# Patient Record
Sex: FEMALE | Race: WHITE | Employment: OTHER | URBAN - METROPOLITAN AREA
[De-identification: names, ages, dates, MRNs, and addresses within clinical notes are randomized per-mention and may not be internally consistent; named-entity substitution may affect disease eponyms.]

---

## 2017-08-28 ENCOUNTER — OFFICE VISIT (OUTPATIENT)
Dept: INTERNAL MEDICINE CLINIC | Age: 82
End: 2017-08-28

## 2017-08-28 VITALS
RESPIRATION RATE: 20 BRPM | DIASTOLIC BLOOD PRESSURE: 65 MMHG | BODY MASS INDEX: 21.71 KG/M2 | TEMPERATURE: 98.4 F | WEIGHT: 115 LBS | HEART RATE: 68 BPM | SYSTOLIC BLOOD PRESSURE: 124 MMHG | HEIGHT: 61 IN | OXYGEN SATURATION: 96 %

## 2017-08-28 DIAGNOSIS — M19.90 OSTEOARTHRITIS, UNSPECIFIED OSTEOARTHRITIS TYPE, UNSPECIFIED SITE: ICD-10-CM

## 2017-08-28 DIAGNOSIS — I48.91 ATRIAL FIBRILLATION, UNSPECIFIED TYPE (HCC): Primary | ICD-10-CM

## 2017-08-28 DIAGNOSIS — R26.9 GAIT DISORDER: ICD-10-CM

## 2017-08-28 DIAGNOSIS — N32.81 OAB (OVERACTIVE BLADDER): ICD-10-CM

## 2017-08-28 DIAGNOSIS — M75.02 ADHESIVE CAPSULITIS OF BOTH SHOULDERS: ICD-10-CM

## 2017-08-28 DIAGNOSIS — M75.01 ADHESIVE CAPSULITIS OF BOTH SHOULDERS: ICD-10-CM

## 2017-08-28 RX ORDER — SENNOSIDES 8.6 MG/1
2 TABLET ORAL EVERY EVENING
COMMUNITY

## 2017-08-28 RX ORDER — LOSARTAN POTASSIUM 50 MG/1
25 TABLET ORAL DAILY
COMMUNITY
End: 2018-05-30

## 2017-08-28 RX ORDER — CHOLECALCIFEROL (VITAMIN D3) 125 MCG
2000 CAPSULE ORAL EVERY EVENING
COMMUNITY

## 2017-08-28 RX ORDER — METOPROLOL SUCCINATE 50 MG/1
TABLET, EXTENDED RELEASE ORAL DAILY
COMMUNITY
End: 2018-05-30

## 2017-08-28 RX ORDER — DILTIAZEM HYDROCHLORIDE EXTENDED-RELEASE TABLETS 240 MG/1
240 TABLET, EXTENDED RELEASE ORAL DAILY
Status: ON HOLD | COMMUNITY
End: 2018-05-30

## 2017-08-28 RX ORDER — OXYBUTYNIN CHLORIDE 5 MG/1
5 TABLET, EXTENDED RELEASE ORAL DAILY
COMMUNITY
End: 2018-02-09 | Stop reason: SDUPTHER

## 2017-08-28 NOTE — PROGRESS NOTES
Chief Complaint   Patient presents with    [facility]    New Order     PT   patient is here today with her niece, Vielka Zaldivar [phone].

## 2017-08-28 NOTE — PROGRESS NOTES
HISTORY OF PRESENT ILLNESS  Ananya Urbina is a 80 y.o. female. This patient presents today with her niece to establish care. The patient has recently moved to [facility] from [facility]. The patient states she is generally feeling well. She denies pain and shortness of breath at time of visit. She does have some gait weakness and uses a rollator for ambulation. She denies recent falls. 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 Patient's history includes Afib. She has appointment scheduled to establish with cardiology, Dr. Russel Davis next month. Visit Vitals    /65 (BP 1 Location: Right arm, BP Patient Position: Sitting)    Pulse 68    Temp 98.4 °F (36.9 °C) (Oral)    Resp 20    Ht 5' 1\" (1.549 m)    Wt 115 lb (52.2 kg)    SpO2 96%    BMI 21.73 kg/m2     HPI    Review of Systems   Constitutional: Negative for chills, fever, malaise/fatigue and weight loss. HENT: Negative for congestion. Eyes: Negative for blurred vision. Respiratory: Negative for cough, shortness of breath and wheezing. Cardiovascular: Negative for chest pain, palpitations and leg swelling. Gastrointestinal: Negative for abdominal pain. Genitourinary: Negative. Musculoskeletal: Negative. Skin: Negative. Neurological: Negative for dizziness and headaches. Endo/Heme/Allergies: Negative. Psychiatric/Behavioral: Negative. Physical Exam   Constitutional: She is oriented to person, place, and time. She appears well-developed and well-nourished. No distress. Ambulating with rollator   HENT:   Head: Normocephalic and atraumatic. Mouth/Throat: Oropharynx is clear and moist. No oropharyngeal exudate. [place]   Eyes: Conjunctivae are normal.   Neck: Neck supple. Cardiovascular: Normal rate, regular rhythm, normal heart sounds and intact distal pulses. No murmur heard. Pulmonary/Chest: Effort normal and breath sounds normal. No respiratory distress. She has no wheezes.  She has no rales. Abdominal: Soft. Bowel sounds are normal. She exhibits no distension. There is no tenderness. Musculoskeletal: She exhibits edema. Trace bilateral lower extremity edema  ROM bilateral shoulders limited with history of frozen shoulders   Lower extremity weakness   Neurological: She is alert and oriented to person, place, and time. Skin: Skin is warm and dry. Psychiatric: She has a normal mood and affect. Her behavior is normal.   Nursing note and vitals reviewed. ASSESSMENT and PLAN    ICD-10-CM ICD-9-CM    1. Atrial fibrillation, unspecified type [facility]) I48.91 427.31 Establish with cardiology, as scheduled. Taking Toprol XL 50 mg daily, Cardizem  mg daily, losartan 50 mg daily, and Xarelto 15 mg daily. 2. OAB (overactive bladder) N32.81 596.51 Taking Ditropan XL 10 mg daily   3. Osteoarthritis, unspecified osteoarthritis type, unspecified site M19.90 715.90 Will refer,   4. Gait disorder R26.9 781. 2 Will order  [address] [place]   5. Adhesive capsulitis of both shoulders M75.01 726.0 As above. M75.02       Lab results and schedule of future lab studies reviewed with patient  Reviewed diet, exercise and weight control  Reviewed medications and side effects in detail  Patient encouraged to call or return to office if symptoms do not improve or worsen. Reviewed plan of care with patient who acknowledges understanding and agrees. Follow-up in 3 months, or sooner as needed.

## 2017-08-28 NOTE — MR AVS SNAPSHOT
Visit Information Date & Time Provider Department Dept. Phone Encounter #  
 8/28/2017  3:30 PM Albina Bateman, [address] [facility] Internal Medicine [facility] [phone] [id number] Upcoming Health Maintenance Date Due DTaP/Tdap/Td series (1 - Tdap) 8/19/1950 ZOSTER VACCINE AGE 60> 6/19/1989 GLAUCOMA SCREENING Q2Y 8/19/1994 OSTEOPOROSIS SCREENING (DEXA) 8/19/1994 Pneumococcal 65+ Low/Medium Risk (1 of 2 - PCV13) 8/19/1994 MEDICARE YEARLY EXAM 8/19/1994 INFLUENZA AGE 9 TO ADULT 8/1/2017 Allergies as of 8/28/2017  Review Complete On: 8/28/2017 By: Albina Bateman NP No Known Allergies Current Immunizations  Never Reviewed No immunizations on file. Not reviewed this visit You Were Diagnosed With   
  
 Codes Comments Gait disorder    -  Primary ICD-10-CM: R26.9 ICD-9-CM: 138. 2 Vitals BP Pulse Temp Resp Height(growth percentile) Weight(growth percentile) 124/65 (BP 1 Location: Right arm, BP Patient Position: Sitting) 68 98.4 °F (36.9 °C) (Oral) 20 5' 1\" (1.549 m) 115 lb (52.2 kg) SpO2 BMI OB Status Smoking Status 96% 21.73 kg/m2 Postmenopausal Never Smoker BMI and BSA Data Body Mass Index Body Surface Area 21.73 kg/m 2 1.5 m 2 Preferred Pharmacy Pharmacy Name Phone [address], [facility] [phone] Your Updated Medication List  
  
   
This list is accurate as of: 8/28/17  4:26 PM.  Always use your most recent med list.  
  
  
  
  
 dilTIAZem  mg Tb24 tablet Commonly known as:  CARDIZEM LA Take 240 mg by mouth daily. DITROPAN XL 5 mg CR tablet Generic drug:  oxybutynin chloride XL Take 5 mg by mouth daily. 2 tablets PO daily. losartan 50 mg tablet Commonly known as:  COZAAR Take  by mouth daily. metoprolol succinate 50 mg XL tablet Commonly known as:  TOPROL-XL Take  by mouth daily. SENOKOT 8.6 mg tablet Generic drug:  senna Take 1 Tab by mouth daily. Take 2 tablets daily. VITAMIN D3 2,000 unit Tab Generic drug:  cholecalciferol (vitamin D3) Take  by mouth. XARELTO 15 mg Tab tablet Generic drug:  rivaroxaban Take  by mouth daily. We Performed the Following [address] [place] [address] Comments:  
 Please evaluate patient for PT:  Gait weakness, bilateral frozen shoulder Referral Information Referral ID Referred By Referred To  
  
 [id number] Deanna Espino Not Available Visits Status Start Date End Date 1 New Request 8/28/17 8/28/18 If your referral has a status of pending review or denied, additional information will be sent to support the outcome of this decision. Please provide this summary of care documentation to your next provider. If you have any questions after today's visit, please call [phone].

## 2017-08-31 ENCOUNTER — TELEPHONE (OUTPATIENT)
Dept: INTERNAL MEDICINE CLINIC | Age: 82
End: 2017-08-31

## 2017-09-05 ENCOUNTER — TELEPHONE (OUTPATIENT)
Dept: INTERNAL MEDICINE CLINIC | Age: 82
End: 2017-09-05

## 2017-09-05 NOTE — TELEPHONE ENCOUNTER
Received message from Naina Escalante with [facility]. Patient started PT today. Jorge Luis Newberry recommends OT referral as well for assistance with bathing and dressing. Verbal order provided. Jorge Luis Newberry requests medication list for home health services.     Requests list be faxed to #[phone]

## 2017-10-10 ENCOUNTER — TELEPHONE (OUTPATIENT)
Dept: INTERNAL MEDICINE CLINIC | Age: 82
End: 2017-10-10

## 2017-11-21 ENCOUNTER — OFFICE VISIT (OUTPATIENT)
Dept: INTERNAL MEDICINE CLINIC | Age: 82
End: 2017-11-21

## 2017-11-21 VITALS
TEMPERATURE: 98.3 F | DIASTOLIC BLOOD PRESSURE: 58 MMHG | HEART RATE: 75 BPM | OXYGEN SATURATION: 98 % | HEIGHT: 61 IN | SYSTOLIC BLOOD PRESSURE: 135 MMHG | RESPIRATION RATE: 20 BRPM

## 2017-11-21 DIAGNOSIS — I48.91 ATRIAL FIBRILLATION, UNSPECIFIED TYPE (HCC): ICD-10-CM

## 2017-11-21 DIAGNOSIS — M54.50 ACUTE LEFT-SIDED LOW BACK PAIN WITHOUT SCIATICA: Primary | ICD-10-CM

## 2017-11-21 DIAGNOSIS — M62.838 MUSCLE SPASM: ICD-10-CM

## 2017-11-21 DIAGNOSIS — M41.9 SCOLIOSIS, UNSPECIFIED SCOLIOSIS TYPE, UNSPECIFIED SPINAL REGION: ICD-10-CM

## 2017-11-21 RX ORDER — BACLOFEN 10 MG/1
10 TABLET ORAL
Qty: 20 TAB | Refills: 0 | Status: SHIPPED | OUTPATIENT
Start: 2017-11-21 | End: 2017-12-05 | Stop reason: SDUPTHER

## 2017-11-21 RX ORDER — BACLOFEN 10 MG/1
10 TABLET ORAL
Qty: 20 TAB | Refills: 0 | Status: SHIPPED | OUTPATIENT
Start: 2017-11-21 | End: 2017-11-21 | Stop reason: SDUPTHER

## 2017-11-21 NOTE — PROGRESS NOTES
HISTORY OF PRESENT ILLNESS  Amanda Hill is a 80 y.o. female here accompanied by her niece . She has been suffering from acute lower back pain for last 5 days. Pain is moderate to severe in intensity. She did not fall no injury. Pain get better if she lie down get worse if she sits up. She has scoliosis. Using specialized bed with running. No leg weakness or bowel bladder problem. Has A. fib, seeing cardiologist.  On Xarelto 20 mg once a day for now labs done in cardiologist office. CBC CMP are stable. No other discomfort. HPI    Review of Systems   Constitutional: Negative. HENT: Negative. Eyes: Negative. Respiratory: Negative. Cardiovascular: Negative. Genitourinary: Negative. Musculoskeletal: Positive for back pain and joint pain. Negative for falls. Skin: Negative. Neurological: Negative. Negative for sensory change and speech change. Physical Exam   Constitutional: She appears well-developed and well-nourished. She appears distressed. Neck: Normal range of motion. Neck supple. No JVD present. No thyromegaly present. Cardiovascular: Normal rate, regular rhythm, normal heart sounds and intact distal pulses. Pulmonary/Chest: Effort normal and breath sounds normal. No respiratory distress. She has no wheezes. Musculoskeletal: She exhibits no edema. Lower back: Spine nontender, has scoliosis present. Left-sided paravertebral muscle spasm present tender on left side muscle. ROM restricted   Lymphadenopathy:     She has no cervical adenopathy. Neurological: She displays normal reflexes. No cranial nerve deficit. She exhibits normal muscle tone. Coordination normal.   Psychiatric: She has a normal mood and affect. Her behavior is normal.       ASSESSMENT and PLAN  Diagnoses and all orders for this visit:    1.  Acute left-sided low back pain without sciatica    Musculoskeletal.  Will give,  -     KETOROLAC TROMETHAMINE INJ 15 MG IM.  -     AR THER/PROPH/DIAG INJECTION, SUBCUT/IM    Will call in,  -     baclofen (LIORESAL) 10 mg tablet; Take 1 Tab by mouth two (2) times daily as needed. Advised to use Tylenol 2-3 times a day. 2. Muscle spasm    Heating pad and massage. Will call in,  -     baclofen (LIORESAL) 10 mg tablet; Take 1 Tab by mouth two (2) times daily as needed. 3. Scoliosis, unspecified scoliosis type, unspecified spinal region    On special mattress with railing. Advised to take her time to get up and come to age of BED TO stand up. 4. Atrial fibrillation, unspecified type (HCC)    Stable on Toprol and Xarelto 20 mg once a day. Seeing cardiologist Dr. Rosaura Ramos. Labs reviewed from cardiologist office. CBC CMP are stable. Discussed expected course/resolution/complications of diagnosis in detail with patient. Medication risks/benefits/costs/interactions/alternatives discussed with patient. Pt was given an after visit summary which includes diagnoses, current medications & vitals. Pt expressed understanding with the diagnosis and plan.

## 2017-11-21 NOTE — MR AVS SNAPSHOT
Visit Information Date & Time Provider Department Dept. Phone Encounter #  
 11/21/2017 11:00 AM Miguel Angel Kramer MD [facility] Internal Medicine [facility] [phone] [id number] Upcoming Health Maintenance Date Due DTaP/Tdap/Td series (1 - Tdap) 8/19/1950 ZOSTER VACCINE AGE 60> 6/19/1989 GLAUCOMA SCREENING Q2Y 8/19/1994 OSTEOPOROSIS SCREENING (DEXA) 8/19/1994 Pneumococcal 65+ Low/Medium Risk (1 of 2 - PCV13) 8/19/1994 MEDICARE YEARLY EXAM 8/19/1994 Influenza Age 5 to Adult 8/1/2017 Allergies as of 11/21/2017  Review Complete On: 11/21/2017 By: Miguel Angel Kramer MD  
 No Known Allergies Current Immunizations  Never Reviewed No immunizations on file. Not reviewed this visit You Were Diagnosed With   
  
 Codes Comments Acute left-sided low back pain without sciatica    -  Primary ICD-10-CM: M54.5 ICD-9-CM: 724.2 Muscle spasm     ICD-10-CM: P66.854 ICD-9-CM: 728.85 Scoliosis, unspecified scoliosis type, unspecified spinal region     ICD-10-CM: M41.9 ICD-9-CM: 737.30 Atrial fibrillation, unspecified type ([facility]     ICD-10-CM: I48.91 
ICD-9-CM: 427.31 Vitals BP Pulse Temp Resp Height(growth percentile) SpO2  
 135/58 (BP 1 Location: Left arm, BP Patient Position: Sitting) 75 98.3 °F (36.8 °C) (Oral) 20 5' 1\" (1.549 m) 98% OB Status Smoking Status Postmenopausal Never Smoker Vitals History Preferred Pharmacy Pharmacy Name Phone [address] [facility] [phone] Your Updated Medication List  
  
   
This list is accurate as of: 11/21/17 11:27 AM.  Always use your most recent med list.  
  
  
  
  
 baclofen 10 mg tablet Commonly known as:  LIORESAL Take 1 Tab by mouth two (2) times daily as needed. dilTIAZem  mg Tb24 tablet Commonly known as:  CARDIZEM LA Take 240 mg by mouth daily. DITROPAN XL 5 mg CR tablet Generic drug:  oxybutynin chloride XL Take 5 mg by mouth daily. 2 tablets PO daily. losartan 50 mg tablet Commonly known as:  COZAAR Take  by mouth daily. metoprolol succinate 50 mg XL tablet Commonly known as:  TOPROL-XL Take  by mouth daily. SENOKOT 8.6 mg tablet Generic drug:  senna Take 1 Tab by mouth daily. Take 2 tablets daily. VITAMIN D3 2,000 unit Tab Generic drug:  cholecalciferol (vitamin D3) Take  by mouth. XARELTO 15 mg Tab tablet Generic drug:  rivaroxaban Take  by mouth daily. Prescriptions Sent to Pharmacy Refills  
 baclofen (LIORESAL) 10 mg tablet 0 Sig: Take 1 Tab by mouth two (2) times daily as needed. Class: Normal  
 Pharmacy: [address], [address] [place] [address] #: [phone] Route: Oral  
  
We Performed the Following KETOROLAC TROMETHAMINE INJ [ [facility]] [place] THER/PROPH/DIAG INJECTION, SUBCUT/IM [id number] CPT(R)] Patient Instructions Acute Low Back Pain: Exercises Your Care Instructions Here are some examples of typical rehabilitation exercises for your condition. Start each exercise slowly. Ease off the exercise if you start to have pain. Your doctor or physical therapist will tell you when you can start these exercises and which ones will work best for you. When you are not being active, find a comfortable position for rest. Some people are comfortable on the floor or a medium-firm bed with a small pillow under their head and another under their knees. Some people prefer to lie on their side with a pillow between their knees. Don't stay in one position for too long. Take short walks (10 to 20 minutes) every 2 to 3 hours. Avoid slopes, hills, and stairs until you feel better. Walk only distances you can manage without pain, especially leg pain. How to do the exercises Back stretches 1. Get down on your hands and knees on the floor. 2. Relax your head and allow it to droop. Round your back up toward the ceiling until you feel a nice stretch in your upper, middle, and lower back. Hold this stretch for as long as it feels comfortable, or about 15 to 30 seconds. 3. Return to the starting position with a flat back while you are on your hands and knees. 4. Let your back sway by pressing your stomach toward the floor. Lift your buttocks toward the ceiling. 5. Hold this position for 15 to 30 seconds. 6. Repeat 2 to 4 times. Follow-up care is a key part of your treatment and safety. Be sure to make and go to all appointments, and call your doctor if you are having problems. It's also a good idea to know your test results and keep a list of the medicines you take. Where can you learn more? Go to http://rosario-gino.info/. Enter N498 in the search box to learn more about \"Acute Low Back Pain: Exercises. \" Current as of: March 21, 2017 Content Version: 11.4 © 8571-0458 Healthwise, [employer]. Care instructions adapted under license by [employer] (which disclaims liability or warranty for this information). If you have questions about a medical condition or this instruction, always ask your healthcare professional. [address] any warranty or liability for your use of this information. Please provide this summary of care documentation to your next provider. Your primary care clinician is listed as Oneta Faes. If you have any questions after today's visit, please call [phone].

## 2017-11-21 NOTE — PATIENT INSTRUCTIONS
Acute Low Back Pain: Exercises  Your Care Instructions  Here are some examples of typical rehabilitation exercises for your condition. Start each exercise slowly. Ease off the exercise if you start to have pain. Your doctor or physical therapist will tell you when you can start these exercises and which ones will work best for you. When you are not being active, find a comfortable position for rest. Some people are comfortable on the floor or a medium-firm bed with a small pillow under their head and another under their knees. Some people prefer to lie on their side with a pillow between their knees. Don't stay in one position for too long. Take short walks (10 to 20 minutes) every 2 to 3 hours. Avoid slopes, hills, and stairs until you feel better. Walk only distances you can manage without pain, especially leg pain. How to do the exercises  Back stretches    1. Get down on your hands and knees on the floor. 2. Relax your head and allow it to droop. Round your back up toward the ceiling until you feel a nice stretch in your upper, middle, and lower back. Hold this stretch for as long as it feels comfortable, or about 15 to 30 seconds. 3. Return to the starting position with a flat back while you are on your hands and knees. 4. Let your back sway by pressing your stomach toward the floor. Lift your buttocks toward the ceiling. 5. Hold this position for 15 to 30 seconds. 6. Repeat 2 to 4 times. Follow-up care is a key part of your treatment and safety. Be sure to make and go to all appointments, and call your doctor if you are having problems. It's also a good idea to know your test results and keep a list of the medicines you take. Where can you learn more? Go to http://rosario-gino.info/. Enter X002 in the search box to learn more about \"Acute Low Back Pain: Exercises. \"  Current as of: March 21, 2017  Content Version: 11.4  © 4739-5155 Healthwise, [facility].  Care instructions adapted under license by [employer] (which disclaims liability or warranty for this information). If you have questions about a medical condition or this instruction, always ask your healthcare professional. [address] any warranty or liability for your use of this information.

## 2017-11-21 NOTE — PROGRESS NOTES
Health Maintenance Due   Topic Date Due    DTaP/Tdap/Td series (1 - Tdap) 08/19/1950    ZOSTER VACCINE AGE 60>  06/19/1989    GLAUCOMA SCREENING Q2Y  08/19/1994    OSTEOPOROSIS SCREENING (DEXA)  08/19/1994    Pneumococcal 65+ Low/Medium Risk (1 of 2 - PCV13) 08/19/1994    MEDICARE YEARLY EXAM  08/19/1994    Influenza Age 9 to Adult  08/01/2017       Chief Complaint   Patient presents with    Back Pain     left low back, no radiating, when sitting, better when she lays down x 4-5 days       1. Have you been to the ER, urgent care clinic since your last visit? Hospitalized since your last visit? No    2. Have you seen or consulted any other health care providers outside of the [address] [place] [address] since your last visit? Include any pap smears or colon screening. No    3) Do you have an Advance Directive on file? no    4) Are you interested in receiving information on Advance Directives? NO      Patient is accompanied by self I have received verbal consent from Darlin Davis to discuss any/all medical information while they are present in the room. After obtaining consent, and per orders of Dr. Geri Lucero, injection of Toradol 15mg IM given by Nolvia Richards LPN. Patient instructed to remain in clinic for 20 minutes afterwards, and to report any adverse reaction to me immediately.

## 2017-12-05 ENCOUNTER — OFFICE VISIT (OUTPATIENT)
Dept: INTERNAL MEDICINE CLINIC | Age: 82
End: 2017-12-05

## 2017-12-05 VITALS
DIASTOLIC BLOOD PRESSURE: 50 MMHG | TEMPERATURE: 98.3 F | OXYGEN SATURATION: 95 % | HEIGHT: 61 IN | BODY MASS INDEX: 21.71 KG/M2 | RESPIRATION RATE: 20 BRPM | SYSTOLIC BLOOD PRESSURE: 120 MMHG | WEIGHT: 115 LBS | HEART RATE: 82 BPM

## 2017-12-05 DIAGNOSIS — Z78.0 POST-MENOPAUSE: ICD-10-CM

## 2017-12-05 DIAGNOSIS — L84 CORN OF FOOT: ICD-10-CM

## 2017-12-05 DIAGNOSIS — Z71.89 ADVANCE CARE PLANNING: ICD-10-CM

## 2017-12-05 DIAGNOSIS — Z23 ENCOUNTER FOR IMMUNIZATION: ICD-10-CM

## 2017-12-05 DIAGNOSIS — M41.9 SCOLIOSIS, UNSPECIFIED SCOLIOSIS TYPE, UNSPECIFIED SPINAL REGION: ICD-10-CM

## 2017-12-05 DIAGNOSIS — Z00.00 MEDICARE ANNUAL WELLNESS VISIT, INITIAL: ICD-10-CM

## 2017-12-05 DIAGNOSIS — I48.91 ATRIAL FIBRILLATION, UNSPECIFIED TYPE (HCC): Primary | ICD-10-CM

## 2017-12-05 DIAGNOSIS — M54.50 ACUTE LEFT-SIDED LOW BACK PAIN WITHOUT SCIATICA: ICD-10-CM

## 2017-12-05 DIAGNOSIS — G89.29 CHRONIC MIDLINE LOW BACK PAIN WITHOUT SCIATICA: ICD-10-CM

## 2017-12-05 DIAGNOSIS — Z13.5 GLAUCOMA SCREENING: ICD-10-CM

## 2017-12-05 DIAGNOSIS — M54.50 CHRONIC MIDLINE LOW BACK PAIN WITHOUT SCIATICA: ICD-10-CM

## 2017-12-05 DIAGNOSIS — R41.3 MEMORY PROBLEM: ICD-10-CM

## 2017-12-05 DIAGNOSIS — M21.961 FOOT DEFORMITY, RIGHT: ICD-10-CM

## 2017-12-05 DIAGNOSIS — M62.838 MUSCLE SPASM: ICD-10-CM

## 2017-12-05 RX ORDER — ACETAMINOPHEN 500 MG
500 TABLET ORAL 2 TIMES DAILY
Qty: 60 TAB | Refills: 5 | Status: SHIPPED | OUTPATIENT
Start: 2017-12-05 | End: 2018-07-28

## 2017-12-05 RX ORDER — BACLOFEN 10 MG/1
10 TABLET ORAL
Qty: 30 TAB | Refills: 1 | Status: SHIPPED | OUTPATIENT
Start: 2017-12-05 | End: 2018-05-30

## 2017-12-05 NOTE — PATIENT INSTRUCTIONS
Schedule of Personalized Health Plan  (Provide Copy to Patient)  The best way to stay healthy is to live a healthy lifestyle. A healthy lifestyle includes regular exercise, eating a well-balanced diet, keeping a healthy weight and not smoking. Regular physical exams and screening tests are another important way to take care of yourself. Preventive exams provided by health care providers can find health problems early when treatment works best and can keep you from getting certain diseases or illnesses. Preventive services include exams, lab tests, screenings, shots, monitoring and information to help you take care of your own health. All people over 65 should have a pneumonia shot. Pneumonia shots are usually only needed once in a lifetime unless your doctor decides differently. Will order    All people over 65 should have a yearly flu shot. upto date    People over 72 are at medium to high risk for Hepatitis B. Three shots are needed for complete protection. In addition to your physical exam, some screening tests are recommended:    Bone mass measurement (dexa scan) is recommended every two years. ordered. Diabetes Mellitus screening is recommended every year. up to date. Glaucoma is an eye disease caused by high pressure in the eye. An eye exam is recommended every year. Will order. Cardiovascular screening tests that check your cholesterol and other blood fat (lipid) levels are recommended every five years. Up to date. Colorectal Cancer screening tests help to find pre-cancerous polyps (growths in the colon) so they can be removed before they turn into cancer. Tests ordered for screening depend on your personal and family history risk factors.     Screening for Breast Cancer is recommended yearly with a mammogram.    Screening for Cervical Cancer is recommended every two years (annually for certain risk factors, such as previous history of STD or abnormal PAP in past 7 years), with a Pelvic Exam with PAP. N/A    Here is a list of your current Health Maintenance items with a due date:  Health Maintenance   Topic Date Due    DTaP/Tdap/Td series (1 - Tdap) 08/19/1950    ZOSTER VACCINE AGE 60>  06/19/1989    GLAUCOMA SCREENING Q2Y  08/19/1994    OSTEOPOROSIS SCREENING (DEXA)  08/19/1994    Pneumococcal 65+ Low/Medium Risk (1 of 2 - PCV13) 08/19/1994    Influenza Age 9 to Adult  08/01/2017    MEDICARE YEARLY EXAM  12/06/2018

## 2017-12-05 NOTE — PROGRESS NOTES
HISTORY OF PRESENT ILLNESS  Vielka Lindsey is a 80 y.o. female here accompanied by her niece . Her back pain has improved. Almost finishing her baclofen. She is taking Tylenol twice a day. Has scoliosis, using specialized bed. No fall or injury. She has foot deformity, has corn on her right foot. He is to see a podiatrist in [facility]. Needs a new one. need bone density and I checkup. Has A. fib, seeing cardiologist.  On Xarelto 20 mg once a day for now labs done in cardiologist office. CBC CMP are stable. Report memory problem. Need formal evaluation. Here for Medicare wellness visit. Need living will. Back Pain      Irregular Heart Beat    Associated symptoms include back pain. Foot Problem    Associated symptoms include back pain. Review of Systems   Constitutional: Negative. HENT: Negative. Eyes: Negative. Respiratory: Negative. Cardiovascular: Negative. Genitourinary: Negative. Musculoskeletal: Positive for back pain and joint pain. Negative for falls. Skin: Negative. Neurological: Negative. Negative for sensory change and speech change. Psychiatric/Behavioral: Positive for memory loss. Physical Exam   Constitutional: She appears well-developed and well-nourished. No distress. Neck: Normal range of motion. Neck supple. No JVD present. No thyromegaly present. Cardiovascular: Normal rate, regular rhythm, normal heart sounds and intact distal pulses. Pulmonary/Chest: Effort normal and breath sounds normal. No respiratory distress. She has no wheezes. Musculoskeletal: She exhibits no edema. Lower back: Spine nontender, has scoliosis present. Left-sided paravertebral muscle spasm present tender on left side muscle. ROM restricted   Skin:   Right foot: Approximately 2 x 5 cm size: On medial part of foot. Foot is deformed. Psychiatric: She has a normal mood and affect.  Her behavior is normal.       ASSESSMENT and PLAN  Diagnoses and all orders for this visit:    1. Atrial fibrillation, unspecified type ([address]    And rhythm controlled. On Xarelto Cardizem LA and metoprolol XL. Has seen Dr. Zakiya Luevano a cardiologist.  Matteo Olvera reviewed, stable. 2. Chronic midline low back pain without sciatica    Mostly positional.  Will continue baclofen twice a day as needed along with Tylenol twice a day scheduled. If pain get worse advised to call me will do x-ray of the lower back. 3. Scoliosis, unspecified scoliosis type, unspecified spinal region    4. Acute left-sided low back pain without sciatica  -     baclofen (LIORESAL) 10 mg tablet; Take 1 Tab by mouth two (2) times daily as needed. -     acetaminophen (TYLENOL) 500 mg tablet; Take 1 Tab by mouth two (2) times a day. 5. Muscle spasm  -     baclofen (LIORESAL) 10 mg tablet; Take 1 Tab by mouth two (2) times daily as needed. -     acetaminophen (TYLENOL) 500 mg tablet; Take 1 Tab by mouth two (2) times a day. 6. Corn of foot    Has foot deformity causing chronic corn. Will refer,  -     REFERRAL TO PODIATRY    7. Foot deformity, right  -     REFERRAL TO PODIATRY    8. Medicare annual wellness visit, initial    9. Post-menopause  -     DEXA BONE DENSITY STUDY AXIAL; Future    10. Memory problem  Will order,    -     REFERRAL TO NEUROPSYCHOLOGY    11. Encounter for immunization  -     varicella zoster vaccine live (ZOSTAVAX) 19,400 unit/0.65 mL susr injection; 1 Vial by SubCUTAneous route once for 1 dose. -     pneumococcal 13 jagjit conj dip (PREVNAR-13) 0.5 mL syrg injection; 0.5 mL by IntraMUSCular route once for 1 dose. 12. Glaucoma screening  -     REFERRAL TO OPHTHALMOLOGY    13. Advance care planning  ACP discussed with pt in detail , including choosing a healthcare agent to communicate patient's healthcare decisions if patient lost the ability to make decisions, such as after a sudden illness or accident. Understanding of the healthcare agent role was assessed and information provided.  Discussed values, goals, and preferences if recovery is not expected, even with continued medical treatment in the event of: Imminent death/serious illness. Recommended completion of Advance Directive form after review of ACP materials and conversation with prospective healthcare agent. Recommended communicating the plan and making copies for the healthcare agent, personal physician, and others as appropriate (e.g., health system). Recommended review of completed ACP document annually or upon change in health status. Information book and form given. Face to face time spent was16 minutes      Discussed expected course/resolution/complications of diagnosis in detail with patient. Medication risks/benefits/costs/interactions/alternatives discussed with patient. Pt was given an after visit summary which includes diagnoses, current medications & vitals. Pt expressed understanding with the diagnosis and plan.

## 2017-12-05 NOTE — PROGRESS NOTES
Lidia Phillips is a 80 y.o. female and presents for annual Medicare Wellness Visit. Problem List: Reviewed with patient and discussed risk factors. 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    Osteoarthritis M19.90    OAB (overactive bladder) N32.81    Scoliosis M41.9       Current medical providers:  Patient Care Team:  Faiza Bernal NP as PCP - General (Nurse Practitioner)  Radha Perez RN    PSH: Reviewed with patient  Past Surgical History:   Procedure Laterality Date    HX BREAST AUGMENTATION      HX ORTHOPAEDIC      TKR--bilateral         SH: Reviewed with patient  Social History   Substance Use Topics    Smoking status: Never Smoker    Smokeless tobacco: Never Used    Alcohol use No       FH: Reviewed with patient  Family History   Problem Relation Age of Onset    Heart Attack Mother     Cancer Sister     Cancer Brother        Medications/Allergies: Reviewed with patient  Current Outpatient Prescriptions on File Prior to Visit   Medication Sig Dispense Refill    losartan (COZAAR) 50 mg tablet Take  by mouth daily.  metoprolol succinate (TOPROL-XL) 50 mg XL tablet Take  by mouth daily.  rivaroxaban (XARELTO) 15 mg tab tablet Take  by mouth daily.  dilTIAZem ER (CARDIZEM LA) 240 mg Tb24 tablet Take 240 mg by mouth daily.  oxybutynin chloride XL (DITROPAN XL) 5 mg CR tablet Take 5 mg by mouth daily. 2 tablets PO daily.  cholecalciferol, vitamin D3, (VITAMIN D3) 2,000 unit tab Take  by mouth.  senna (SENOKOT) 8.6 mg tablet Take 1 Tab by mouth daily. Take 2 tablets daily. No current facility-administered medications on file prior to visit.        No Known Allergies    Objective:  Visit Vitals    /50 (BP 1 Location: Right arm, BP Patient Position: Sitting)    Pulse 82    Temp 98.3 °F (36.8 °C) (Oral)    Resp 20    Ht 5' 1\" (1.549 m)    Wt 115 lb (52.2 kg)    SpO2 95%    BMI 21.73 kg/m2    Body mass index is 21.73 kg/(m^2). Assessment of cognitive impairment: Alert and oriented x 2    Depression Screen:   PHQ over the last two weeks 12/5/2017   Little interest or pleasure in doing things Not at all   Feeling down, depressed or hopeless Not at all   Total Score PHQ 2 0       Fall Risk Assessment:    Fall Risk Assessment, last 12 mths 12/5/2017   Able to walk? No   Fall in past 12 months? -       Functional Ability:   Does the patient exhibit a steady gait?  no   How long did it take the patient to get up and walk from a sitting position? 1 min   Is the patient self reliant?  (ie can do own laundry, meals, household chores)  no     Does the patient handle his/her own medications? yes     Does the patient handle his/her own money? no     Is the patients home safe (ie good lighting, handrails on stairs and bath, etc.)? yes     Did you notice or did patient express any hearing difficulties? no     Did you notice or did patient express any vision difficulties?   no     Were distance and reading eye charts used? no       Advance Care Planning:   Patient was offered the opportunity to discuss advance care planning:  yes     Does patient have an Advance Directive:  yes   If no, did you provide information on Caring Connections?   yes       Plan:      Orders Placed This Encounter    DEXA BONE DENSITY STUDY AXIAL    REFERRAL TO PODIATRY    REFERRAL TO NEUROPSYCHOLOGY    REFERRAL TO OPHTHALMOLOGY    baclofen (LIORESAL) 10 mg tablet    acetaminophen (TYLENOL) 500 mg tablet    varicella zoster vaccine live (ZOSTAVAX) 19,400 unit/0.65 mL susr injection    pneumococcal 13 jagjit conj dip (PREVNAR-13) 0.5 mL syrg injection       Health Maintenance   Topic Date Due    DTaP/Tdap/Td series (1 - Tdap) [birth year]/1950    ZOSTER VACCINE AGE 60>  06/19/1989    GLAUCOMA SCREENING Q2Y  08/19/1994    OSTEOPOROSIS SCREENING (DEXA)  08/19/1994    Pneumococcal 65+ Low/Medium Risk (1 of 2 - PCV13) 08/19/1994    Influenza Age 9 to Adult  08/01/2017    [address] [place] [address]  12/06/2018       *Patient verbalized understanding and agreement with the plan. A copy of the After Visit Summary with personalized health plan was given to the patient today.

## 2017-12-05 NOTE — MR AVS SNAPSHOT
Visit Information Date & Time Provider Department Dept. Phone Encounter #  
 12/5/2017 10:45 AM Miguelangel Johnson MD [facility] [phone] [id number] Upcoming Health Maintenance Date Due DTaP/Tdap/Td series (1 - Tdap) 8/19/1950 ZOSTER VACCINE AGE 60> 6/19/1989 GLAUCOMA SCREENING Q2Y 8/19/1994 OSTEOPOROSIS SCREENING (DEXA) 8/19/1994 Pneumococcal 65+ Low/Medium Risk (1 of 2 - PCV13) 8/19/1994 Influenza Age 5 to Adult 8/1/2017 MEDICARE YEARLY EXAM 12/6/2018 Allergies as of 12/5/2017  Review Complete On: 12/5/2017 By: Miguelangel Johnson MD  
 No Known Allergies Current Immunizations  Reviewed on 12/5/2017 No immunizations on file. Reviewed by Miguelangel Johnson MD on 12/5/2017 at 11:19 AM  
You Were Diagnosed With   
  
 Codes Comments Atrial fibrillation, unspecified type ([facility]    -  Primary ICD-10-CM: I48.91 
ICD-9-CM: 427.31 Chronic midline low back pain without sciatica     ICD-10-CM: M54.5, G89.29 ICD-9-CM: 724.2, 338.29 Scoliosis, unspecified scoliosis type, unspecified spinal region     ICD-10-CM: M41.9 ICD-9-CM: 737.30 Acute left-sided low back pain without sciatica     ICD-10-CM: M54.5 ICD-9-CM: 724.2 Muscle spasm     ICD-10-CM: A43.683 ICD-9-CM: 728.85 Corn of foot     ICD-10-CM: L84 
ICD-9-CM: 539 Foot deformity, right     ICD-10-CM: M21.961 ICD-9-CM: 736.70 Medicare annual wellness visit, initial     ICD-10-CM: Z00.00 ICD-9-CM: V70.0 Post-menopause     ICD-10-CM: Z78.0 ICD-9-CM: V49.81 Memory problem     ICD-10-CM: R41.3 ICD-9-CM: 780.93 Encounter for immunization     ICD-10-CM: L41 ICD-9-CM: V03.89 Glaucoma screening     ICD-10-CM: Z13.5 ICD-9-CM: V80.1 Advance care planning     ICD-10-CM: Z71.89 ICD-9-CM: V65.49 Vitals BP Pulse Temp Resp Height(growth percentile) Weight(growth percentile)  120/50 (BP 1 Location: Right arm, BP Patient Position: Sitting) 82 98.3 °F (36.8 °C) (Oral) 20 5' 1\" (1.549 m) 115 lb (52.2 kg) SpO2 BMI OB Status Smoking Status 95% 21.73 kg/m2 Postmenopausal Never Smoker Vitals History BMI and BSA Data Body Mass Index Body Surface Area 21.73 kg/m 2 1.5 m 2 Preferred Pharmacy Pharmacy Name Phone [facility] [phone] Your Updated Medication List  
  
   
This list is accurate as of: 12/5/17 11:26 AM.  Always use your most recent med list.  
  
  
  
  
 acetaminophen 500 mg tablet Commonly known as:  TYLENOL Take 1 Tab by mouth two (2) times a day. baclofen 10 mg tablet Commonly known as:  LIORESAL Take 1 Tab by mouth two (2) times daily as needed. dilTIAZem  mg Tb24 tablet Commonly known as:  CARDIZEM LA Take 240 mg by mouth daily. DITROPAN XL 5 mg CR tablet Generic drug:  oxybutynin chloride XL Take 5 mg by mouth daily. 2 tablets PO daily. losartan 50 mg tablet Commonly known as:  COZAAR Take  by mouth daily. metoprolol succinate 50 mg XL tablet Commonly known as:  TOPROL-XL Take  by mouth daily. pneumococcal 13 jagjit conj dip 0.5 mL Syrg injection Commonly known as:  PREVNAR-13  
0.5 mL by IntraMUSCular route once for 1 dose. SENOKOT 8.6 mg tablet Generic drug:  senna Take 1 Tab by mouth daily. Take 2 tablets daily. varicella zoster vaccine live 19,400 unit/0.65 mL Susr injection Commonly known as:  ZOSTAVAX  
1 Vial by SubCUTAneous route once for 1 dose. VITAMIN D3 2,000 unit Tab Generic drug:  cholecalciferol (vitamin D3) Take  by mouth. XARELTO 15 mg Tab tablet Generic drug:  rivaroxaban Take  by mouth daily. Prescriptions Sent to Pharmacy Refills  
 baclofen (LIORESAL) 10 mg tablet 1 Sig: Take 1 Tab by mouth two (2) times daily as needed.   
 Class: Normal  
 Pharmacy: [address] Ph #: [phone] Route: Oral  
 acetaminophen (TYLENOL) 500 mg tablet 5 Sig: Take 1 Tab by mouth two (2) times a day. Class: Normal  
 Pharmacy: [facility] Ph #: [phone] Route: Oral  
 varicella zoster vaccine live (ZOSTAVAX) 19,400 unit/0.65 mL susr injection 0 Si Vial by SubCUTAneous route once for 1 dose. Class: Normal  
 Pharmacy: [facility] Ph #: [phone] Route: SubCUTAneous  
 pneumococcal 13 jagjit conj dip (PREVNAR-13) 0.5 mL syrg injection 0 Si.5 mL by IntraMUSCular route once for 1 dose. Class: Normal  
 Pharmacy: [facility] Ph #: [phone] Route: IntraMUSCular We Performed the Following REFERRAL TO NEUROPSYCHOLOGY [YKY58 Custom] Comments:  
 Memory problem REFERRAL TO OPHTHALMOLOGY [REF57 Custom] Comments:  
 Glaucoma screening REFERRAL TO PODIATRY [REF90 Custom] Comments:  
 Foot deformity and corn To-Do List   
 2018 Imaging:  DEXA BONE DENSITY STUDY AXIAL Referral Information Referral ID Referred By Referred To  
  
 [id number] MARY, [facility] Visits Status Start Date End Date 1 New Request 17 If your referral has a status of pending review or denied, additional information will be sent to support the outcome of this decision. Referral ID Referred By Referred To  
 [id number] MARY, [address] Jakob Rodriguez [address] [facility] [address] Phone: [phone] Fax: [phone] Visits Status Start Date End Date 1 New Request 17 If your referral has a status of pending review or denied, additional information will be sent to support the outcome of this decision. Referral ID Referred By [facility]  
 [id number] [address] [place], [address] Visits Status Start Date End Date 1 New Request 12/5/17 12/5/18 If your referral has a status of pending review or denied, additional information will be sent to support the outcome of this decision. Patient Instructions Schedule of Personalized Health Plan (Provide Copy to Patient) The best way to stay healthy is to live a healthy lifestyle. A healthy lifestyle includes regular exercise, eating a well-balanced diet, keeping a healthy weight and not smoking. Regular physical exams and screening tests are another important way to take care of yourself. Preventive exams provided by health care providers can find health problems early when treatment works best and can keep you from getting certain diseases or illnesses. Preventive services include exams, lab tests, screenings, shots, monitoring and information to help you take care of your own health. All people over 65 should have a pneumonia shot. Pneumonia shots are usually only needed once in a lifetime unless your doctor decides differently. Will order All people over 65 should have a yearly flu shot. upto date People over 65 are at medium to high risk for Hepatitis B. Three shots are needed for complete protection. In addition to your physical exam, some screening tests are recommended: 
 
Bone mass measurement (dexa scan) is recommended every two years. ordered. Diabetes Mellitus screening is recommended every year. up to date. Glaucoma is an eye disease caused by high pressure in the eye. An eye exam is recommended every year. Will order.  
 
Cardiovascular screening tests that check your cholesterol and other blood fat (lipid) levels are recommended every five years. Up to date. Colorectal Cancer screening tests help to find pre-cancerous polyps (growths in the colon) so they can be removed before they turn into cancer. Tests ordered for screening depend on your personal and family history risk factors. Screening for Breast Cancer is recommended yearly with a mammogram. 
 
Screening for Cervical Cancer is recommended every two years (annually for certain risk factors, such as previous history of STD or abnormal PAP in past 7 years), with a Pelvic Exam with PAP. N/A Here is a list of your current Health Maintenance items with a due date: 
Health Maintenance Topic Date Due  
 DTaP/Tdap/Td series (1 - Tdap) 08/19/1950  ZOSTER VACCINE AGE 60>  06/19/1989  GLAUCOMA SCREENING Q2Y  08/19/1994  
 OSTEOPOROSIS SCREENING (DEXA)  08/19/1994  Pneumococcal 65+ Low/Medium Risk (1 of 2 - PCV13) 08/19/1994  Influenza Age 5 to Adult  08/01/2017  MEDICARE YEARLY EXAM  12/06/2018 Please provide this summary of care documentation to your next provider. Your primary care clinician is listed as Len Brown. If you have any questions after today's visit, please call [phone].

## 2018-02-09 RX ORDER — OXYBUTYNIN CHLORIDE 5 MG/1
10 TABLET, EXTENDED RELEASE ORAL DAILY
Qty: 180 TAB | Refills: 0 | Status: SHIPPED | OUTPATIENT
Start: 2018-02-09 | End: 2018-02-16 | Stop reason: CLARIF

## 2018-02-16 RX ORDER — OXYBUTYNIN CHLORIDE 10 MG/1
10 TABLET, EXTENDED RELEASE ORAL DAILY
Qty: 90 TAB | Refills: 1 | Status: SHIPPED | OUTPATIENT
Start: 2018-02-16 | End: 2018-02-20 | Stop reason: DRUGHIGH

## 2018-02-20 ENCOUNTER — HOSPITAL ENCOUNTER (OUTPATIENT)
Dept: LAB | Age: 83
Discharge: HOME | End: 2018-02-20
Payer: MEDICARE

## 2018-02-20 ENCOUNTER — OFFICE VISIT (OUTPATIENT)
Dept: INTERNAL MEDICINE CLINIC | Age: 83
End: 2018-02-20

## 2018-02-20 ENCOUNTER — TELEPHONE (OUTPATIENT)
Dept: INTERNAL MEDICINE CLINIC | Age: 83
End: 2018-02-20

## 2018-02-20 VITALS
HEART RATE: 74 BPM | DIASTOLIC BLOOD PRESSURE: 54 MMHG | OXYGEN SATURATION: 98 % | SYSTOLIC BLOOD PRESSURE: 102 MMHG | WEIGHT: 120 LBS | TEMPERATURE: 96.8 F | BODY MASS INDEX: 22.66 KG/M2 | HEIGHT: 61 IN | RESPIRATION RATE: 20 BRPM

## 2018-02-20 DIAGNOSIS — E55.9 VITAMIN D DEFICIENCY: ICD-10-CM

## 2018-02-20 DIAGNOSIS — I48.91 ATRIAL FIBRILLATION, UNSPECIFIED TYPE (HCC): ICD-10-CM

## 2018-02-20 DIAGNOSIS — N32.81 OAB (OVERACTIVE BLADDER): ICD-10-CM

## 2018-02-20 DIAGNOSIS — R63.0 LOSS OF APPETITE: ICD-10-CM

## 2018-02-20 DIAGNOSIS — F32.A DEPRESSION, UNSPECIFIED DEPRESSION TYPE: ICD-10-CM

## 2018-02-20 DIAGNOSIS — F02.80 LATE ONSET ALZHEIMER'S DISEASE WITHOUT BEHAVIORAL DISTURBANCE (HCC): Primary | ICD-10-CM

## 2018-02-20 DIAGNOSIS — G30.1 LATE ONSET ALZHEIMER'S DISEASE WITHOUT BEHAVIORAL DISTURBANCE (HCC): Primary | ICD-10-CM

## 2018-02-20 DIAGNOSIS — E78.5 HYPERLIPIDEMIA, UNSPECIFIED HYPERLIPIDEMIA TYPE: ICD-10-CM

## 2018-02-20 DIAGNOSIS — D64.9 ANEMIA, UNSPECIFIED TYPE: ICD-10-CM

## 2018-02-20 PROCEDURE — 82607 VITAMIN B-12: CPT

## 2018-02-20 PROCEDURE — 80061 LIPID PANEL: CPT

## 2018-02-20 PROCEDURE — 85018 HEMOGLOBIN: CPT

## 2018-02-20 PROCEDURE — 83550 IRON BINDING TEST: CPT

## 2018-02-20 PROCEDURE — 82306 VITAMIN D 25 HYDROXY: CPT

## 2018-02-20 PROCEDURE — 82728 ASSAY OF FERRITIN: CPT

## 2018-02-20 PROCEDURE — 80053 COMPREHEN METABOLIC PANEL: CPT

## 2018-02-20 PROCEDURE — 84443 ASSAY THYROID STIM HORMONE: CPT

## 2018-02-20 RX ORDER — MIRTAZAPINE 7.5 MG/1
7.5 TABLET, FILM COATED ORAL
Qty: 30 TAB | Refills: 3 | Status: SHIPPED | OUTPATIENT
Start: 2018-02-20 | End: 2018-05-22

## 2018-02-20 RX ORDER — OXYBUTYNIN CHLORIDE 5 MG/1
5 TABLET ORAL 2 TIMES DAILY
Qty: 60 TAB | Refills: 4 | Status: SHIPPED | OUTPATIENT
Start: 2018-02-20 | End: 2018-02-20 | Stop reason: SDUPTHER

## 2018-02-20 RX ORDER — OXYBUTYNIN CHLORIDE 5 MG/1
5 TABLET ORAL 2 TIMES DAILY
Qty: 180 TAB | Refills: 3 | Status: SHIPPED | OUTPATIENT
Start: 2018-02-20 | End: 2018-02-27 | Stop reason: SDUPTHER

## 2018-02-20 RX ORDER — MEMANTINE HYDROCHLORIDE 10 MG/1
10 TABLET ORAL DAILY
Qty: 30 TAB | Refills: 5 | Status: SHIPPED | OUTPATIENT
Start: 2018-02-20 | End: 2018-05-22

## 2018-02-20 NOTE — PROGRESS NOTES
Health Maintenance Due   Topic Date Due    DTaP/Tdap/Td series (1 - Tdap) 08/19/1950    ZOSTER VACCINE AGE 60>  06/19/1989    GLAUCOMA SCREENING Q2Y  08/19/1994    OSTEOPOROSIS SCREENING (DEXA)  08/19/1994    Influenza Age 9 to Adult  08/01/2017       Chief Complaint   Patient presents with    Irregular Heart Beat     2 month follow up    Osteoarthritis    Scoliosis       1. Have you been to the ER, urgent care clinic since your last visit? Hospitalized since your last visit? No    2. Have you seen or consulted any other health care providers outside of the [address] [place] [address] since your last visit? Include any pap smears or colon screening. No    3) Do you have an Advance Directive on file? no    4) Are you interested in receiving information on Advance Directives? NO      Patient is accompanied by self I have received verbal consent from Zeina Oconnor to discuss any/all medical information while they are present in the room.     Per verbal order from MD changes oxybutynin to 5mg po bid due to insurance not covering other

## 2018-02-20 NOTE — TELEPHONE ENCOUNTER
They need the oxybutynin re done for the regular at the extended release. Ins urance will not pay for the xl please put in corrected for the 5mg tab twice a day.

## 2018-02-20 NOTE — LETTER
2/23/2018 9:47 AM 
 
Ms. Марина Rooney [address] [facility] [id number] [facility] [id number] Dear Марина Rooney: 
 
Please find your most recent results below. Resulted Orders METABOLIC PANEL, COMPREHENSIVE Result Value Ref Range Glucose 77 65 - 99 mg/dL BUN 20 8 - 27 mg/dL Creatinine 0.79 0.57 - 1.00 mg/dL GFR est non-AA 67 >59 GFR est AA 77 >59 BUN/Creatinine ratio 25 12 - 28 Sodium 140 134 - 144 mmol/L Potassium 5.0 3.5 - 5.2 mmol/L Chloride 99 96 - 106 mmol/L  
 CO2 22 18 - 29 mmol/L Calcium 9.0 8.7 - 10.3 mg/dL Protein, total 6.6 6.0 - 8.5 g/dL Albumin 4.3 3.5 - 4.7 g/dL GLOBULIN, TOTAL 2.3 1.5 - 4.5 A-G Ratio 1.9 1.2 - 2.2 Bilirubin, total 0.3 0.0 - 1.2 mg/dL Alk. phosphatase 63 39 - 117 IU/L  
 AST (SGOT) 22 0 - 40 IU/L  
 ALT (SGPT) 16 0 - 32 IU/L Narrative Performed at:  [address]  [id number] : Erasto Bunch MD, Phone:  [phone] HGB & HCT Result Value Ref Range HGB 10.3 (L) 11.1 - 15.9 g/dL HCT 32.6 (L) 34.0 - 46.6 % Narrative Performed at:  [address]  [id number] : Erasto Bunch MD, Phone:  [phone] IRON PROFILE Result Value Ref Range TIBC 372 250 - 450 UIBC 353 118 - 369 ug/dL Iron 19 (L) 27 - 139 ug/dL Iron % saturation 5 (LL) 15 - 55 Narrative Performed at:  [address]  [id number] : Erasto Bunch MD, Phone:  [phone] FERRITIN Result Value Ref Range Ferritin 25 15 - 150 ng/mL Narrative Performed at:  [address] [place]  [id number] : Erasto Bunch MD, Phone:  [phone] LIPID PANEL Result Value Ref Range Cholesterol, total 153 100 - 199 mg/dL Triglyceride 70 0 - 149 mg/dL HDL Cholesterol 72 >39 mg/dL  VLDL, calculated 14 5 - 40  
 LDL, calculated 67 0 - 99 Narrative Performed at:  [address]  [id number] : Bella Koch MD, Phone:  [phone] VITAMIN D, 25 HYDROXY Result Value Ref Range VITAMIN D, 25-HYDROXY 31.8 30.0 - 100.0 ng/mL Comment:  
   Vitamin D deficiency has been defined by the [address] [place] [address] practice guideline as a 
level of serum 25-OH vitamin D less than 20 ng/mL (1,2). The Endocrine Society went on to further define vitamin D 
insufficiency as a level between 21 and 29 ng/mL (2). 1. IOM ([employer] of [employer]). 2010. Dietary reference 
   intakes for calcium and D. [address] [place] [address]: The 
   [employer]. 2. Carter MF, Chari JOHNSON, David JAVIER, et al. 
   Evaluation, treatment, and prevention of vitamin D 
   deficiency: an Endocrine Society clinical practice 
   guideline. JCEM. 2011 Jul; 96(7):1911-30. Narrative Performed at:  [address]  [id number] : Bella Koch MD, Phone:  [phone] TSH 3RD GENERATION Result Value Ref Range TSH 1.710 0.450 - 4.500 uIU/mL Narrative Performed at:  [address]  [id number] : Bella Koch MD, Phone:  [phone] VITAMIN B12 Result Value Ref Range Vitamin B12 474 232 - 1245 pg/mL Narrative Performed at:  [address]  [id number] : Bella Koch MD, Phone:  [phone] CVD REPORT Result Value Ref Range INTERPRETATION Note Comment:  
   Supplemental report is available. Narrative Performed at:  [address] [place] [address]  [id number] : Shaun Rowell PhD, Phone:  [phone] RECOMMENDATIONS: 
 
Please call me if you have any questions: [phone] Has iron deficiency anemia. Advised to start taking over-the-counter iron sulfate 325 mg 1 tablet once a day for 4 months. Please also add vitamin B12 500 mcg once a day for 4 months.  Repeat H&H in 3 months.  All other labs are stable Sincerely, Donaldo Morley MD

## 2018-02-20 NOTE — MR AVS SNAPSHOT
[address] [place] [id number] [phone] Patient: Alvin Conroy MRN: [id number] Sherryle Gola Visit Information Date & Time Provider Department Dept. Phone Encounter #  
 2/20/2018 11:00 AM Rubén Rhoades MD [facility] [phone] [id number] Upcoming Health Maintenance Date Due DTaP/Tdap/Td series (1 - Tdap) 8/19/1950 ZOSTER VACCINE AGE 60> 6/19/1989 GLAUCOMA SCREENING Q2Y 8/19/1994 OSTEOPOROSIS SCREENING (DEXA) 8/19/1994 Influenza Age 5 to Adult 8/1/2017 Pneumococcal 65+ Low/Medium Risk (2 of 2 - PPSV23) 12/5/2018 MEDICARE YEARLY EXAM 12/6/2018 Allergies as of 2/20/2018  Review Complete On: 2/20/2018 By: Rubén Rhoades MD  
 No Known Allergies Current Immunizations  Reviewed on 12/6/2017 Name Date Pneumococcal Conjugate (PCV-13) 12/5/2017 Not reviewed this visit You Were Diagnosed With   
  
 Codes Comments Late onset Alzheimer's disease without behavioral disturbance    -  Primary ICD-10-CM: G30.1, F02.80 ICD-9-CM: 331.0, 294.10 Atrial fibrillation, unspecified type ([facility] [address]     ICD-10-CM: I48.91 
ICD-9-CM: 427.31 Loss of appetite     ICD-10-CM: R63.0 ICD-9-CM: 783.0 Depression, unspecified depression type     ICD-10-CM: F32.9 ICD-9-CM: 777 OAB (overactive bladder)     ICD-10-CM: N32.81 ICD-9-CM: 596.51 Anemia, unspecified type     ICD-10-CM: D64.9 ICD-9-CM: 239. 9 Vitamin D deficiency     ICD-10-CM: E55.9 ICD-9-CM: 268.9 Hyperlipidemia, unspecified hyperlipidemia type     ICD-10-CM: E78.5 ICD-9-CM: 272.4 Vitals BP Pulse Temp Resp Height(growth percentile) Weight(growth percentile) 102/54 (BP 1 Location: Right arm, BP Patient Position: Sitting) 74 96.8 °F (36 °C) (Oral) 20 5' 1\" (1.549 m) 120 lb (54.4 kg) SpO2 BMI OB Status Smoking Status 98% 22.67 kg/m2 Postmenopausal Never Smoker Vitals History BMI and BSA Data Body Mass Index Body Surface Area  
 22.67 kg/m 2 1.53 m 2 Preferred Pharmacy Pharmacy Name Phone [facility] [phone] Your Updated Medication List  
  
   
This list is accurate as of: 2/20/18 11:45 AM.  Always use your most recent med list.  
  
  
  
  
 acetaminophen 500 mg tablet Commonly known as:  TYLENOL Take 1 Tab by mouth two (2) times a day. baclofen 10 mg tablet Commonly known as:  LIORESAL Take 1 Tab by mouth two (2) times daily as needed. dilTIAZem  mg Tb24 tablet Commonly known as:  CARDIZEM LA Take 240 mg by mouth daily. losartan 50 mg tablet Commonly known as:  COZAAR Take  by mouth daily. memantine 10 mg tablet Commonly known as:  Chyrl Poplin Take 1 Tab by mouth daily. metoprolol succinate 50 mg XL tablet Commonly known as:  TOPROL-XL Take  by mouth daily. mirtazapine 7.5 mg tablet Commonly known as:  Hazeline [place] Take 1 Tab by mouth nightly. oxybutynin chloride XL 10 mg CR tablet Commonly known as:  DITROPAN XL Take 1 Tab by mouth daily. SENOKOT 8.6 mg tablet Generic drug:  senna Take 1 Tab by mouth daily. Take 2 tablets daily. VITAMIN D3 2,000 unit Tab Generic drug:  cholecalciferol (vitamin D3) Take  by mouth. XARELTO 15 mg Tab tablet Generic drug:  rivaroxaban Take  by mouth daily. Prescriptions Sent to Pharmacy Refills  
 memantine (NAMENDA) 10 mg tablet 5 Sig: Take 1 Tab by mouth daily. Class: Normal  
 Pharmacy: [facility] Hector Babb Ph #: [phone] Route: Oral  
 mirtazapine (REMERON) 7.5 mg tablet 3 Sig: Take 1 Tab by mouth nightly. Class: Normal  
 Pharmacy: [address] [place], [place] [address]and Dr. Ph #: [phone] Route: Oral  
  
We Performed the Following FERRITIN [13192 CPT(R)] HGB & HCT [88011 CPT(R)] IRON PROFILE [id number] CPT(R)] LIPID PANEL [73574 CPT(R)] METABOLIC PANEL, COMPREHENSIVE [96514 CPT(R)] TSH 3RD GENERATION [51822 CPT(R)] VITAMIN B12 [id number] CPT(R)] VITAMIN D, 25 HYDROXY Q8046030 CPT(R)] Please provide this summary of care documentation to your next provider. Your primary care clinician is listed as Amiclar Arenas. If you have any questions after today's visit, please call [phone].

## 2018-02-20 NOTE — PROGRESS NOTES
HISTORY OF PRESENT ILLNESS  King Wicho is a 80 y.o. female here accompanied by her niece . She is not having good appetite. Not eating well. She is taking boost every morning. Weight seems okay. Caregiver is asking if she can have medicine to boost her appetite. Has A. fib, seeing cardiologist.  On Xarelto 20 mg once a day for now labs done in cardiologist office. CBC CMP are stable. She is more forgetful   Nowadays. Never tried any medications for dementia. She seems depressed. No suicidal thought. Irregular Heart Beat      Scoliosis         Review of Systems   Constitutional: Negative. HENT: Negative. Eyes: Negative. Respiratory: Negative. Cardiovascular: Negative. Genitourinary: Negative. Musculoskeletal: Positive for joint pain. Negative for falls. Skin: Negative. Neurological: Negative. Negative for sensory change and speech change. Psychiatric/Behavioral: Positive for memory loss. Physical Exam   Constitutional: She appears well-developed and well-nourished. No distress. Neck: Normal range of motion. Neck supple. No JVD present. No thyromegaly present. Cardiovascular: Normal rate, regular rhythm, normal heart sounds and intact distal pulses. Pulmonary/Chest: Effort normal and breath sounds normal. No respiratory distress. She has no wheezes. Musculoskeletal: She exhibits no edema or tenderness. Psychiatric: She has a normal mood and affect. Her behavior is normal.       ASSESSMENT and PLAN  Diagnoses and all orders for this visit:    1. Late onset Alzheimer's disease without behavioral disturbance  She is more forgetful nowadays. Her dementia is getting worse. Will start her,  -     memantine (NAMENDA) 10 mg tablet; Take 1 Tab by mouth daily.  -     TSH 3RD GENERATION  -     VITAMIN B12    2. Atrial fibrillation, unspecified type ([address]    On Cardizem and metoprolol. Rate controlled.   Will check,  -     METABOLIC PANEL, COMPREHENSIVE  - LIPID PANEL    3. Loss of appetite    Advised her to use boost every day. Will add,  -     mirtazapine (REMERON) 7.5 mg tablet; Take 1 Tab by mouth nightly. 4. Depression, unspecified depression type    She is probably having some component of depressions. Will try,  -     mirtazapine (REMERON) 7.5 mg tablet; Take 1 Tab by mouth nightly. Follow-up in 6 weeks. 5. OAB (overactive bladder)    We will change,  -     oxybutynin (DITROPAN) 5 mg tablet; Take 1 Tab by mouth two (2) times a day. 6. Anemia, unspecified type    Hemoglobin was 9.6. Will check,  -     HGB & HCT  -     IRON PROFILE  -     FERRITIN    7. Vitamin D deficiency  -     VITAMIN D, 25 HYDROXY    8. Hyperlipidemia, unspecified hyperlipidemia type  -     LIPID PANEL    9. OAB (overactive bladder)  -     oxybutynin (DITROPAN) 5 mg tablet; Take 1 Tab by mouth two (2) times a day. Discussed expected course/resolution/complications of diagnosis in detail with patient. Medication risks/benefits/costs/interactions/alternatives discussed with patient. Pt was given an after visit summary which includes diagnoses, current medications & vitals. Pt expressed understanding with the diagnosis and plan.

## 2018-02-21 LAB
25(OH)D3+25(OH)D2 SERPL-MCNC: 31.8 NG/ML (ref 30–100)
ALBUMIN SERPL-MCNC: 4.3 G/DL (ref 3.5–4.7)
ALBUMIN/GLOB SERPL: 1.9 {RATIO} (ref 1.2–2.2)
ALP SERPL-CCNC: 63 IU/L (ref 39–117)
ALT SERPL-CCNC: 16 IU/L (ref 0–32)
AST SERPL-CCNC: 22 IU/L (ref 0–40)
BILIRUB SERPL-MCNC: 0.3 MG/DL (ref 0–1.2)
BUN SERPL-MCNC: 20 MG/DL (ref 8–27)
BUN/CREAT SERPL: 25 (ref 12–28)
CALCIUM SERPL-MCNC: 9 MG/DL (ref 8.7–10.3)
CHLORIDE SERPL-SCNC: 99 MMOL/L (ref 96–106)
CHOLEST SERPL-MCNC: 153 MG/DL (ref 100–199)
CO2 SERPL-SCNC: 22 MMOL/L (ref 18–29)
CREAT SERPL-MCNC: 0.79 MG/DL (ref 0.57–1)
FERRITIN SERPL-MCNC: 25 NG/ML (ref 15–150)
GFR SERPLBLD CREATININE-BSD FMLA CKD-EPI: 67 ML/MIN/{1.73_M2}
GFR SERPLBLD CREATININE-BSD FMLA CKD-EPI: 77 ML/MIN/{1.73_M2}
GLOBULIN SER CALC-MCNC: 2.3 G/L (ref 1.5–4.5)
GLUCOSE SERPL-MCNC: 77 MG/DL (ref 65–99)
HCT VFR BLD AUTO: 32.6 % (ref 34–46.6)
HDLC SERPL-MCNC: 72 MG/DL
HGB BLD-MCNC: 10.3 G/DL (ref 11.1–15.9)
INTERPRETATION, 910389: NORMAL
IRON SATN MFR SERPL: 5 % (ref 15–55)
IRON SERPL-MCNC: 19 UG/DL (ref 27–139)
LDLC SERPL CALC-MCNC: 67 MG/DL (ref 0–99)
POTASSIUM SERPL-SCNC: 5 MMOL/L (ref 3.5–5.2)
PROT SERPL-MCNC: 6.6 G/DL (ref 6–8.5)
SODIUM SERPL-SCNC: 140 MMOL/L (ref 134–144)
TIBC SERPL-MCNC: 372 UG/DL (ref 250–450)
TRIGL SERPL-MCNC: 70 MG/DL (ref 0–149)
TSH SERPL DL<=0.005 MIU/L-ACNC: 1.71 UIU/ML (ref 0.45–4.5)
UIBC SERPL-MCNC: 353 UG/DL (ref 118–369)
VIT B12 SERPL-MCNC: 474 PG/ML (ref 232–1245)
VLDLC SERPL CALC-MCNC: 14 MG/DL (ref 5–40)

## 2018-02-21 NOTE — PROGRESS NOTES
Has iron deficiency anemia. Advised to start taking over-the-counter iron sulfate 325 mg 1 tablet once a day for 4 months. Please also add vitamin B12 500 mcg once a day for 4 months. Repeat H&H in 3 months. All other labs are stable.

## 2018-02-27 DIAGNOSIS — N32.81 OAB (OVERACTIVE BLADDER): ICD-10-CM

## 2018-02-27 RX ORDER — OXYBUTYNIN CHLORIDE 5 MG/1
5 TABLET ORAL 2 TIMES DAILY
Qty: 180 TAB | Refills: 3 | Status: SHIPPED | OUTPATIENT
Start: 2018-02-27 | End: 2018-03-12 | Stop reason: SDUPTHER

## 2018-03-09 ENCOUNTER — OFFICE VISIT (OUTPATIENT)
Dept: NEUROLOGY | Age: 83
End: 2018-03-09

## 2018-03-09 VITALS
OXYGEN SATURATION: 97 % | HEART RATE: 79 BPM | SYSTOLIC BLOOD PRESSURE: 110 MMHG | BODY MASS INDEX: 22.66 KG/M2 | HEIGHT: 61 IN | WEIGHT: 120 LBS | DIASTOLIC BLOOD PRESSURE: 80 MMHG

## 2018-03-09 DIAGNOSIS — I10 ESSENTIAL HYPERTENSION: ICD-10-CM

## 2018-03-09 DIAGNOSIS — F02.80 LATE ONSET ALZHEIMER'S DISEASE WITHOUT BEHAVIORAL DISTURBANCE (HCC): Primary | ICD-10-CM

## 2018-03-09 DIAGNOSIS — G30.1 LATE ONSET ALZHEIMER'S DISEASE WITHOUT BEHAVIORAL DISTURBANCE (HCC): Primary | ICD-10-CM

## 2018-03-09 DIAGNOSIS — I48.20 CHRONIC ATRIAL FIBRILLATION (HCC): ICD-10-CM

## 2018-03-09 RX ORDER — LUTEIN 6 MG
1 TABLET ORAL DAILY
COMMUNITY

## 2018-03-09 NOTE — PATIENT INSTRUCTIONS
[facility]   Statement to Patients  April 1, 2014      In an effort to ensure the large volume of patient prescription refills is processed in the most efficient and expeditious manner, we are asking our patients to assist us by calling your Pharmacy for all prescription refills, this will include also your  Mail Order Pharmacy. The pharmacy will contact our office electronically to continue the refill process. Please do not wait until the last minute to call your pharmacy. We need at least 48 hours (2days) to fill prescriptions. We also encourage you to call your pharmacy before going to  your prescription to make sure it is ready. With regard to controlled substance prescription refill requests (narcotic refills) that need to be picked up at our office, we ask your cooperation by providing us with at least 72 hours (3days) notice that you will need a refill. We will not refill narcotic prescription refill requests after 4:00pm on any weekday, Monday through Thursday, or after 2:00pm on Fridays, or on the weekends. We encourage everyone to explore another way of getting your prescription refill request processed using [employer], our patient web portal through our electronic medical record system. [employer] is an efficient and effective way to communicate your medication request directly to the office and  downloadable as an darien on your smart phone . [employer] also features a review functionality that allows you to view your medication list as well as leave messages for your physician. Are you ready to get connected? If so please review the attatched instructions or speak to any of our staff to get you set up right away! Thank you so much for your cooperation. Should you have any questions please contact our Practice Administrator.     The Physicians and Staff,  [facility]

## 2018-03-09 NOTE — LETTER
3/9/2018 11:35 AM 
 
Patient:  Tim Vidal YOB: 1929 Date of Visit: 3/9/2018 Dear David Dinero MD 
[address] [place] [address] [address] [place] [address] [phone] VIA In Basket 
 : Thank you for referring Ms. Tim Vidal to me for evaluation/treatment. Below are the relevant portions of my assessment and plan of care. HISTORY OF PRESENT ILLNESS Tim Vidal is a 80 y.o. female. HPI Comments: This patient is an 49-year-old right-handed  female who comes in today for memory loss. She apparently has been living at home with a lot of help and they are looking to place her in an extended care facility. She is referred by her primary care physician for evaluation and staging of dementia. She has a history of hypertension and atrial fibrillation. She is on Xarelto. She was started on memantine 10 mg a day by her primary care physician. He also has her own mirtazapine 7.5 mg a day for possible depression. The family states that this woman has been having memory problems for many years. It is just gotten slowly worse. There is no history of syncope or seizure. There is no history of stroke. She does have a sister that is demented as well. Memory Loss The history is provided by the relative. This is a chronic problem. Review of Systems Constitutional:  
     Review of systems is not particularly reliable however the patient complains of depression fatigue hearing loss memory loss poor appetite. Complete review of systems done all others negative Psychiatric/Behavioral: Positive for memory loss. Current Outpatient Prescriptions on File Prior to Visit Medication Sig Dispense Refill  oxybutynin (DITROPAN) 5 mg tablet Take 1 Tab by mouth two (2) times a day. 180 Tab 3  
 memantine (NAMENDA) 10 mg tablet Take 1 Tab by mouth daily. 30 Tab 5  
 mirtazapine (REMERON) 7.5 mg tablet Take 1 Tab by mouth nightly.  30 Tab 3  
  losartan (COZAAR) 50 mg tablet Take  by mouth daily.  metoprolol succinate (TOPROL-XL) 50 mg XL tablet Take  by mouth daily.  rivaroxaban (XARELTO) 15 mg tab tablet Take  by mouth daily.  dilTIAZem ER (CARDIZEM LA) 240 mg Tb24 tablet Take 240 mg by mouth daily.  cholecalciferol, vitamin D3, (VITAMIN D3) 2,000 unit tab Take  by mouth.  senna (SENOKOT) 8.6 mg tablet Take 1 Tab by mouth daily. Take 2 tablets daily.  baclofen (LIORESAL) 10 mg tablet Take 1 Tab by mouth two (2) times daily as needed. 30 Tab 1  
 acetaminophen (TYLENOL) 500 mg tablet Take 1 Tab by mouth two (2) times a day. 60 Tab 5 No current facility-administered medications on file prior to visit. Past Medical History:  
Diagnosis Date  [facility] ([facility]  Arthritis Family History Problem Relation Age of Onset  Heart Attack Mother  Cancer Sister  Cancer Brother Social History Substance Use Topics  Smoking status: Never Smoker  Smokeless tobacco: Never Used  Alcohol use No  
 
/80  Pulse 79  Ht 5' 1\" (1.549 m)  Wt 120 lb (54.4 kg)  SpO2 97%  BMI 22.67 kg/m2 Physical Exam  
Constitutional: She appears well-developed. No distress. HENT:  
Head: Normocephalic and atraumatic. Mouth/Throat: Oropharynx is clear and moist. No oropharyngeal exudate. Eyes: Conjunctivae and EOM are normal. Pupils are equal, round, and reactive to light. No scleral icterus. Neck: No thyromegaly present. Musculoskeletal: Normal range of motion. She exhibits no edema, tenderness or deformity. Neurological: She is alert. No cranial nerve deficit. Coordination normal.  
Toes appear to be downgoing bilaterally The patient is minimally conversant. She will answer of asked questions. She scored 15 out of 30 on the MMSE. Skin: Skin is warm and dry. Psychiatric: She is appropriate considering her degree of dementia. Vitals reviewed. ASSESSMENT and PLAN 
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 The time course and her exam are consistent with that. I think she is a little late to consider Namenda or Aricept. I recommend they stop that at this time. I am going to set her up for a CT scan of her head without contrast just to eliminate the possibility of multiple strokes presenting as dementia or other structural abnormalities. Given her exam I think that is unlikely. She has a moderate to severe dementia at this time. I explained to the family as they already knew that this is a progressive disease and we have no medication to slow the progression of the illness. I will plan to see her back on an as-needed basis. HYPERTENSION Stroke risk, stable, managed by primary care ATRIAL FIB Stroke risk, on Xarelto This note will not be viewable in [address] This note was created using voice recognition software. Despite editing, there may be syntax errors. If you have questions, please do not hesitate to call me. I look forward to following Ms. Nelsy No along with you. Sincerely, Antoinette Wolfe MD

## 2018-03-09 NOTE — PROGRESS NOTES
HISTORY OF PRESENT ILLNESS  Deni Mccoy is a 80 y.o. female. HPI Comments: This patient is an 80-year-old right-handed  female who comes in today for memory loss. She apparently has been living at home with a lot of help and they are looking to place her in an extended care facility. She is referred by her primary care physician for evaluation and staging of dementia. She has a history of hypertension and atrial fibrillation. She is on Xarelto. She was started on memantine 10 mg a day by her primary care physician. He also has her own mirtazapine 7.5 mg a day for possible depression. The family states that this woman has been having memory problems for many years. It is just gotten slowly worse. There is no history of syncope or seizure. There is no history of stroke. She does have a sister that is demented as well. Memory Loss    The history is provided by the relative. This is a chronic problem. Review of Systems   Constitutional:        Review of systems is not particularly reliable however the patient complains of depression fatigue hearing loss memory loss poor appetite. Complete review of systems done all others negative   Psychiatric/Behavioral: Positive for memory loss. Current Outpatient Prescriptions on File Prior to Visit   Medication Sig Dispense Refill    oxybutynin (DITROPAN) 5 mg tablet Take 1 Tab by mouth two (2) times a day. 180 Tab 3    memantine (NAMENDA) 10 mg tablet Take 1 Tab by mouth daily. 30 Tab 5    mirtazapine (REMERON) 7.5 mg tablet Take 1 Tab by mouth nightly. 30 Tab 3    losartan (COZAAR) 50 mg tablet Take  by mouth daily.  metoprolol succinate (TOPROL-XL) 50 mg XL tablet Take  by mouth daily.  rivaroxaban (XARELTO) 15 mg tab tablet Take  by mouth daily.  dilTIAZem ER (CARDIZEM LA) 240 mg Tb24 tablet Take 240 mg by mouth daily.  cholecalciferol, vitamin D3, (VITAMIN D3) 2,000 unit tab Take  by mouth.       senna (SENOKOT) 8.6 mg tablet Take 1 Tab by mouth daily. Take 2 tablets daily.  baclofen (LIORESAL) 10 mg tablet Take 1 Tab by mouth two (2) times daily as needed. 30 Tab 1    acetaminophen (TYLENOL) 500 mg tablet Take 1 Tab by mouth two (2) times a day. 60 Tab 5     No current facility-administered medications on file prior to visit. Past Medical History:   Diagnosis Date    A-fib [facility])     Arthritis      Family History   Problem Relation Age of Onset    Heart Attack Mother     Cancer Sister     Cancer Brother      Social History   Substance Use Topics    Smoking status: Never Smoker    Smokeless tobacco: Never Used    Alcohol use No     /80  Pulse 79  Ht 5' 1\" (1.549 m)  Wt 120 lb (54.4 kg)  SpO2 97%  BMI 22.67 kg/m2  Physical Exam   Constitutional: She appears well-developed. No distress. HENT:   Head: Normocephalic and atraumatic. Mouth/Throat: Oropharynx is clear and moist. No oropharyngeal exudate. Eyes: Conjunctivae and EOM are normal. Pupils are equal, round, and reactive to light. No scleral icterus. Neck: No thyromegaly present. Musculoskeletal: Normal range of motion. She exhibits no edema, tenderness or deformity. Neurological: She is alert. No cranial nerve deficit. Coordination normal.   Toes appear to be downgoing bilaterally  The patient is minimally conversant. She will answer of asked questions. She scored 15 out of 30 on the MMSE. Skin: Skin is warm and dry. Psychiatric:   She is appropriate considering her degree of dementia. Vitals reviewed. 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 The time course and her exam are consistent with that. I think she is a little late to consider Namenda or Aricept. I recommend they stop that at this time.   I am going to set her up for a CT scan of her head without contrast just to eliminate the possibility of multiple strokes presenting as dementia or other structural abnormalities. Given her exam I think that is unlikely. She has a moderate to severe dementia at this time. I explained to the family as they already knew that this is a progressive disease and we have no medication to slow the progression of the illness. I will plan to see her back on an as-needed basis. HYPERTENSION  Stroke risk, stable, managed by primary care  ATRIAL FIB  Stroke risk, on Xarelto  This note will not be viewable in [place]. This note was created using voice recognition software. Despite editing, there may be syntax errors.

## 2018-03-12 DIAGNOSIS — N32.81 OAB (OVERACTIVE BLADDER): ICD-10-CM

## 2018-03-12 NOTE — TELEPHONE ENCOUNTER
Prescription was written for 10 mg 1x a day  Insurance charges copay if written this way  Patient requests a new prescription written for the original 5mg taken 2X a day

## 2018-03-13 RX ORDER — OXYBUTYNIN CHLORIDE 5 MG/1
5 TABLET ORAL 2 TIMES DAILY
Qty: 180 TAB | Refills: 2 | Status: SHIPPED | OUTPATIENT
Start: 2018-03-13 | End: 2018-05-30

## 2018-03-19 ENCOUNTER — HOSPITAL ENCOUNTER (OUTPATIENT)
Dept: CT IMAGING | Age: 83
Discharge: HOME | End: 2018-03-19
Attending: PSYCHIATRY & NEUROLOGY
Payer: MEDICARE

## 2018-03-19 DIAGNOSIS — F02.80 LATE ONSET ALZHEIMER'S DISEASE WITHOUT BEHAVIORAL DISTURBANCE (HCC): ICD-10-CM

## 2018-03-19 DIAGNOSIS — G30.1 LATE ONSET ALZHEIMER'S DISEASE WITHOUT BEHAVIORAL DISTURBANCE (HCC): ICD-10-CM

## 2018-03-19 PROCEDURE — 70450 CT HEAD/BRAIN W/O DYE: CPT

## 2018-05-22 ENCOUNTER — HOSPITAL ENCOUNTER (OUTPATIENT)
Dept: LAB | Age: 83
Discharge: HOME | End: 2018-05-22
Payer: MEDICARE

## 2018-05-22 ENCOUNTER — OFFICE VISIT (OUTPATIENT)
Dept: INTERNAL MEDICINE CLINIC | Age: 83
End: 2018-05-22

## 2018-05-22 VITALS
BODY MASS INDEX: 21.75 KG/M2 | HEIGHT: 61 IN | RESPIRATION RATE: 20 BRPM | OXYGEN SATURATION: 94 % | SYSTOLIC BLOOD PRESSURE: 142 MMHG | HEART RATE: 77 BPM | WEIGHT: 115.2 LBS | TEMPERATURE: 97.8 F | DIASTOLIC BLOOD PRESSURE: 61 MMHG

## 2018-05-22 DIAGNOSIS — F02.80 LATE ONSET ALZHEIMER'S DISEASE WITHOUT BEHAVIORAL DISTURBANCE (HCC): ICD-10-CM

## 2018-05-22 DIAGNOSIS — I48.91 ATRIAL FIBRILLATION, UNSPECIFIED TYPE (HCC): Primary | ICD-10-CM

## 2018-05-22 DIAGNOSIS — I10 ESSENTIAL HYPERTENSION: ICD-10-CM

## 2018-05-22 DIAGNOSIS — G30.1 LATE ONSET ALZHEIMER'S DISEASE WITHOUT BEHAVIORAL DISTURBANCE (HCC): ICD-10-CM

## 2018-05-22 DIAGNOSIS — D50.9 IRON DEFICIENCY ANEMIA, UNSPECIFIED IRON DEFICIENCY ANEMIA TYPE: ICD-10-CM

## 2018-05-22 PROCEDURE — 85027 COMPLETE CBC AUTOMATED: CPT

## 2018-05-22 PROCEDURE — 80053 COMPREHEN METABOLIC PANEL: CPT

## 2018-05-22 RX ORDER — FERROUS SULFATE 325(65) MG
325 TABLET, DELAYED RELEASE (ENTERIC COATED) ORAL
Qty: 30 TAB | Refills: 2 | Status: SHIPPED | OUTPATIENT
Start: 2018-05-22 | End: 2018-07-28 | Stop reason: DRUGHIGH

## 2018-05-22 NOTE — PROGRESS NOTES
Health Maintenance Due   Topic Date Due    DTaP/Tdap/Td series (1 - Tdap) 08/19/1950    ZOSTER VACCINE AGE 60>  06/19/1989    GLAUCOMA SCREENING Q2Y  08/19/1994    Bone Densitometry (Dexa) Screening  08/19/1994       Chief Complaint   Patient presents with    Irregular Heart Beat    Osteoarthritis    Overactive Bladder    Decreased Appetite     not eating much at all, taking boost       1. Have you been to the ER, urgent care clinic since your last visit? Hospitalized since your last visit? No    2. Have you seen or consulted any other health care providers outside of the [address] [place] [address] since your last visit? Include any pap smears or colon screening. No    3) Do you have an Advance Directive on file? no    4) Are you interested in receiving information on Advance Directives? NO      Patient is accompanied by self I have received verbal consent from Ro Foots to discuss any/all medical information while they are present in the room.

## 2018-05-22 NOTE — MR AVS SNAPSHOT
Karyle Olp 
 
 
 [address] [place] [id number] [phone] Patient: Geri Nugent MRN: [id number] Dalia Patient Visit Information Date & Time Provider Department Dept. Phone Encounter #  
 5/22/2018 10:45 AM Leyda Gray MD [address] [phone] [id number] Upcoming Health Maintenance Date Due DTaP/Tdap/Td series (1 - Tdap) 8/19/1950 ZOSTER VACCINE AGE 60> 6/19/1989 GLAUCOMA SCREENING Q2Y 8/19/1994 Bone Densitometry (Dexa) Screening 8/19/1994 Influenza Age 5 to Adult 8/1/2018 Pneumococcal 65+ Low/Medium Risk (2 of 2 - PPSV23) 12/5/2018 MEDICARE YEARLY EXAM 12/6/2018 Allergies as of 5/22/2018  Review Complete On: 5/22/2018 By: Leyda Gray MD  
 No Known Allergies Current Immunizations  Reviewed on 12/6/2017 Name Date Pneumococcal Conjugate (PCV-13) 12/5/2017 Not reviewed this visit You Were Diagnosed With   
  
 Codes Comments Atrial fibrillation, unspecified type ([facility]    -  Primary ICD-10-CM: I48.91 
ICD-9-CM: 427.31 Essential hypertension     ICD-10-CM: I10 
ICD-9-CM: 401.9 Late onset Alzheimer's disease without behavioral disturbance     ICD-10-CM: G30.1, F02.80 ICD-9-CM: 331.0, 294.10 Iron deficiency anemia, unspecified iron deficiency anemia type     ICD-10-CM: D50.9 ICD-9-CM: 280.9 Vitals BP Pulse Temp Resp Height(growth percentile) Weight(growth percentile) 142/61 (BP 1 Location: Left arm, BP Patient Position: Sitting) 77 97.8 °F (36.6 °C) (Oral) 20 5' 1\" (1.549 m) 115 lb 3.2 oz (52.3 kg) SpO2 BMI OB Status Smoking Status 94% 21.77 kg/m2 Postmenopausal Never Smoker Vitals History BMI and BSA Data Body Mass Index Body Surface Area 21.77 kg/m 2 1.5 m 2 Preferred Pharmacy Pharmacy Name Phone [facility] [address] [address] [place] [address] [address] [facility] Your Updated Medication List  
  
   
This list is accurate as of 5/22/18 11:17 AM.  Always use your most recent med list.  
  
  
  
  
 acetaminophen 500 mg tablet Commonly known as:  TYLENOL Take 1 Tab by mouth two (2) times a day. baclofen 10 mg tablet Commonly known as:  LIORESAL Take 1 Tab by mouth two (2) times daily as needed. dilTIAZem  mg Tb24 tablet Commonly known as:  CARDIZEM LA Take 240 mg by mouth daily. ferrous sulfate 325 mg (65 mg iron) EC tablet Commonly known as:  IRON Take 1 Tab by mouth Daily (before breakfast). losartan 50 mg tablet Commonly known as:  COZAAR Take 25 mg by mouth daily. lutein 6 mg Tab Take  by mouth.  
  
 metoprolol succinate 50 mg XL tablet Commonly known as:  TOPROL-XL Take  by mouth daily. oxybutynin 5 mg tablet Commonly known as:  CJRHXNKF Take 1 Tab by mouth two (2) times a day. SENOKOT 8.6 mg tablet Generic drug:  senna Take 1 Tab by mouth daily. Take 2 tablets daily. VITAMIN D3 2,000 unit Tab Generic drug:  cholecalciferol (vitamin D3) Take  by mouth. XARELTO 15 mg Tab tablet Generic drug:  rivaroxaban Take  by mouth daily. Prescriptions Sent to Pharmacy Refills  
 ferrous sulfate (IRON) 325 mg (65 mg iron) EC tablet 2 Sig: Take 1 Tab by mouth Daily (before breakfast). Class: Normal  
 Pharmacy: [address] [place] [address] Stephanie [address]. [address] Ph #: [phone] Route: Oral  
  
We Performed the Following CBC W/O DIFF [32964 CPT(R)] METABOLIC PANEL, COMPREHENSIVE [67964 CPT(R)] Please provide this summary of care documentation to your next provider. Your primary care clinician is listed as Britt Guerrero. If you have any questions after today's visit, please call [phone].

## 2018-05-22 NOTE — PROGRESS NOTES
HISTORY OF PRESENT ILLNESS  Manjinder Bravo is a 80 y.o. female here accompanied by her niece . She has lost another 5 pound weight. Has moderate to severe dementia, was confirmed from neurologist Dr. Redd Conteh. Her medications for dementia were discontinued. Now she is forgetful, not wants to take any food or even fluid. Her family is coming almost every day and reinforcing different types of food, patient is not taking much. She is living on boost.   Has A. fib, seeing cardiologist.  On Xarelto 20 mg once a day for now labs done in cardiologist office. CBC CMP are stable. Has low iron, taking iron sulfate every day. Need lab work. Irregular Heart Beat    Associated symptoms include malaise/fatigue. Scoliosis         Review of Systems   Constitutional: Positive for malaise/fatigue. HENT: Negative. Eyes: Negative. Respiratory: Negative. Cardiovascular: Negative. Genitourinary: Negative. Musculoskeletal: Negative. Negative for falls. Skin: Negative. Neurological: Negative. Negative for sensory change and speech change. Psychiatric/Behavioral: Positive for memory loss. Physical Exam   Constitutional: She appears well-developed and well-nourished. No distress. Neck: Normal range of motion. Neck supple. No JVD present. No thyromegaly present. Cardiovascular: Normal rate, regular rhythm, normal heart sounds and intact distal pulses. Pulmonary/Chest: Effort normal and breath sounds normal. No respiratory distress. She has no wheezes. Musculoskeletal: She exhibits no edema or tenderness. Psychiatric: She has a normal mood and affect. Her behavior is normal.       ASSESSMENT and PLAN  Diagnoses and all orders for this visit:    1. Atrial fibrillation, unspecified type ([address]    On Cardizem and metoprolol. Also taking Xarelto. Seeing cardiologist Dr. Jeanie Partida. Rate and rhythm controlled. Will check,  -     METABOLIC PANEL, COMPREHENSIVE    2.  Essential hypertension    on losartan 25 mg right now blood pressure is okay. Will check,  -     METABOLIC PANEL, COMPREHENSIVE    3. Late onset Alzheimer's disease without behavioral disturbance   Moderate to severe dementia. Was seen by neurologist Dr. Geri Schmitt, was advised not to continue with Namenda or Aricept. She was not able to tolerate Remeron, but was discontinued also. She is forgetful, forgets to eat, and she has lost 5 pounds in the last 2 months. She is not drinking enough fluid either. Her family continuously reinforcing her teaching her to eat but is not working with her. She is only living on post if she wants to. No not much solid food at all. 4. Iron deficiency anemia, unspecified iron deficiency anemia type    Has low iron. probable dietary deficiency. Will repeat,  -     CBC W/O DIFF  -     ferrous sulfate (IRON) 325 mg (65 mg iron) EC tablet; Take 1 Tab by mouth Daily (before breakfast). Discussed expected course/resolution/complications of diagnosis in detail with patient. Medication risks/benefits/costs/interactions/alternatives discussed with patient. Pt was given an after visit summary which includes diagnoses, current medications & vitals. Pt expressed understanding with the diagnosis and plan.

## 2018-05-22 NOTE — LETTER
5/23/2018 11:06 AM 
 
Ms. Chino Galaviz [address] [address] Dear Chino Galaviz: 
 
Please find your most recent results below. Resulted Orders CBC W/O DIFF Result Value Ref Range WBC 6.8 3.4 - 10.8 x10E3/uL  
 RBC 4.62 3.77 - 5.28 x10E6/uL HGB 13.9 11.1 - 15.9 g/dL HCT 41.7 34.0 - 46.6 % MCV 90 79 - 97 fL  
 MCH 30.1 26.6 - 33.0 pg  
 MCHC 33.3 31.5 - 35.7 g/dL  
 RDW 18.0 (H) 12.3 - 15.4 % PLATELET 839 417 - 373 x10E3/uL Narrative Performed at:  [address]  [id number] : Claudine Angeles MD, Phone:  [phone] METABOLIC PANEL, COMPREHENSIVE Result Value Ref Range Glucose 82 65 - 99 mg/dL BUN 22 8 - 27 mg/dL Creatinine 0.98 0.57 - 1.00 mg/dL GFR est non-AA 52 (L) >59 mL/min/1.73 GFR est AA 60 >59 mL/min/1.73  
 BUN/Creatinine ratio 22 12 - 28 Sodium 146 (H) 134 - 144 mmol/L Potassium 4.8 3.5 - 5.2 mmol/L Chloride 104 96 - 106 mmol/L  
 CO2 24 18 - 29 mmol/L Calcium 10.1 8.7 - 10.3 mg/dL Protein, total 6.9 6.0 - 8.5 g/dL Albumin 4.5 3.5 - 4.7 g/dL GLOBULIN, TOTAL 2.4 1.5 - 4.5 g/dL A-G Ratio 1.9 1.2 - 2.2 Bilirubin, total 0.5 0.0 - 1.2 mg/dL Alk. phosphatase 57 39 - 117 IU/L  
 AST (SGOT) 20 0 - 40 IU/L  
 ALT (SGPT) 18 0 - 32 IU/L Narrative Performed at:  [address]  [id number] : Claudine Angeles MD, Phone:  [phone] CKD REPORT Result Value Ref Range Interpretation Note Comment:  
   Supplemental report is available. Narrative Performed at:  [address] [place] [address]  [id number] : Socorro Ashford MD, Phone:  [phone] RECOMMENDATIONS: 
Labs are stable except sodium level slightly high.   advised to drink more fluid. Please call me if you have any questions: [phone] Sincerely, 
 
 
 Hieu Rosario MD

## 2018-05-23 LAB
ALBUMIN SERPL-MCNC: 4.5 G/DL (ref 3.5–4.7)
ALBUMIN/GLOB SERPL: 1.9 {RATIO} (ref 1.2–2.2)
ALP SERPL-CCNC: 57 IU/L (ref 39–117)
ALT SERPL-CCNC: 18 IU/L (ref 0–32)
AST SERPL-CCNC: 20 IU/L (ref 0–40)
BILIRUB SERPL-MCNC: 0.5 MG/DL (ref 0–1.2)
BUN SERPL-MCNC: 22 MG/DL (ref 8–27)
BUN/CREAT SERPL: 22 (ref 12–28)
CALCIUM SERPL-MCNC: 10.1 MG/DL (ref 8.7–10.3)
CHLORIDE SERPL-SCNC: 104 MMOL/L (ref 96–106)
CO2 SERPL-SCNC: 24 MMOL/L (ref 18–29)
CREAT SERPL-MCNC: 0.98 MG/DL (ref 0.57–1)
ERYTHROCYTE [DISTWIDTH] IN BLOOD BY AUTOMATED COUNT: 18 % (ref 12.3–15.4)
GFR SERPLBLD CREATININE-BSD FMLA CKD-EPI: 52 ML/MIN/1.73
GFR SERPLBLD CREATININE-BSD FMLA CKD-EPI: 60 ML/MIN/1.73
GLOBULIN SER CALC-MCNC: 2.4 G/DL (ref 1.5–4.5)
GLUCOSE SERPL-MCNC: 82 MG/DL (ref 65–99)
HCT VFR BLD AUTO: 41.7 % (ref 34–46.6)
HGB BLD-MCNC: 13.9 G/DL (ref 11.1–15.9)
INTERPRETATION: NORMAL
MCH RBC QN AUTO: 30.1 PG (ref 26.6–33)
MCHC RBC AUTO-ENTMCNC: 33.3 G/DL (ref 31.5–35.7)
MCV RBC AUTO: 90 FL (ref 79–97)
PLATELET # BLD AUTO: 229 X10E3/UL (ref 150–379)
POTASSIUM SERPL-SCNC: 4.8 MMOL/L (ref 3.5–5.2)
PROT SERPL-MCNC: 6.9 G/DL (ref 6–8.5)
RBC # BLD AUTO: 4.62 X10E6/UL (ref 3.77–5.28)
SODIUM SERPL-SCNC: 146 MMOL/L (ref 134–144)
WBC # BLD AUTO: 6.8 X10E3/UL (ref 3.4–10.8)

## 2018-05-24 ENCOUNTER — HOSPITAL ENCOUNTER (INPATIENT)
Age: 83
LOS: 6 days | Discharge: NURSING, REHAB OR LONG-TERM CARE | DRG: 309 | End: 2018-05-30
Attending: EMERGENCY MEDICINE | Admitting: INTERNAL MEDICINE
Payer: MEDICARE

## 2018-05-24 ENCOUNTER — APPOINTMENT (OUTPATIENT)
Dept: GENERAL RADIOLOGY | Age: 83
DRG: 309 | End: 2018-05-24
Attending: EMERGENCY MEDICINE
Payer: MEDICARE

## 2018-05-24 DIAGNOSIS — R63.4 WEIGHT LOSS: ICD-10-CM

## 2018-05-24 DIAGNOSIS — I48.92 ATRIAL FLUTTER WITH RAPID VENTRICULAR RESPONSE (HCC): Primary | ICD-10-CM

## 2018-05-24 DIAGNOSIS — G30.1 LATE ONSET ALZHEIMER'S DISEASE WITHOUT BEHAVIORAL DISTURBANCE (HCC): ICD-10-CM

## 2018-05-24 DIAGNOSIS — F02.80 LATE ONSET ALZHEIMER'S DISEASE WITHOUT BEHAVIORAL DISTURBANCE (HCC): ICD-10-CM

## 2018-05-24 DIAGNOSIS — E86.0 DEHYDRATION: ICD-10-CM

## 2018-05-24 DIAGNOSIS — Z71.89 ADVANCED CARE PLANNING/COUNSELING DISCUSSION: ICD-10-CM

## 2018-05-24 DIAGNOSIS — F02.80 ALZHEIMER'S DEMENTIA WITHOUT BEHAVIORAL DISTURBANCE, UNSPECIFIED TIMING OF DEMENTIA ONSET: ICD-10-CM

## 2018-05-24 DIAGNOSIS — G30.9 ALZHEIMER'S DEMENTIA WITHOUT BEHAVIORAL DISTURBANCE, UNSPECIFIED TIMING OF DEMENTIA ONSET: ICD-10-CM

## 2018-05-24 LAB
ALBUMIN SERPL-MCNC: 4.2 G/DL (ref 3.5–5)
ALBUMIN/GLOB SERPL: 1.4 {RATIO} (ref 1.1–2.2)
ALP SERPL-CCNC: 60 U/L (ref 45–117)
ALT SERPL-CCNC: 22 U/L (ref 12–78)
ANION GAP SERPL CALC-SCNC: 8 MMOL/L (ref 5–15)
AST SERPL-CCNC: 14 U/L (ref 15–37)
BASOPHILS # BLD: 0 K/UL (ref 0–0.1)
BASOPHILS NFR BLD: 1 % (ref 0–1)
BILIRUB SERPL-MCNC: 0.5 MG/DL (ref 0.2–1)
BUN SERPL-MCNC: 22 MG/DL (ref 6–20)
BUN/CREAT SERPL: 23 (ref 12–20)
CALCIUM SERPL-MCNC: 9.5 MG/DL (ref 8.5–10.1)
CHLORIDE SERPL-SCNC: 105 MMOL/L (ref 97–108)
CK SERPL-CCNC: 40 U/L (ref 26–192)
CO2 SERPL-SCNC: 29 MMOL/L (ref 21–32)
CREAT SERPL-MCNC: 0.95 MG/DL (ref 0.55–1.02)
DIFFERENTIAL METHOD BLD: ABNORMAL
EOSINOPHIL # BLD: 0.1 K/UL (ref 0–0.4)
EOSINOPHIL NFR BLD: 1 % (ref 0–7)
ERYTHROCYTE [DISTWIDTH] IN BLOOD BY AUTOMATED COUNT: 17.2 % (ref 11.5–14.5)
GLOBULIN SER CALC-MCNC: 3.1 G/DL (ref 2–4)
GLUCOSE SERPL-MCNC: 103 MG/DL (ref 65–100)
HCT VFR BLD AUTO: 43.8 % (ref 35–47)
HGB BLD-MCNC: 14.6 G/DL (ref 11.5–16)
IMM GRANULOCYTES # BLD: 0 K/UL (ref 0–0.04)
IMM GRANULOCYTES NFR BLD AUTO: 0 % (ref 0–0.5)
INR PPP: 1.3 (ref 0.9–1.1)
LYMPHOCYTES # BLD: 2.3 K/UL (ref 0.8–3.5)
LYMPHOCYTES NFR BLD: 27 % (ref 12–49)
MAGNESIUM SERPL-MCNC: 2.4 MG/DL (ref 1.6–2.4)
MCH RBC QN AUTO: 30.4 PG (ref 26–34)
MCHC RBC AUTO-ENTMCNC: 33.3 G/DL (ref 30–36.5)
MCV RBC AUTO: 91.1 FL (ref 80–99)
MONOCYTES # BLD: 0.9 K/UL (ref 0–1)
MONOCYTES NFR BLD: 10 % (ref 5–13)
NEUTS SEG # BLD: 5.1 K/UL (ref 1.8–8)
NEUTS SEG NFR BLD: 61 % (ref 32–75)
NRBC # BLD: 0 K/UL (ref 0–0.01)
NRBC BLD-RTO: 0 PER 100 WBC
PLATELET # BLD AUTO: 236 K/UL (ref 150–400)
PMV BLD AUTO: 11 FL (ref 8.9–12.9)
POTASSIUM SERPL-SCNC: 4.4 MMOL/L (ref 3.5–5.1)
PROT SERPL-MCNC: 7.3 G/DL (ref 6.4–8.2)
PROTHROMBIN TIME: 13.5 SEC (ref 9–11.1)
RBC # BLD AUTO: 4.81 M/UL (ref 3.8–5.2)
SODIUM SERPL-SCNC: 142 MMOL/L (ref 136–145)
TROPONIN I SERPL-MCNC: <0.04 NG/ML
WBC # BLD AUTO: 8.3 K/UL (ref 3.6–11)

## 2018-05-24 PROCEDURE — 36415 COLL VENOUS BLD VENIPUNCTURE: CPT | Performed by: EMERGENCY MEDICINE

## 2018-05-24 PROCEDURE — 85610 PROTHROMBIN TIME: CPT | Performed by: EMERGENCY MEDICINE

## 2018-05-24 PROCEDURE — 96376 TX/PRO/DX INJ SAME DRUG ADON: CPT

## 2018-05-24 PROCEDURE — 96365 THER/PROPH/DIAG IV INF INIT: CPT

## 2018-05-24 PROCEDURE — 65660000000 HC RM CCU STEPDOWN

## 2018-05-24 PROCEDURE — 74011250636 HC RX REV CODE- 250/636: Performed by: EMERGENCY MEDICINE

## 2018-05-24 PROCEDURE — 96361 HYDRATE IV INFUSION ADD-ON: CPT

## 2018-05-24 PROCEDURE — 74011250636 HC RX REV CODE- 250/636: Performed by: INTERNAL MEDICINE

## 2018-05-24 PROCEDURE — 71045 X-RAY EXAM CHEST 1 VIEW: CPT

## 2018-05-24 PROCEDURE — 93005 ELECTROCARDIOGRAM TRACING: CPT

## 2018-05-24 PROCEDURE — 82550 ASSAY OF CK (CPK): CPT | Performed by: EMERGENCY MEDICINE

## 2018-05-24 PROCEDURE — 80053 COMPREHEN METABOLIC PANEL: CPT | Performed by: EMERGENCY MEDICINE

## 2018-05-24 PROCEDURE — 84484 ASSAY OF TROPONIN QUANT: CPT | Performed by: EMERGENCY MEDICINE

## 2018-05-24 PROCEDURE — 83735 ASSAY OF MAGNESIUM: CPT | Performed by: EMERGENCY MEDICINE

## 2018-05-24 PROCEDURE — 74011000250 HC RX REV CODE- 250: Performed by: EMERGENCY MEDICINE

## 2018-05-24 PROCEDURE — 99285 EMERGENCY DEPT VISIT HI MDM: CPT

## 2018-05-24 PROCEDURE — 85025 COMPLETE CBC W/AUTO DIFF WBC: CPT | Performed by: EMERGENCY MEDICINE

## 2018-05-24 RX ORDER — OXYBUTYNIN CHLORIDE 5 MG/1
10 TABLET, EXTENDED RELEASE ORAL DAILY
Status: DISCONTINUED | OUTPATIENT
Start: 2018-05-25 | End: 2018-05-29 | Stop reason: ALTCHOICE

## 2018-05-24 RX ORDER — METOPROLOL SUCCINATE 50 MG/1
50 TABLET, EXTENDED RELEASE ORAL DAILY
Status: DISCONTINUED | OUTPATIENT
Start: 2018-05-25 | End: 2018-05-26

## 2018-05-24 RX ORDER — ACETAMINOPHEN 325 MG/1
650 TABLET ORAL
Status: DISCONTINUED | OUTPATIENT
Start: 2018-05-24 | End: 2018-05-30 | Stop reason: HOSPADM

## 2018-05-24 RX ORDER — ONDANSETRON 2 MG/ML
4 INJECTION INTRAMUSCULAR; INTRAVENOUS
Status: DISCONTINUED | OUTPATIENT
Start: 2018-05-24 | End: 2018-05-30 | Stop reason: HOSPADM

## 2018-05-24 RX ORDER — OXYBUTYNIN CHLORIDE 5 MG/1
5 TABLET ORAL 2 TIMES DAILY
Status: DISCONTINUED | OUTPATIENT
Start: 2018-05-24 | End: 2018-05-24

## 2018-05-24 RX ORDER — SODIUM CHLORIDE 9 MG/ML
75 INJECTION, SOLUTION INTRAVENOUS CONTINUOUS
Status: DISCONTINUED | OUTPATIENT
Start: 2018-05-24 | End: 2018-05-28

## 2018-05-24 RX ORDER — SODIUM CHLORIDE 0.9 % (FLUSH) 0.9 %
5-10 SYRINGE (ML) INJECTION EVERY 8 HOURS
Status: DISCONTINUED | OUTPATIENT
Start: 2018-05-24 | End: 2018-05-30 | Stop reason: HOSPADM

## 2018-05-24 RX ORDER — DOCUSATE SODIUM 100 MG/1
100 CAPSULE, LIQUID FILLED ORAL
Status: DISCONTINUED | OUTPATIENT
Start: 2018-05-24 | End: 2018-05-30 | Stop reason: HOSPADM

## 2018-05-24 RX ORDER — DILTIAZEM HYDROCHLORIDE 5 MG/ML
10 INJECTION INTRAVENOUS
Status: COMPLETED | OUTPATIENT
Start: 2018-05-24 | End: 2018-05-24

## 2018-05-24 RX ORDER — METOPROLOL TARTRATE 5 MG/5ML
2.5 INJECTION INTRAVENOUS
Status: DISCONTINUED | OUTPATIENT
Start: 2018-05-24 | End: 2018-05-30 | Stop reason: HOSPADM

## 2018-05-24 RX ORDER — SODIUM CHLORIDE 0.9 % (FLUSH) 0.9 %
5-10 SYRINGE (ML) INJECTION AS NEEDED
Status: DISCONTINUED | OUTPATIENT
Start: 2018-05-24 | End: 2018-05-30 | Stop reason: HOSPADM

## 2018-05-24 RX ORDER — SENNOSIDES 8.6 MG/1
1 TABLET ORAL DAILY
Status: DISCONTINUED | OUTPATIENT
Start: 2018-05-25 | End: 2018-05-30 | Stop reason: HOSPADM

## 2018-05-24 RX ADMIN — SODIUM CHLORIDE 500 ML: 900 INJECTION, SOLUTION INTRAVENOUS at 17:37

## 2018-05-24 RX ADMIN — SODIUM CHLORIDE 75 ML/HR: 900 INJECTION, SOLUTION INTRAVENOUS at 21:57

## 2018-05-24 RX ADMIN — DEXTROSE MONOHYDRATE 5 MG/HR: 50 INJECTION, SOLUTION INTRAVENOUS at 19:09

## 2018-05-24 RX ADMIN — DILTIAZEM HYDROCHLORIDE 10 MG: 5 INJECTION INTRAVENOUS at 17:40

## 2018-05-24 RX ADMIN — Medication 10 ML: at 21:58

## 2018-05-24 NOTE — IP AVS SNAPSHOT
[address] [facility] 
[phone] Patient: Tabitha Garces MRN: [id number] Steven Bradley About your hospitalization You were admitted on:  May 24, 2018 You last received care in the:  [facility] [place] [facility] CARDIOPULMONARY CARE You were discharged on:  May 30, 2018 Why you were hospitalized Your primary diagnosis was:  Atrial Flutter (Hcc) Your diagnoses also included:  Atrial Fibrillation (Hcc), Late Onset Alzheimer's Disease Without Behavioral Disturbance, Malnutrition (Hcc), Hypertension Follow-up Information Follow up With Details Comments Contact Info [id number] ([facility]) On 5/30/2018 This is your post-acute skilled nursing rehabilitation facility provider. [address] [facility] [address] [facility] 
[phone] Jimbo Ndiaye MD Go in 25 days Please schedule hospital follow-up after patient completes rehablitatoin. [address] Jesus [address] 13 
[phone] Una Medina MD Go on 6/15/2018 9:30AM; Cardiology follow-up [address] [facility] 
[phone] Your Scheduled Appointments Tuesday July 10, 2018  1:15 PM EDT Any with Jimbo Ndiaye MD  
[facility] ([address]) [address] [place] [id number] [phone] Discharge Orders None A check alis indicates which time of day the medication should be taken. My Medications START taking these medications Instructions Each Dose to Equal  
 Morning Noon Evening Bedtime  
 mirabegron ER 25 mg ER tablet Commonly known as:  MYRBETRIQ Your last dose was: Your next dose is: Take 1 Tab by mouth daily. 25 mg CHANGE how you take these medications Instructions Each Dose to Equal  
 Morning Noon Evening Bedtime metoprolol succinate 50 mg XL tablet Commonly known as:  TOPROL-XL What changed:  how much to take Your last dose was: Your next dose is: Take 3 Tabs by mouth daily. 150 mg CONTINUE taking these medications Instructions Each Dose to Equal  
 Morning Noon Evening Bedtime  
 acetaminophen 500 mg tablet Commonly known as:  TYLENOL Your last dose was: Your next dose is: Take 1 Tab by mouth two (2) times a day. 500 mg  
    
   
   
   
  
 ferrous sulfate 325 mg (65 mg iron) EC tablet Commonly known as:  IRON Your last dose was: Your next dose is: Take 1 Tab by mouth Daily (before breakfast). 325 mg  
    
   
   
   
  
 lutein 6 mg Tab Your last dose was: Your next dose is: Take  by mouth. SENOKOT 8.6 mg tablet Generic drug:  senna Your last dose was: Your next dose is: Take 1 Tab by mouth daily. Take 2 tablets daily. 1 Tab VITAMIN D3 2,000 unit Tab Generic drug:  cholecalciferol (vitamin D3) Your last dose was: Your next dose is: Take  by mouth. XARELTO 15 mg Tab tablet Generic drug:  rivaroxaban Your last dose was: Your next dose is: Take  by mouth daily. STOP taking these medications   
 baclofen 10 mg tablet Commonly known as:  LIORESAL  
   
  
 dilTIAZem  mg Tb24 tablet Commonly known as:  CARDIZEM LA  
   
  
 losartan 50 mg tablet Commonly known as:  COZAAR  
   
  
 oxybutynin 5 mg tablet Commonly known as:  HCQELKOO Where to Get Your Medications Information on where to get these meds will be given to you by the nurse or doctor. ! Ask your nurse or doctor about these medications  
  metoprolol succinate 50 mg XL tablet mirabegron ER 25 mg ER tablet Discharge Instructions Patient Discharge Instructions Pt Name  Courtney Nelson Date of Birth 8/19/1929 Age  80 y.o. Medical Record Number  [id number] PCP Padmini Vivar MD   
Admit date:  5/24/2018 @    [place] [address] Room Number  2214/01 Date of Discharge 5/30/2018 Admission Diagnoses:     Atrial flutter ([address] No Known Allergies You were admitted to [address] for  Atrial flutter ([address] YOUR OTHER MEDICAL DIAGNOSES INCLUDE (BUT NOT LIMITED TO ): 
Present on Admission:  Atrial flutter ([address]  Atrial fibrillation ([address]  Late onset Alzheimer's disease without behavioral disturbance  Malnutrition ([address]  Hypertension DIET:  Regular Diet Nutritional support: Ensure TID Recommended activity: Activity as tolerated Follow up : Follow-up Information Follow up With Details Comments Contact Info [id number] [facility]) On 5/30/2018 This is your post-acute skilled nursing rehabilitation facility provider. [address] [place] [address]. 
[phone] Padmini Vivar MD Go in 25 days Please schedule hospital follow-up after patient completes rehablitatoin. [address] [place] [address] 
[phone] Kristine Sweeney MD Go on 6/15/2018 9:30AM; Cardiology follow-up [address] [facility] 83. [phone] Skilled nursing facility MD responsible for above upon discharge. · It is important that you take the medication exactly as they are prescribed. · Keep your medication in the bottles provided by the pharmacist and keep a list of the medication names, dosages, and times to be taken in your wallet. · Do not take other medications without consulting your doctor.   
 
 
ADDITIONAL INFORMATION: If you experience any of the following symptoms or have any health problem not listed below, then please call your primary care physician or return to the emergency room if you cannot get hold of your doctor: Fever, chills, nausea, vomiting, diarrhea, change in mentation, falling, bleeding, shortness of breath. I understand that if any problems occur once I am discharged, I am supposed to call my Primary care physician for further care or seek help in the Emergency Department at the nearest Healthcare facility. I have had an opportunity to discuss my clinical issues with my doctor and nursing staff. I understand and acknowledge receipt of the above instructions. Physician's or R.N.'s Signature                                                            Date/Time Patient or Representative Signature                                                 Date/Time 
 
 
 
       
 
 
[facility]O Transitions of Care [facility] offers a voluntary care coordination program to provide high quality service and care to [facility] fee-for-service beneficiaries. Raissa Rojas was designed to help you enhance your health and well-being through the following services: ? Transitions of Care  support for individuals who are transitioning from one care setting to another (example: Hospital to home). ? Chronic and Complex Care Coordination  support for individuals and caregivers of those with serious or chronic illnesses or with more than one chronic (ongoing) condition and those who take a number of different medications.   
 
 
If you meet specific medical criteria, a [facility] Coordinator may call you directly to coordinate your care with your primary care physician and your other care providers. For questions about the [facility] programs, please, contact your physicians office. For general questions or additional information about Accountable Care Organizations: 
Please visit www.medicare.gov/acos. html or call 1-800-MEDICARE (5-699.252.2023) TTY users should call 1-594.883.9952. [employer] Announcement We are excited to announce that we are making your provider's discharge notes available to you in [employer]. You will see these notes when they are completed and signed by the physician that discharged you from your recent hospital stay. If you have any questions or concerns about any information you see in [employer], please call the Health Information Department where you were seen or reach out to your Primary Care Provider for more information about your plan of care. Introducing Catarino Waggoner As a [facility] patient, I wanted to make you aware of our electronic visit tool called Catarion Waggoner. [facility] 24/Practice Management e-Tools allows you to connect within minutes with a medical provider 24 hours a day, seven days a week via a mobile device or tablet or logging into a secure website from your computer. You can access Catarino Waggoner from anywhere in the [place]. A virtual visit might be right for you when you have a simple condition and feel like you just dont want to get out of bed, or cant get away from work for an appointment, when your regular [facility] provider is not available (evenings, weekends or holidays), or when youre out of town and need minor care. Electronic visits cost only $49 and if the [facility] 24/7 provider determines a prescription is needed to treat your condition, one can be electronically transmitted to a nearby pharmacy*. Please take a moment to enroll today if you have not already done so.   The enrollment process is free and takes just a few minutes. To enroll, please download the [employer] 24/7 darien to your tablet or phone, or visit [email or web address] to enroll on your computer. And, as an [address] [place] [address] patient with a [employer] account, the results of your visits will be scanned into your electronic medical record and your primary care provider will be able to view the scanned results. We urge you to continue to see your regular [employer] provider for your ongoing medical care. And while your primary care provider may not be the one available when you seek a [employer] virtual visit, the peace of mind you get from getting a real diagnosis real time can be priceless. For more information on [employer], view our Frequently Asked Questions (FAQs) at [email or web address]. Sincerely, 
 
Shavon Cartagena MD 
Chief Medical Officer Luther Clemente *:  certain medications cannot be prescribed via [employer] Providers Seen During Your Hospitalization Provider Specialty Primary office phone Edwin Watson MD Emergency Medicine [phone] Marilee Noble MD Internal Medicine MD [place] Hospitalist [phone] Your Primary Care Physician (PCP) Primary Care Physician Office Phone Office Fax [address], [address] [place] [address] [phone] You are allergic to the following No active allergies Recent Documentation Height Weight Breastfeeding? BMI OB Status Smoking Status 1.549 m 53 kg No 22.08 kg/m2 Postmenopausal Never Smoker Emergency Contacts Name Discharge Info Relation Home Work Mobile Pia Osuna DISCHARGE CAREGIVER [3] Other Relative [6] [phone] Patient Belongings  The following personal items are in your possession at time of discharge: 
  Dental Appliances: None  Visual Aid: Glasses, With patient      Home Medications: None   Jewelry: None  Clothing: At bedside    Other Valuables: At bedside  Personal Items Sent to Safe: none Please provide this summary of care documentation to your next provider. Signatures-by signing, you are acknowledging that this After Visit Summary has been reviewed with you and you have received a copy. Patient Signature:  ____________________________________________________________ Date:  ____________________________________________________________  
  
[facility] Provider Signature:  ____________________________________________________________ Date:  ____________________________________________________________

## 2018-05-24 NOTE — ED TRIAGE NOTES
Pt here via EMS for rapid HR and aflutter.  initially and now 98 after 300ml of fluid. Pt denies CP or SOB. Pt confused and asking Cathy Bui is wrong with me\" repeatedly.

## 2018-05-24 NOTE — ED NOTES
Pt up to bedside commode to void, pt contaminated urine sample so did not collect  Pt became symptomatic HR increased to 120's and became short winded. Placed pt back in bed. HR remaining in the 120's. Redirection given to pt about why she is here and where she is and where her family is. Hitchcock given to pt, call light within reach, bed in lowest position, side rails up x 2 for pt safety.

## 2018-05-24 NOTE — ED PROVIDER NOTES
EMERGENCY DEPARTMENT HISTORY AND PHYSICAL EXAM      Date: 5/24/2018  Patient Name: Wilian Little    History of Presenting Illness     Chief Complaint   Patient presents with    Irregular Heart Beat     pt here via EMS for rapid HR ,sinus tach with aflutter hx of afib       History Provided By: Patient's Daughter    HPI: Wilian Little, 80 y.o. female with PMHx significant for a-fib on xarelto, presents via EMS to the ED with cc of elevated HR in the 150's x today. Pt's daughter reports that home health was evaluating a wound per normal routine and found her HR to be in 150's. They called EMS, who found her to be in a-flutter with prolonged tachycardia. She does not have any complaints in the ED. Family is aware of hx of a-fib and notes that she is on metoprolol and diltiazem, which she is compliant with. She sees Dr. Marisela House for cardiology. Pt does not eat well at baseline and family notes that she has lost 6lbs in 5 weeks. Family makes her drink 2 Boosts with her medications daily. She lives at [facility]. They report that she complains of a HA everyday. Denies nausea, vomiting, diarrhea. HPI is limited secondary to pt's dementia    PCP: Lis Flores MD    Current Facility-Administered Medications   Medication Dose Route Frequency Provider Last Rate Last Dose    dilTIAZem (CARDIZEM) 100 mg in dextrose 5% (MBP/ADV) 100 mL infusion  0-15 mg/hr IntraVENous TITRATE Ye Zaldivar MD 5 mL/hr at 05/24/18 1909 5 mg/hr at 05/24/18 1909     Current Outpatient Prescriptions   Medication Sig Dispense Refill    ferrous sulfate (IRON) 325 mg (65 mg iron) EC tablet Take 1 Tab by mouth Daily (before breakfast). 30 Tab 2    oxybutynin (DITROPAN) 5 mg tablet Take 1 Tab by mouth two (2) times a day. 180 Tab 2    lutein 6 mg tab Take  by mouth.  baclofen (LIORESAL) 10 mg tablet Take 1 Tab by mouth two (2) times daily as needed.  30 Tab 1    acetaminophen (TYLENOL) 500 mg tablet Take 1 Tab by mouth two (2) times a day. 60 Tab 5    losartan (COZAAR) 50 mg tablet Take 25 mg by mouth daily.  metoprolol succinate (TOPROL-XL) 50 mg XL tablet Take  by mouth daily.  rivaroxaban (XARELTO) 15 mg tab tablet Take  by mouth daily.  dilTIAZem ER (CARDIZEM LA) 240 mg Tb24 tablet Take 240 mg by mouth daily.  cholecalciferol, vitamin D3, (VITAMIN D3) 2,000 unit tab Take  by mouth.  senna (SENOKOT) 8.6 mg tablet Take 1 Tab by mouth daily. Take 2 tablets daily. Past History     Past Medical History:  Past Medical History:   Diagnosis Date    A-fib ([address]     Arthritis        Past Surgical History:  Past Surgical History:   Procedure Laterality Date    HX BREAST AUGMENTATION      HX ORTHOPAEDIC      TKR--bilateral        Family History:  Family History   Problem Relation Age of Onset    Heart Attack Mother     Cancer Sister     Cancer Brother        Social History:  Social History   Substance Use Topics    Smoking status: Never Smoker    Smokeless tobacco: Never Used    Alcohol use No       Allergies:  No Known Allergies      Review of Systems   Review of Systems   Unable to perform ROS: Dementia       Physical Exam   Physical Exam   Constitutional: She appears well-developed and well-nourished. HENT:   Head: Normocephalic and atraumatic. Eyes: EOM are normal. Right eye exhibits no discharge. Left eye exhibits no discharge. No scleral icterus. Neck: Normal range of motion. Neck supple. No tracheal deviation present. Cardiovascular: Normal heart sounds and intact distal pulses. An irregular rhythm present. Tachycardia present. Exam reveals no gallop and no friction rub. No murmur heard. Pulmonary/Chest: Effort normal and breath sounds normal. No respiratory distress. She has no wheezes. She has no rales. Abdominal: Soft. She exhibits no distension. There is no tenderness. Musculoskeletal: Normal range of motion. She exhibits edema (trace BLE edema).    Lymphadenopathy:     She has no cervical adenopathy. Neurological: She is alert. No focal neuro deficits  Oriented to self and person but not place or time, facial symmetry, moving all extremities equally   Skin: Skin is warm and dry. No rash noted. Psychiatric: She has a normal mood and affect. Nursing note and vitals reviewed. Diagnostic Study Results     Labs -     Recent Results (from the past 12 hour(s))   EKG, 12 LEAD, INITIAL    Collection Time: 05/24/18  5:22 PM   Result Value Ref Range    Ventricular Rate 118 BPM    Atrial Rate 308 BPM    QRS Duration 88 ms    Q-T Interval 346 ms    QTC Calculation (Bezet) 484 ms    Calculated P Axis -151 degrees    Calculated R Axis 27 degrees    Calculated T Axis 50 degrees    Diagnosis       Atrial flutter with variable AV block with premature ventricular or   aberrantly conducted complexes  Nonspecific ST and T wave abnormality  Abnormal ECG  No previous ECGs available     CK W/ REFLX CKMB    Collection Time: 05/24/18  5:43 PM   Result Value Ref Range    CK 40 26 - 192 U/L   TROPONIN I    Collection Time: 05/24/18  5:43 PM   Result Value Ref Range    Troponin-I, Qt. <0.04 <0.05 ng/mL   CBC WITH AUTOMATED DIFF    Collection Time: 05/24/18  5:43 PM   Result Value Ref Range    WBC 8.3 3.6 - 11.0 K/uL    RBC 4.81 3.80 - 5.20 M/uL    HGB 14.6 11.5 - 16.0 g/dL    HCT 43.8 35.0 - 47.0 %    MCV 91.1 80.0 - 99.0 FL    MCH 30.4 26.0 - 34.0 PG    MCHC 33.3 30.0 - 36.5 g/dL    RDW 17.2 (H) 11.5 - 14.5 %    PLATELET 354 167 - 530 K/uL    MPV 11.0 8.9 - 12.9 FL    NRBC 0.0 0  WBC    ABSOLUTE NRBC 0.00 0.00 - 0.01 K/uL    NEUTROPHILS 61 32 - 75 %    LYMPHOCYTES 27 12 - 49 %    MONOCYTES 10 5 - 13 %    EOSINOPHILS 1 0 - 7 %    BASOPHILS 1 0 - 1 %    IMMATURE GRANULOCYTES 0 0.0 - 0.5 %    ABS. NEUTROPHILS 5.1 1.8 - 8.0 K/UL    ABS. LYMPHOCYTES 2.3 0.8 - 3.5 K/UL    ABS. MONOCYTES 0.9 0.0 - 1.0 K/UL    ABS. EOSINOPHILS 0.1 0.0 - 0.4 K/UL    ABS. BASOPHILS 0.0 0.0 - 0.1 K/UL    ABS. IMM.  GRANS. 0.0 0.00 - 0.04 K/UL    DF AUTOMATED     METABOLIC PANEL, COMPREHENSIVE    Collection Time: 05/24/18  5:43 PM   Result Value Ref Range    Sodium 142 136 - 145 mmol/L    Potassium 4.4 3.5 - 5.1 mmol/L    Chloride 105 97 - 108 mmol/L    CO2 29 21 - 32 mmol/L    Anion gap 8 5 - 15 mmol/L    Glucose 103 (H) 65 - 100 mg/dL    BUN 22 (H) 6 - 20 MG/DL    Creatinine 0.95 0.55 - 1.02 MG/DL    BUN/Creatinine ratio 23 (H) 12 - 20      GFR est AA >60 >60 ml/min/1.73m2    GFR est non-AA 56 (L) >60 ml/min/1.73m2    Calcium 9.5 8.5 - 10.1 MG/DL    Bilirubin, total 0.5 0.2 - 1.0 MG/DL    ALT (SGPT) 22 12 - 78 U/L    AST (SGOT) 14 (L) 15 - 37 U/L    Alk. phosphatase 60 45 - 117 U/L    Protein, total 7.3 6.4 - 8.2 g/dL    Albumin 4.2 3.5 - 5.0 g/dL    Globulin 3.1 2.0 - 4.0 g/dL    A-G Ratio 1.4 1.1 - 2.2     PROTHROMBIN TIME + INR    Collection Time: 05/24/18  5:43 PM   Result Value Ref Range    INR 1.3 (H) 0.9 - 1.1      Prothrombin time 13.5 (H) 9.0 - 11.1 sec   MAGNESIUM    Collection Time: 05/24/18  5:43 PM   Result Value Ref Range    Magnesium 2.4 1.6 - 2.4 mg/dL   EKG, 12 LEAD, SUBSEQUENT    Collection Time: 05/24/18  6:03 PM   Result Value Ref Range    Ventricular Rate 80 BPM    Atrial Rate 320 BPM    QRS Duration 78 ms    Q-T Interval 386 ms    QTC Calculation (Bezet) 445 ms    Calculated P Axis 43 degrees    Calculated R Axis 28 degrees    Calculated T Axis 59 degrees    Diagnosis       Atrial flutter with 4:1 AV conduction  Abnormal ECG  When compared with ECG of 24-MAY-2018 17:22,  MANUAL COMPARISON REQUIRED, DATA IS UNCONFIRMED         Radiologic Studies -   CXR Results  (Last 48 hours)               05/24/18 1835  XR CHEST PORT Final result    Impression:  IMPRESSION: No Acute Disease. Narrative:  EXAM: Portable CXR. 1818 hours         INDICATION: a flutter       The lungs are clear. Heart is normal in size. There is no overt pulmonary edema. There is no evident pneumothorax, adenopathy or pleural effusion. Medical Decision Making   I am the first provider for this patient. I reviewed the vital signs, available nursing notes, past medical history, past surgical history, family history and social history. Vital Signs-Reviewed the patient's vital signs. Patient Vitals for the past 12 hrs:   Temp Pulse Resp BP SpO2   05/24/18 1912 - (!) 142 23 - 94 %   05/24/18 1902 - (!) 109 20 - 94 %   05/24/18 1900 - (!) 124 23 105/72 95 %   05/24/18 1856 - (!) 143 24 - 94 %   05/24/18 1855 - (!) 114 16 - 96 %   05/24/18 1854 - (!) 117 14 - (!) 84 %   05/24/18 1853 - (!) 140 27 - 95 %   05/24/18 1848 - (!) 126 24 - 94 %   05/24/18 1845 - (!) 120 19 126/84 95 %   05/24/18 1810 - 81 22 - 98 %   05/24/18 1800 - 81 23 95/83 96 %   05/24/18 1757 - 81 17 - 96 %   05/24/18 1747 - 80 17 - 98 %   05/24/18 1745 - 81 17 98/60 96 %   05/24/18 1744 - 81 18 - 97 %   05/24/18 1742 - (!) 128 28 94/59 98 %   05/24/18 1740 - (!) 145 - 100/78 -   05/24/18 1739 - (!) 132 21 - 97 %   05/24/18 1730 - (!) 135 21 100/78 98 %   05/24/18 1725 - - - - 97 %   05/24/18 1721 - - - 126/85 93 %   05/24/18 1717 97.9 °F (36.6 °C) (!) 124 16 126/85 92 %       Pulse Oximetry Analysis - 92% on RA    Cardiac Monitor:   Rate: 124 bpm  Rhythm: Atrial Flutter      EKG interpretation: (Preliminary) 1727  Rhythm: atrial flutter; and irregular. Rate (approx.): 118 bpm; Axis: normal; QRS interval: normal ; ST/T wave: non-specific ST and T wave abnormality. Written by London Callow ED Scribe as dictated by Miki Damon MD      Records Reviewed: Nursing Notes and Old Medical Records    Provider Notes (Medical Decision Making):   DDx: a-flutter, a-fib, other arrhythmia, dehydration, electrolyte abnormality, ACS    Impression Plan: 80year old female who presents to the ED in aflutter RVR. HR initially improved with 10 mg diltiazem, but during ED course HR increased to 130s bpm and pt remained symptomatic. Diltiazem drip initiated.  Labs unremarkable. Pt admitted for further management. ED Course:   Initial assessment performed. The patients presenting problems have been discussed, and they are in agreement with the care plan formulated and outlined with them. I have encouraged them to ask questions as they arise throughout their visit. CONSULT NOTE:   6:50 PM  Suresh Rojas MD spoke with Dr. Hulon Gitelman,   Specialty: Cardiology  Discussed pt's hx, disposition, and available diagnostic and imaging results. Reviewed care plans. Consultant states that if pt is asymptomatic and rate controlled, she can be d/c home as she is already on appropriate therapy. If she becomes symptomatic or her rate is uncontrolled, admit to hospitalist.   Written by Eric Zamudio ED Scribe, as dictated by Suresh Rojas MD.    PROGRESS NOTE:  7:00 PM  Pt is now in a-flutter with RVR. Pt ambulated and her HR went up to the 120's and she became symptomatic. Will start on diltiazem drip and admit to hospitalist.  Written by Eric Zamudio ED Scribe as dictated by Suresh Rojas MD    CONSULT NOTE:   7:08 PM  Suresh Rojas MD spoke with Dr. Paulo Thomas,   Specialty: Hospitalist  Discussed pt's hx, disposition, and available diagnostic and imaging results. Reviewed care plans. Consultant will evaluate pt for admission.   Written by Eric Zamudio ED Scribe, as dictated by Suresh Rojas MD.    Critical Care Time:   CRITICAL CARE NOTE :    7:08 PM      IMPENDING DETERIORATION -Cardiovascular    ASSOCIATED RISK FACTORS - Dysrhythmia    MANAGEMENT- Bedside Assessment and Supervision of Care    INTERPRETATION -  ECG and Blood Pressure    INTERVENTIONS - IV fluids and diltiazem drip    CASE REVIEW - Hospitalist, Medical Sub-Specialist, Nursing and Family    TREATMENT RESPONSE -Improved    PERFORMED BY - Self        NOTES   :      I have spent 45 minutes of critical care time involved in lab review, consultations with specialist, family decision- making, bedside attention and documentation. During this entire length of time I was immediately available to the patient . Marie Estrada MD    Disposition:  PLAN: Admit to Hospitalist    7:08 PM  Patient is being admitted to the hospital by Dr. Jerris Sever. The results of their tests and reasons for their admission have been discussed with them and/or available family. They convey agreement and understanding for the need to be admitted and for their admission diagnosis. Written by Arelis Rivero ED Scribe, as dictated by Marie Estrada MD.    Diagnosis     Clinical Impression:   Atrial flutter with RVR  Alzheimer's dementia    Attestations: This note is prepared by John Padgett acting as scribe for MD Marie Vogel MD : The scribe's documentation has been prepared under my direction and personally reviewed by me in its entirety. I confirm that the note above accurately reflects all work, treatment, procedures, and medical decision making performed by me.

## 2018-05-24 NOTE — ED NOTES
Bedside shift change report given to Tressa Mora (oncoming nurse) by Naif Garcia RN (offgoing nurse). Report included the following information SBAR, Kardex, ED Summary and MAR. Call light within reach, bed in lowest position.

## 2018-05-24 NOTE — ED NOTES
Pt assisted to bedside commode. Pt asking if she can go home and if she can have something to eat. Hospitalist currently at bedside with patient.

## 2018-05-24 NOTE — ED NOTES
Family and MD  at bedside. Call light within reach, side rails up x 2 for safety. Pt has hx of afib and follows with Dr. Tanner Roy.   Family reports pt does not eat well per family, drinks mainly only 2 boosts a day

## 2018-05-25 LAB
ANION GAP SERPL CALC-SCNC: 9 MMOL/L (ref 5–15)
ATRIAL RATE: 308 BPM
ATRIAL RATE: 320 BPM
BUN SERPL-MCNC: 16 MG/DL (ref 6–20)
BUN/CREAT SERPL: 21 (ref 12–20)
CALCIUM SERPL-MCNC: 8.9 MG/DL (ref 8.5–10.1)
CALCULATED P AXIS, ECG09: -151 DEGREES
CALCULATED P AXIS, ECG09: 43 DEGREES
CALCULATED R AXIS, ECG10: 27 DEGREES
CALCULATED R AXIS, ECG10: 28 DEGREES
CALCULATED T AXIS, ECG11: 50 DEGREES
CALCULATED T AXIS, ECG11: 59 DEGREES
CHLORIDE SERPL-SCNC: 108 MMOL/L (ref 97–108)
CO2 SERPL-SCNC: 24 MMOL/L (ref 21–32)
CREAT SERPL-MCNC: 0.75 MG/DL (ref 0.55–1.02)
DIAGNOSIS, 93000: NORMAL
DIAGNOSIS, 93000: NORMAL
ERYTHROCYTE [DISTWIDTH] IN BLOOD BY AUTOMATED COUNT: 17.1 % (ref 11.5–14.5)
GLUCOSE SERPL-MCNC: 93 MG/DL (ref 65–100)
HCT VFR BLD AUTO: 38.4 % (ref 35–47)
HGB BLD-MCNC: 13 G/DL (ref 11.5–16)
MCH RBC QN AUTO: 30.4 PG (ref 26–34)
MCHC RBC AUTO-ENTMCNC: 33.9 G/DL (ref 30–36.5)
MCV RBC AUTO: 89.9 FL (ref 80–99)
NRBC # BLD: 0 K/UL (ref 0–0.01)
NRBC BLD-RTO: 0 PER 100 WBC
PLATELET # BLD AUTO: 202 K/UL (ref 150–400)
PMV BLD AUTO: 10.8 FL (ref 8.9–12.9)
POTASSIUM SERPL-SCNC: 3.9 MMOL/L (ref 3.5–5.1)
Q-T INTERVAL, ECG07: 346 MS
Q-T INTERVAL, ECG07: 386 MS
QRS DURATION, ECG06: 78 MS
QRS DURATION, ECG06: 88 MS
QTC CALCULATION (BEZET), ECG08: 445 MS
QTC CALCULATION (BEZET), ECG08: 484 MS
RBC # BLD AUTO: 4.27 M/UL (ref 3.8–5.2)
SODIUM SERPL-SCNC: 141 MMOL/L (ref 136–145)
VENTRICULAR RATE, ECG03: 118 BPM
VENTRICULAR RATE, ECG03: 80 BPM
WBC # BLD AUTO: 9.3 K/UL (ref 3.6–11)

## 2018-05-25 PROCEDURE — 36415 COLL VENOUS BLD VENIPUNCTURE: CPT | Performed by: INTERNAL MEDICINE

## 2018-05-25 PROCEDURE — 97165 OT EVAL LOW COMPLEX 30 MIN: CPT

## 2018-05-25 PROCEDURE — 65660000000 HC RM CCU STEPDOWN

## 2018-05-25 PROCEDURE — 76450000000

## 2018-05-25 PROCEDURE — 97530 THERAPEUTIC ACTIVITIES: CPT

## 2018-05-25 PROCEDURE — 77030032490 HC SLV COMPR SCD KNE COVD -B

## 2018-05-25 PROCEDURE — G8978 MOBILITY CURRENT STATUS: HCPCS

## 2018-05-25 PROCEDURE — 74011250636 HC RX REV CODE- 250/636: Performed by: INTERNAL MEDICINE

## 2018-05-25 PROCEDURE — G8979 MOBILITY GOAL STATUS: HCPCS

## 2018-05-25 PROCEDURE — 74011000250 HC RX REV CODE- 250: Performed by: EMERGENCY MEDICINE

## 2018-05-25 PROCEDURE — 97116 GAIT TRAINING THERAPY: CPT

## 2018-05-25 PROCEDURE — 74011250637 HC RX REV CODE- 250/637: Performed by: INTERNAL MEDICINE

## 2018-05-25 PROCEDURE — 97162 PT EVAL MOD COMPLEX 30 MIN: CPT

## 2018-05-25 PROCEDURE — 80048 BASIC METABOLIC PNL TOTAL CA: CPT | Performed by: INTERNAL MEDICINE

## 2018-05-25 PROCEDURE — 85027 COMPLETE CBC AUTOMATED: CPT | Performed by: INTERNAL MEDICINE

## 2018-05-25 RX ADMIN — METOPROLOL SUCCINATE 50 MG: 50 TABLET, EXTENDED RELEASE ORAL at 09:30

## 2018-05-25 RX ADMIN — OXYBUTYNIN CHLORIDE 10 MG: 5 TABLET, EXTENDED RELEASE ORAL at 09:30

## 2018-05-25 RX ADMIN — Medication 10 ML: at 22:25

## 2018-05-25 RX ADMIN — DEXTROSE MONOHYDRATE 7.5 MG/HR: 50 INJECTION, SOLUTION INTRAVENOUS at 22:25

## 2018-05-25 RX ADMIN — RIVAROXABAN 15 MG: 15 TABLET, FILM COATED ORAL at 09:30

## 2018-05-25 RX ADMIN — SODIUM CHLORIDE 75 ML/HR: 900 INJECTION, SOLUTION INTRAVENOUS at 23:25

## 2018-05-25 RX ADMIN — Medication 10 ML: at 15:29

## 2018-05-25 RX ADMIN — Medication 10 ML: at 06:00

## 2018-05-25 RX ADMIN — SENNOSIDES 8.6 MG: 8.6 TABLET, FILM COATED ORAL at 09:30

## 2018-05-25 RX ADMIN — DEXTROSE MONOHYDRATE 5 MG/HR: 50 INJECTION, SOLUTION INTRAVENOUS at 04:13

## 2018-05-25 RX ADMIN — SODIUM CHLORIDE 75 ML/HR: 900 INJECTION, SOLUTION INTRAVENOUS at 10:35

## 2018-05-25 NOTE — ED NOTES
TRANSFER - OUT REPORT:    Verbal report given to Keila GARSIA on Edwina High  being transferred to PCU for routine progression of care       Report consisted of patients Situation, Background, Assessment and   Recommendations(SBAR). Information from the following report(s) SBAR, Kardex, ED Summary, Procedure Summary, Recent Results and Cardiac Rhythm a flutter was reviewed with the receiving nurse. Lines:   Peripheral IV 05/24/18 Right Antecubital (Active)   Site Assessment Clean, dry, & intact 5/24/2018  5:39 PM   Phlebitis Assessment 0 5/24/2018  5:39 PM   Infiltration Assessment 0 5/24/2018  5:39 PM   Dressing Status Clean, dry, & intact 5/24/2018  5:39 PM        Opportunity for questions and clarification was provided.       Patient transported with:   Monitor  Registered Nurse

## 2018-05-25 NOTE — PROGRESS NOTES
Hospitalist Progress Note    NAME: mArit Kendrick   :  1929   MRN:  [id number]       Assessment / Plan:  Atrial flutter with RVR POA- now rate controlled  -HR up to 150s, most likely related to dehydration, has been compliant with medication, no signs of ischemia    Continue IV fluids  Continue dilt drip- taper soon  Awaiting Cardiology/EP consult for further recommendations  Cont  metoprolol IV PRN  Cont PO metoprolol 50 mg XL started today AM  Continue xarelto  Follow echo results     Malnutrition, weight loss, decreased appetite POA- likely natural progression dementia  in setting of Alzheimer's dementia POA  -drinks 1-2 boost per day otherwise barely eats or drinks  -family is very frustrated  Palliative care consulted in ER-  to help with care decisions  -nutrition consulted        Code Status: DNR  Surrogate Decision Maker: niece is mPOJAMA     DVT Prophylaxis: xarelto  Baseline: at assisted living, family checks in frequently and helps with meds      Recommended Disposition: SNF/LTC [facility] soon- may need health care section - CM informed     Subjective:     Chief Complaint / Reason for Physician Visit: F/U Aflutter/fib, dehydration, dementia  \"Why am I here? \". Discussed with RN events overnight. Review of Systems:  Symptom Y/N Comments  Symptom Y/N Comments   Fever/Chills    Chest Pain     Poor Appetite    Edema     Cough    Abdominal Pain     Sputum    Joint Pain     SOB/ADAME    Pruritis/Rash     Nausea/vomit    Tolerating PT/OT     Diarrhea    Tolerating Diet     Constipation    Other       Could NOT obtain due to: Dementia     Objective:     VITALS:   Last 24hrs VS reviewed since prior progress note.  Most recent are:  Patient Vitals for the past 24 hrs:   Temp Pulse Resp BP SpO2   18 1117 98.4 °F (36.9 °C) 64 16 110/83 97 %   18 0928 - (!) 104 - 146/79 -   18 0714 98.4 °F (36.9 °C) 80 16 121/75 96 %   18 0324 98.7 °F (37.1 °C) 88 15 112/63 99 %   18 2327 97.4 °F (36.3 °C) 67 16 127/58 100 %   05/24/18 2258 97.8 °F (36.6 °C) 90 16 110/83 93 %   05/24/18 2040 98.3 °F (36.8 °C) (!) 105 19 125/89 98 %   05/24/18 2030 - (!) 106 19 - 96 %   05/24/18 2015 - (!) 134 21 114/81 95 %   05/24/18 2000 - (!) 137 10 - 95 %   05/24/18 1945 - (!) 153 20 (!) 120/105 94 %   05/24/18 1930 - (!) 118 21 (!) 113/94 94 %   05/24/18 1924 - (!) 125 23 - 93 %   05/24/18 1915 - (!) 118 17 (!) 120/101 95 %   05/24/18 1912 - (!) 142 23 - 94 %   05/24/18 1902 - (!) 109 20 - 94 %   05/24/18 1900 - (!) 124 23 105/72 95 %   05/24/18 1856 - (!) 143 24 - 94 %   05/24/18 1855 - (!) 114 16 - 96 %   05/24/18 1854 - (!) 117 14 - (!) 84 %   05/24/18 1853 - (!) 140 27 - 95 %   05/24/18 1848 - (!) 126 24 - 94 %   05/24/18 1845 - (!) 120 19 126/84 95 %   05/24/18 1810 - 81 22 - 98 %   05/24/18 1800 - 81 23 95/83 96 %   05/24/18 1757 - 81 17 - 96 %   05/24/18 1747 - 80 17 - 98 %   05/24/18 1745 - 81 17 98/60 96 %   05/24/18 1744 - 81 18 - 97 %   05/24/18 1742 - (!) 128 28 94/59 98 %   05/24/18 1740 - (!) 145 - 100/78 -   05/24/18 1739 - (!) 132 21 - 97 %   05/24/18 1730 - (!) 135 21 100/78 98 %   05/24/18 1725 - - - - 97 %   05/24/18 1721 - - - 126/85 93 %   05/24/18 1717 97.9 °F (36.6 °C) (!) 124 16 126/85 92 %       Intake/Output Summary (Last 24 hours) at 05/25/18 1230  Last data filed at 05/25/18 0600   Gross per 24 hour   Intake           808.75 ml   Output              425 ml   Net           383.75 ml        PHYSICAL EXAM:  General: WD, WN. Alert, cooperative, no acute distress    EENT:  EOMI. Anicteric sclerae. MMM  Resp:  CTA bilaterally, no wheezing or rales. No accessory muscle use  CV:  Regular  rhythm,  No edema  GI:  Soft, Non distended, Non tender.  +Bowel sounds  Neurologic:  Alert and oriented X 1, normal speech,   Psych:   Poor insight. confused, dementia noted +  Skin:  No rashes.   No jaundice    Reviewed most current lab test results and cultures  YES  Reviewed most current radiology test results   YES  Review and summation of old records today    NO  Reviewed patient's current orders and MAR    YES  PMH/SH reviewed - no change compared to H&P  ________________________________________________________________________  Care Plan discussed with:    Comments   Patient x    Family  x mPOA/Niece at bedside   RN x    Care Manager x Evy   Consultant                        Multidiciplinary team rounds were held today with , nursing, pharmacist and clinical coordinator. Patient's plan of care was discussed; medications were reviewed and discharge planning was addressed. ________________________________________________________________________  Total NON critical care TIME:  36   Minutes    Total CRITICAL CARE TIME Spent:   Minutes non procedure based      Comments   >50% of visit spent in counseling and coordination of care     ________________________________________________________________________  Mike Roman MD     Procedures: see electronic medical records for all procedures/Xrays and details which were not copied into this note but were reviewed prior to creation of Plan. LABS:  I reviewed today's most current labs and imaging studies.   Pertinent labs include:  Recent Labs      05/25/18   0410  05/24/18   1743   WBC  9.3  8.3   HGB  13.0  14.6   HCT  38.4  43.8   PLT  202  236     Recent Labs      05/25/18   0410  05/24/18   1743   NA  141  142   K  3.9  4.4   CL  108  105   CO2  24  29   GLU  93  103*   BUN  16  22*   CREA  0.75  0.95   CA  8.9  9.5   MG   --   2.4   ALB   --   4.2   TBILI   --   0.5   SGOT   --   14*   ALT   --   22   INR   --   1.3*       Signed: Mike Roman MD

## 2018-05-25 NOTE — PROGRESS NOTES
Pressure Ulcer Prevention Alert Received for Seng < 14 (moderate risk).        Care Plan/Interventions for Nursin. Complete Seng Pressure Ulcer Risk Scale and use sub scores to identify appropriate interventions. 2. Perform Assessment: skin, changes in LOC, visual cues for pain, monitor skin under medical devices  3. Respond to Reduced Sensory Perception: changes in LOC, check visual cues for pain, float heels, suspension boots, pressure redistribution bed/mattress/chair cushion, turning and reposition approximately every 2 hours (pillows & wedges), pad between skin to skin, turn & reposition  4. Manage Moisture: absorbent under pads, internal / external urinary device, internal /  external fecal device, minimize layers, contain wound drainage, access need for specialty bed, limit adult briefs, maintain skin hydration (lotion/cream), moisture barrier, offer toileting every hour  5. Promote Activity: increase time out of bed, chair cushion, PT/OT evaluation, trapeze to reposition, pressure redistribution bed/mattress/chair  6. Address Reduced Mobility: float heels / suspension boot, HOB 30 degrees or less, pressure redistribution bed/mattress/cushion, PT / OT evaluation, turn and reposition approximately every 2 hours (pillows & wedges)  7. Promote Nutrition: document food / fluid / supplement intake, encourage/assist with meals as needed  8. Reduce Friction and Shear: transferring/repositioning devices (lift/draw sheet), lift team/ patient mobility team, feet elevated on foot rest, minimize layers, foam dressing / transparent film / skin sealants, protective barrier creams and emollients, transfer aides (board, Jing lift, ceiling lift, stand assist), HOB 30 degrees or less, trapeze to reposition.   Wound Care Team

## 2018-05-25 NOTE — PROGRESS NOTES
Shay 71 with Caprice Yady in administration  Pt requires a sitter  She is impulsive, confused, has lines, unable to redirect  Approval rec'd

## 2018-05-25 NOTE — PROGRESS NOTES
Problem: Mobility Impaired (Adult and Pediatric)  Goal: *Acute Goals and Plan of Care (Insert Text)  Physical Therapy Goals  Initiated 5/25/2018  1. Patient will move from supine to sit and sit to supine , scoot up and down and roll side to side in bed with independence within 7 day(s). 2.  Patient will transfer from bed to chair and chair to bed with supervision/set-up using the least restrictive device within 7 day(s). 3.  Patient will perform sit to stand with supervision/set-up within 7 day(s). 4.  Patient will ambulate with supervision/set-up for 150 feet with the least restrictive device within 7 day(s). physical Therapy EVALUATION  Patient: [place] Rodarte (66 y.o. female)  Date: 5/25/2018  Primary Diagnosis: Atrial flutter ([address]        Precautions:   Bed Alarm, DNR (sitter)    ASSESSMENT :  Based on the objective data described below, the patient presents with decreased strength, decreased functional mobility, and unsteady gait following admission for atrial flutter. PTA patient lives at [facility]. She ambulates with a RW although unsure of PLOF due to patient with dementia and only oriented to self. Currently, patient only oriented to self, perseverating on working at [employer]. Patient occasionally unable to redirect, although not agitated. Patient required CGA-min A x 2 for all aspects of functional mobility. Patient ambulated 20 feet in the room with CGA-min A and RW. Patient reports she does use a RW at home and gait improved with use of walker. Patient left sitting in the chair with the bed alarm on and the sitter present. Patient will benefit from SNF at discharge pending progress. Patient will benefit from skilled intervention to address the above impairments.   Patients rehabilitation potential is considered to be Fair  Factors which may influence rehabilitation potential include:   []         None noted  [x]         Mental ability/status  [x]         Medical condition  []         Home/family situation and support systems  [x]         Safety awareness  []         Pain tolerance/management  []         Other:      PLAN :  Recommendations and Planned Interventions:  [x]           Bed Mobility Training             []    Neuromuscular Re-Education  [x]           Transfer Training                   []    Orthotic/Prosthetic Training  [x]           Gait Training                         []    Modalities  [x]           Therapeutic Exercises           []    Edema Management/Control  [x]           Therapeutic Activities            [x]    Patient and Family Training/Education  []           Other (comment):    Frequency/Duration: Patient will be followed by physical therapy  3 times a week to address goals. Discharge Recommendations: Justin Avila  Further Equipment Recommendations for Discharge: TBD     SUBJECTIVE:   Patient stated I know I have seen you at [place].     OBJECTIVE DATA SUMMARY:   HISTORY:    Past Medical History:   Diagnosis Date    A-fib ([facility] [address]     Alzheimer's dementia     Arthritis      Past Surgical History:   Procedure Laterality Date    HX BREAST AUGMENTATION      HX ORTHOPAEDIC      TKR--bilateral      Prior Level of Function/Home Situation:  patient lives at [facility]. She ambulates with a RW although unsure of PLOF due to patient with dementia and only oriented to self. Personal factors and/or comorbidities impacting plan of care: dementia, malnutrition, confusion    Home Situation  Home Environment: Assisted living  # Steps to Enter: 0  One/Two Story Residence: One story  Living Alone: No  Support Systems: Family member(s)  Patient Expects to be Discharged to[de-identified] Assisted living  Current DME Used/Available at Home: None    EXAMINATION/PRESENTATION/DECISION MAKING:   Critical Behavior:  Neurologic State: Alert, Confused  Orientation Level: Oriented to person, Disoriented to place, Disoriented to situation, Disoriented to time        Hearing:   Auditory  Auditory Impairment: Hard of hearing, bilateral  Skin:    Edema:   Range Of Motion:  AROM: Within functional limits           PROM: Within functional limits           Strength:    Strength: Generally decreased, functional                    Tone & Sensation:   Tone: Normal              Sensation: Intact               Coordination:  Coordination: Within functional limits  Vision:      Functional Mobility:  Bed Mobility:  Rolling: Contact guard assistance  Supine to Sit: Minimum assistance     Scooting: Minimum assistance  Transfers:  Sit to Stand: Minimum assistance;Assist x2  Stand to Sit: Minimum assistance;Assist x1        Bed to Chair: Contact guard assistance;Minimum assistance;Assist x1              Balance:   Sitting: Impaired; With support  Sitting - Static: Fair (occasional)  Sitting - Dynamic: Fair (occasional)  Standing: Impaired; With support  Standing - Static: Constant support; Fair  Standing - Dynamic : Fair  Ambulation/Gait Training:  Distance (ft): 20 Feet (ft)  Assistive Device: Gait belt;Walker, rolling  Ambulation - Level of Assistance: Contact guard assistance;Minimal assistance     Gait Description (WDL): Exceptions to WDL  Gait Abnormalities: Decreased step clearance        Base of Support: Narrowed     Speed/Mindi: Pace decreased (<100 feet/min)  Step Length: Left shortened;Right shortened                     Stairs: Therapeutic Exercises:       Functional Measure:  Barthel Index:    Bathin  Bladder: 10  Bowels: 10  Groomin  Dressing: 10  Feeding: 10  Mobility: 0  Stairs: 0  Toilet Use: 5  Transfer (Bed to Chair and Back): 10  Total: 60       Barthel and G-code impairment scale:  Percentage of impairment CH  0% CI  1-19% CJ  20-39% CK  40-59% CL  60-79% CM  80-99% CN  100%   Barthel Score 0-100 100 99-80 79-60 59-40 20-39 1-19   0   Barthel Score 0-20 20 17-19 13-16 9-12 5-8 1-4 0      The Barthel ADL Index: Guidelines  1.  The index should be used as a record of what a patient does, not as a record of what a patient could do. 2. The main aim is to establish degree of independence from any help, physical or verbal, however minor and for whatever reason. 3. The need for supervision renders the patient not independent. 4. A patient's performance should be established using the best available evidence. Asking the patient, friends/relatives and nurses are the usual sources, but direct observation and common sense are also important. However direct testing is not needed. 5. Usually the patient's performance over the preceding 24-48 hours is important, but occasionally longer periods will be relevant. 6. Middle categories imply that the patient supplies over 50 per cent of the effort. 7. Use of aids to be independent is allowed. Swati Vyas., Barthel, D.W. (9053). Functional evaluation: the Barthel Index. [address] [facility] (14)2. Juana Wright evens ADALID Diehl, Florencia Segal., Clarissa Rodriguez., [place], [address] [place] [address] (1999). Measuring the change indisability after inpatient rehabilitation; comparison of the responsiveness of the Barthel Index and Functional [place] Measure. Journal of Neurology, Neurosurgery, and Psychiatry, 66(4), 332-603. Miroslava Pedraza, N.J.A, JOSÉ ANTONIO Farrell, & Saul Sandoval M.A. (2004.) Assessment of post-stroke quality of life in cost-effectiveness studies: The usefulness of the Barthel Index and the EuroQoL-5D. Quality of Life Research, 13, 869-33         G codes: In compliance with CMSs Claims Based Outcome Reporting, the following G-code set was chosen for this patient based on their primary functional limitation being treated: The outcome measure chosen to determine the severity of the functional limitation was the Barthel with a score of 60/100 which was correlated with the impairment scale.     ? Mobility - Walking and Moving Around:     - CURRENT STATUS: CJ - 20%-39% impaired, limited or restricted    - GOAL STATUS: CI - 1%-19% impaired, limited or restricted    - D/C STATUS:  ---------------To be determined---------------      Physical Therapy Evaluation Charge Determination   History Examination Presentation Decision-Making   MEDIUM  Complexity : 1-2 comorbidities / personal factors will impact the outcome/ POC  MEDIUM Complexity : 3 Standardized tests and measures addressing body structure, function, activity limitation and / or participation in recreation  MEDIUM Complexity : Evolving with changing characteristics  Other outcome measures Barthel   MEDIUM      Based on the above components, the patient evaluation is determined to be of the following complexity level: MEDIUM    Pain:  Pain Scale 1: Numeric (0 - 10)  Pain Intensity 1: 0              Activity Tolerance:   Fair, confused. Oriented to self only. Please refer to the flowsheet for vital signs taken during this treatment. After treatment:   [x]         Patient left in no apparent distress sitting up in chair  []         Patient left in no apparent distress in bed  [x]         Call bell left within reach  [x]         Nursing notified  [x]         Caregiver present  [x]         Bed alarm activated    COMMUNICATION/EDUCATION:   The patients plan of care was discussed with: Occupational Therapist, Registered Nurse and . [x]         Fall prevention education was provided and the patient/caregiver indicated understanding. [x]         Patient/family have participated as able in goal setting and plan of care. [x]         Patient/family agree to work toward stated goals and plan of care. []         Patient understands intent and goals of therapy, but is neutral about his/her participation. []         Patient is unable to participate in goal setting and plan of care.     Thank you for this referral.  Lian Umanzor, PT, DPT   Time Calculation: 17 mins

## 2018-05-25 NOTE — PROGRESS NOTES
PCU SHIFT NURSING NOTE      Bedside and Verbal shift change report given to Adilson Huggins RN (oncoming nurse) by Telma Ríos RN (offgoing nurse). Report included the following information SBAR, Kardex, MAR, Recent Results and Cardiac Rhythm a flutter. Shift Summary:   0830:  Pt keeps trying to get out of bed. Attempted to re-orient pt. Pt still thinks she is in [place] and that she does not need to be in the hospital.  Placed pt on list for tele-sitter. 0930:  Pt HR increased to 150 sustained. Increased Cardizem to 7.5mg/hr. 0945:  HR back down to 105. Will continue to monitor. 1045:  Pt is now in NSR rate of 70. Decreased Cardizem drip to 5mg/hr  1100:  Pt now has tele sitter. Pt still tries to get up without assistance and has almost made it to the door. 56:  Spoke with MD and received order for PT/OT. 1230:  Charge nurse spoke with MD. Received order for sitter due to pt still trying to get out of bed. Tele-sitter increases pt confusion. 1530: Bedside and Verbal shift change report given to Ade William RN (oncoming nurse) by Adilson Huggins RN (offgoing nurse). Report included the following information SBAR, Kardex, MAR, Recent Results and Cardiac Rhythm NSR. Admission Date 5/24/2018   Admission Diagnosis Atrial flutter ([facility] [address]   Consults IP CONSULT TO PALLIATIVE CARE - PROVIDER  IP CONSULT TO CARDIOLOGY        Consults   [x]PT   [x]OT   []Speech   [x]Case Management      [] Palliative      Cardiac Monitoring Order   [x]Yes   []No     IV drips   [x]Yes    Drip:  Cardizem 5mg/hr                          Dose:  Drip:                            Dose:  Drip:                            Dose:   []No     GI Prophylaxis   []Yes   [x]No         DVT Prophylaxis   SCDs:  Sequential Compression Device: Bilateral          Vic stockings:         [x] Medication   []Contraindicated   []None      Activity Level Activity Level: Up with Assistance     Activity Assistance: Partial (one person)   Purposeful Rounding every 1-2 hour? [x]Yes   Gonzalez Score  Total Score: 4   Bed Alarm (If score 3 or >)   [x]Yes   [] Refused (See signed refusal form in chart)   Seng Score  Seng Score: 13   Seng Score (if score 14 or less)   []PMT consult   []Wound Care consult      []Specialty bed   [] Nutrition consult          Needs prior to discharge:   Home O2 required:    []Yes   [x]No    If yes, how much O2 required? Other:    Last Bowel Movement: Last Bowel Movement Date: 05/25/18      Influenza Vaccine Received Flu Vaccine for Current Season (usually Sept-March): Unsure    Patient/Guardian Refused (Notify MD): No   Pneumonia Vaccine           Diet Active Orders   Diet    DIET REGULAR      LDAs               Peripheral IV 05/24/18 Right Antecubital (Active)   Site Assessment Clean, dry, & intact 5/24/2018  5:39 PM   Phlebitis Assessment 0 5/24/2018  5:39 PM   Infiltration Assessment 0 5/24/2018  5:39 PM   Dressing Status Clean, dry, & intact 5/24/2018  5:39 PM                      Urinary Catheter      Intake & Output   Date 05/24/18 0700 - 05/25/18 0659 05/25/18 0700 - 05/26/18 0659   Shift 4133-5585 2204-7385 24 Hour Total 2360-9357 9586-4594 24 Hour Total   I  N  T  A  K  E   Shift Total  (mL/kg)         O  U  T  P  U  T   Urine  (mL/kg/hr)            Urine Occurrence(s)  1 x 1 x       Shift Total  (mL/kg)         NET         Weight (kg) 52.2 53 53 53 53 53         Readmission Risk Assessment Tool Score Medium Risk            13       Total Score        3 Has Seen PCP in Last 6 Months (Yes=3, No=0)    5 Pt. Coverage (Medicare=5 , Medicaid, or Self-Pay=4)    5 Charlson Comorbidity Score (Age + Comorbid Conditions)        Criteria that do not apply:    . Living with Significant Other. Assisted Living. LTAC. SNF.  or   Rehab    Patient Length of Stay (>5 days = 3)    IP Visits Last 12 Months (1-3=4, 4=9, >4=11)       Expected Length of Stay - - -   Actual Length of Stay 1

## 2018-05-25 NOTE — PROGRESS NOTES
Initial Nutrition Assessment:    INTERVENTIONS/RECOMMENDATIONS:   · Continue regular diet  · Ensure TID (chocolate)  · Consider adding MVI  · Encourage PO intake    ASSESSMENT:   Chart reviewed, medically noted for PMH shown below. Nutrition was consulted d/t \"Malnutrition, weight loss, decreased appetite in setting of Alzheimer's dementia\". Pt was pleasantly confused but was able to provide ensure flavor preference and no family at bedside to provide nutrition history. Per H&P pt was consuming very little PTA except 2 boost per day which has led to 6 lbs (5%) weight loss in the past few weeks. Pt physical appearance is frail with little muscle mass or fat reserves. Pt meets ASPEN criteria for acute severe malnutrition, see below. Decreased PO intake and weight loss is very common in pt with Alzheimer's dementia. Not much to be done besides encourage PO intake and provide kcal and protein dense foods and oral nutrition supplements. Meets Criteria for Acute Malnutrition   [x] Severe Malnutrition, as evidenced by:   [] Moderate muscle wasting, loss of subcutaneous fat   [x] Nutritional intake of <50% of recommended intake for >5 days   [x] Weight loss of >1-2% in 1 week, >5% in 1 month, >7.5% in 3 months, or >10% in 6 months   [] Moderate-severe edema   []Moderate Malnutrition, as evidenced by:   [x] Mild muscle wasting, loss of subcutaneous fat   [] Nutritional intake <75% of recommended intake for >1 week   [] Weight loss of 1-2% in 1 week, 5% in 1 month, 7.5% in 3 months, or 10% in 6 months   [x] Mild edema          Past Medical History:   Diagnosis Date    [facility]     Alzheimer's dementia     Arthritis        Diet Order: Regular  % Eaten:  No data found.     Pertinent Medications: [x]Reviewed: NS, senna,   Pertinent Labs: [x]Reviewed:   Food Allergies: [x]NKFA  []Other   Last BM: 5/25  Edema:        []RUE   []LUE   [x]RLE 1+  [x]LLE 1+      Pressure Injury:      [] Stage I   [] Stage II   [] Stage III [] Stage IV      Wt Readings from Last 30 Encounters:   05/24/18 53 kg (116 lb 13.5 oz)   05/22/18 52.3 kg (115 lb 3.2 oz)   03/09/18 54.4 kg (120 lb)   02/20/18 54.4 kg (120 lb)   12/05/17 52.2 kg (115 lb)   08/28/17 52.2 kg (115 lb)       Anthropometrics:   Height: 5' 1\" (154.9 cm) Weight: 53 kg (116 lb 13.5 oz)   IBW (%IBW):   ( ) UBW (%UBW):   (  %)   Last Weight Metrics:  Weight Loss Metrics 5/24/2018 5/22/2018 3/9/2018 2/20/2018 12/5/2017 11/21/2017 8/28/2017   Today's Wt 116 lb 13.5 oz 115 lb 3.2 oz 120 lb 120 lb 115 lb - 115 lb   BMI 22.08 kg/m2 21.77 kg/m2 22.67 kg/m2 22.67 kg/m2 21.73 kg/m2 - 21.73 kg/m2       BMI: Body mass index is 22.08 kg/(m^2). This BMI is indicative of:   []Underweight    [x]Normal    []Overweight    [] Obesity   [] Extreme Obesity (BMI>40)     Estimated Nutrition Needs (Based on):   1175 Kcals/day (BMR: 900 x 1.3) , 55 g (1 g/kg) Protein  Carbohydrate: At Least 130 g/day  Fluids: 1175 mL/day (1ml/kcal) or per primary team    NUTRITION DIAGNOSES:   Problem:  Inadequate protein-energy intake      Etiology: related to Alzheimer's dementia      Signs/Symptoms: as evidenced by documentation on minimal PO intake PTA with 6 lbs (5%) wt loss x 2-3 weeks      NUTRITION INTERVENTIONS:  Meals/Snacks: General/healthful diet   Supplements: Commercial supplement              GOAL:   consume >50% of meals and ONS in 2-4 days    LEARNING NEEDS (Diet, Food/Nutrient-Drug Interaction):    [x] None Identified   [] Identified and Education Provided/Documented   [] Identified and Pt declined/was not appropriate     Cultureal, Adventism, OR Ethnic Dietary Needs:    [x] None Identified   [] Identified and Addressed     [x] Interdisciplinary Care Plan Reviewed/Documented    [x] Discharge Planning:  Kcal and protein dense foods with every meal, may benefit from small frequent meals     MONITORING /EVALUATION:      Food/Nutrient Intake Outcomes:  Total energy intake  Physical Signs/Symptoms Outcomes: Weight/weight change, Electrolyte and renal profile, GI    NUTRITION RISK:    [x] High              [] Moderate           []  Low  []  Minimal/Uncompromised    PT SEEN FOR:    [x]  MD Consult: []Calorie Count      []Diabetic Diet Education        []Diet Education     []Electrolyte Management     [x]General Nutrition Management and Supplements     []Management of Tube Feeding     []TPN Recommendations    []  RN Referral:  []MST score >=2     []Enteral/Parenteral Nutrition PTA     []Pregnant: Gestational DM or Multigestation     []Pressure Ulcer/Wound Care needs        []  Low BMI  []  LOS Referral       Saul Torres RDN  Pager [phone]  Weekend Pager [phone]

## 2018-05-25 NOTE — PROGRESS NOTES
Reason for Admission:   Patient came to ed with elevated hr in 150's. She lives at independent living at [facility]. She has been there for one year. Spoke with patient's niece who states patient has no other family other than herself. Niece states she is poa, asked her to bring paperwork in to that effect. Per niece states patient was able to feed and bath herself prior to coming to the hospital. She states patient uses a rollator. She has not had home health services in the past however has been to [facility] for 30 days in the past when she was at [facility]. , [place] Demaris Dusty of patient's admission. RRAT Score: 13                   Do you (patient/family) have any concerns for transition/discharge? Brook would like for patient to go to a rehab facility to get stronger prior to going back to [facility]. Plan for utilizing home health: The plans are for snf. Likelihood of readmission? Mod              Transition of Care Plan:   The plan is for patient to go to snf. Pt/ot orders obtained. Brook first choice for snf is [facility] and the second choice is [address] [place] [address],[address]. Referral sent to [facility] so they may review. Care Management Interventions  PCP Verified by CM: Yes  Transition of Care Consult (CM Consult): Discharge Planning  Discharge Durable Medical Equipment:  (Patient has rollator)  Physical Therapy Consult: Yes  Occupational Therapy Consult: Yes  Current Support Network:  Other (Independent  living at [facility] )  Confirm Follow Up Transport: Family  Plan discussed with Pt/Family/Caregiver: Yes  Discharge Location  Discharge Placement: Home

## 2018-05-25 NOTE — H&P
Hospitalist Admission Note    NAME: Jacqueline Radford   :  1929   MRN:  [id number]     Date/Time:  2018 8:24 PM    Patient PCP: Britt Guerrero MD  ________________________________________________________________________    My assessment of this patient's clinical condition and my plan of care is as follows. Assessment / Plan:  Atrial flutter with RVR  -HR up to 150s, most likely related to dehydration, has been compliant with medication, no signs of ischemia  -continue IV fluids  -continue dilt drip  -cardiology consulted  -will add metoprolol IV PRN  -restart metoprolol 50 mg XL tomorrow hopefully dilt gtt can be titrated off  -continue xarelto  -check echo    Malnutrition, weight loss, decreased appetite in setting of Alzheimer's dementia  -drinks 1-2 boost per day otherwise barely eats or drinks  -family is very frustrated  -will consult palliative care to help with care decisions  -nutrition consult      Code Status: DNR  Surrogate Decision Maker: niece is mPOA    DVT Prophylaxis: xarelto  Baseline: at assisted living, family checks in frequently and helps with meds        Subjective:   CHIEF COMPLAINT:   Chief Complaint   Patient presents with    Irregular Heart Beat     pt here via EMS for rapid HR ,sinus tach with aflutter hx of afib       HISTORY OF PRESENT ILLNESS:     Jacqueline Radford is a 80 y.o.  female with history of dementia, atrial fibrillation who presents from assisted living with high HR. Patient was being evaluated for a wound by a nurse when they noted her HR in 150s. Patient denies any chest pain, no palpitations. Of note her family says that she has not been eating much and has lost 6 pounds in the last few weeks. She has been compliant with her medications. She has been seeing her PCP regularly for her decrease in appetite she has tried appetite stimulant mirtazapine without bad side effects. Patient is asking to go home.      We were asked to admit for work up and evaluation of the above problems. Past Medical History:   Diagnosis Date    [facility]fib [facility])     Alzheimer's dementia     Arthritis         Past Surgical History:   Procedure Laterality Date    HX BREAST AUGMENTATION      HX ORTHOPAEDIC      TKR--bilateral        Social History   Substance Use Topics    Smoking status: Never Smoker    Smokeless tobacco: Never Used    Alcohol use No        Family History   Problem Relation Age of Onset    Heart Attack Mother     Cancer Sister     Cancer Brother      No Known Allergies     Prior to Admission medications    Medication Sig Start Date End Date Taking? Authorizing Provider   ferrous sulfate (IRON) 325 mg (65 mg iron) EC tablet Take 1 Tab by mouth Daily (before breakfast). 5/22/18   Gloria Bhat MD   oxybutynin (DITROPAN) 5 mg tablet Take 1 Tab by mouth two (2) times a day. 3/13/18   Delmy Seay NP   lutein 6 mg tab Take  by mouth. Historical Provider   baclofen (LIORESAL) 10 mg tablet Take 1 Tab by mouth two (2) times daily as needed. 12/5/17   Gloria Bhat MD   acetaminophen (TYLENOL) 500 mg tablet Take 1 Tab by mouth two (2) times a day. 12/5/17   Gloria Bhat MD   losartan (COZAAR) 50 mg tablet Take 25 mg by mouth daily. Historical Provider   metoprolol succinate (TOPROL-XL) 50 mg XL tablet Take  by mouth daily. Historical Provider   rivaroxaban (XARELTO) 15 mg tab tablet Take  by mouth daily. Historical Provider   dilTIAZem ER (CARDIZEM LA) 240 mg Tb24 tablet Take 240 mg by mouth daily. Historical Provider   cholecalciferol, vitamin D3, (VITAMIN D3) 2,000 unit tab Take  by mouth. Historical Provider   senna (SENOKOT) 8.6 mg tablet Take 1 Tab by mouth daily. Take 2 tablets daily.     Historical Provider       REVIEW OF SYSTEMS:         Total of 12 systems reviewed as follows:       POSITIVE= underlined text  Negative = text not underlined  General:  fever, chills, sweats, generalized weakness, weight loss/gain,      loss of appetite   Eyes:    blurred vision, eye pain, loss of vision, double vision  ENT:    rhinorrhea, pharyngitis   Respiratory:   cough, sputum production, SOB, ADAME, wheezing, pleuritic pain   Cardiology:   chest pain, palpitations, orthopnea, PND, edema, syncope   Gastrointestinal:  abdominal pain , N/V, diarrhea, dysphagia, constipation, bleeding   Genitourinary:  frequency, urgency, dysuria, hematuria, incontinence   Muskuloskeletal :  arthralgia, myalgia, back pain  Hematology:  easy bruising, nose or gum bleeding, lymphadenopathy   Dermatological: rash, ulceration, pruritis, color change / jaundice  Endocrine:   hot flashes or polydipsia   Neurological:  headache, dizziness, confusion, focal weakness, paresthesia,     Speech difficulties, memory loss, gait difficulty  Psychological: Feelings of anxiety, depression, agitation    Objective:   VITALS:    Patient Vitals for the past 12 hrs:   Temp Pulse Resp BP SpO2   05/24/18 2000 - (!) 137 10 - 95 %   05/24/18 1945 - (!) 153 20 (!) 120/105 94 %   05/24/18 1930 - (!) 118 21 (!) 113/94 94 %   05/24/18 1924 - (!) 125 23 - 93 %   05/24/18 1915 - (!) 118 17 (!) 120/101 95 %   05/24/18 1912 - (!) 142 23 - 94 %   05/24/18 1902 - (!) 109 20 - 94 %   05/24/18 1900 - (!) 124 23 105/72 95 %   05/24/18 1856 - (!) 143 24 - 94 %   05/24/18 1855 - (!) 114 16 - 96 %   05/24/18 1854 - (!) 117 14 - (!) 84 %   05/24/18 1853 - (!) 140 27 - 95 %   05/24/18 1848 - (!) 126 24 - 94 %   05/24/18 1845 - (!) 120 19 126/84 95 %   05/24/18 1810 - 81 22 - 98 %   05/24/18 1800 - 81 23 95/83 96 %   05/24/18 1757 - 81 17 - 96 %   05/24/18 1747 - 80 17 - 98 %   05/24/18 1745 - 81 17 98/60 96 %   05/24/18 1744 - 81 18 - 97 %   05/24/18 1742 - (!) 128 28 94/59 98 %   05/24/18 1740 - (!) 145 - 100/78 -   05/24/18 1739 - (!) 132 21 - 97 %   05/24/18 1730 - (!) 135 21 100/78 98 %   05/24/18 1725 - - - - 97 %   05/24/18 1721 - - - 126/85 93 %   05/24/18 1717 97.9 °F (36.6 °C) (!) 124 16 126/85 92 % PHYSICAL EXAM:    General:    Alert, cooperative, no distress, appears stated age. HEENT: Atraumatic, anicteric sclerae, pink conjunctivae     No oral ulcers, oral mucosa dry, throat clear, dentition fair  Neck:  Supple, symmetrical  Lungs:   Clear to auscultation bilaterally, no wheezing, rhonchi, or rales. Chest wall:  No tenderness, no accessory muscle use. Heart:   Regular rhythm, no murmur, no edema  Abdomen:   Soft, non-tender, not distended, bowel sounds normal  Extremities: No cyanosis, no clubbing, warm, well perfused, radial pulse 2+  Skin:     Not pale, not jaundiced, no rashes   Psych:  Poor insight, not depressed, mildly anxious not agitated. Neurologic: EOMs intact, face symmetric, no aphasia or slurred speech, strength 5/5 in BUE, 5/5 in BLE, sensation grossly intact, alert and oriented to person only    _______________________________________________________________________  Care Plan discussed with:    Comments   Patient x    Family      RN     Care Manager                    Consultant:      _______________________________________________________________________  Expected  Disposition:   Home with Family    HH/PT/OT/RN x   SNF/LTC    MARCOS    ________________________________________________________________________  TOTAL TIME:  61 Minutes    Critical Care Provided     Minutes non procedure based      Comments     Reviewed previous records   >50% of visit spent in counseling and coordination of care  Discussion with patient and/or family and questions answered       ________________________________________________________________________  Stark Goltz, MD    Procedures: see electronic medical records for all procedures/Xrays and details which were not copied into this note but were reviewed prior to creation of Plan.     LAB DATA REVIEWED:    Recent Results (from the past 24 hour(s))   EKG, 12 LEAD, INITIAL    Collection Time: 05/24/18  5:22 PM   Result Value Ref Range    Ventricular Rate 118 BPM    Atrial Rate 308 BPM    QRS Duration 88 ms    Q-T Interval 346 ms    QTC Calculation (Bezet) 484 ms    Calculated P Axis -151 degrees    Calculated R Axis 27 degrees    Calculated T Axis 50 degrees    Diagnosis       Atrial flutter with variable AV block with premature ventricular or   aberrantly conducted complexes  Nonspecific ST and T wave abnormality  Abnormal ECG  No previous ECGs available     CK W/ REFLX CKMB    Collection Time: 05/24/18  5:43 PM   Result Value Ref Range    CK 40 26 - 192 U/L   TROPONIN I    Collection Time: 05/24/18  5:43 PM   Result Value Ref Range    Troponin-I, Qt. <0.04 <0.05 ng/mL   CBC WITH AUTOMATED DIFF    Collection Time: 05/24/18  5:43 PM   Result Value Ref Range    WBC 8.3 3.6 - 11.0 K/uL    RBC 4.81 3.80 - 5.20 M/uL    HGB 14.6 11.5 - 16.0 g/dL    HCT 43.8 35.0 - 47.0 %    MCV 91.1 80.0 - 99.0 FL    MCH 30.4 26.0 - 34.0 PG    MCHC 33.3 30.0 - 36.5 g/dL    RDW 17.2 (H) 11.5 - 14.5 %    PLATELET 639 722 - 717 K/uL    MPV 11.0 8.9 - 12.9 FL    NRBC 0.0 0  WBC    ABSOLUTE NRBC 0.00 0.00 - 0.01 K/uL    NEUTROPHILS 61 32 - 75 %    LYMPHOCYTES 27 12 - 49 %    MONOCYTES 10 5 - 13 %    EOSINOPHILS 1 0 - 7 %    BASOPHILS 1 0 - 1 %    IMMATURE GRANULOCYTES 0 0.0 - 0.5 %    ABS. NEUTROPHILS 5.1 1.8 - 8.0 K/UL    ABS. LYMPHOCYTES 2.3 0.8 - 3.5 K/UL    ABS. MONOCYTES 0.9 0.0 - 1.0 K/UL    ABS. EOSINOPHILS 0.1 0.0 - 0.4 K/UL    ABS. BASOPHILS 0.0 0.0 - 0.1 K/UL    ABS. IMM.  GRANS. 0.0 0.00 - 0.04 K/UL    DF AUTOMATED     METABOLIC PANEL, COMPREHENSIVE    Collection Time: 05/24/18  5:43 PM   Result Value Ref Range    Sodium 142 136 - 145 mmol/L    Potassium 4.4 3.5 - 5.1 mmol/L    Chloride 105 97 - 108 mmol/L    CO2 29 21 - 32 mmol/L    Anion gap 8 5 - 15 mmol/L    Glucose 103 (H) 65 - 100 mg/dL    BUN 22 (H) 6 - 20 MG/DL    Creatinine 0.95 0.55 - 1.02 MG/DL    BUN/Creatinine ratio 23 (H) 12 - 20      GFR est AA >60 >60 ml/min/1.73m2    GFR est non-AA 56 (L) >60 ml/min/1.73m2    Calcium 9.5 8.5 - 10.1 MG/DL    Bilirubin, total 0.5 0.2 - 1.0 MG/DL    ALT (SGPT) 22 12 - 78 U/L    AST (SGOT) 14 (L) 15 - 37 U/L    Alk.  phosphatase 60 45 - 117 U/L    Protein, total 7.3 6.4 - 8.2 g/dL    Albumin 4.2 3.5 - 5.0 g/dL    Globulin 3.1 2.0 - 4.0 g/dL    A-G Ratio 1.4 1.1 - 2.2     PROTHROMBIN TIME + INR    Collection Time: 05/24/18  5:43 PM   Result Value Ref Range    INR 1.3 (H) 0.9 - 1.1      Prothrombin time 13.5 (H) 9.0 - 11.1 sec   MAGNESIUM    Collection Time: 05/24/18  5:43 PM   Result Value Ref Range    Magnesium 2.4 1.6 - 2.4 mg/dL   EKG, 12 LEAD, SUBSEQUENT    Collection Time: 05/24/18  6:03 PM   Result Value Ref Range    Ventricular Rate 80 BPM    Atrial Rate 320 BPM    QRS Duration 78 ms    Q-T Interval 386 ms    QTC Calculation (Bezet) 445 ms    Calculated P Axis 43 degrees    Calculated R Axis 28 degrees    Calculated T Axis 59 degrees    Diagnosis       Atrial flutter with 4:1 AV conduction  Abnormal ECG  When compared with ECG of 24-MAY-2018 17:22,  MANUAL COMPARISON REQUIRED, DATA IS UNCONFIRMED

## 2018-05-25 NOTE — PROGRESS NOTES
Problem: Self Care Deficits Care Plan (Adult)  Goal: *Acute Goals and Plan of Care (Insert Text)  Occupational Therapy Goals  Initiated 5/25/2018  1. Patient will perform lower body dressing with supervision/set-up within 7 day(s). 2.  Patient will perform bathing with supervision/set-up within 7 day(s). 3.  Patient will perform grooming with supervision/set-up within 7 day(s). 4.  Patient will perform toilet transfers in bathroom with RW with supervision/set-up within 7 day(s). 5.  Patient will perform all aspects of toileting with supervision/set-up within 7 day(s). Occupational Therapy EVALUATION  Patient: Ami Stagers (84 y.o. female)  Date: 5/25/2018  Primary Diagnosis: Atrial flutter ([facility] [address]        Precautions:   Bed Alarm, DNR (sitter)    ASSESSMENT :  Based on the objective data described below, the patient presents with dementia/confusion, decreased balance without AD, risk for falls and mild decline in functional status. Pt pleasantly confused, oriented to self only and perseverating on locating her shoes (present in her bag). Pt was min A x 1 for supine to sit, min A x 2 via HHA to stand with mildly unsteady balance which improved with use of RW. Pt with chronic lateral L foot wound. Pt is needing CGA to min A for ADLs and would benefit from SNF rehab. Likely only needs several more sessions. Pt's HR in sinus rhythm and 87 bpm with activity today. Patient will benefit from skilled intervention to address the above impairments.   Patients rehabilitation potential is considered to be Guarded  Factors which may influence rehabilitation potential include:   []             None noted  [x]             Mental ability/status  []             Medical condition  []             Home/family situation and support systems  []             Safety awareness  []             Pain tolerance/management  []             Other:      PLAN :  Recommendations and Planned Interventions:  [x]               Self Care Training                  [x]        Therapeutic Activities  [x]               Functional Mobility Training    []        Cognitive Retraining  [x]               Therapeutic Exercises           [x]        Endurance Activities  [x]               Balance Training                   []        Neuromuscular Re-Education  []               Visual/Perceptual Training     [x]   Home Safety Training  [x]               Patient Education                 [x]        Family Training/Education  []               Other (comment):    Frequency/Duration: Patient will be followed by occupational therapy 3 times a week to address goals. Discharge Recommendations: Justin Avila  Further Equipment Recommendations for Discharge: TBD     SUBJECTIVE:   Patient stated oh I've used this. It's easy. All you do is push it.  (RW)    OBJECTIVE DATA SUMMARY:   HISTORY:   Past Medical History:   Diagnosis Date    A[facility]fib ([facility] [address]     Alzheimer's dementia     Arthritis      Past Surgical History:   Procedure Laterality Date    HX BREAST AUGMENTATION      HX ORTHOPAEDIC      TKR--bilateral        Prior Level of Function/Environment/Context: from  [facility], poor historian but seemed familiar to use of RW  Occupations in which the patient is/was successful, what are the barriers preventing that success:   Performance Patterns (routines, roles, habits, and rituals):   Personal Interests and/or values:   Expanded or extensive additional review of patient history: Alzheimer's    Home Situation  Home Environment: Assisted living  # Steps to Enter: 0  One/Two Story Residence: One story  Living Alone: No  Support Systems: Family member(s)  Patient Expects to be Discharged to[de-identified] Assisted living  Current DME Used/Available at Home: None  [x]  Right hand dominant   []  Left hand dominant    EXAMINATION OF PERFORMANCE DEFICITS:  Cognitive/Behavioral Status:  Neurologic State: Alert;Confused  Orientation Level: Oriented to person;Disoriented to place; Disoriented to situation;Disoriented to time  Cognition: Memory loss; Follows commands  Perception: Appears intact  Perseveration: Perseverates during conversation  Safety/Judgement: Awareness of environment    Skin: intact    Edema: B LE    Hearing: Auditory  Auditory Impairment: Hard of hearing, bilateral    Vision/Perceptual:    Tracking: Able to track stimulus in all quadrants w/o difficulty                      Acuity: Within Defined Limits    Corrective Lenses: Glasses    Range of Motion:    AROM: Within functional limits  PROM: Within functional limits                      Strength:    Strength: Generally decreased, functional                Coordination:  Coordination: Within functional limits  Fine Motor Skills-Upper: Left Intact; Right Intact    Gross Motor Skills-Upper: Left Intact; Right Intact    Tone & Sensation:    Tone: Normal  Sensation: Intact                      Balance:  Sitting: Impaired; With support  Sitting - Static: Fair (occasional)  Sitting - Dynamic: Fair (occasional)  Standing: Impaired; With support  Standing - Static: Constant support; Fair  Standing - Dynamic : Fair    Functional Mobility and Transfers for ADLs:  Bed Mobility:  Rolling: Contact guard assistance  Supine to Sit: Minimum assistance  Scooting: Minimum assistance    Transfers:  Sit to Stand: Minimum assistance;Assist x2  Stand to Sit: Minimum assistance;Assist x1  Bed to Chair: Contact guard assistance;Minimum assistance;Assist x1  Toilet Transfer : Contact guard assistance;Assist x1;Additional time    ADL Assessment:  Feeding: Independent    Oral Facial Hygiene/Grooming: Contact guard assistance    Bathing: Minimum assistance    Upper Body Dressing: Setup;Supervision    Lower Body Dressing: Contact guard assistance;Minimum assistance (for dynamic balance, donned/doffed socks via cross leg)    Toileting: Contact guard assistance;Minimum assistance                ADL Intervention and task modifications:          Pt educated on role of OT and required verbal cues for safety and proper hand placement during ADLs/functional transfers. Cognitive Retraining  Safety/Judgement: Awareness of environment      Functional Measure:  Barthel Index:    Bathin  Bladder: 10  Bowels: 10  Groomin  Dressing: 10  Feeding: 10  Mobility: 0  Stairs: 0  Toilet Use: 5  Transfer (Bed to Chair and Back): 10  Total: 60       Barthel and G-code impairment scale:  Percentage of impairment CH  0% CI  1-19% CJ  20-39% CK  40-59% CL  60-79% CM  80-99% CN  100%   Barthel Score 0-100 100 99-80 79-60 59-40 20-39 1-19   0   Barthel Score 0-20 20 17-19 13-16 9-12 5-8 1-4 0      The Barthel ADL Index: Guidelines  1. The index should be used as a record of what a patient does, not as a record of what a patient could do. 2. The main aim is to establish degree of independence from any help, physical or verbal, however minor and for whatever reason. 3. The need for supervision renders the patient not independent. 4. A patient's performance should be established using the best available evidence. Asking the patient, friends/relatives and nurses are the usual sources, but direct observation and common sense are also important. However direct testing is not needed. 5. Usually the patient's performance over the preceding 24-48 hours is important, but occasionally longer periods will be relevant. 6. Middle categories imply that the patient supplies over 50 per cent of the effort. 7. Use of aids to be independent is allowed. Yessenia Neff, Barthel, D.W. (2219). Functional evaluation: the Barthel Index. [facility] (14)2. [email or web address] evens ADALID Diehl, Drew Addison, Loyda Chester., Kacy, [address] [place], [place] [address]e (). Measuring the change indisability after inpatient rehabilitation; comparison of the responsiveness of the Barthel Index and Functional Bureau Measure. Journal of Neurology, Neurosurgery, and Psychiatry, 66(4), 185-828.   [email or web address] [place], [place], Daly Aguila M.A. (2004.) Assessment of post-stroke quality of life in cost-effectiveness studies: The usefulness of the Barthel Index and the EuroQoL-5D. Quality of Life Research, 13, 918-08         G codes: In compliance with CMSs Claims Based Outcome Reporting, the following G-code set was chosen for this patient based on their primary functional limitation being treated: The outcome measure chosen to determine the severity of the functional limitation was the barthel with a score of 60/100 which was correlated with the impairment scale. ? Self Care:     - CURRENT STATUS: CJ - 20%-39% impaired, limited or restricted    - GOAL STATUS: CI - 1%-19% impaired, limited or restricted    - D/C STATUS:  ---------------To be determined---------------     Occupational Therapy Evaluation Charge Determination   History Examination Decision-Making   LOW Complexity : Brief history review  LOW Complexity : 1-3 performance deficits relating to physical, cognitive , or psychosocial skils that result in activity limitations and / or participation restrictions  MEDIUM Complexity : Patient may present with comorbidities that affect occupational performnce. Miniml to moderate modification of tasks or assistance (eg, physical or verbal ) with assesment(s) is necessary to enable patient to complete evaluation       Based on the above components, the patient evaluation is determined to be of the following complexity level: LOW   Pain:  Pain Scale 1: Numeric (0 - 10)  Pain Intensity 1: 0              Activity Tolerance:   VSS  Please refer to the flowsheet for vital signs taken during this treatment.   After treatment:   [x] Patient left in no apparent distress sitting up in chair  [] Patient left in no apparent distress in bed  [x] Call bell left within reach  [x] Nursing notified  [x] sitter present  [x] Bed alarm activated    COMMUNICATION/EDUCATION:   The patients plan of care was discussed with: Physical Therapist and Registered Nurse.  [] Home safety education was provided and the patient/caregiver indicated understanding. [] Patient/family have participated as able in goal setting and plan of care. [] Patient/family agree to work toward stated goals and plan of care. [] Patient understands intent and goals of therapy, but is neutral about his/her participation. [x] Patient is unable to participate in goal setting and plan of care. This patients plan of care is appropriate for delegation to [facility].     Thank you for this referral.  Janie Hardin OT  Time Calculation: 20 mins

## 2018-05-25 NOTE — CONSULTS
[facility]: [phone] ([phone])  [facility] [phone]   [facility]: [phone]    Patient Name: Maranda Rodrigues  YOB: 1929    Date of Initial Consult: 5/24/2018  Reason for Consult: care decisions  Requesting Provider: Juan Beth   Primary Care Physician: Josey Dubon MD      SUMMARY:   Maranda Rodrigues is a 80y.o. year old with a past history of afib, Alzheimers, arthritis, weight loss, and poor nutrition, who was admitted on 5/24/2018 from [place] [address] with a diagnosis of a-flutter with RVR. She was being evaluated by the wound care nurse at the facility when they noted her HR to be in the 150's. Of note, her family says she has not been eating much and has lost 6 pounds over the past few weeks, despite mirtazapine. Current medical issues leading to Palliative Medicine involvement include: Care decisions in light of worsening dementia and other comorbidities     PALLIATIVE DIAGNOSES:   1. Altered mentation/dementia  2. Weight loss and decreased appetite  3. Dehydration  4. Care Decisions with advancing disease and comorbidities       PLAN:   1. Discussion with King Ashli (niece) on the phone  2. Family is aware that her decreased appetite and weight loss part of the progression of dementia  3. She understands that limited medical interventions are appropriate for her aunt at this stage  4. She is waiting to talk with Cardiology to find out what outpatient management is on the table, so her Aunt can be discharged  5. Ms Flavia Echeverria already has DDNR and HPOA but it is not on file with the hospital  6. Will follow up Tuesday if patient is still here  7. Initial consult note routed to primary continuity provider  8.  Communicated plan of care with: Palliative IDT    GOALS OF CARE: Comfort and limited interventions for acute issues         TREATMENT PREFERENCES:   Code Status: DNR    Advance Care Planning:  Advance Care Planning 5/25/2018   Patient's Healthcare Decision Maker is: Legal Next of [place] 69   Primary Decision Maker Name Cameron Addison Decision Maker Phone Number [phone]   Primary Decision Maker Relationship to Patient Other relative   Confirm Advance Directive None   Patient Would Like to Complete Advance Directive No   Does the patient have other document types Do Not Resuscitate; Power of [employer]           Other Instructions: Other:  As far as possible, the palliative care team has discussed with patient / health care proxy about goals of care / treatment preferences for patient. HISTORY:     History obtained from: Chart, patient, niece Delmar Fermin: \"I need Sherry Risen to take me home\"    HPI/SUBJECTIVE:    The patient is:   [x] Verbal and participatory  [] Non-participatory due to:     Patient is pleasant but very confused  Visited with her twice, she remembered me (but I had written my name on her board) but did not recall how she got here despite me telling her the story twice  She is very anxious, wants to go home, she is very confused as to why she is here and the unfamiliar environment is not sitting well with her  She tells me that her HR is elevated because she is \"a nervous wreck\"  She is very distressed that she is here, telling me she \"has no friends here, no one will admit that they know me!\"    Spoke to her niece a few times on the phone- she was very busy and kept getting phone calls she needed to take, but did tell me that she understands the following  1. She knows that part of the progression of dementia is decreased appetite  2. She understands that dehydration can be causing the tachycardia, and that this is something that will not be \"fixable\" at her current stage of disease  3.  She is interested in knowing what the plan is for discharge, and does not want extraordinary medical interventions    I will attempt to follow up with her at the end of the day after she has had a chance to speak with the rest of the medical team- especially cardiology- to ensure she does not have any questions or concerns      Clinical Pain Assessment (nonverbal scale for nonverbal patients): Clinical Pain Assessment  Severity: 0          Duration: for how long has pt been experiencing pain (e.g., 2 days, 1 month, years)  Frequency: how often pain is an issue (e.g., several times per day, once every few days, constant)     FUNCTIONAL ASSESSMENT:     Palliative Performance Scale (PPS):  PPS: 40    ECOG        PSYCHOSOCIAL/SPIRITUAL SCREENING:      Any spiritual / Jew concerns:  [] Yes /  [x] No    Caregiver Burnout:  [] Yes /  [x] No /  [] No Caregiver Present      Anticipatory grief assessment:   [x] Normal  / [] Maladaptive        REVIEW OF SYSTEMS:     Positive and pertinent negative findings in ROS are noted above in HPI. The following systems were [x] reviewed / [] unable to be reviewed as noted in HPI  Other findings are noted below. Systems: constitutional, ears/nose/mouth/throat, respiratory, gastrointestinal, genitourinary, musculoskeletal, integumentary, neurologic, psychiatric, endocrine. Positive findings noted below. Modified ESAS Completed by: provider   Fatigue: 0       Pain: 0   Anxiety: 9 Nausea: 0   Anorexia: 4 Dyspnea: 0     Constipation: No              PHYSICAL EXAM:     Wt Readings from Last 3 Encounters:   05/24/18 116 lb 13.5 oz (53 kg)   05/22/18 115 lb 3.2 oz (52.3 kg)   03/09/18 120 lb (54.4 kg)     Blood pressure 110/83, pulse 64, temperature 98.4 °F (36.9 °C), resp. rate 16, height 5' 1\" (1.549 m), weight 116 lb 13.5 oz (53 kg), SpO2 97 %, not currently breastfeeding.   Pain:  Pain Scale 1: Numeric (0 - 10)  Pain Intensity 1: 0                 Last bowel movement:     Constitutional: frail older woman, alert, confused, very distressed  Eyes: pupils equal, anicteric  ENMT: no nasal discharge, moist mucous membranes  Cardiovascular: tachycardic on auscutation  Respiratory: breathing not labored, symmetric  Gastrointestinal: soft non-tender, +bowel sounds  Musculoskeletal: no deformity, no tenderness to palpation  Skin: warm, dry  Neurologic: following commands, moving all extremities  Psychiatric: alert to person, did know she was in the hospital, but sometimes called it the doctors office, did not know how she got here or why  Other:       HISTORY:     Active Problems:    Atrial flutter ([facility] [address] (5/24/2018)      Past Medical History:   Diagnosis Date    A-fib ([facility] [address]     Alzheimer's dementia     Arthritis       Past Surgical History:   Procedure Laterality Date    HX BREAST AUGMENTATION      HX ORTHOPAEDIC      TKR--bilateral       Family History   Problem Relation Age of Onset    Heart Attack Mother     Cancer Sister     Cancer Brother      History reviewed, no pertinent family history.   Social History   Substance Use Topics    Smoking status: Never Smoker    Smokeless tobacco: Never Used    Alcohol use No     No Known Allergies   Current Facility-Administered Medications   Medication Dose Route Frequency    dilTIAZem (CARDIZEM) 100 mg in dextrose 5% (MBP/ADV) 100 mL infusion  0-15 mg/hr IntraVENous TITRATE    metoprolol succinate (TOPROL-XL) XL tablet 50 mg  50 mg Oral DAILY    rivaroxaban (XARELTO) tablet 15 mg  15 mg Oral DAILY    senna (SENOKOT) tablet 8.6 mg  1 Tab Oral DAILY    0.9% sodium chloride infusion  75 mL/hr IntraVENous CONTINUOUS    metoprolol (LOPRESSOR) injection 2.5 mg  2.5 mg IntraVENous Q6H PRN    sodium chloride (NS) flush 5-10 mL  5-10 mL IntraVENous Q8H    sodium chloride (NS) flush 5-10 mL  5-10 mL IntraVENous PRN    acetaminophen (TYLENOL) tablet 650 mg  650 mg Oral Q4H PRN    ondansetron (ZOFRAN) injection 4 mg  4 mg IntraVENous Q4H PRN    docusate sodium (COLACE) capsule 100 mg  100 mg Oral BID PRN    oxybutynin chloride XL (DITROPAN XL) tablet 10 mg  10 mg Oral DAILY        LAB AND IMAGING FINDINGS:     Lab Results   Component Value Date/Time WBC 9.3 05/25/2018 04:10 AM    HGB 13.0 05/25/2018 04:10 AM    PLATELET 752 50/30/4737 04:10 AM     Lab Results   Component Value Date/Time    Sodium 141 05/25/2018 04:10 AM    Potassium 3.9 05/25/2018 04:10 AM    Chloride 108 05/25/2018 04:10 AM    CO2 24 05/25/2018 04:10 AM    BUN 16 05/25/2018 04:10 AM    Creatinine 0.75 05/25/2018 04:10 AM    Calcium 8.9 05/25/2018 04:10 AM    Magnesium 2.4 05/24/2018 05:43 PM      Lab Results   Component Value Date/Time    AST (SGOT) 14 (L) 05/24/2018 05:43 PM    Alk. phosphatase 60 05/24/2018 05:43 PM    Protein, total 7.3 05/24/2018 05:43 PM    Albumin 4.2 05/24/2018 05:43 PM    Globulin 3.1 05/24/2018 05:43 PM     Lab Results   Component Value Date/Time    INR 1.3 (H) 05/24/2018 05:43 PM    Prothrombin time 13.5 (H) 05/24/2018 05:43 PM      Lab Results   Component Value Date/Time    Iron 19 (L) 02/20/2018 02:08 PM    TIBC 372 02/20/2018 02:08 PM    Iron % saturation 5 (LL) 02/20/2018 02:08 PM    Ferritin 25 02/20/2018 02:08 PM      No results found for: PH, PCO2, PO2  No components found for: GLPOC   No results found for: CPK, CKMB           Total time:   Counseling / coordination time, spent as noted above:   > 50% counseling / coordination:     Prolonged service was provided for  []30 min   []75 min in face to face time in the presence of the patient, spent as noted above. Time Start:   Time End:   Note: this can only be billed with 57769 (initial) or 79641 (follow up). If multiple start / stop times, list each separately.

## 2018-05-25 NOTE — PROGRESS NOTES
Spiritual Care Assessment/Progress Note  [facility]      NAME: Frandy Guthrie      MRN: [id number]  AGE: 80 y.o.  SEX: female  Protestant Affiliation: Taoist   Language: English     5/25/2018     Total Time (in minutes): 23     Spiritual Assessment begun in [facility] PROGRESSIVE CARE through conversation with:         [x]Patient        [] Family    [] Friend(s)        Reason for Consult: Initial/Spiritual assessment, patient floor     Spiritual beliefs: (Please include comment if needed)     [x] Identifies with a violet tradition: Taoist     [] Supported by a violet community:      [] Claims no spiritual orientation:      [] Seeking spiritual identity:           [] Adheres to an individual form of spirituality:      [] Not able to assess:                     Identified resources for coping:      [x] Prayer                               [] Music                  [] Guided Imagery     [] Family/friends                 [] Pet visits     [] Devotional reading                         [] Unknown     [] Other:                                               Interventions offered during this visit: (See comments for more details)    Patient Interventions: Affirmation of violet, Affirmation of emotions/emotional suffering, Iconic (affirming the presence of God/Higher Power), Initial/Spiritual assessment, patient floor, Prayer (assurance of), Protestant beliefs/image of God discussed           Plan of Care:     [x] Support spiritual and/or cultural needs    [] Support AMD and/or advance care planning process      [] Support grieving process   [] Coordinate Rites and/or Rituals    [] Coordination with community clergy   [] No spiritual needs identified at this time   [] Detailed Plan of Care below (See Comments)  [] Make referral to Music Therapy  [] Make referral to Pet Therapy     [] Make referral to Addiction services  [] Make referral to [facility]  [] Make referral to Spiritual Care Partner  [] No future visits requested        [x] Follow up visits as needed     Comments:  Initial visit on PCU for spiritual assessment of new palliative consult patient. Hospital sitter in the room; no family present. Patient is mildly confused; some of her responses were not pertinent to question posed. She is Jew and shared that going to [employer] is important to her, but she no longer drives. She could not remember the name of her parish, nor did she recall being visited by volunteer [facility] Ministers. Her only expressed need/concern is that she wants to leave; asking for help to get out of here. Ms. Francisca Perez was receptive to assurance of prayer. Chaplains are available for support as needed/requested.   BETY Cali, [facility], [address]    [address] Paging Service  [phone]

## 2018-05-26 LAB
ANION GAP SERPL CALC-SCNC: 7 MMOL/L (ref 5–15)
BASOPHILS # BLD: 0 K/UL (ref 0–0.1)
BASOPHILS NFR BLD: 1 % (ref 0–1)
BUN SERPL-MCNC: 13 MG/DL (ref 6–20)
BUN/CREAT SERPL: 17 (ref 12–20)
CALCIUM SERPL-MCNC: 9 MG/DL (ref 8.5–10.1)
CHLORIDE SERPL-SCNC: 107 MMOL/L (ref 97–108)
CO2 SERPL-SCNC: 26 MMOL/L (ref 21–32)
CREAT SERPL-MCNC: 0.77 MG/DL (ref 0.55–1.02)
DIFFERENTIAL METHOD BLD: ABNORMAL
EOSINOPHIL # BLD: 0.1 K/UL (ref 0–0.4)
EOSINOPHIL NFR BLD: 2 % (ref 0–7)
ERYTHROCYTE [DISTWIDTH] IN BLOOD BY AUTOMATED COUNT: 16.8 % (ref 11.5–14.5)
GLUCOSE SERPL-MCNC: 98 MG/DL (ref 65–100)
HCT VFR BLD AUTO: 39 % (ref 35–47)
HGB BLD-MCNC: 13.1 G/DL (ref 11.5–16)
IMM GRANULOCYTES # BLD: 0 K/UL (ref 0–0.04)
IMM GRANULOCYTES NFR BLD AUTO: 0 % (ref 0–0.5)
LYMPHOCYTES # BLD: 3.1 K/UL (ref 0.8–3.5)
LYMPHOCYTES NFR BLD: 35 % (ref 12–49)
MCH RBC QN AUTO: 30 PG (ref 26–34)
MCHC RBC AUTO-ENTMCNC: 33.6 G/DL (ref 30–36.5)
MCV RBC AUTO: 89.2 FL (ref 80–99)
MONOCYTES # BLD: 1.1 K/UL (ref 0–1)
MONOCYTES NFR BLD: 13 % (ref 5–13)
NEUTS SEG # BLD: 4.5 K/UL (ref 1.8–8)
NEUTS SEG NFR BLD: 50 % (ref 32–75)
NRBC # BLD: 0 K/UL (ref 0–0.01)
NRBC BLD-RTO: 0 PER 100 WBC
PLATELET # BLD AUTO: 206 K/UL (ref 150–400)
PMV BLD AUTO: 10.5 FL (ref 8.9–12.9)
POTASSIUM SERPL-SCNC: 3.5 MMOL/L (ref 3.5–5.1)
RBC # BLD AUTO: 4.37 M/UL (ref 3.8–5.2)
SODIUM SERPL-SCNC: 140 MMOL/L (ref 136–145)
WBC # BLD AUTO: 8.8 K/UL (ref 3.6–11)

## 2018-05-26 PROCEDURE — 74011250637 HC RX REV CODE- 250/637: Performed by: INTERNAL MEDICINE

## 2018-05-26 PROCEDURE — 85025 COMPLETE CBC W/AUTO DIFF WBC: CPT | Performed by: INTERNAL MEDICINE

## 2018-05-26 PROCEDURE — 80048 BASIC METABOLIC PNL TOTAL CA: CPT | Performed by: INTERNAL MEDICINE

## 2018-05-26 PROCEDURE — 74011250636 HC RX REV CODE- 250/636: Performed by: INTERNAL MEDICINE

## 2018-05-26 PROCEDURE — 65660000000 HC RM CCU STEPDOWN

## 2018-05-26 PROCEDURE — 77030038269 HC DRN EXT URIN PURWCK BARD -A

## 2018-05-26 PROCEDURE — 36415 COLL VENOUS BLD VENIPUNCTURE: CPT | Performed by: INTERNAL MEDICINE

## 2018-05-26 RX ORDER — METOPROLOL SUCCINATE 50 MG/1
100 TABLET, EXTENDED RELEASE ORAL DAILY
Status: DISCONTINUED | OUTPATIENT
Start: 2018-05-27 | End: 2018-05-28

## 2018-05-26 RX ORDER — METOPROLOL SUCCINATE 50 MG/1
50 TABLET, EXTENDED RELEASE ORAL
Status: COMPLETED | OUTPATIENT
Start: 2018-05-26 | End: 2018-05-26

## 2018-05-26 RX ORDER — DILTIAZEM HYDROCHLORIDE 180 MG/1
180 CAPSULE, COATED, EXTENDED RELEASE ORAL DAILY
Status: DISCONTINUED | OUTPATIENT
Start: 2018-05-26 | End: 2018-05-26

## 2018-05-26 RX ADMIN — RIVAROXABAN 15 MG: 15 TABLET, FILM COATED ORAL at 09:54

## 2018-05-26 RX ADMIN — Medication 10 ML: at 22:03

## 2018-05-26 RX ADMIN — METOPROLOL SUCCINATE 50 MG: 50 TABLET, EXTENDED RELEASE ORAL at 09:54

## 2018-05-26 RX ADMIN — OXYBUTYNIN CHLORIDE 10 MG: 5 TABLET, EXTENDED RELEASE ORAL at 09:54

## 2018-05-26 RX ADMIN — SENNOSIDES 8.6 MG: 8.6 TABLET, FILM COATED ORAL at 09:54

## 2018-05-26 RX ADMIN — SODIUM CHLORIDE 75 ML/HR: 900 INJECTION, SOLUTION INTRAVENOUS at 13:25

## 2018-05-26 RX ADMIN — Medication 10 ML: at 06:15

## 2018-05-26 RX ADMIN — METOPROLOL SUCCINATE 50 MG: 50 TABLET, EXTENDED RELEASE ORAL at 11:44

## 2018-05-26 NOTE — PROGRESS NOTES
PCU SHIFT NURSING NOTE      Bedside and Verbal shift change report given to Toño Barrera RN (oncoming nurse) by Ngoc Hammond RN (offgoing nurse). Report included the following information SBAR, Kardex, MAR and Recent Results. Shift Summary:     2005: Sneha MARX  2020: Spoke with Dr. Aisha Glasgow to make aware patient back in [address], [address] [facility] [facility] new orders, will continue to monitor. 8117: Patient converted back into NSR  0705: Bedside and Verbal shift change report given to Brenda Ceja RN (oncoming nurse) by Toño Barrera RN (offgoing nurse). Report included the following information SBAR, Kardex, MAR and Recent Results. Admission Date 5/24/2018   Admission Diagnosis Atrial flutter ([facility] [address]   Consults IP CONSULT TO PALLIATIVE CARE - PROVIDER  IP CONSULT TO CARDIOLOGY        Consults   [x]PT   [x]OT   []Speech   [x]Case Management      [] Palliative      Cardiac Monitoring Order   [x]Yes   []No     IV drips   [x]Yes    Drip: Diltiazem            Dose:  Drip:                            Dose:  Drip:                            Dose:   []No     GI Prophylaxis   []Yes   []No         DVT Prophylaxis   SCDs:  Sequential Compression Device: Bilateral     Patient Refused VTE Prophylaxis: Yes    Vic stockings:         [] Medication   []Contraindicated   []None      Activity Level Activity Level: Up with Assistance     Activity Assistance: Partial (one person)   Purposeful Rounding every 1-2 hour? [x]Yes   Gonzalez Score  Total Score: 4   Bed Alarm (If score 3 or >)   []Yes   [] Refused (See signed refusal form in chart)   Seng Score  Seng Score: 14   Seng Score (if score 14 or less)   []PMT consult   []Wound Care consult      []Specialty bed   [] Nutrition consult          Needs prior to discharge:   Home O2 required:    []Yes   [x]No    If yes, how much O2 required?     Other:    Last Bowel Movement: Last Bowel Movement Date: 05/25/18      Influenza Vaccine Received Flu Vaccine for Current Season (usually Sept-March): Not Flu Season Patient/Guardian Refused (Notify MD): No   Pneumonia Vaccine           Diet Active Orders   Diet    DIET REGULAR      LDAs               Peripheral IV 05/24/18 Right Antecubital (Active)   Site Assessment Drainage (comment) 5/25/2018  4:31 PM   Phlebitis Assessment 0 5/25/2018  4:31 PM   Infiltration Assessment 0 5/25/2018  4:31 PM   Dressing Status Old drainage 5/25/2018  4:31 PM   Dressing Type Transparent 5/25/2018  4:31 PM   Hub Color/Line Status Capped 5/25/2018  4:31 PM       Peripheral IV 05/25/18 Right Forearm (Active)   Site Assessment Clean, dry, & intact 5/25/2018  4:31 PM   Phlebitis Assessment 0 5/25/2018  4:31 PM   Infiltration Assessment 0 5/25/2018  4:31 PM   Dressing Status Clean, dry, & intact 5/25/2018  4:31 PM   Dressing Type Transparent 5/25/2018  4:31 PM   Hub Color/Line Status Capped 5/25/2018  4:31 PM                      Urinary Catheter      Intake & Output   Date 05/24/18 1900 - 05/25/18 0659 05/25/18 0700 - 05/26/18 0659   Shift 4573-2449 24 Hour Total 6500-1232 5838-6775 24 Hour Total   I  N  T  A  K  E   P.O. 100 100         P. O. 100 100       I.V.  (mL/kg/hr) 708.8 708.8 369.4  (0.6)  369.4      Cardizem Volume 105 105 25.7  25.7      Volume (0.9% sodium chloride infusion) 603.8 603.8 343.8  343.8    Shift Total  (mL/kg) 808.8  (15.3) 808.8  (15.3) 369.4  (7)  369.4  (7)   O  U  T  P  U  T   Urine  (mL/kg/hr) 425 425         Urine Voided 425 425         Urine Occurrence(s) 2 x 2 x 6 x  6 x    Shift Total  (mL/kg) 425  (8) 425  (8)      .8 383.8 369.4  369.4   Weight (kg) 53 53 53 53 53         Readmission Risk Assessment Tool Score Medium Risk            13       Total Score        3 Has Seen PCP in Last 6 Months (Yes=3, No=0)    5 Pt. Coverage (Medicare=5 , Medicaid, or Self-Pay=4)    5 Charlson Comorbidity Score (Age + Comorbid Conditions)        Criteria that do not apply:    . Living with Significant Other. Assisted Living. LTAC. SNF.  or   Rehab    Patient Length of Stay (>5 days = 3)    IP Visits Last 12 Months (1-3=4, 4=9, >4=11)       Expected Length of Stay 2d 12h   Actual Length of Stay 1

## 2018-05-26 NOTE — PROGRESS NOTES
Bedside and Verbal shift change report given to Sunday Hernandez (oncoming nurse) by Sandi Gonzalez (offgoing nurse). Report included the following information SBAR, Kardex, Intake/Output, MAR and Recent Results.

## 2018-05-26 NOTE — PROGRESS NOTES
Hospitalist Progress Note    NAME: Evans Crisostomo   :  1929   MRN:  [id number]       Assessment / Plan:  Atrial flutter with RVR POA- now rate controlled  -HR up to 150s, most likely related to dehydration, has been compliant with medication, no signs of ischemia    Continue IV fluids, DC in 24 hrs once eating well  Off cardizem now as per cardiology  Awaiting Cardiology/EP consult for further recommendations  Cont  metoprolol IV PRN  incease PO metoprolol to 100 mg XL as per cardiology today  Continue xarelto     Malnutrition, weight loss, decreased appetite POA- likely natural progression dementia  in setting of Alzheimer's dementia POA  -drinks 1-2 boost per day otherwise barely eats or drinks  -family is very frustrated  Palliative care consulted in ER-  to help with care decisions  -nutrition consulted        Code Status: DNR  Surrogate Decision Maker: niece is mPOA     DVT Prophylaxis: xarelto  Baseline: at assisted living, family checks in frequently and helps with meds      Recommended Disposition: SNF/LTC for STR as recommended by PT/OT,to place of [facility]/neice's choices- CM working on it, DC in 1-2 days once cleared by Cardiology     Subjective:     Chief Complaint / Reason for Physician Visit: F/U Aflutter/fib, dehydration, dementia  \"Why am I here? \". Discussed with RN events overnight. Review of Systems:  Symptom Y/N Comments  Symptom Y/N Comments   Fever/Chills    Chest Pain     Poor Appetite    Edema     Cough    Abdominal Pain     Sputum    Joint Pain     SOB/ADAME    Pruritis/Rash     Nausea/vomit    Tolerating PT/OT     Diarrhea    Tolerating Diet     Constipation    Other       Could NOT obtain due to: Dementia     Objective:     VITALS:   Last 24hrs VS reviewed since prior progress note.  Most recent are:  Patient Vitals for the past 24 hrs:   Temp Pulse Resp BP SpO2   18 0802 97.8 °F (36.6 °C) 61 12 108/67 97 %   18 0252 98 °F (36.7 °C) 77 17 131/74 -   18 2236 98.3 °F (36.8 °C) 92 17 123/75 -   05/25/18 1936 98.2 °F (36.8 °C) 82 18 125/62 95 %   05/25/18 1932 - (!) 124 24 125/62 -   05/25/18 1625 98 °F (36.7 °C) 68 18 126/53 -   05/25/18 1618 - - - - 98 %       Intake/Output Summary (Last 24 hours) at 05/26/18 1120  Last data filed at 05/26/18 9273   Gross per 24 hour   Intake                0 ml   Output             1875 ml   Net            -1875 ml        PHYSICAL EXAM:  General: WD, WN. Alert, cooperative, no acute distress    EENT:  EOMI. Anicteric sclerae. MMM  Resp:  CTA bilaterally, no wheezing or rales. No accessory muscle use  CV:  Regular  rhythm,  No edema  GI:  Soft, Non distended, Non tender.  +Bowel sounds  Neurologic:  Alert and oriented X 1, normal speech,   Psych:   Poor insight. confused, dementia noted +  Skin:  No rashes. No jaundice    Reviewed most current lab test results and cultures  YES  Reviewed most current radiology test results   YES  Review and summation of old records today    NO  Reviewed patient's current orders and MAR    YES  PMH/SH reviewed - no change compared to H&P  ________________________________________________________________________  Care Plan discussed with:    Comments   Patient x    Family  x mPOA/Niece at bedside   RN x    Care Manager x Evy yesterday   Consultant                        Multidiciplinary team rounds were held today with , nursing, pharmacist and clinical coordinator. Patient's plan of care was discussed; medications were reviewed and discharge planning was addressed.      ________________________________________________________________________  Total NON critical care TIME:  26   Minutes    Total CRITICAL CARE TIME Spent:   Minutes non procedure based      Comments   >50% of visit spent in counseling and coordination of care     ________________________________________________________________________  Dennise Brewer MD     Procedures: see electronic medical records for all procedures/Xrays and details which were not copied into this note but were reviewed prior to creation of Plan. LABS:  I reviewed today's most current labs and imaging studies.   Pertinent labs include:  Recent Labs      05/26/18 0403 05/25/18 0410 05/24/18   1743   WBC  8.8  9.3  8.3   HGB  13.1  13.0  14.6   HCT  39.0  38.4  43.8   PLT  206  202  236     Recent Labs      05/26/18 0403 05/25/18 0410 05/24/18   1743   NA  140  141  142   K  3.5  3.9  4.4   CL  107  108  105   CO2  26  24  29   GLU  98  93  103*   BUN  13  16  22*   CREA  0.77  0.75  0.95   CA  9.0  8.9  9.5   MG   --    --   2.4   ALB   --    --   4.2   TBILI   --    --   0.5   SGOT   --    --   14*   ALT   --    --   22   INR   --    --   1.3*       Signed: Amaury Burris MD

## 2018-05-26 NOTE — PROGRESS NOTES
Problem: Falls - Risk of  Goal: *Absence of Falls  Document [place] Fall Risk and appropriate interventions in the flowsheet. Outcome: Progressing Towards Goal  Fall Risk Interventions:  Mobility Interventions: Bed/chair exit alarm, Patient to call before getting OOB, PT Consult for mobility concerns    Mentation Interventions: Door open when patient unattended, Familiar objects from home    Medication Interventions: Bed/chair exit alarm, Patient to call before getting OOB, Teach patient to arise slowly    Elimination Interventions: Call light in reach, Bed/chair exit alarm, Patient to call for help with toileting needs, Toileting schedule/hourly rounds             Problem: Pressure Injury - Risk of  Goal: *Prevention of pressure injury  Document Seng Scale and appropriate interventions in the flowsheet.    Outcome: Progressing Towards Goal  Pressure Injury Interventions:  Sensory Interventions: Assess need for specialty bed, Assess changes in LOC, Float heels, Keep linens dry and wrinkle-free    Moisture Interventions: Absorbent underpads, Assess need for specialty bed, Limit adult briefs    Activity Interventions: Increase time out of bed, PT/OT evaluation    Mobility Interventions: HOB 30 degrees or less, PT/OT evaluation    Nutrition Interventions: Document food/fluid/supplement intake, Offer support with meals,snacks and hydration    Friction and Shear Interventions: Foam dressings/transparent film/skin sealants, Lift sheet, Minimize layers

## 2018-05-26 NOTE — PROGRESS NOTES
Bedside and Verbal shift change report given to Wally Solis RN by ADY gonsales. Report included the following information SBAR, Kardex, Intake/Output, MAR and Cardiac Rhythm. Padmini Silverman

## 2018-05-26 NOTE — PROGRESS NOTES
Cardiology Progress Note  2018     Admit Date: 2018  Admit Diagnosis: Atrial flutter ([facility].)  CC: none currently  Assessment (as per Dr Christiano Delgado): 1. Paroxysmal atrial flutter with variable block, now back in sinus. 2. Dementia. 3. Chronic anticoagulation with Xarelto. 4. DNR.     Plan (as per Dr Christiano Delgado):      1. I'd continue the current plan with rate control and anticoagulation. She is able to be rate controlled. 2. I would try to manage this conservatively rather than push for an EP study and ablation given her circumstance. 3. As far as antiarrhythmic therapy, will hold off at this time, continue with beta-blocker and diltiazem, will try to get back to all orals this weekend.      : No complaints; NSR currently. Off IV dilt: Will try to use toprol XL only (100 every day for now) due to drug interaction b/w Xarelto and dilt. For other plans, see orders.   Hospital problem list   Active Hospital Problems    Diagnosis Date Noted    Atrial flutter ([facility] 2018        Subjective: Ed Nilesh reports   Chest pain X none  consistent with:  Non-cardiac CP         Atypical CP     None now  On going  Anginal CP     Dyspnea X none  at rest  with exertion         improved  unchanged  worse              PND X none  overnight       Orthopnea X none  improved  unchanged  worse   Presyncope X none  improved  unchanged  worse     Ambulated in hallway without symptoms   Yes   Ambulated in room without symptoms  Yes   Objective:    Physical Exam:  Overall VSSAF;    Visit Vitals    /67 (BP 1 Location: Left arm, BP Patient Position: At rest)    Pulse 61    Temp 97.8 °F (36.6 °C)    Resp 12    Ht 5' 1\" (1.549 m)    Wt 53 kg (116 lb 13.5 oz)    SpO2 97%    Breastfeeding No    BMI 22.08 kg/m2     Temp (24hrs), Av.1 °F (36.7 °C), Min:97.8 °F (36.6 °C), Max:98.4 °F (36.9 °C)    Patient Vitals for the past 8 hrs:   Pulse   18 0802 61    Patient Vitals for the past 8 hrs:   Resp   05/26/18 0802 12    Patient Vitals for the past 8 hrs:   BP   05/26/18 0802 108/67        Intake/Output Summary (Last 24 hours) at 05/26/18 1054  Last data filed at 05/26/18 3362   Gross per 24 hour   Intake                0 ml   Output             1875 ml   Net            -1875 ml     General Appearance: Well developed, elderly, no acute distress. Ears/Nose/Mouth/Throat:   Normal MM; anicteric. JVP: WNL   Resp:   clear to auscultation bilaterally. Nl resp effort. Cardiovascular:  RRR, S1, S2 normal, no new murmur. No gallop or rub. Abdomen:   Soft, non-tender, bowel sounds are present. Extremities: No edema bilaterally. Skin:  Neuro: Warm and dry. A/O x3, grossly nonfocal   cath site intact w/o hematoma or new bruit; distal pulse unchanged  Yes   Data Review:     Telemetry independently reviewed x sinus  chronic afib  parox afib  NSVT     ECG independently reviewed  NSR  afib  no significant changes  NSST-Tw chgs   x no new ECG provided for review   Lab results reviewed as noted below. Current medications reviewed as noted below. No results for input(s): PH, PCO2, PO2 in the last 72 hours.   Recent Labs      05/24/18   1743   TROIQ  <0.04     Recent Labs      05/26/18   0403  05/25/18   0410  05/24/18   1743   NA  140  141  142   K  3.5  3.9  4.4   CL  107  108  105   CO2  26  24  29   BUN  13  16  22*   CREA  0.77  0.75  0.95   GLU  98  93  103*   CA  9.0  8.9  9.5   ALB   --    --   4.2   WBC  8.8  9.3  8.3   HGB  13.1  13.0  14.6   HCT  39.0  38.4  43.8   PLT  206  202  236     Lab Results   Component Value Date/Time    Cholesterol, total 153 02/20/2018 02:08 PM    HDL Cholesterol 72 02/20/2018 02:08 PM    LDL, calculated 67 02/20/2018 02:08 PM    Triglyceride 70 02/20/2018 02:08 PM     Recent Labs      05/24/18   1743   SGOT  14*   AP  60   TP  7.3   ALB  4.2   GLOB  3.1     Recent Labs      05/24/18   1743   INR  1.3*   PTP  13.5*      No components found for: [place]    Current Facility-Administered Medications   Medication Dose Route Frequency    dilTIAZem (CARDIZEM) 100 mg in dextrose 5% (MBP/ADV) 100 mL infusion  0-15 mg/hr IntraVENous TITRATE    metoprolol succinate (TOPROL-XL) XL tablet 50 mg  50 mg Oral DAILY    rivaroxaban (XARELTO) tablet 15 mg  15 mg Oral DAILY    senna (SENOKOT) tablet 8.6 mg  1 Tab Oral DAILY    0.9% sodium chloride infusion  75 mL/hr IntraVENous CONTINUOUS    metoprolol (LOPRESSOR) injection 2.5 mg  2.5 mg IntraVENous Q6H PRN    sodium chloride (NS) flush 5-10 mL  5-10 mL IntraVENous Q8H    sodium chloride (NS) flush 5-10 mL  5-10 mL IntraVENous PRN    acetaminophen (TYLENOL) tablet 650 mg  650 mg Oral Q4H PRN    ondansetron (ZOFRAN) injection 4 mg  4 mg IntraVENous Q4H PRN    docusate sodium (COLACE) capsule 100 mg  100 mg Oral BID PRN    oxybutynin chloride XL (DITROPAN XL) tablet 10 mg  10 mg Oral DAILY        Harlan Ramirez MD

## 2018-05-26 NOTE — CONSULTS
EP/ ARRHYTHMIA/ CARDIOLOGY CONSULT    Patient ID:  Patient: Prakash Guardado  MRN: [id number]  Age: 80 y.o.  : 1929    Date of  Admission: 2018  5:10 PM   PCP:  Antony Santoyo MD    Assessment: 1. Paroxysmal atrial flutter with variable block, now back in sinus. 2. Dementia. 3. Chronic anticoagulation with Xarelto. 4. DNR. Plan:     1. I'd continue the current plan with rate control and anticoagulation. She is able to be rate controlled. 2. I would try to manage this conservatively rather than push for an EP study and ablation given her circumstance. 3. As far as antiarrhythmic therapy, will hold off at this time, continue with beta-blocker and diltiazem, will try to get back to all orals this weekend. []       High complexity decision making was performed in this patient at high risk for decompensation with multiple organ involvement. Prakash Guardado is a 80 y.o. female with a history of atrial arrhythmias with recent discovery of atrial fibrillation and flutter. ECG's here suggest an atrial flutter. She was admitted for tachycardia. She denies syncope, dizziness, palpitations. No worsening dyspnea. No orthopnea PND or edema. Per the hospitalist H&P:  Wayne Lomeli is a 80 y.o.  female with history of dementia, atrial fibrillation who presents from assisted living with high HR. Patient was being evaluated for a wound by a nurse when they noted her HR in 150s. Patient denies any chest pain, no palpitations. Of note her family says that she has not been eating much and has lost 6 pounds in the last few weeks. She has been compliant with her medications. She has been seeing her PCP regularly for her decrease in appetite she has tried appetite stimulant mirtazapine without bad side effects. Patient is asking to go home. \"     She is a difficult historian, memory loss.       Past Medical History:   Diagnosis Date    A-fib [facility])     Alzheimer's dementia  Arthritis         Past Surgical History:   Procedure Laterality Date    HX BREAST AUGMENTATION      HX ORTHOPAEDIC      TKR--bilateral        Social History   Substance Use Topics    Smoking status: Never Smoker    Smokeless tobacco: Never Used    Alcohol use No        Family History   Problem Relation Age of Onset    Heart Attack Mother     Cancer Sister     Cancer Brother         No Known Allergies       Current Facility-Administered Medications   Medication Dose Route Frequency    dilTIAZem (CARDIZEM) 100 mg in dextrose 5% (MBP/ADV) 100 mL infusion  0-15 mg/hr IntraVENous TITRATE    metoprolol succinate (TOPROL-XL) XL tablet 50 mg  50 mg Oral DAILY    rivaroxaban (XARELTO) tablet 15 mg  15 mg Oral DAILY    senna (SENOKOT) tablet 8.6 mg  1 Tab Oral DAILY    0.9% sodium chloride infusion  75 mL/hr IntraVENous CONTINUOUS    metoprolol (LOPRESSOR) injection 2.5 mg  2.5 mg IntraVENous Q6H PRN    sodium chloride (NS) flush 5-10 mL  5-10 mL IntraVENous Q8H    sodium chloride (NS) flush 5-10 mL  5-10 mL IntraVENous PRN    acetaminophen (TYLENOL) tablet 650 mg  650 mg Oral Q4H PRN    ondansetron (ZOFRAN) injection 4 mg  4 mg IntraVENous Q4H PRN    docusate sodium (COLACE) capsule 100 mg  100 mg Oral BID PRN    oxybutynin chloride XL (DITROPAN XL) tablet 10 mg  10 mg Oral DAILY       Review of Symptoms:  She cannot give a reliable ROS due to dementia.   General: negative for fever, chills, sweats, weakness, weight loss   Eyes: negative for blurred vision, eye pain, loss of vision, diplopia   Ear Nose and Throat: negative for rhinorrhea, pharyngitis, otalgia, tinnitus, speech or swallowing difficulties   Respiratory: negative for SOB, cough, sputum production, wheezing, ADAME, pleuritic pain   Cardiology: negative for chest pain, palpitations, orthopnea, PND, edema, syncope   Gastrointestinal: negative for abdominal pain, N/V, dysphagia, change in bowel habits, bleeding   Genitourinary: negative for frequency, urgency, dysuria, hematuria, incontinence   Muskuloskeletal : negative for arthralgia, myalgia   Hematology: negative for easy bruising, bleeding, lymphadenopathy   Dermatological: negative for rash, ulceration, mole change, new lesion   Endocrine: negative for hot flashes or polydipsia   Neurological: negative for headache, dizziness, confusion, focal weakness, paresthesia, memory loss, gait disturbance   Psychological: negative for anxiety, depression, agitation       Objective:      Physical Exam:  Temp (24hrs), Av.1 °F (36.7 °C), Min:97.4 °F (36.3 °C), Max:98.7 °F (37.1 °C)    Patient Vitals for the past 8 hrs:   Pulse   18 1936 82   18 1625 68    Patient Vitals for the past 8 hrs:   Resp   18 193 18   18 1625 18    Patient Vitals for the past 8 hrs:   BP   18 1936 125/62   18 1625 126/53        Intake/Output Summary (Last 24 hours) at 18 2231  Last data filed at 18 1035   Gross per 24 hour   Intake          1178.17 ml   Output              425 ml   Net           753.17 ml       Nondiaphoretic, not in acute distress, elderly female. .  Supple, no palpable thyromegaly. No scleral icterus, mucous membranes moist, conjuctivae pink, no xanthelasma. Unlabored, clear to auscultation bilaterally anteriorly, symmetric air movement. Regular rate and rhythm, no new murmur, pericardial rub, knock, or gallop. No JVD or peripheral edema. Palpable radial pulses bilaterally. Abdomen, soft, nontender, nondistended. Extremities without cyanosis or clubbing. Muscle tone and bulk normal.  Skin warm and dry. No rashes or ulcers. Neuro grossly nonfocal.  No tremor. Awake and appropriate. CARDIOGRAPHICS and STUDIES, I reviewed:    Telemetry:  SR currently. ECG's reviewed.        Labs:  Recent Labs      18   1743   TROIQ  <0.04     Lab Results   Component Value Date/Time    Cholesterol, total 153 2018 02:08 PM    HDL Cholesterol 72 2018 02:08 PM    LDL, calculated 67 02/20/2018 02:08 PM    Triglyceride 70 02/20/2018 02:08 PM     Recent Labs      05/24/18   1743   INR  1.3*   PTP  13.5*      Recent Labs      05/25/18   0410  05/24/18   1743   NA  141  142   K  3.9  4.4   CL  108  105   CO2  24  29   BUN  16  22*   CREA  0.75  0.95   GLU  93  103*   CA  8.9  9.5   ALB   --   4.2   WBC  9.3  8.3   HGB  13.0  14.6   HCT  38.4  43.8   PLT  202  236     Recent Labs      05/24/18   1743   SGOT  14*   AP  60   TP  7.3   ALB  4.2   GLOB  3.1     No components found for: GLPOC  No results for input(s): PH, PCO2, PO2 in the last 72 hours.         Una Medina MD  5/25/2018

## 2018-05-27 LAB
ANION GAP SERPL CALC-SCNC: 7 MMOL/L (ref 5–15)
BUN SERPL-MCNC: 22 MG/DL (ref 6–20)
BUN/CREAT SERPL: 28 (ref 12–20)
CALCIUM SERPL-MCNC: 8.7 MG/DL (ref 8.5–10.1)
CHLORIDE SERPL-SCNC: 108 MMOL/L (ref 97–108)
CO2 SERPL-SCNC: 25 MMOL/L (ref 21–32)
CREAT SERPL-MCNC: 0.78 MG/DL (ref 0.55–1.02)
GLUCOSE SERPL-MCNC: 80 MG/DL (ref 65–100)
POTASSIUM SERPL-SCNC: 3.7 MMOL/L (ref 3.5–5.1)
SODIUM SERPL-SCNC: 140 MMOL/L (ref 136–145)

## 2018-05-27 PROCEDURE — 80048 BASIC METABOLIC PNL TOTAL CA: CPT | Performed by: INTERNAL MEDICINE

## 2018-05-27 PROCEDURE — 74011000250 HC RX REV CODE- 250: Performed by: INTERNAL MEDICINE

## 2018-05-27 PROCEDURE — 74011250637 HC RX REV CODE- 250/637: Performed by: INTERNAL MEDICINE

## 2018-05-27 PROCEDURE — 65660000000 HC RM CCU STEPDOWN

## 2018-05-27 PROCEDURE — 74011250636 HC RX REV CODE- 250/636: Performed by: INTERNAL MEDICINE

## 2018-05-27 PROCEDURE — 36415 COLL VENOUS BLD VENIPUNCTURE: CPT | Performed by: INTERNAL MEDICINE

## 2018-05-27 RX ADMIN — SODIUM CHLORIDE 75 ML/HR: 900 INJECTION, SOLUTION INTRAVENOUS at 05:09

## 2018-05-27 RX ADMIN — SODIUM CHLORIDE 75 ML/HR: 900 INJECTION, SOLUTION INTRAVENOUS at 09:05

## 2018-05-27 RX ADMIN — Medication 10 ML: at 05:11

## 2018-05-27 RX ADMIN — SENNOSIDES 8.6 MG: 8.6 TABLET, FILM COATED ORAL at 09:56

## 2018-05-27 RX ADMIN — SODIUM CHLORIDE 75 ML/HR: 900 INJECTION, SOLUTION INTRAVENOUS at 22:24

## 2018-05-27 RX ADMIN — METOROPROLOL TARTRATE 2.5 MG: 5 INJECTION, SOLUTION INTRAVENOUS at 05:04

## 2018-05-27 RX ADMIN — METOPROLOL SUCCINATE 100 MG: 50 TABLET, EXTENDED RELEASE ORAL at 09:56

## 2018-05-27 RX ADMIN — RIVAROXABAN 15 MG: 15 TABLET, FILM COATED ORAL at 09:56

## 2018-05-27 RX ADMIN — OXYBUTYNIN CHLORIDE 10 MG: 5 TABLET, EXTENDED RELEASE ORAL at 09:56

## 2018-05-27 NOTE — PROGRESS NOTES
TRANSFER - IN REPORT:    Verbal report received from David Brown RN (name) on Edwina High  being received from PCU (unit) for routine progression of care      Report consisted of patients Situation, Background, Assessment and   Recommendations(SBAR). Information from the following report(s) SBAR was reviewed with the receiving nurse. Opportunity for questions and clarification was provided. Assessment completed upon patients arrival to unit and care assumed. Bedside shift change report given to ADY Velazquez (oncoming nurse). Report included the following information SBAR. SHIFT SUMMARY:            [address] Hoff NURSING NOTE   Admission Date 5/24/2018   Admission Diagnosis Atrial flutter ([facility]   Consults IP CONSULT TO PALLIATIVE CARE - PROVIDER  IP CONSULT TO CARDIOLOGY      Cardiac Monitoring [x] Yes [] No      Purposeful Hourly Rounding [x] Yes    Rose Mary Score Total Score: 4   Rose Mary score 3 or > [] Bed Alarm [] Avasys [x] 1:1 sitter [] Patient refused (Signed refusal form in chart)   Seng Score Seng Score: 16   Seng score 14 or < [] PMT consult [] Wound Care consult    []  Specialty bed  [] Nutrition consult      Influenza Vaccine Received Flu Vaccine for Current Season (usually Sept-March): Not Flu Season    Patient/Guardian Refused (Notify MD): No      Oxygen needs? [x] Room air Oxygen @  []1L    []2L    []3L   []4L    []5L   []6L via  NC   Chronic home O2 use? [] Yes [x] No  Perform O2 challenge test and document in progress note using smartphrase (.Homeoxygen)      Last bowel movement Last Bowel Movement Date: 05/26/18      Urinary Catheter             LDAs               Peripheral IV 05/25/18 Right Forearm (Active)   Site Assessment Clean, dry, & intact 5/27/2018  4:59 PM   Phlebitis Assessment 0 5/27/2018  4:59 PM   Infiltration Assessment 0 5/27/2018  4:59 PM   Dressing Status Clean, dry, & intact 5/27/2018  4:59 PM   Dressing Type Tape 5/27/2018  4:59 PM   Hub Color/Line Status Blue; Infusing 5/27/2018  4:59 PM                         Readmission Risk Assessment Tool Score Medium Risk            13       Total Score        3 Has Seen PCP in Last 6 Months (Yes=3, No=0)    5 Pt. Coverage (Medicare=5 , Medicaid, or Self-Pay=4)    5 Charlson Comorbidity Score (Age + Comorbid Conditions)        Criteria that do not apply:    . Living with Significant Other. Assisted Living. LTAC. SNF.  or   Rehab    Patient Length of Stay (>5 days = 3)    IP Visits Last 12 Months (1-3=4, 4=9, >4=11)       Expected Length of Stay 2d 12h   Actual Length of Stay 3

## 2018-05-27 NOTE — PROGRESS NOTES
Cardiology Progress Note  2018     Admit Date: 2018  Admit Diagnosis: Atrial flutter ([facility]  CC: none currently  Assessment (as per Dr Naima Clark): 1. Paroxysmal atrial flutter with variable block, now back in sinus. 2. Dementia. 3. Chronic anticoagulation with Xarelto. 4. DNR.     Plan (as per Dr Naima Clark):      1. I'd continue the current plan with rate control and anticoagulation. She is able to be rate controlled. 2. I would try to manage this conservatively rather than push for an EP study and ablation given her circumstance. 3. As far as antiarrhythmic therapy, will hold off at this time, continue with beta-blocker and diltiazem, will try to get back to all orals this weekend.      : No complaints; NSR currently. Off IV dilt: Will try to use toprol XL only (100 every day for now) due to drug interaction b/w Xarelto and dilt. : NSR currently; Paflutter overnight. No new recs. For other plans, see orders.   Hospital problem list   Active Hospital Problems    Diagnosis Date Noted    At[facility] flutter ([facility] 2018        Subjective: Geri Nugent reports   Chest pain X none  consistent with:  Non-cardiac CP         Atypical CP     None now  On going  Anginal CP     Dyspnea X none  at rest  with exertion         improved  unchanged  worse              PND X none  overnight       Orthopnea X none  improved  unchanged  worse   Presyncope X none  improved  unchanged  worse     Ambulated in hallway without symptoms   Yes   Ambulated in room without symptoms  Yes   Objective:    Physical Exam:  Overall VSSAF;    Visit Vitals    /54 (BP 1 Location: Left arm, BP Patient Position: At rest)    Pulse 79    Temp 98.6 °F (37 °C)    Resp 23    Ht 5' 1\" (1.549 m)    Wt 53 kg (116 lb 13.5 oz)    SpO2 96%    Breastfeeding No    BMI 22.08 kg/m2     Temp (24hrs), Av.6 °F (37 °C), Min:98.5 °F (36.9 °C), Max:98.6 °F (37 °C)    Patient Vitals for the past 8 hrs:   Pulse   05/27/18 1200 79   05/27/18 0802 81   05/27/18 0600 77   05/27/18 0546 75   05/27/18 0515 97   05/27/18 0504 (!) 153   05/27/18 0430 (!) 110    Patient Vitals for the past 8 hrs:   Resp   05/27/18 1200 23   05/27/18 0802 13   05/27/18 0600 18   05/27/18 0515 17   05/27/18 0504 16    Patient Vitals for the past 8 hrs:   BP   05/27/18 1200 118/54   05/27/18 0802 136/68   05/27/18 0504 (!) 146/92          Intake/Output Summary (Last 24 hours) at 05/27/18 1224  Last data filed at 05/27/18 0527   Gross per 24 hour   Intake              750 ml   Output              625 ml   Net              125 ml     General Appearance: Well developed, elderly, no acute distress. Ears/Nose/Mouth/Throat:   Normal MM; anicteric. JVP: WNL   Resp:   clear to auscultation bilaterally. Nl resp effort. Cardiovascular:  RRR, S1, S2 normal, no new murmur. No gallop or rub. Abdomen:   Soft, non-tender, bowel sounds are present. Extremities: No edema bilaterally. Skin:  Neuro: Warm and dry. A/O x3, grossly nonfocal   cath site intact w/o hematoma or new bruit; distal pulse unchanged  Yes   Data Review:     Telemetry independently reviewed x sinus  chronic afib x parox aflutter  NSVT     ECG independently reviewed  NSR  afib  no significant changes  NSST-Tw chgs   x no new ECG provided for review   Lab results reviewed as noted below. Current medications reviewed as noted below. No results for input(s): PH, PCO2, PO2 in the last 72 hours.   Recent Labs      05/24/18   1743   TROIQ  <0.04     Recent Labs      05/27/18   0349  05/26/18   0403  05/25/18   0410  05/24/18   1743   NA  140  140  141  142   K  3.7  3.5  3.9  4.4   CL  108  107  108  105   CO2  25  26  24  29   BUN  22*  13  16  22*   CREA  0.78  0.77  0.75  0.95   GLU  80  98  93  103*   CA  8.7  9.0  8.9  9.5   ALB   --    --    --   4.2   WBC   --   8.8  9.3  8.3   HGB   --   13.1  13.0  14.6   HCT   --   39.0  38.4  43.8   PLT   --   206  202  236 Lab Results   Component Value Date/Time    Cholesterol, total 153 02/20/2018 02:08 PM    HDL Cholesterol 72 02/20/2018 02:08 PM    LDL, calculated 67 02/20/2018 02:08 PM    Triglyceride 70 02/20/2018 02:08 PM     Recent Labs      05/24/18   1743   SGOT  14*   AP  60   TP  7.3   ALB  4.2   GLOB  3.1     Recent Labs      05/24/18   1743   INR  1.3*   PTP  13.5*      No components found for: GLPOC    Current Facility-Administered Medications   Medication Dose Route Frequency    metoprolol succinate (TOPROL-XL) XL tablet 100 mg  100 mg Oral DAILY    rivaroxaban (XARELTO) tablet 15 mg  15 mg Oral DAILY    senna (SENOKOT) tablet 8.6 mg  1 Tab Oral DAILY    0.9% sodium chloride infusion  75 mL/hr IntraVENous CONTINUOUS    metoprolol (LOPRESSOR) injection 2.5 mg  2.5 mg IntraVENous Q6H PRN    sodium chloride (NS) flush 5-10 mL  5-10 mL IntraVENous Q8H    sodium chloride (NS) flush 5-10 mL  5-10 mL IntraVENous PRN    acetaminophen (TYLENOL) tablet 650 mg  650 mg Oral Q4H PRN    ondansetron (ZOFRAN) injection 4 mg  4 mg IntraVENous Q4H PRN    docusate sodium (COLACE) capsule 100 mg  100 mg Oral BID PRN    oxybutynin chloride XL (DITROPAN XL) tablet 10 mg  10 mg Oral DAILY        Rod Kohli MD

## 2018-05-27 NOTE — PROGRESS NOTES
Attempted to call report to [phone] Mcleod RN unavailable at this time.  Left number to call back

## 2018-05-27 NOTE — PROGRESS NOTES
PCU SHIFT NURSING NOTE      Bedside and Verbal shift change report given to [id number] [place] [address] (oncoming nurse) by Porsha Antonio RN (offgoing nurse). Report included the following information SBAR, Kardex, Recent Results and Cardiac Rhythm NSR - afib. Shift Summary:   0900: pt oriented to self only. States her name and birthday followed by \"I think. \" Aware of confusion stating \"I just am really confused right now. \" Ate 40% breakfast.      Admission Date 5/24/2018   Admission Diagnosis Atrial flutter ([facility]   Consults IP CONSULT TO PALLIATIVE CARE - PROVIDER  IP CONSULT TO CARDIOLOGY        Consults   []PT   []OT   []Speech   []Case Management      [] Palliative      Cardiac Monitoring Order   []Yes   []No     IV drips   []Yes    Drip:                            Dose:  Drip:                            Dose:  Drip:                            Dose:   []No     GI Prophylaxis   [x]Yes   []No         DVT Prophylaxis   SCDs:  Sequential Compression Device: Bilateral     Patient Refused VTE Prophylaxis: Yes    Vic stockings:         [] Medication   []Contraindicated   []None      Activity Level Activity Level: Up with Assistance     Activity Assistance: Partial (one person)   Purposeful Rounding every 1-2 hour? [x]Yes   Gonzalez Score  Total Score: 4   Bed Alarm (If score 3 or >)   []Yes   [] Refused (See signed refusal form in chart)   Seng Score  Seng Score: 16   Seng Score (if score 14 or less)   []PMT consult   []Wound Care consult      []Specialty bed   [] Nutrition consult          Needs prior to discharge:   Home O2 required:    []Yes   []No    If yes, how much O2 required?     Other:    Last Bowel Movement: Last Bowel Movement Date: 05/26/18      Influenza Vaccine Received Flu Vaccine for Current Season (usually Sept-March): Not Flu Season    Patient/Guardian Refused (Notify MD): No   Pneumonia Vaccine           Diet Active Orders   Diet    DIET REGULAR      LDAs               Peripheral IV 05/25/18 Right Forearm (Active) Site Assessment Clean, dry, & intact 5/27/2018  8:02 AM   Phlebitis Assessment 0 5/27/2018  8:02 AM   Infiltration Assessment 0 5/27/2018  8:02 AM   Dressing Status Clean, dry, & intact 5/27/2018  8:02 AM   Dressing Type Transparent;Tape 5/27/2018  8:02 AM   Hub Color/Line Status Blue;Capped 5/27/2018  8:02 AM                      Urinary Catheter      Intake & Output   Date 05/26/18 0700 - 05/27/18 0659 05/27/18 0700 - 05/28/18 0659   Shift 3918-5202 0318-0607 24 Hour Total 4521-9932 1326-1439 24 Hour Total   I  N  T  A  K  E   I.V.  (mL/kg/hr)  750  (1.2) 750  (0.6)         Volume (0.9% sodium chloride infusion)  750 750       Shift Total  (mL/kg)  750  (14.2) 750  (14.2)      O  U  T  P  U  T   Urine  (mL/kg/hr)  625  (1) 625  (0.5)         Urine Voided  625 625       Stool            Stool Occurrence(s) 1 x  1 x       Shift Total  (mL/kg)  625  (11.8) 625  (11.8)      NET  125 125      Weight (kg) 53 53 53 53 53 53         Readmission Risk Assessment Tool Score Medium Risk            13       Total Score        3 Has Seen PCP in Last 6 Months (Yes=3, No=0)    5 Pt. Coverage (Medicare=5 , Medicaid, or Self-Pay=4)    5 Charlson Comorbidity Score (Age + Comorbid Conditions)        Criteria that do not apply:    . Living with Significant Other. Assisted Living. LTAC. SNF.  or   Rehab    Patient Length of Stay (>5 days = 3)    IP Visits Last 12 Months (1-3=4, 4=9, >4=11)       Expected Length of Stay 2d 12h   Actual Length of Stay 3

## 2018-05-27 NOTE — PROGRESS NOTES
Remains in a-fib, with rate increasing and sustained from 122-155. Metoprolol 2.5mg IV given per PRN order. /92. Pt awake and alert, asymptomatic. confusion some better.

## 2018-05-27 NOTE — PROGRESS NOTES
TRANSFER - OUT REPORT:    Verbal report given to Joy RN(name) on Morenita Jasso  being transferred to [facility](unit) for routine progression of care       Report consisted of patients Situation, Background, Assessment and   Recommendations(SBAR). Information from the following report(s) SBAR, Kardex, Intake/Output, Recent Results and Cardiac Rhythm NSR was reviewed with the receiving nurse. Lines:   Peripheral IV 05/25/18 Right Forearm (Active)   Site Assessment Clean, dry, & intact 5/27/2018 12:00 PM   Phlebitis Assessment 0 5/27/2018 12:00 PM   Infiltration Assessment 0 5/27/2018 12:00 PM   Dressing Status Clean, dry, & intact 5/27/2018 12:00 PM   Dressing Type Transparent;Tape 5/27/2018 12:00 PM   Hub Color/Line Status Blue;Capped 5/27/2018 12:00 PM        Opportunity for questions and clarification was provided.       Patient transported with:   Monitor  Tech

## 2018-05-27 NOTE — PROGRESS NOTES
Hospitalist Progress Note    NAME: Jacqueline Radford   :  1929   MRN:  [id number]       Assessment / Plan:  Atrial flutter with RVR POA- now rate controlled  -HR up to 150s, most likely related to dehydration, has been compliant with medication, no signs of ischemia    Continue IV fluids, DC in 24 hrs once eating well  Off cardizem now as per cardiology  Cont  metoprolol IV PRN  Cont increased PO metoprolol at100 mg XL as per cardiology   Continue xarelto     Malnutrition, weight loss, decreased appetite POA- likely natural progression dementia  in setting of Alzheimer's dementia POA  -drinks 1-2 boost per day otherwise barely eats or drinks  -family is very frustrated  Palliative care consulted in ER-  to help with care decisions  -nutrition consulted        Code Status: DNR  Surrogate Decision Maker: niece is mPOA     DVT Prophylaxis: xarelto  Baseline: at assisted living, family checks in frequently and helps with meds      Recommended Disposition: SNF/LTC for STR as recommended by PT/OT,to place of [facility]'s choices- CM working on it, DC in 24hrs once cleared by Cardiology     Subjective:     Chief Complaint / Reason for Physician Visit: F/U Aflutter/fib, dehydration, dementia  \"Why am I here? \". Discussed with RN events overnight. Review of Systems:  Symptom Y/N Comments  Symptom Y/N Comments   Fever/Chills    Chest Pain     Poor Appetite    Edema     Cough    Abdominal Pain     Sputum    Joint Pain     SOB/ADAME    Pruritis/Rash     Nausea/vomit    Tolerating PT/OT     Diarrhea    Tolerating Diet     Constipation    Other       Could NOT obtain due to: Dementia     Objective:     VITALS:   Last 24hrs VS reviewed since prior progress note.  Most recent are:  Patient Vitals for the past 24 hrs:   Temp Pulse Resp BP SpO2   18 0802 98.6 °F (37 °C) 81 13 136/68 93 %   18 0600 - 77 18 - 95 %   18 0546 - 75 - - -   18 0515 - 97 17 - 97 %   18 0504 - (!) 153 16 (!) 146/92 96 % 05/27/18 0430 - (!) 110 - - -   05/26/18 2029 - 83 16 (!) 146/92 96 %   05/26/18 2026 98.5 °F (36.9 °C) 75 14 139/75 96 %   05/26/18 1548 98.5 °F (36.9 °C) 70 16 119/58 95 %   05/26/18 1133 97.8 °F (36.6 °C) 73 16 113/75 96 %       Intake/Output Summary (Last 24 hours) at 05/27/18 0935  Last data filed at 05/27/18 0527   Gross per 24 hour   Intake              750 ml   Output              625 ml   Net              125 ml        PHYSICAL EXAM:  General: WD, WN. Alert, cooperative, no acute distress    EENT:  EOMI. Anicteric sclerae. MMM  Resp:  CTA bilaterally, no wheezing or rales. No accessory muscle use  CV:  irregular  rhythm,  No edema  GI:  Soft, Non distended, Non tender.  +Bowel sounds  Neurologic:  Alert and oriented X 1, normal speech,   Psych:   Poor insight. confused, dementia noted +  Skin:  No rashes. No jaundice    Reviewed most current lab test results and cultures  YES  Reviewed most current radiology test results   YES  Review and summation of old records today    NO  Reviewed patient's current orders and MAR    YES  PMH/SH reviewed - no change compared to H&P  ________________________________________________________________________  Care Plan discussed with:    Comments   Patient x    Family  x mPOA/Niece at bedside yesterday   RN x    Care Manager     Consultant                        Multidiciplinary team rounds were held today with , nursing, pharmacist and clinical coordinator. Patient's plan of care was discussed; medications were reviewed and discharge planning was addressed.      ________________________________________________________________________  Total NON critical care TIME:  26   Minutes    Total CRITICAL CARE TIME Spent:   Minutes non procedure based      Comments   >50% of visit spent in counseling and coordination of care     ________________________________________________________________________  Alexander Ryan MD     Procedures: see electronic medical records for all procedures/Xrays and details which were not copied into this note but were reviewed prior to creation of Plan. LABS:  I reviewed today's most current labs and imaging studies.   Pertinent labs include:  Recent Labs      05/26/18 0403 05/25/18 0410 05/24/18   1743   WBC  8.8  9.3  8.3   HGB  13.1  13.0  14.6   HCT  39.0  38.4  43.8   PLT  206  202  236     Recent Labs      05/27/18   0349  05/26/18 0403 05/25/18 0410 05/24/18   1743   NA  140  140  141  142   K  3.7  3.5  3.9  4.4   CL  108  107  108  105   CO2  25  26  24  29   GLU  80  98  93  103*   BUN  22*  13  16  22*   CREA  0.78  0.77  0.75  0.95   CA  8.7  9.0  8.9  9.5   MG   --    --    --   2.4   ALB   --    --    --   4.2   TBILI   --    --    --   0.5   SGOT   --    --    --   14*   ALT   --    --    --   22   INR   --    --    --   1.3*       Signed: Marilee Noble MD

## 2018-05-27 NOTE — PROGRESS NOTES
Pt noted to be in A-fib/flutter on monitor with rate in 90's to low 100's. Asymptomatic. Awake and alert, confused and anxious, oriented only to self, but was not sure of her last name. Reassured, attempted to reorient.

## 2018-05-28 LAB
ANION GAP SERPL CALC-SCNC: 11 MMOL/L (ref 5–15)
BASOPHILS # BLD: 0 K/UL (ref 0–0.1)
BASOPHILS NFR BLD: 0 % (ref 0–1)
BUN SERPL-MCNC: 18 MG/DL (ref 6–20)
BUN/CREAT SERPL: 24 (ref 12–20)
CALCIUM SERPL-MCNC: 9.1 MG/DL (ref 8.5–10.1)
CHLORIDE SERPL-SCNC: 108 MMOL/L (ref 97–108)
CO2 SERPL-SCNC: 22 MMOL/L (ref 21–32)
CREAT SERPL-MCNC: 0.74 MG/DL (ref 0.55–1.02)
DIFFERENTIAL METHOD BLD: ABNORMAL
EOSINOPHIL # BLD: 0.4 K/UL (ref 0–0.4)
EOSINOPHIL NFR BLD: 4 % (ref 0–7)
ERYTHROCYTE [DISTWIDTH] IN BLOOD BY AUTOMATED COUNT: 16.8 % (ref 11.5–14.5)
GLUCOSE SERPL-MCNC: 73 MG/DL (ref 65–100)
HCT VFR BLD AUTO: 37.3 % (ref 35–47)
HGB BLD-MCNC: 12.7 G/DL (ref 11.5–16)
IMM GRANULOCYTES # BLD: 0 K/UL (ref 0–0.04)
IMM GRANULOCYTES NFR BLD AUTO: 0 % (ref 0–0.5)
LYMPHOCYTES # BLD: 3.4 K/UL (ref 0.8–3.5)
LYMPHOCYTES NFR BLD: 37 % (ref 12–49)
MCH RBC QN AUTO: 30.9 PG (ref 26–34)
MCHC RBC AUTO-ENTMCNC: 34 G/DL (ref 30–36.5)
MCV RBC AUTO: 90.8 FL (ref 80–99)
MONOCYTES # BLD: 1.2 K/UL (ref 0–1)
MONOCYTES NFR BLD: 13 % (ref 5–13)
NEUTS SEG # BLD: 4.4 K/UL (ref 1.8–8)
NEUTS SEG NFR BLD: 46 % (ref 32–75)
NRBC # BLD: 0 K/UL (ref 0–0.01)
NRBC BLD-RTO: 0 PER 100 WBC
PLATELET # BLD AUTO: 211 K/UL (ref 150–400)
PMV BLD AUTO: 11.1 FL (ref 8.9–12.9)
POTASSIUM SERPL-SCNC: 3.8 MMOL/L (ref 3.5–5.1)
RBC # BLD AUTO: 4.11 M/UL (ref 3.8–5.2)
SODIUM SERPL-SCNC: 141 MMOL/L (ref 136–145)
WBC # BLD AUTO: 9.4 K/UL (ref 3.6–11)

## 2018-05-28 PROCEDURE — 65660000000 HC RM CCU STEPDOWN

## 2018-05-28 PROCEDURE — 74011250637 HC RX REV CODE- 250/637: Performed by: INTERNAL MEDICINE

## 2018-05-28 PROCEDURE — 36415 COLL VENOUS BLD VENIPUNCTURE: CPT | Performed by: INTERNAL MEDICINE

## 2018-05-28 PROCEDURE — 74011000250 HC RX REV CODE- 250: Performed by: INTERNAL MEDICINE

## 2018-05-28 PROCEDURE — 80048 BASIC METABOLIC PNL TOTAL CA: CPT | Performed by: INTERNAL MEDICINE

## 2018-05-28 PROCEDURE — 85025 COMPLETE CBC W/AUTO DIFF WBC: CPT | Performed by: INTERNAL MEDICINE

## 2018-05-28 RX ORDER — DILTIAZEM HYDROCHLORIDE 5 MG/ML
5 INJECTION INTRAVENOUS ONCE
Status: ACTIVE | OUTPATIENT
Start: 2018-05-28 | End: 2018-05-29

## 2018-05-28 RX ORDER — METOPROLOL SUCCINATE 50 MG/1
150 TABLET, EXTENDED RELEASE ORAL DAILY
Status: DISCONTINUED | OUTPATIENT
Start: 2018-05-29 | End: 2018-05-30 | Stop reason: HOSPADM

## 2018-05-28 RX ORDER — METOPROLOL SUCCINATE 50 MG/1
50 TABLET, EXTENDED RELEASE ORAL
Status: COMPLETED | OUTPATIENT
Start: 2018-05-28 | End: 2018-05-28

## 2018-05-28 RX ADMIN — METOPROLOL SUCCINATE 50 MG: 50 TABLET, EXTENDED RELEASE ORAL at 12:10

## 2018-05-28 RX ADMIN — Medication 10 ML: at 22:00

## 2018-05-28 RX ADMIN — Medication 5 ML: at 14:18

## 2018-05-28 RX ADMIN — RIVAROXABAN 15 MG: 15 TABLET, FILM COATED ORAL at 08:25

## 2018-05-28 RX ADMIN — METOPROLOL SUCCINATE 100 MG: 50 TABLET, EXTENDED RELEASE ORAL at 08:25

## 2018-05-28 RX ADMIN — SENNOSIDES 8.6 MG: 8.6 TABLET, FILM COATED ORAL at 08:25

## 2018-05-28 RX ADMIN — Medication 10 ML: at 06:15

## 2018-05-28 RX ADMIN — DOCUSATE SODIUM 100 MG: 100 CAPSULE, LIQUID FILLED ORAL at 10:31

## 2018-05-28 RX ADMIN — METOROPROLOL TARTRATE 2.5 MG: 5 INJECTION, SOLUTION INTRAVENOUS at 10:31

## 2018-05-28 RX ADMIN — OXYBUTYNIN CHLORIDE 10 MG: 5 TABLET, EXTENDED RELEASE ORAL at 08:24

## 2018-05-28 NOTE — PROGRESS NOTES
Cardiology Progress Note  2018     Admit Date: 2018  Admit Diagnosis: Atrial flutter ([facility]  CC: none currently  Assessment (as per Dr Hira Amaya): 1. Paroxysmal atrial flutter with variable block, now back in sinus. 2. Dementia. 3. Chronic anticoagulation with Xarelto. 4. DNR.     Plan (as per Dr Hira Amaya):      1. I'd continue the current plan with rate control and anticoagulation. She is able to be rate controlled. 2. I would try to manage this conservatively rather than push for an EP study and ablation given her circumstance. 3. As far as antiarrhythmic therapy, will hold off at this time, continue with beta-blocker and diltiazem, will try to get back to all orals this weekend.      : No complaints; NSR currently. Off IV dilt: Will try to use toprol XL only (100 every day for now) due to drug interaction b/w Xarelto and dilt. : NSR currently; Paflutter overnight.    : Recurrent ASx Parox AFlutter (VR to 150); Increase toprol XL to 150 qday. For other plans, see orders.   Hospital problem list   Active Hospital Problems    Diagnosis Date Noted    Atrial flutter ([facility] 2018        Subjective: Tabitha Garces reports   Chest pain X none  consistent with:  Non-cardiac CP         Atypical CP     None now  On going  Anginal CP     Dyspnea X none  at rest  with exertion         improved  unchanged  worse              PND X none  overnight       Orthopnea X none  improved  unchanged  worse   Presyncope X none  improved  unchanged  worse     Ambulated in hallway without symptoms   Yes   Ambulated in room without symptoms  Yes   Objective:    Physical Exam:  Overall VSSAF;    Visit Vitals    /71 (BP 1 Location: Right arm, BP Patient Position: At rest)    Pulse 66    Temp 98.1 °F (36.7 °C)    Resp 18    Ht 5' 1\" (1.549 m)    Wt 53 kg (116 lb 13.5 oz)    SpO2 97%    Breastfeeding No    BMI 22.08 kg/m2     Temp (24hrs), Av.1 °F (36.7 °C), Min:97.7 °F (36.5 °C), Max:98.6 °F (37 °C)    Patient Vitals for the past 8 hrs:   Pulse   05/28/18 1117 66   05/28/18 0427 63    Patient Vitals for the past 8 hrs:   Resp   05/28/18 1117 18   05/28/18 0721 18   05/28/18 0427 18    Patient Vitals for the past 8 hrs:   BP   05/28/18 1117 124/71   05/28/18 0721 169/83   05/28/18 0427 141/87          Intake/Output Summary (Last 24 hours) at 05/28/18 1119  Last data filed at 05/28/18 0014   Gross per 24 hour   Intake          1408.75 ml   Output                0 ml   Net          1408.75 ml     General Appearance: Well developed, elderly, no acute distress. Ears/Nose/Mouth/Throat:   Normal MM; anicteric. JVP: WNL   Resp:   clear to auscultation bilaterally. Nl resp effort. Cardiovascular:  RRR, S1, S2 normal, no new murmur. No gallop or rub. Abdomen:   Soft, non-tender, bowel sounds are present. Extremities: No edema bilaterally. Skin:  Neuro: Warm and dry. A/O x3, grossly nonfocal   cath site intact w/o hematoma or new bruit; distal pulse unchanged  Yes   Data Review:     Telemetry independently reviewed x sinus  chronic afib x parox aflutter  NSVT     ECG independently reviewed  NSR  afib  no significant changes  NSST-Tw chgs   x no new ECG provided for review   Lab results reviewed as noted below. Current medications reviewed as noted below. No results for input(s): PH, PCO2, PO2 in the last 72 hours. No results for input(s): CPK, CKMB, CKNDX, TROIQ in the last 72 hours.   Recent Labs      05/28/18   0301  05/27/18   0349  05/26/18   0403   NA  141  140  140   K  3.8  3.7  3.5   CL  108  108  107   CO2  22  25  26   BUN  18  22*  13   CREA  0.74  0.78  0.77   GLU  73  80  98   CA  9.1  8.7  9.0   WBC  9.4   --   8.8   HGB  12.7   --   13.1   HCT  37.3   --   39.0   PLT  211   --   206     Lab Results   Component Value Date/Time    Cholesterol, total 153 02/20/2018 02:08 PM    HDL Cholesterol 72 02/20/2018 02:08 PM    LDL, calculated 67 02/20/2018 02:08 PM    Triglyceride 70 02/20/2018 02:08 PM     No results for input(s): SGOT, GPT, AP, TBIL, TP, ALB, GLOB, GGT, AML, LPSE in the last 72 hours. No lab exists for component: AMYP, HLPSE  No results for input(s): INR, PTP, APTT in the last 72 hours.     No lab exists for component: INREXT, INREXT   No components found for: [place]    Current Facility-Administered Medications   Medication Dose Route Frequency    [START ON 5/29/2018] metoprolol succinate (TOPROL-XL) XL tablet 150 mg  150 mg Oral DAILY    metoprolol succinate (TOPROL-XL) XL tablet 50 mg  50 mg Oral NOW    rivaroxaban (XARELTO) tablet 15 mg  15 mg Oral DAILY    senna (SENOKOT) tablet 8.6 mg  1 Tab Oral DAILY    metoprolol (LOPRESSOR) injection 2.5 mg  2.5 mg IntraVENous Q6H PRN    sodium chloride (NS) flush 5-10 mL  5-10 mL IntraVENous Q8H    sodium chloride (NS) flush 5-10 mL  5-10 mL IntraVENous PRN    acetaminophen (TYLENOL) tablet 650 mg  650 mg Oral Q4H PRN    ondansetron (ZOFRAN) injection 4 mg  4 mg IntraVENous Q4H PRN    docusate sodium (COLACE) capsule 100 mg  100 mg Oral BID PRN    oxybutynin chloride XL (DITROPAN XL) tablet 10 mg  10 mg Oral DAILY        Garrick Duarte MD

## 2018-05-28 NOTE — PROGRESS NOTES
Hospitalist Progress Note    NAME: Manjinder Bravo   :  1929   MRN:  [id number]       Assessment / Plan:  Atrial flutter with RVR POA- now rate controlled  -HR up to 150s, most likely related to dehydration, has been compliant with medication, no signs of ischemia    Continue IV fluids, DC in 24 hrs once SNF arranged  Off cardizem now as per cardiology- cleared by cardiology now  Cont  metoprolol IV PRN  Cont increased PO metoprolol at100 mg XL as per cardiology   Continue xarelto     Malnutrition, weight loss, decreased appetite POA- likely natural progression dementia  in setting of Alzheimer's dementia POA  -drinks 1-2 boost per day otherwise barely eats or drinks  -family is very frustrated  Palliative care consulted in ER-  to help with care decisions  -nutrition consulted        Code Status: DNR  Surrogate Decision Maker: niece is mPOA     DVT Prophylaxis: xarelto  Baseline: at assisted living, family checks in frequently and helps with meds      Recommended Disposition: SNF/LTC for STR as recommended by PT/OT,to place of [facility] choices- CM working on it, DC in 24hrs once SNF arranged     Subjective:     Chief Complaint / Reason for Physician Visit: F/U Aflutter/fib, dehydration, dementia  \"Why am I here? \". Discussed with RN events overnight. Review of Systems:  Symptom Y/N Comments  Symptom Y/N Comments   Fever/Chills    Chest Pain     Poor Appetite    Edema     Cough    Abdominal Pain     Sputum    Joint Pain     SOB/ADAME    Pruritis/Rash     Nausea/vomit    Tolerating PT/OT     Diarrhea    Tolerating Diet     Constipation    Other       Could NOT obtain due to: Dementia     Objective:     VITALS:   Last 24hrs VS reviewed since prior progress note.  Most recent are:  Patient Vitals for the past 24 hrs:   Temp Pulse Resp BP SpO2   18 0721 98 °F (36.7 °C) - 18 169/83 96 %   18 0427 98.2 °F (36.8 °C) 63 18 141/87 97 %   18 0014 98.4 °F (36.9 °C) 66 16 145/78 97 %   18 2051 98 °F (36.7 °C) - 18 151/75 97 %   05/27/18 1644 97.9 °F (36.6 °C) 73 16 (!) 151/94 96 %   05/27/18 1600 97.7 °F (36.5 °C) 69 15 151/83 97 %   05/27/18 1200 98.6 °F (37 °C) 79 23 118/54 96 %       Intake/Output Summary (Last 24 hours) at 05/28/18 0928  Last data filed at 05/28/18 0014   Gross per 24 hour   Intake          1408.75 ml   Output                0 ml   Net          1408.75 ml        PHYSICAL EXAM:  General: WD, WN. Alert, cooperative, no acute distress    EENT:  EOMI. Anicteric sclerae. MMM  Resp:  CTA bilaterally, no wheezing or rales. No accessory muscle use  CV:  irregular  rhythm,  No edema  GI:  Soft, Non distended, Non tender.  +Bowel sounds  Neurologic:  Alert and oriented X 1, normal speech,   Psych:   Poor insight. confused, dementia noted +  Skin:  No rashes. No jaundice    Reviewed most current lab test results and cultures  YES  Reviewed most current radiology test results   YES  Review and summation of old records today    NO  Reviewed patient's current orders and MAR    YES  PMH/SH reviewed - no change compared to H&P  ________________________________________________________________________  Care Plan discussed with:    Comments   Patient x    Family  x mPOA/Niece at bedside on saturday   RN x    Care Manager x    Consultant                        Multidiciplinary team rounds were held today with , nursing, pharmacist and clinical coordinator. Patient's plan of care was discussed; medications were reviewed and discharge planning was addressed.      ________________________________________________________________________  Total NON critical care TIME:  16   Minutes    Total CRITICAL CARE TIME Spent:   Minutes non procedure based      Comments   >50% of visit spent in counseling and coordination of care     ________________________________________________________________________  Minnie Lopez MD     Procedures: see electronic medical records for all procedures/Xrays and details which were not copied into this note but were reviewed prior to creation of Plan. LABS:  I reviewed today's most current labs and imaging studies.   Pertinent labs include:  Recent Labs      05/28/18   0301  05/26/18   0403   WBC  9.4  8.8   HGB  12.7  13.1   HCT  37.3  39.0   PLT  211  206     Recent Labs      05/28/18   0301  05/27/18   0349  05/26/18   0403   NA  141  140  140   K  3.8  3.7  3.5   CL  108  108  107   CO2  22  25  26   GLU  73  80  98   BUN  18  22*  13   CREA  0.74  0.78  0.77   CA  9.1  8.7  9.0       Signed: Preet Haney MD

## 2018-05-28 NOTE — PROGRESS NOTES
Received from Telemetry that patient experienced 9 beat of V-Tach at 2039. Hospitalist has been notified and no new orders received. Will continue to monitor and notify physician of any further occurrence.

## 2018-05-28 NOTE — PROGRESS NOTES
Bedside shift change report given to Rosebud Schirmer, RN (oncoming nurse) by Steven Ratliff RN (offgoing nurse). Report included the following information SBAR. 1030 Pt HR jumped up to 150's. Pt back in Afib. Dr Damian Greenwood made aware. 2.5 mg IV metoprolol given. [address] with Dr. Oralia Mcdonald and made aware of Pt's HR in 140's after IV metoprolol. 1216 's sustained. Dr. Oralia Mcdonald made aware. 1239 /104. Dr. Oralia Mcdonald made aware    [phone] Dr. Damian Greenwood made aware of HR in 150's and no new orders from cards yet. 1300 Order received for 5 mg IV Cardizem push. After order place the Pt converted back to NS and HR in 60's. Held Cardizem push. Bedside shift change report given to ADY Velazquez (oncoming nurse). Report included the following information SBAR. SHIFT SUMMARY:            [facility] NURSING NOTE   Admission Date 5/24/2018   Admission Diagnosis Atrial flutter ([address]   Consults IP CONSULT TO PALLIATIVE CARE - PROVIDER  IP CONSULT TO CARDIOLOGY      Cardiac Monitoring [x] Yes [] No      Purposeful Hourly Rounding [x] Yes    Rose Mary Score Total Score: 4   Rose Mary score 3 or > [] Bed Alarm [] Avasys [x] 1:1 sitter [] Patient refused (Signed refusal form in chart)   Seng Score Seng Score: 16   Seng score 14 or < [] PMT consult [] Wound Care consult    []  Specialty bed  [] Nutrition consult      Influenza Vaccine Received Flu Vaccine for Current Season (usually Sept-March): Not Flu Season    Patient/Guardian Refused (Notify MD): No      Oxygen needs? [x] Room air Oxygen @  []1L    []2L    []3L   []4L    []5L   []6L via  NC   Chronic home O2 use?  [] Yes [x] No  Perform O2 challenge test and document in progress note using smartphrase (.Homeoxygen)      Last bowel movement Last Bowel Movement Date: 05/26/18      Urinary Catheter             LDAs               Peripheral IV 05/25/18 Right Forearm (Active)   Site Assessment Clean, dry, & intact 5/28/2018  2:35 PM   Phlebitis Assessment 0 5/28/2018  2:35 PM   Infiltration Assessment 0 5/28/2018  2:35 PM   Dressing Status Clean, dry, & intact 5/28/2018  2:35 PM   Dressing Type Tape 5/28/2018  2:35 PM   Hub Color/Line Status Blue;Flushed 5/28/2018  2:35 PM                         Readmission Risk Assessment Tool Score Medium Risk            13       Total Score        3 Has Seen PCP in Last 6 Months (Yes=3, No=0)    5 Pt. Coverage (Medicare=5 , Medicaid, or Self-Pay=4)    5 Charlson Comorbidity Score (Age + Comorbid Conditions)        Criteria that do not apply:    . Living with Significant Other. Assisted Living. LTAC. SNF.  or   Rehab    Patient Length of Stay (>5 days = 3)    IP Visits Last 12 Months (1-3=4, 4=9, >4=11)       Expected Length of Stay 2d 12h   Actual Length of Stay 4

## 2018-05-28 NOTE — PROGRESS NOTES
CM acknowledged consult for possible dc in next 24 hours to [facility]. CM spoke with nursing - pt is tachycardic at this time. Will delay dc for 24 hours. 1:38PM UPDATE  Pt's niece/mPOA Demaris Pollen) contacted CM regarding discharge planning. Family is out of town at this time and wanted to confirm disposition to [facility] and requesting to be notified tomorrow should any plans change. 3:24PM UPDATE   Referral sent to [facility] for BLS transport tomorrow via [facility], awaiting acceptance.  Requesting pt to be placed on Will Call list.     BRENDAN Dominique [employer], [address] [place] [address]  [phone]

## 2018-05-29 LAB
ANION GAP SERPL CALC-SCNC: 8 MMOL/L (ref 5–15)
BUN SERPL-MCNC: 15 MG/DL (ref 6–20)
BUN/CREAT SERPL: 21 (ref 12–20)
CALCIUM SERPL-MCNC: 9.1 MG/DL (ref 8.5–10.1)
CHLORIDE SERPL-SCNC: 105 MMOL/L (ref 97–108)
CO2 SERPL-SCNC: 26 MMOL/L (ref 21–32)
CREAT SERPL-MCNC: 0.71 MG/DL (ref 0.55–1.02)
GLUCOSE SERPL-MCNC: 79 MG/DL (ref 65–100)
POTASSIUM SERPL-SCNC: 3.6 MMOL/L (ref 3.5–5.1)
SODIUM SERPL-SCNC: 139 MMOL/L (ref 136–145)

## 2018-05-29 PROCEDURE — 65660000000 HC RM CCU STEPDOWN

## 2018-05-29 PROCEDURE — 74011250637 HC RX REV CODE- 250/637: Performed by: INTERNAL MEDICINE

## 2018-05-29 PROCEDURE — 80048 BASIC METABOLIC PNL TOTAL CA: CPT | Performed by: INTERNAL MEDICINE

## 2018-05-29 PROCEDURE — 36415 COLL VENOUS BLD VENIPUNCTURE: CPT | Performed by: INTERNAL MEDICINE

## 2018-05-29 RX ADMIN — ACETAMINOPHEN 650 MG: 325 TABLET ORAL at 08:42

## 2018-05-29 RX ADMIN — Medication 10 ML: at 05:40

## 2018-05-29 RX ADMIN — METOPROLOL SUCCINATE 150 MG: 50 TABLET, EXTENDED RELEASE ORAL at 08:43

## 2018-05-29 RX ADMIN — RIVAROXABAN 15 MG: 15 TABLET, FILM COATED ORAL at 08:43

## 2018-05-29 RX ADMIN — OXYBUTYNIN CHLORIDE 10 MG: 5 TABLET, EXTENDED RELEASE ORAL at 08:42

## 2018-05-29 RX ADMIN — Medication 10 ML: at 16:30

## 2018-05-29 RX ADMIN — Medication 10 ML: at 21:08

## 2018-05-29 RX ADMIN — SENNOSIDES 8.6 MG: 8.6 TABLET, FILM COATED ORAL at 08:43

## 2018-05-29 NOTE — PROGRESS NOTES
1145  Sitter removed from bedside, bed alarm, soft call bell, and side rails x3 in place. Pt instructed to call nurse before getting out of bed and door left open.

## 2018-05-29 NOTE — PROGRESS NOTES
Bedside shift change report given to Adela Shelton RN (oncoming nurse). Report included the following information SBAR, Kardex, Intake/Output, MAR and Recent Results. SHIFT SUMMARY:            [address] NURSING NOTE   Admission Date 5/24/2018   Admission Diagnosis Atrial flutter ([address]   Consults IP CONSULT TO CARDIOLOGY      Cardiac Monitoring [x] Yes [] No      Purposeful Hourly Rounding [x] Yes    Rose Mary Score Total Score: 4   Rose Mary score 3 or > [x] Bed Alarm [] Avasys [] 1:1 sitter [] Patient refused (Signed refusal form in chart)   Seng Score Seng Score: 16   Seng score 14 or < [] PMT consult [] Wound Care consult    []  Specialty bed  [] Nutrition consult      Influenza Vaccine Received Flu Vaccine for Current Season (usually Sept-March): Not Flu Season    Patient/Guardian Refused (Notify MD): No      Oxygen needs? [x] Room air Oxygen @  []1L    []2L    []3L   []4L    []5L   []6L via  NC   Chronic home O2 use? [] Yes [x] No  Perform O2 challenge test and document in progress note using smartphrase (.Homeoxygen)      Last bowel movement Last Bowel Movement Date: 05/27/18      Urinary Catheter             LDAs               Peripheral IV 05/25/18 Right Forearm (Active)   Site Assessment Clean, dry, & intact 5/29/2018  7:46 PM   Phlebitis Assessment 0 5/29/2018  7:46 PM   Infiltration Assessment 0 5/29/2018  7:46 PM   Dressing Status Clean, dry, & intact 5/29/2018  7:46 PM   Dressing Type Transparent 5/29/2018  7:46 PM   Hub Color/Line Status Blue;Capped 5/29/2018  7:46 PM                         Readmission Risk Assessment Tool Score Medium Risk            16       Total Score        3 Has Seen PCP in Last 6 Months (Yes=3, No=0)    3 Patient Length of Stay (>5 days = 3)    5 Pt. Coverage (Medicare=5 , Medicaid, or Self-Pay=4)    5 Charlson Comorbidity Score (Age + Comorbid Conditions)        Criteria that do not apply:    . Living with Significant Other. Assisted Living. LTAC. SNF.  or   Rehab    IP Visits Last 12 Months (1-3=4, 4=9, >4=11)       Expected Length of Stay 2d 12h   Actual Length of Stay 5

## 2018-05-29 NOTE — PROGRESS NOTES
Attending Hospitalist Attestation:    I have personally seen and examined the patient, reviewed pertinent labs and imaging, and discussed the plan of care with NP Dax Logan. I reviewed and agree with the history, exam, assessment and plan as outlined in her note.     \" I do not know why I am here\"  Alert, still with sitter in the room  Denies complaints, NO CP or SOB or N/V or diarrhea or abdominal pain or HA  Awaiting placement once sitter off    Patient Vitals for the past 24 hrs:   Temp Pulse Resp BP SpO2   05/29/18 1906 - - - 107/51 -   05/29/18 1859 - - - (!) 109/93 -   05/29/18 1853 - (!) 110 20 100/50 96 %   05/29/18 1448 98.3 °F (36.8 °C) 74 20 117/65 96 %   05/29/18 1100 98.3 °F (36.8 °C) 60 20 108/64 94 %   05/29/18 0736 97.7 °F (36.5 °C) 62 20 177/74 99 %   05/29/18 0333 97.6 °F (36.4 °C) 64 20 152/84 98 %   05/29/18 0053 98.1 °F (36.7 °C) 74 20 162/80 98 %   05/28/18 2350 98.3 °F (36.8 °C) 73 20 184/74 -   05/28/18 2347 - - - 181/69 -   05/28/18 2311 98.4 °F (36.9 °C) 77 20 179/86 97 %   05/28/18 1930 98.1 °F (36.7 °C) 76 20 119/63 95 %     No distress  Lungs Clear to ausculation bilaterally  Heart: RRR nl S1S2, no murmurs  Abdomen: soft, NT, ND, Positive BS, no rebound  Skin: No rashes  Neuro: Alert, oriented x 1, non focal motor exam, follows commands      Assessment/plan:  Atrial flutter with RVR POA  HR up to 150s, most likely related to dehydration, has been compliant with medication  HR mainly NSR overnight, rate controlled   Few brief runs of atrial flutter  Appreciate cardiology input  Cont  metoprolol at 150 mg XL/day  Continue xarelto      Malnutrition, weight loss, decreased appetite POA likely progressive dementia  in setting of Alzheimer's dementia POA  drinks 1-2 boost per day otherwise barely eats or drinks  nutrition consulted  Stop sitter if possible today  D/C once safely off the sitter      Code Status: DNR    Surrogate Decision Maker: niece is mPOA      DVT Prophylaxis: xarelto    Baseline: at assisted living, family checks in frequently and helps with meds     Recommended Disposition: SNF/LTC    Additional time:   25  min rendering and coordinating care    Martha Chapin MD

## 2018-05-29 NOTE — PROGRESS NOTES
Patient experienced episode of hypertension. Automatic blood pressure check was 179/86 at 2311. Automatic recheck was 181/69 at 2347. Manual recheck was 184/74 at 2350. Dr. Martha Saul was notified and no new orders were given. Will continue to monitor, rechecking vital signs hourly until blood pressure returns to [facility] for patient, notifying physician of any further increase or significant change. Recheck at 0053 was 162/80. Will continue to monitor.

## 2018-05-29 NOTE — PROGRESS NOTES
Hospitalist Progress Note    NAME: Jacqueline Radford   :  1929   MRN:  [id number]       Interim Hospital Summary: 80 y.o. female whom presented on 2018 with      Assessment / Plan:  Atrial flutter with RVR POA  Hypertension  - now rate controlled (HR went up to 150s, most likely related to dehydration, has been compliant with medication, no signs of ischemia)  - received  IV fluids  - continue with Toprol XL and xarelto  - appreciate cardiology input: conservative management, no EO study and ablation at this time      Malnutrition, weight loss, decreased appetite POA  Alzheimer's dementia POA  - continue with nutritional supplement; drinks 1-2 boost per day otherwise barely eats or drinks. - appreciate dietary input   - appreciate palliative team input      Code Status: DNR  Surrogate Decision Maker: niece is mPOJAMA      DVT Prophylaxis: xarelto  Baseline: at assisted living, family checks in frequently and helps with meds        Recommended Disposition: SNF/LTC   D/c when pt doesn't require sitter for 24 hours     Subjective:     Chief Complaint / Reason for Physician Visit  confused. Discussed with RN events overnight. Review of Systems:  Symptom Y/N Comments  Symptom Y/N Comments   Fever/Chills    Chest Pain     Poor Appetite    Edema     Cough    Abdominal Pain     Sputum    Joint Pain     SOB/ADAME    Pruritis/Rash     Nausea/vomit    Tolerating PT/OT     Diarrhea    Tolerating Diet     Constipation    Other       Could NOT obtain due to:      Objective:     VITALS:   Last 24hrs VS reviewed since prior progress note.  Most recent are:  Patient Vitals for the past 24 hrs:   Temp Pulse Resp BP SpO2   18 0736 97.7 °F (36.5 °C) 62 20 177/74 99 %   18 0333 97.6 °F (36.4 °C) 64 20 152/84 98 %   18 0053 98.1 °F (36.7 °C) 74 20 162/80 98 %   18 2350 98.3 °F (36.8 °C) 73 20 184/74 -   18 2347 - - - 181/69 -   18 2311 98.4 °F (36.9 °C) 77 20 179/86 97 %   18 1930 98.1 °F (36.7 °C) 76 20 119/63 95 %   05/28/18 1427 97.9 °F (36.6 °C) 65 17 137/90 98 %   05/28/18 1400 - 78 - - -   05/28/18 1236 98.5 °F (36.9 °C) (!) 150 18 (!) 141/104 97 %   05/28/18 1117 98.1 °F (36.7 °C) 66 18 124/71 97 %       Intake/Output Summary (Last 24 hours) at 05/29/18 1020  Last data filed at 05/28/18 1400   Gross per 24 hour   Intake              240 ml   Output                0 ml   Net              240 ml        PHYSICAL EXAM:  General: Ill appearing. Alert, uncooperative at times, no acute distress    EENT:  EOMI. Anicteric sclerae. Dry mucous membrane  Resp:  CTA bilaterally, no wheezing or rales. No accessory muscle use  CV:  irregular  rhythm,  No edema  GI:  Soft, Non distended, Non tender.  +Bowel sounds  Neurologic:  Alert and oriented X 1, normal speech, confused  Psych:   poor insight. Not anxious nor agitated  Skin:  No rashes. No jaundice    Reviewed most current lab test results and cultures  YES  Reviewed most current radiology test results   YES  Review and summation of old records today    NO  Reviewed patient's current orders and MAR    YES  PMH/ reviewed - no change compared to H&P  ________________________________________________________________________  Care Plan discussed with:    Comments   Patient y    Family      RN y    Care Manager y    Consultant                       y Multidiciplinary team rounds were held today with , nursing, pharmacist and clinical coordinator. Patient's plan of care was discussed; medications were reviewed and discharge planning was addressed.      ________________________________________________________________________  Total NON critical care TIME:  30  Minutes    Total CRITICAL CARE TIME Spent:   Minutes non procedure based      Comments   >50% of visit spent in counseling and coordination of care     ________________________________________________________________________  Grace Perez NP     Procedures: see electronic medical records for all procedures/Xrays and details which were not copied into this note but were reviewed prior to creation of Plan. LABS:  I reviewed today's most current labs and imaging studies.   Pertinent labs include:  Recent Labs      05/28/18   0301   WBC  9.4   HGB  12.7   HCT  37.3   PLT  211     Recent Labs      05/29/18   0330  05/28/18   0301  05/27/18   0349   NA  139  141  140   K  3.6  3.8  3.7   CL  105  108  108   CO2  26  22  25   GLU  79  73  80   BUN  15  18  22*   CREA  0.71  0.74  0.78   CA  9.1  9.1  8.7       Signed: )Radha Wilson, NP

## 2018-05-29 NOTE — PROGRESS NOTES
Nutrition Assessment:    INTERVENTIONS/RECOMMENDATIONS:   Continue current diet  Continue supplements  Consider adding MVI d/t poor PO intake  Encourage PO intake    ASSESSMENT:   Chart reviewed. Attempted to visit with pt however she was confused and unable to provide information. Flowsheet documentation shows poor appetite. Diet Order: Regular  % Eaten:  Patient Vitals for the past 72 hrs:   % Diet Eaten   05/28/18 1805 30 %   05/28/18 1400 50 %   05/28/18 0800 90 %     Pertinent Medications: [x]Reviewed: senna  Pertinent Labs: [x]Reviewed:   Food Allergies: [x]NKFA  []Other   Last BM:  5/27  Edema:      []RUE   []LUE   []RLE   []LLE      Pressure Ulcer:      [] Stage I   [] Stage II   [] Stage III   [] Stage IV      Anthropometrics: Height: 5' 1\" (154.9 cm) Weight: 53 kg (116 lb 13.5 oz)    IBW (%IBW):   ( ) UBW (%UBW):   (  %)    BMI: Body mass index is 22.08 kg/(m^2). This BMI is indicative of:  []Underweight   [x]Normal   []Overweight   [] Obesity   [] Extreme Obesity (BMI>40)  Last Weight Metrics:  Weight Loss Metrics 5/24/2018 5/22/2018 3/9/2018 2/20/2018 12/5/2017 11/21/2017 8/28/2017   Today's Wt 116 lb 13.5 oz 115 lb 3.2 oz 120 lb 120 lb 115 lb - 115 lb   BMI 22.08 kg/m2 21.77 kg/m2 22.67 kg/m2 22.67 kg/m2 21.73 kg/m2 - 21.73 kg/m2       Estimated Nutrition Needs (Based on): 1175 Kcals/day (BMR: 900 x 1.3) , 55 g (1 g/kg) Protein  Carbohydrate:  At Least 130 g/day  Fluids: 1175 mL/day or per primary team    NUTRITION DIAGNOSES:   Problem:  Inadequate protein-energy intake      Etiology: related to Alzheimer's dementia      Signs/Symptoms: as evidenced by documentation on minimal PO intake PTA with 6 lbs (5%) wt loss x 2-3 weeks    Previous Nutrition Dx:  [] Resolved  [] Unresolved           [x] Progressing    NUTRITION INTERVENTIONS:  Meals/Snacks: General/healthful diet   Supplements: Commercial supplement              GOAL:   consume >50% of meals and ONS in 2-4 days    NUTRITION MONITORING AND EVALUATION      Food/Nutrient Intake Outcomes:  Total energy intake  Physical Signs/Symptoms Outcomes: Weight/weight change, Electrolyte and renal profile, GI    Previous Goal Met:   [] Met              [x] Progressing Towards Goal              [] Not Progressing Towards Goal   Previous Recommendations:   [x] Implemented          [] Not Implemented          [] Not Applicable    LEARNING NEEDS (Diet, Food/Nutrient-Drug Interaction):    [x] None Identified   [] Identified and Education Provided/Documented   [] Identified and Pt declined/was not appropriate     Cultural, Mosque, OR Ethnic Dietary Needs:    [x] None Identified   [] Identified and Addressed     [x] Interdisciplinary Care Plan Reviewed/Documented    [x] Discharge Planning: Small frequent meals with kcal and protein dense foods   [] Participated in Interdisciplinary Rounds    NUTRITION RISK:    [x] High              [] Moderate           []  Low  []  Minimal/Uncompromised      Leobardo Lloyd RDN  Pager [phone]  Weekend Pager [phone]

## 2018-05-29 NOTE — PROGRESS NOTES
Cardiology Progress Note  2018     Admit Date: 2018  Admit Diagnosis: Atrial flutter ([facility]      Assessment: 1. Paroxysmal atrial flutter with variable block, now back in sinus. 2. Dementia. 3. Chronic anticoagulation with Xarelto. 4. DNR.     Plan:      1. Continue rate control and anticoagulation. She's been placed on beta-blocker only but at high dose. Sinus rates OK. 2. As far as antiarrhythmic therapy, will hold off at this time.        For other plans, see orders.   Hospital problem list   Active Hospital Problems    Diagnosis Date Noted    Atrial flutter ([facility] 2018        Subjective: Sky Barros reports   Chest pain X none  consistent with:  Non-cardiac CP         Atypical CP     None now  On going  Anginal CP     Dyspnea X none  at rest  with exertion         improved  unchanged  worse              PND X none  overnight       Orthopnea X none  improved  unchanged  worse   Presyncope X none  improved  unchanged  worse     Ambulated in hallway without symptoms   Yes   Ambulated in room without symptoms  Yes   Objective:    Physical Exam:  Overall VSSAF;    Visit Vitals    /65 (BP 1 Location: Right arm, BP Patient Position: At rest;Supine)    Pulse 74    Temp 98.3 °F (36.8 °C)    Resp 20    Ht 5' 1\" (1.549 m)    Wt 53 kg (116 lb 13.5 oz)    SpO2 96%    Breastfeeding No    BMI 22.08 kg/m2     Temp (24hrs), Av.1 °F (36.7 °C), Min:97.6 °F (36.4 °C), Max:98.4 °F (36.9 °C)    Patient Vitals for the past 8 hrs:   Pulse   18 1448 74   18 1100 60    Patient Vitals for the past 8 hrs:   Resp   18 1448 20   18 1100 20    Patient Vitals for the past 8 hrs:   BP   18 1448 117/65   18 1100 108/64          Intake/Output Summary (Last 24 hours) at 18 1731  Last data filed at 18 1601   Gross per 24 hour   Intake              240 ml   Output                0 ml   Net              240 ml     General Appearance: Well developed, elderly, no acute distress. Ears/Nose/Mouth/Throat:   Normal MM; anicteric. JVP: WNL   Resp:   clear to auscultation bilaterally. Nl resp effort. Cardiovascular:  RRR, S1, S2 normal, no new murmur. No gallop or rub. Abdomen:   Soft, non-tender, bowel sounds are present. Extremities: No edema bilaterally. Skin:  Neuro: Warm and dry. Alert and oriented x1, otherwise grossly nonfocal     cath site intact w/o hematoma or new bruit; distal pulse unchanged  Yes   Data Review:     Telemetry independently reviewed x sinus  chronic afib x parox aflutter  NSVT     ECG independently reviewed  NSR  afib  no significant changes  NSST-Tw chgs   x no new ECG provided for review   Lab results reviewed as noted below. Current medications reviewed as noted below. No results for input(s): PH, PCO2, PO2 in the last 72 hours. No results for input(s): CPK, CKMB, CKNDX, TROIQ in the last 72 hours. Recent Labs      05/29/18   0330  05/28/18   0301  05/27/18   0349   NA  139  141  140   K  3.6  3.8  3.7   CL  105  108  108   CO2  26  22  25   BUN  15  18  22*   CREA  0.71  0.74  0.78   GLU  79  73  80   CA  9.1  9.1  8.7   WBC   --   9.4   --    HGB   --   12.7   --    HCT   --   37.3   --    PLT   --   211   --      Lab Results   Component Value Date/Time    Cholesterol, total 153 02/20/2018 02:08 PM    HDL Cholesterol 72 02/20/2018 02:08 PM    LDL, calculated 67 02/20/2018 02:08 PM    Triglyceride 70 02/20/2018 02:08 PM     No results for input(s): SGOT, GPT, AP, TBIL, TP, ALB, GLOB, GGT, AML, LPSE in the last 72 hours. No lab exists for component: AMYP, HLPSE  No results for input(s): INR, PTP, APTT in the last 72 hours.     No lab exists for component: INREXT, INREXT   No components found for: [place]    Current Facility-Administered Medications   Medication Dose Route Frequency    [START ON 5/30/2018] mirabegron ER (MYRBETRIQ) tablet 25 mg  25 mg Oral DAILY    metoprolol succinate (TOPROL-XL) XL tablet 150 mg  150 mg Oral DAILY    rivaroxaban (XARELTO) tablet 15 mg  15 mg Oral DAILY    senna (SENOKOT) tablet 8.6 mg  1 Tab Oral DAILY    metoprolol (LOPRESSOR) injection 2.5 mg  2.5 mg IntraVENous Q6H PRN    sodium chloride (NS) flush 5-10 mL  5-10 mL IntraVENous Q8H    sodium chloride (NS) flush 5-10 mL  5-10 mL IntraVENous PRN    acetaminophen (TYLENOL) tablet 650 mg  650 mg Oral Q4H PRN    ondansetron (ZOFRAN) injection 4 mg  4 mg IntraVENous Q4H PRN    docusate sodium (COLACE) capsule 100 mg  100 mg Oral BID PRN        Tyree Leal MD

## 2018-05-29 NOTE — PROGRESS NOTES
1850  Pt HR tachy up into the 155's with SBP 100s and converted into Afib. Dr. Arnulfo Kim notified. Awaiting new orders. Will continue to monitor. 1905  Pt converted back into NSR, HR 60s-70s. Will continue to monitor      Bedside shift change report given to Georgie Nesbitt RN (oncoming nurse). Report included the following information SBAR. SHIFT SUMMARY:            [address] NURSING NOTE   Admission Date 5/24/2018   Admission Diagnosis Atrial flutter ([address]   Consults IP CONSULT TO CARDIOLOGY      Cardiac Monitoring [x] Yes [] No      Purposeful Hourly Rounding [x] Yes    Rose Mary Score Total Score: 4   Rose Mary score 3 or > [x] Bed Alarm [] Avasys [] 1:1 sitter [] Patient refused (Signed refusal form in chart)   Seng Score Seng Score: 16   Seng score 14 or < [] PMT consult [] Wound Care consult    []  Specialty bed  [] Nutrition consult      Influenza Vaccine Received Flu Vaccine for Current Season (usually Sept-March): Not Flu Season    Patient/Guardian Refused (Notify MD): No      Oxygen needs? [x] Room air Oxygen @  []1L    []2L    []3L   []4L    []5L   []6L via  NC   Chronic home O2 use? [] Yes [] No  Perform O2 challenge test and document in progress note using smartphrase (.Homeoxygen)      Last bowel movement Last Bowel Movement Date: 05/27/18      Urinary Catheter             LDAs               Peripheral IV 05/25/18 Right Forearm (Active)   Site Assessment Clean, dry, & intact 5/29/2018  7:46 PM   Phlebitis Assessment 0 5/29/2018  7:46 PM   Infiltration Assessment 0 5/29/2018  7:46 PM   Dressing Status Clean, dry, & intact 5/29/2018  7:46 PM   Dressing Type Transparent 5/29/2018  7:46 PM   Hub Color/Line Status Blue;Capped 5/29/2018  7:46 PM                         Readmission Risk Assessment Tool Score Medium Risk            16       Total Score        3 Has Seen PCP in Last 6 Months (Yes=3, No=0)    3 Patient Length of Stay (>5 days = 3)    5 Pt.  Coverage (Medicare=5 , Medicaid, or Self-Pay=4)    5 Charlson Comorbidity Score (Age + Comorbid Conditions)        Criteria that do not apply:    . Living with Significant Other. Assisted Living. LTAC. SNF. or   Rehab    IP Visits Last 12 Months (1-3=4, 4=9, >4=11)       Expected Length of Stay 2d 12h   Actual Length of Stay 5          .

## 2018-05-29 NOTE — PROGRESS NOTES
Problem: Falls - Risk of  Goal: *Absence of Falls  Document Rose Mary Fall Risk and appropriate interventions in the flowsheet.    Outcome: Progressing Towards Goal  Fall Risk Interventions:  Mobility Interventions: Bed/chair exit alarm, Communicate number of staff needed for ambulation/transfer, Mechanical lift, OT consult for ADLs, Patient to call before getting OOB, PT Consult for mobility concerns    Mentation Interventions: Adequate sleep, hydration, pain control, Bed/chair exit alarm, Door open when patient unattended, Evaluate medications/consider consulting pharmacy, Eyeglasses and hearing aids, Increase mobility, More frequent rounding, Reorient patient, Room close to nurse's station, Self-releasing belt, Toileting rounds, Update white board    Medication Interventions: Bed/chair exit alarm, Evaluate medications/consider consulting pharmacy, Patient to call before getting OOB, Teach patient to arise slowly    Elimination Interventions: Bed/chair exit alarm, Call light in reach, Elevated toilet seat, Patient to call for help with toileting needs, Toilet paper/wipes in reach, Toileting schedule/hourly rounds

## 2018-05-30 VITALS
OXYGEN SATURATION: 100 % | RESPIRATION RATE: 20 BRPM | HEIGHT: 61 IN | HEART RATE: 62 BPM | DIASTOLIC BLOOD PRESSURE: 60 MMHG | SYSTOLIC BLOOD PRESSURE: 125 MMHG | TEMPERATURE: 97.8 F | BODY MASS INDEX: 22.06 KG/M2 | WEIGHT: 116.84 LBS

## 2018-05-30 PROBLEM — E46 MALNUTRITION (HCC): Status: ACTIVE | Noted: 2018-05-30

## 2018-05-30 PROBLEM — I10 HYPERTENSION: Status: ACTIVE | Noted: 2018-05-30

## 2018-05-30 LAB
ANION GAP SERPL CALC-SCNC: 8 MMOL/L (ref 5–15)
BASOPHILS # BLD: 0 K/UL (ref 0–0.1)
BASOPHILS NFR BLD: 1 % (ref 0–1)
BUN SERPL-MCNC: 21 MG/DL (ref 6–20)
BUN/CREAT SERPL: 32 (ref 12–20)
CALCIUM SERPL-MCNC: 9 MG/DL (ref 8.5–10.1)
CHLORIDE SERPL-SCNC: 106 MMOL/L (ref 97–108)
CO2 SERPL-SCNC: 26 MMOL/L (ref 21–32)
CREAT SERPL-MCNC: 0.66 MG/DL (ref 0.55–1.02)
DIFFERENTIAL METHOD BLD: ABNORMAL
EOSINOPHIL # BLD: 0.3 K/UL (ref 0–0.4)
EOSINOPHIL NFR BLD: 3 % (ref 0–7)
ERYTHROCYTE [DISTWIDTH] IN BLOOD BY AUTOMATED COUNT: 16.4 % (ref 11.5–14.5)
GLUCOSE SERPL-MCNC: 84 MG/DL (ref 65–100)
HCT VFR BLD AUTO: 36.5 % (ref 35–47)
HGB BLD-MCNC: 12.5 G/DL (ref 11.5–16)
IMM GRANULOCYTES # BLD: 0 K/UL (ref 0–0.04)
IMM GRANULOCYTES NFR BLD AUTO: 0 % (ref 0–0.5)
LYMPHOCYTES # BLD: 3.2 K/UL (ref 0.8–3.5)
LYMPHOCYTES NFR BLD: 39 % (ref 12–49)
MCH RBC QN AUTO: 30.6 PG (ref 26–34)
MCHC RBC AUTO-ENTMCNC: 34.2 G/DL (ref 30–36.5)
MCV RBC AUTO: 89.5 FL (ref 80–99)
MONOCYTES # BLD: 1 K/UL (ref 0–1)
MONOCYTES NFR BLD: 13 % (ref 5–13)
NEUTS SEG # BLD: 3.6 K/UL (ref 1.8–8)
NEUTS SEG NFR BLD: 44 % (ref 32–75)
NRBC # BLD: 0 K/UL (ref 0–0.01)
NRBC BLD-RTO: 0 PER 100 WBC
PLATELET # BLD AUTO: 202 K/UL (ref 150–400)
PMV BLD AUTO: 10.3 FL (ref 8.9–12.9)
POTASSIUM SERPL-SCNC: 3.9 MMOL/L (ref 3.5–5.1)
RBC # BLD AUTO: 4.08 M/UL (ref 3.8–5.2)
SODIUM SERPL-SCNC: 140 MMOL/L (ref 136–145)
WBC # BLD AUTO: 8.1 K/UL (ref 3.6–11)

## 2018-05-30 PROCEDURE — 74011250637 HC RX REV CODE- 250/637: Performed by: NURSE PRACTITIONER

## 2018-05-30 PROCEDURE — 80048 BASIC METABOLIC PNL TOTAL CA: CPT | Performed by: INTERNAL MEDICINE

## 2018-05-30 PROCEDURE — 85025 COMPLETE CBC W/AUTO DIFF WBC: CPT | Performed by: INTERNAL MEDICINE

## 2018-05-30 PROCEDURE — 36415 COLL VENOUS BLD VENIPUNCTURE: CPT | Performed by: INTERNAL MEDICINE

## 2018-05-30 PROCEDURE — 74011250637 HC RX REV CODE- 250/637: Performed by: INTERNAL MEDICINE

## 2018-05-30 RX ORDER — METOPROLOL SUCCINATE 50 MG/1
150 TABLET, EXTENDED RELEASE ORAL DAILY
Qty: 90 TAB | Refills: 0 | Status: SHIPPED | OUTPATIENT
Start: 2018-05-30 | End: 2018-08-08

## 2018-05-30 RX ORDER — METOPROLOL TARTRATE 25 MG/1
12.5 TABLET, FILM COATED ORAL EVERY 12 HOURS
Status: DISCONTINUED | OUTPATIENT
Start: 2018-05-30 | End: 2018-05-30

## 2018-05-30 RX ADMIN — DOCUSATE SODIUM 100 MG: 100 CAPSULE, LIQUID FILLED ORAL at 10:16

## 2018-05-30 RX ADMIN — METOPROLOL SUCCINATE 150 MG: 50 TABLET, EXTENDED RELEASE ORAL at 09:40

## 2018-05-30 RX ADMIN — SENNOSIDES 8.6 MG: 8.6 TABLET, FILM COATED ORAL at 09:40

## 2018-05-30 RX ADMIN — RIVAROXABAN 15 MG: 15 TABLET, FILM COATED ORAL at 09:40

## 2018-05-30 RX ADMIN — Medication 10 ML: at 05:04

## 2018-05-30 RX ADMIN — MIRABEGRON 25 MG: 25 TABLET, FILM COATED, EXTENDED RELEASE ORAL at 09:46

## 2018-05-30 NOTE — DISCHARGE INSTRUCTIONS
Patient Discharge Instructions     Pt Name  Ami Barnes   Date of Birth 8/19/1929   Age  80 y.o. Medical Record Number  [id number]   PCP Rafael Penaloza MD    Admit date:  5/24/2018 @    [place] [address]    Room Number  2214/01   Date of Discharge 5/30/2018     Admission Diagnoses:     Atrial flutter ([address]        No Known Allergies     You were admitted to [address] for  Atrial flutter ([address]    YOUR OTHER MEDICAL DIAGNOSES INCLUDE (BUT NOT LIMITED TO ):  Present on Admission:   Atrial flutter ([address]   Atrial fibrillation ([address]   Late onset Alzheimer's disease without behavioral disturbance   Malnutrition ([address]   Hypertension      DIET:  Regular Diet   Nutritional support: Ensure TID  Recommended activity: Activity as tolerated  Follow up : Follow-up Information     Follow up With Details Comments Contact Info    [id number] [facility] On 5/30/2018 This is your post-acute skilled nursing rehabilitation facility provider. [address]      Rafael Penaloza MD Go in 25 days Please schedule hospital follow-up after patient completes rehablitatoin. [address]  [facility] TRESA Bell MD Go on 6/15/2018 9:30AM; Cardiology follow-up [address]  [address] [place] [address]  [phone]             [facility] nursing facility MD responsible for above upon discharge. · It is important that you take the medication exactly as they are prescribed. · Keep your medication in the bottles provided by the pharmacist and keep a list of the medication names, dosages, and times to be taken in your wallet. · Do not take other medications without consulting your doctor.        ADDITIONAL INFORMATION: If you experience any of the following symptoms or have any health problem not listed below, then please call your primary care physician or return to the emergency room if you cannot get hold of your doctor: Fever, chills, nausea, vomiting, diarrhea, change in mentation, falling, bleeding, shortness of breath. I understand that if any problems occur once I am discharged, I am supposed to call my Primary care physician for further care or seek help in the Emergency Department at the nearest Healthcare facility. I have had an opportunity to discuss my clinical issues with my doctor and nursing staff. I understand and acknowledge receipt of the above instructions.                                                                                                                                            Physician's or R.N.'s Signature                                                            Date/Time                                                                                                                                              Patient or Representative Signature                                                 Date/Time

## 2018-05-30 NOTE — PROGRESS NOTES
Pharmacist Discharge Medication Reconciliation    Significant PMH:   Past Medical History:   Diagnosis Date    A-fib [facility])     Alzheimer's dementia     Arthritis      Chief Complaint for this Admission:   Chief Complaint   Patient presents with    Irregular Heart Beat     pt here via EMS for rapid HR ,sinus tach with aflutter hx of afib     Allergies: Review of patient's allergies indicates no known allergies. Discharge Medications:   Current Discharge Medication List        START taking these medications    Details   mirabegron ER (MYRBETRIQ) 25 mg ER tablet Take 1 Tab by mouth daily. Qty: 30 Tab, Refills: 0           CONTINUE these medications which have CHANGED    Details   metoprolol succinate (TOPROL-XL) 50 mg XL tablet Take 3 Tabs by mouth daily. Qty: 90 Tab, Refills: 0           CONTINUE these medications which have NOT CHANGED    Details   ferrous sulfate (IRON) 325 mg (65 mg iron) EC tablet Take 1 Tab by mouth Daily (before breakfast). Qty: 30 Tab, Refills: 2    Associated Diagnoses: Iron deficiency anemia, unspecified iron deficiency anemia type      lutein 6 mg tab Take  by mouth. acetaminophen (TYLENOL) 500 mg tablet Take 1 Tab by mouth two (2) times a day. Qty: 60 Tab, Refills: 5    Associated Diagnoses: Acute left-sided low back pain without sciatica; Muscle spasm      rivaroxaban (XARELTO) 15 mg tab tablet Take  by mouth daily. cholecalciferol, vitamin D3, (VITAMIN D3) 2,000 unit tab Take  by mouth. senna (SENOKOT) 8.6 mg tablet Take 1 Tab by mouth daily. Take 2 tablets daily.            STOP taking these medications       oxybutynin (DITROPAN) 5 mg tablet Comments:   Reason for Stopping:         baclofen (LIORESAL) 10 mg tablet Comments:   Reason for Stopping:         losartan (COZAAR) 50 mg tablet Comments:   Reason for Stopping:         dilTIAZem ER (CARDIZEM LA) 240 mg Tb24 tablet Comments:   Reason for Stopping:               The patient's chart, MAR and AVS were reviewed by Leopoldo Harder, PHARMD.    Discharging Provider: Dr. Enrrique Patel    Thank You,     Leopoldo Harder, [facility]

## 2018-05-30 NOTE — PROGRESS NOTES
Attending Hospitalist Attestation:    I have personally seen and examined the patient, reviewed pertinent labs and imaging, and discussed the plan of care with NP Ghassan Graf. I reviewed and agree with the history, exam, assessment and plan as outlined in her note.     \"I am feeling okay\"  Alert,off sitter since yesterday  Denies complaints, No CP or SOB or N/V or diarrhea or abdominal pain or HA  Being discharged to SNF today    Patient Vitals for the past 24 hrs:   Temp Pulse Resp BP SpO2   05/30/18 1057 97.8 °F (36.6 °C) 62 20 125/60 100 %   05/30/18 0940 - 69 - 156/66 -   05/30/18 0733 97.2 °F (36.2 °C) (!) 58 18 154/65 97 %   05/30/18 0312 97.9 °F (36.6 °C) (!) 58 18 136/73 97 %   05/29/18 2254 98.2 °F (36.8 °C) 68 18 132/62 96 %   05/29/18 1948 98 °F (36.7 °C) 66 20 129/70 97 %   05/29/18 1906 - - - 107/51 -   05/29/18 1859 - - - (!) 109/93 -   05/29/18 1853 - (!) 110 20 100/50 96 %   05/29/18 1448 98.3 °F (36.8 °C) 74 20 117/65 96 %     No distress  Lungs Clear to ausculation bilaterally  Heart: RRR nl S1S2, no murmurs  Abdomen: soft, NT, ND, Positive BS, no rebound  Skin: No rashes  Neuro: Alert, oriented x 1, non focal motor exam, follows commands      Assessment/plan:  Atrial flutter with RVR POA  HR up to 150s, most likely related to dehydration, has been compliant with medication  HR mainly NSR overnight, rate controlled   Few brief runs of atrial flutter  Appreciate cardiology input  Cont metoprolol at 150 mg XL/day  Continue xarelto  Cleared by cardiology for discharge      Malnutrition, weight loss, decreased appetite POA likely progressive dementia  in setting of Alzheimer's dementia POA  drinks 1-2 boost per day otherwise barely eats or drinks  nutrition consulted  Stop sitter if possible today  D/C to SNF      Code Status: DNR    Surrogate Decision Maker: niece is mPOA      DVT Prophylaxis: xarelto    Baseline: at assisted living, family checks in frequently and helps with meds     Recommended Disposition: SNF/LTC    Additional time:   25  min rendering and coordinating care    Garo Ríos MD

## 2018-05-30 NOTE — PROGRESS NOTES
0700: Received bedside report from Emperatriz Cavanaugh, off going nurse. Assumed care of patient. 1200:   Hospital to SNF SBAR Handoff - [place]                                                                        80 y.o.   female    [address]   Room: 2214/01    [facility] CARDIOPULMONARY CARE  Unit Phone# :  [phone]      [address]  [facility] [address] [facility]  [address] [address]  Dept: [phone]  Loc: [phone]                    SITUATION     Admitted:  5/24/2018         Attending Provider:  Pollo Pastrana MD       Consultations:  IP CONSULT TO CARDIOLOGY    PCP:  Irena Matt MD   [phone]    Treatment Team: Attending Provider: Pollo Pastrana MD; Consulting Provider: Raffi Conley MD; Hospitalist: Chad Billy MD; Consulting Provider: Kenzie Awad MD; Consulting Provider: Maynor Roblero NP; Utilization Review: Leonor Ochoa RN; Care Manager: Meng Myles    Admitting Dx:  Atrial flutter [facility])       Principal Problem: Atrial flutter ([facility] [address]    * No surgery found * of      BY: * Surgery not found *             ON: * No surgery found *                  Code Status: DNR                Advance Directives:   Advance Care Planning 5/25/2018   Patient's Healthcare Decision Maker is: Legal Next of [place] [address]   Primary Decision Maker Name Elvia Schmidt   Primary Decision Maker Phone Number [phone]   Primary Decision Maker Relationship to Patient Other relative   Confirm Advance Directive None   Patient Would Like to Complete Advance Directive No   Does the patient have other document types Do Not Resuscitate; Power of     (Send w/patient)   Yes Not W Pt       Isolation:  There are currently no Active Isolations       MDRO: No current active infections    Pain Medications given:  n/a    Last dose: n/a at      Special Equipment needed: no  Type of equipment:      (Not currently on dialysis)  (Not currently on dialysis)  (Not currently on dialysis)     BACKGROUND     Allergies:  No Known Allergies    Past Medical History:   Diagnosis Date    A-fib ([address]     Alzheimer's dementia     Arthritis        Past Surgical History:   Procedure Laterality Date    HX BREAST AUGMENTATION      HX ORTHOPAEDIC      TKR--bilateral        Prescriptions Prior to Admission   Medication Sig    ferrous sulfate (IRON) 325 mg (65 mg iron) EC tablet Take 1 Tab by mouth Daily (before breakfast).  oxybutynin (DITROPAN) 5 mg tablet Take 1 Tab by mouth two (2) times a day.  lutein 6 mg tab Take  by mouth.  baclofen (LIORESAL) 10 mg tablet Take 1 Tab by mouth two (2) times daily as needed.  acetaminophen (TYLENOL) 500 mg tablet Take 1 Tab by mouth two (2) times a day.  losartan (COZAAR) 50 mg tablet Take 25 mg by mouth daily.  metoprolol succinate (TOPROL-XL) 50 mg XL tablet Take  by mouth daily.  rivaroxaban (XARELTO) 15 mg tab tablet Take  by mouth daily.  cholecalciferol, vitamin D3, (VITAMIN D3) 2,000 unit tab Take  by mouth.  senna (SENOKOT) 8.6 mg tablet Take 1 Tab by mouth daily. Take 2 tablets daily. Hard scripts included in transfer packet no    Vaccinations:    Immunization History   Administered Date(s) Administered    Pneumococcal Conjugate (PCV-13) 12/05/2017       Readmission Risks:    Known Risks: dementia, age        The Charlson CoMorbitiy Index tool is an evidenced based tool that has more automatic generated information. The tool looks at many different items such as the age of the patient, how many times they were admitted in the last calendar year, current length of stay in the hospital and their diagnosis. All of these items are pulled automatically from information documented in the chart from various places and will generate a score that predicts whether a patient is at low (less than 13), medium (13-20) or high (21 or greater) risk of being readmitted.         ASSESSMENT                Temp: 97.8 °F (36.6 °C) (05/30/18 1057) Pulse (Heart Rate): 62 (05/30/18 1057)     Resp Rate: 20 (05/30/18 1057)           BP: 125/60 (05/30/18 1057)     O2 Sat (%): 100 % (05/30/18 1057)     Weight: 53 kg (116 lb 13.5 oz)    Height: 5' 1\" (154.9 cm) (05/24/18 2040)       If above not within 1 hour of discharge:    BP:_125/60____  P:__62__  R:__20__ T:__97.8___ O2 Sat: _100__%  O2: ___RA___    Active Orders   Diet    DIET REGULAR         Orientation: disoriented and only aware of  person     Active Behaviors: None                                   Active Lines/Drains:  (Peg Tube / [place] / CL or S/L?): no    Urinary Status: Voiding     Last BM: Last Bowel Movement Date: 05/27/18     Skin Integrity: Wound (add Wound LDA)             Mobility: Slightly limited   Weight Bearing Status: WBAT (Weight Bearing as Tolerated)      Gait Training  Assistive Device: Gait belt, Walker, rolling  Ambulation - Level of Assistance: Contact guard assistance, Minimal assistance  Distance (ft): 20 Feet (ft)         Lab Results   Component Value Date/Time    Glucose 84 05/30/2018 02:55 AM    INR 1.3 (H) 05/24/2018 05:43 PM    HGB 12.5 05/30/2018 02:55 AM    HGB 12.7 05/28/2018 03:01 AM        RECOMMENDATION     See After Visit Summary (AVS) for:  · Discharge instructions  · After [address]   · Special equipment needed (entered pre-discharge by Care Management)  · Medication Reconciliation    · Follow up Appointment(s)         Report given/sent by:  Dorian Bell                    Verbal report given to:  Vanessa Frederick  FAXED to:  [phone]         Estimated discharge time:  5/30/2018 at 1300

## 2018-05-30 NOTE — DISCHARGE SUMMARY
Hospitalist Discharge Summary     Patient ID:  Frandy Guthrie  [id number]  80 y.o.  8/19/1929    PCP on record: Asa Rai MD    Admit date: 5/24/2018  Discharge date and time: 5/30/2018      DISCHARGE DIAGNOSIS:    Atrial flutter with RVR POA  Hypertension  Malnutrition, weight loss, decreased appetite POA  Alzheimer's dementia POA    CONSULTATIONS:  IP CONSULT TO CARDIOLOGY    Excerpted HPI from H&P of Sam Tavares MD:  Frandy Guthrie is a 80 y.o.  female with history of dementia, atrial fibrillation who presents from assisted living with high HR. Patient was being evaluated for a wound by a nurse when they noted her HR in 150s. Patient denies any chest pain, no palpitations. Of note her family says that she has not been eating much and has lost 6 pounds in the last few weeks. She has been compliant with her medications. She has been seeing her PCP regularly for her decrease in appetite she has tried appetite stimulant mirtazapine without bad side effects. Patient is asking to go home.     ______________________________________________________________________  DISCHARGE SUMMARY/HOSPITAL COURSE:  for full details see H&P, daily progress notes, labs, consult notes. Atrial flutter with RVR POA  Hypertension  - now rate controlled (HR went up to 150s, most likely related to dehydration, has been compliant with medication, no signs of ischemia)  - received  IV fluids  - continue with Toprol XL and xarelto  - appreciate cardiology input: conservative management, no EP study and ablation at this time      Malnutrition, weight loss, decreased appetite POA  Alzheimer's dementia POA  - continue with nutritional supplement; drinks 1-2 boost per day otherwise barely eats or drinks. Overactive bladder  - d/c'd oxybutynin and started on Mybertriq.       _______________________________________________________________________  Patient seen and examined by me on discharge day.   Pertinent Findings:  Gen:    Not in distress  Chest: Clear in apex with decreased breath sounds at bases  CVS:   Regular rhythm. No edema  Abd:  Soft, not distended, not tender  Neuro:  Alert, orient to self only. Confused. _______________________________________________________________________  DISCHARGE MEDICATIONS:   Current Discharge Medication List      START taking these medications    Details   mirabegron ER (MYRBETRIQ) 25 mg ER tablet Take 1 Tab by mouth daily. Qty: 30 Tab, Refills: 0         CONTINUE these medications which have CHANGED    Details   metoprolol succinate (TOPROL-XL) 50 mg XL tablet Take 3 Tabs by mouth daily. Qty: 90 Tab, Refills: 0         CONTINUE these medications which have NOT CHANGED    Details   ferrous sulfate (IRON) 325 mg (65 mg iron) EC tablet Take 1 Tab by mouth Daily (before breakfast). Qty: 30 Tab, Refills: 2    Associated Diagnoses: Iron deficiency anemia, unspecified iron deficiency anemia type      lutein 6 mg tab Take  by mouth. acetaminophen (TYLENOL) 500 mg tablet Take 1 Tab by mouth two (2) times a day. Qty: 60 Tab, Refills: 5    Associated Diagnoses: Acute left-sided low back pain without sciatica; Muscle spasm      rivaroxaban (XARELTO) 15 mg tab tablet Take  by mouth daily. cholecalciferol, vitamin D3, (VITAMIN D3) 2,000 unit tab Take  by mouth. senna (SENOKOT) 8.6 mg tablet Take 1 Tab by mouth daily. Take 2 tablets daily.          STOP taking these medications       oxybutynin (DITROPAN) 5 mg tablet Comments:   Reason for Stopping:         baclofen (LIORESAL) 10 mg tablet Comments:   Reason for Stopping:         losartan (COZAAR) 50 mg tablet Comments:   Reason for Stopping:         dilTIAZem ER (CARDIZEM LA) 240 mg Tb24 tablet Comments:   Reason for Stopping:               My Recommended Diet, Activity, Wound Care, and follow-up labs are listed in the patient's Discharge Insturctions which I have personally completed and reviewed. _______________________________________________________________________  DISPOSITION:    Home with Family:    Home with HH/PT/OT/RN:    SNF/LTC: y   MARCOS:    OTHER:        Condition at Discharge:  Stable  _______________________________________________________________________  Follow up with:   PCP : Leyda Gray MD  Follow-up Information     Follow up With Details Comments Contact Info    [address] [facility] On 5/30/2018 This is your post-acute skilled nursing rehabilitation facility provider. [address]      Leyda Gray MD Go in 25 days Please schedule hospital follow-up after patient completes rehablitatoin.   [address]  [address] Nighat Negron MD Go on 6/15/2018 9:30AM; Cardiology follow-up [address]  [address]  [address] [phone]                Total time in minutes spent coordinating this discharge (includes going over instructions, follow-up, prescriptions, and preparing report for sign off to her PCP) :  30 minutes    Signed:  Radha Anthony NP

## 2018-05-30 NOTE — PROGRESS NOTES
Pt to discharge to [facility] today - as has been sitter free since yesterday. Pt already placed on [facility] Will Call. CM informed [facility], who are able to transport pt at 1:00PM today. CM updated mPOA who is coming back to town today from visiting family. 9:07AM UPDATE   Pt to discharge to [facility] today at 1:00PM via [facility] BLS transport. PCS paperwork placed on bedside chart. Cardiology f/u appointment scheduled. Pt's family to schedule PCP f/u appointment after completion of rehabilitation. All information entered into pt AVS.     Pt has no additional CM needs at this time. Floor nurse notified. Call Report [phone]. Please ensure any hard prescriptions for narcotics discharge with pt to SNF in green envelope. Care Management Interventions  PCP Verified by CM:  Yes  Palliative Care Criteria Met (RRAT>21 & CHF Dx)?: No  Mode of Transport at Discharge: BLS ([facility])  Hospital Transport Time of Discharge: [address] (CM Consult): SNF ([facility])  Partner SNF: Yes  Discharge Durable Medical Equipment: No  Health Maintenance Reviewed: Yes  Physical Therapy Consult: Yes  Occupational Therapy Consult: Yes  Speech Therapy Consult: No  Current Support Network: Assisted Living, Family Lives Nearby ([address] [facility])  Confirm Follow Up Transport: Family  Plan discussed with Pt/Family/Caregiver: Yes  Freedom of Choice Offered: Yes  Discharge Location  Discharge Placement: Skilled nursing facility [facility])    BRENDAN Ackerman Supervisee in Social Work, [address] [place] [address]  [phone]

## 2018-06-06 ENCOUNTER — PATIENT OUTREACH (OUTPATIENT)
Dept: CASE MANAGEMENT | Age: 83
End: 2018-06-06

## 2018-06-06 NOTE — PROGRESS NOTES
Community Care Team Documentation for Patient in [facility]  Initial Follow Up       Patient was admitted to [facility] from 5/24/18 to 5/30/18. Patient was discharged to [facility], [facility], on 5/30/18 (date). Community Care Team followed up to [address] [place] [address] during this transition. Hospital Discharge diagnosis:  Atrial flutter with RVR. RRAT score: 16    Advance Medical Directive on file in EMR? no    Total Hospitalizations/ED visits last 6 months? IP - 1; ED - 0    PCP : Rafael Penaloza MD  Nurse Navigator in PCP office: Chayito Delarosa  Note routed to Nurse Navigator team.    SNF Attending:  Rosie Bravo MD  [facility] and team at [facility], Ensured patient arrived to SNF with admission packet in order. PT/OT providing skilled therapy in SNF. Currently supervision for bed mobility and transfers, ambulating 200 ft with rollator at [facility], min assist for all ADLs, min assist for toileting with rollator.  assisted with Medicaid application. Patient will need placement. Unable to return to [facility] due to funds. Plan for discharge 6/12. Disposition TBD. Community Care Team will follow up weekly with Justin Avila. Medications were not reconciled and general patient assessment was not completed during this skilled nursing facility outreach.      Indira Marvin, MSN, RN, ACNS-BC, [facility]  Nurse Navigator, [employer] [phone]

## 2018-06-13 ENCOUNTER — PATIENT OUTREACH (OUTPATIENT)
Dept: INTERNAL MEDICINE CLINIC | Age: 83
End: 2018-06-13

## 2018-06-13 ENCOUNTER — PATIENT OUTREACH (OUTPATIENT)
Dept: CASE MANAGEMENT | Age: 83
End: 2018-06-13

## 2018-06-13 NOTE — PROGRESS NOTES
Received notification of patient's discharge from [facility] from  BRIDGETT Longoria RN (CCT) via staff messaging. Outgoing call placed to patient's niece Suha MIXON. Patient identified utilizing 2 identifiers introduced self and reason for the call given. Mrs Scooter Bhandari declined to schedule a follow up appointment with Dr. Sekou Vallejo stating that the patient has an follow up appointment with Dr. Everton Jean on Friday 6/15. Mrs Scooter Bhandari reports she will schedule an appointment if Dr. Everton Jean advises her to do so. Encounter ended.

## 2018-06-13 NOTE — Clinical Note
Patient DC from SNF. No PCP appt made. We were told she needs placement so did not make appt. Today they said she went home with family. Wondering if she needs home based primary care. Please follow up with patient/family.   Thanks!!

## 2018-06-13 NOTE — PROGRESS NOTES
Community Care Team Documentation for Patient in [facility]  Discharge Note    Per SNF staff, patient discharged from [address] ([facility]). See previous [facility] Team notes. PCP : Britt Guerrero MD    Nurse Navigator in PCP office: Kathleen Cr  Note routed to Nurse Navigator team.    Per Carlos Andrews and team at [facility], Confirmed patient was discharged 6/12 to home with family. Family declined home health. CCT will notify PCP nurse navigator of need for follow up appointment. .    SNF LOS:  13 days    Community Care Team will sign off at this time. Medications were not reconciled and general patient assessment was not completed during this skilled nursing facility outreach.      Yodit Flores, MSN, RN, ACNS-BC, [facility]  Nurse Navigator, [employer] [phone]

## 2018-07-28 ENCOUNTER — APPOINTMENT (OUTPATIENT)
Dept: GENERAL RADIOLOGY | Age: 83
DRG: 309 | End: 2018-07-28
Attending: EMERGENCY MEDICINE
Payer: MEDICARE

## 2018-07-28 ENCOUNTER — APPOINTMENT (OUTPATIENT)
Dept: CT IMAGING | Age: 83
DRG: 309 | End: 2018-07-28
Attending: EMERGENCY MEDICINE
Payer: MEDICARE

## 2018-07-28 ENCOUNTER — HOSPITAL ENCOUNTER (INPATIENT)
Age: 83
LOS: 11 days | Discharge: NURSING, REHAB OR LONG-TERM CARE | DRG: 309 | End: 2018-08-08
Attending: EMERGENCY MEDICINE | Admitting: INTERNAL MEDICINE
Payer: MEDICARE

## 2018-07-28 DIAGNOSIS — R77.8 ELEVATED TROPONIN: ICD-10-CM

## 2018-07-28 DIAGNOSIS — I48.92 ATRIAL FLUTTER WITH RAPID VENTRICULAR RESPONSE (HCC): Primary | ICD-10-CM

## 2018-07-28 DIAGNOSIS — N17.9 AKI (ACUTE KIDNEY INJURY) (HCC): ICD-10-CM

## 2018-07-28 LAB
ALBUMIN SERPL-MCNC: 3.9 G/DL (ref 3.5–5)
ALBUMIN/GLOB SERPL: 1.4 {RATIO} (ref 1.1–2.2)
ALP SERPL-CCNC: 58 U/L (ref 45–117)
ALT SERPL-CCNC: 34 U/L (ref 12–78)
ANION GAP SERPL CALC-SCNC: 11 MMOL/L (ref 5–15)
AST SERPL-CCNC: 69 U/L (ref 15–37)
BASOPHILS # BLD: 0 K/UL (ref 0–0.1)
BASOPHILS NFR BLD: 0 % (ref 0–1)
BILIRUB SERPL-MCNC: 1.7 MG/DL (ref 0.2–1)
BUN SERPL-MCNC: 24 MG/DL (ref 6–20)
BUN/CREAT SERPL: 21 (ref 12–20)
CALCIUM SERPL-MCNC: 9.1 MG/DL (ref 8.5–10.1)
CHLORIDE SERPL-SCNC: 103 MMOL/L (ref 97–108)
CK MB CFR SERPL CALC: 0.8 % (ref 0–2.5)
CK MB SERPL-MCNC: 12.2 NG/ML (ref 5–25)
CK SERPL-CCNC: 1480 U/L (ref 26–192)
CO2 SERPL-SCNC: 25 MMOL/L (ref 21–32)
CREAT SERPL-MCNC: 1.14 MG/DL (ref 0.55–1.02)
DIFFERENTIAL METHOD BLD: ABNORMAL
EOSINOPHIL # BLD: 0 K/UL (ref 0–0.4)
EOSINOPHIL NFR BLD: 0 % (ref 0–7)
ERYTHROCYTE [DISTWIDTH] IN BLOOD BY AUTOMATED COUNT: 14.6 % (ref 11.5–14.5)
GLOBULIN SER CALC-MCNC: 2.8 G/DL (ref 2–4)
GLUCOSE SERPL-MCNC: 106 MG/DL (ref 65–100)
HCT VFR BLD AUTO: 39.1 % (ref 35–47)
HGB BLD-MCNC: 13.1 G/DL (ref 11.5–16)
IMM GRANULOCYTES # BLD: 0.1 K/UL (ref 0–0.04)
IMM GRANULOCYTES NFR BLD AUTO: 0 % (ref 0–0.5)
LACTATE SERPL-SCNC: 1.9 MMOL/L (ref 0.4–2)
LYMPHOCYTES # BLD: 1.6 K/UL (ref 0.8–3.5)
LYMPHOCYTES NFR BLD: 12 % (ref 12–49)
MCH RBC QN AUTO: 32 PG (ref 26–34)
MCHC RBC AUTO-ENTMCNC: 33.5 G/DL (ref 30–36.5)
MCV RBC AUTO: 95.4 FL (ref 80–99)
MONOCYTES # BLD: 1.5 K/UL (ref 0–1)
MONOCYTES NFR BLD: 12 % (ref 5–13)
NEUTS SEG # BLD: 9.7 K/UL (ref 1.8–8)
NEUTS SEG NFR BLD: 76 % (ref 32–75)
NRBC # BLD: 0 K/UL (ref 0–0.01)
NRBC BLD-RTO: 0 PER 100 WBC
PLATELET # BLD AUTO: 245 K/UL (ref 150–400)
PMV BLD AUTO: 11.2 FL (ref 8.9–12.9)
POTASSIUM SERPL-SCNC: 3.7 MMOL/L (ref 3.5–5.1)
PROT SERPL-MCNC: 6.7 G/DL (ref 6.4–8.2)
RBC # BLD AUTO: 4.1 M/UL (ref 3.8–5.2)
SODIUM SERPL-SCNC: 139 MMOL/L (ref 136–145)
TROPONIN I SERPL-MCNC: 0.83 NG/ML
WBC # BLD AUTO: 12.9 K/UL (ref 3.6–11)

## 2018-07-28 PROCEDURE — 72125 CT NECK SPINE W/O DYE: CPT

## 2018-07-28 PROCEDURE — 36415 COLL VENOUS BLD VENIPUNCTURE: CPT | Performed by: EMERGENCY MEDICINE

## 2018-07-28 PROCEDURE — 99285 EMERGENCY DEPT VISIT HI MDM: CPT

## 2018-07-28 PROCEDURE — 80053 COMPREHEN METABOLIC PANEL: CPT | Performed by: EMERGENCY MEDICINE

## 2018-07-28 PROCEDURE — 73080 X-RAY EXAM OF ELBOW: CPT

## 2018-07-28 PROCEDURE — 96361 HYDRATE IV INFUSION ADD-ON: CPT

## 2018-07-28 PROCEDURE — 74011000258 HC RX REV CODE- 258: Performed by: EMERGENCY MEDICINE

## 2018-07-28 PROCEDURE — 65660000000 HC RM CCU STEPDOWN

## 2018-07-28 PROCEDURE — 51701 INSERT BLADDER CATHETER: CPT

## 2018-07-28 PROCEDURE — 77030005563 HC CATH URETH INT MMGH -A

## 2018-07-28 PROCEDURE — 85025 COMPLETE CBC W/AUTO DIFF WBC: CPT | Performed by: EMERGENCY MEDICINE

## 2018-07-28 PROCEDURE — 74011250636 HC RX REV CODE- 250/636: Performed by: EMERGENCY MEDICINE

## 2018-07-28 PROCEDURE — 84484 ASSAY OF TROPONIN QUANT: CPT | Performed by: EMERGENCY MEDICINE

## 2018-07-28 PROCEDURE — 82550 ASSAY OF CK (CPK): CPT | Performed by: EMERGENCY MEDICINE

## 2018-07-28 PROCEDURE — 83605 ASSAY OF LACTIC ACID: CPT | Performed by: EMERGENCY MEDICINE

## 2018-07-28 PROCEDURE — 70450 CT HEAD/BRAIN W/O DYE: CPT

## 2018-07-28 PROCEDURE — 96360 HYDRATION IV INFUSION INIT: CPT

## 2018-07-28 PROCEDURE — 93005 ELECTROCARDIOGRAM TRACING: CPT

## 2018-07-28 PROCEDURE — 74011250636 HC RX REV CODE- 250/636: Performed by: INTERNAL MEDICINE

## 2018-07-28 PROCEDURE — 74011000250 HC RX REV CODE- 250: Performed by: EMERGENCY MEDICINE

## 2018-07-28 RX ORDER — MELATONIN
2000 EVERY EVENING
Status: DISCONTINUED | OUTPATIENT
Start: 2018-07-29 | End: 2018-08-08 | Stop reason: HOSPADM

## 2018-07-28 RX ORDER — SODIUM CHLORIDE 9 MG/ML
75 INJECTION, SOLUTION INTRAVENOUS CONTINUOUS
Status: DISCONTINUED | OUTPATIENT
Start: 2018-07-28 | End: 2018-07-29

## 2018-07-28 RX ORDER — METOPROLOL SUCCINATE 50 MG/1
50 TABLET, EXTENDED RELEASE ORAL DAILY
Status: DISCONTINUED | OUTPATIENT
Start: 2018-07-29 | End: 2018-07-30

## 2018-07-28 RX ORDER — OXYBUTYNIN CHLORIDE 5 MG/1
5 TABLET ORAL 2 TIMES DAILY
COMMUNITY

## 2018-07-28 RX ORDER — DILTIAZEM HYDROCHLORIDE EXTENDED-RELEASE TABLETS 240 MG/1
240 TABLET, EXTENDED RELEASE ORAL DAILY
COMMUNITY
End: 2018-08-08

## 2018-07-28 RX ORDER — OXYBUTYNIN CHLORIDE 5 MG/1
5 TABLET ORAL 2 TIMES DAILY
Status: DISCONTINUED | OUTPATIENT
Start: 2018-07-28 | End: 2018-07-30

## 2018-07-28 RX ORDER — ACETAMINOPHEN 325 MG/1
650 TABLET ORAL
Status: DISCONTINUED | OUTPATIENT
Start: 2018-07-28 | End: 2018-08-08 | Stop reason: HOSPADM

## 2018-07-28 RX ORDER — DILTIAZEM HYDROCHLORIDE 5 MG/ML
5 INJECTION INTRAVENOUS
Status: DISCONTINUED | OUTPATIENT
Start: 2018-07-28 | End: 2018-07-31

## 2018-07-28 RX ORDER — LOSARTAN POTASSIUM 50 MG/1
50 TABLET ORAL DAILY
COMMUNITY
End: 2018-08-08

## 2018-07-28 RX ORDER — ONDANSETRON 2 MG/ML
4 INJECTION INTRAMUSCULAR; INTRAVENOUS
Status: DISCONTINUED | OUTPATIENT
Start: 2018-07-28 | End: 2018-08-08 | Stop reason: HOSPADM

## 2018-07-28 RX ORDER — SENNOSIDES 8.6 MG/1
2 TABLET ORAL EVERY EVENING
Status: CANCELLED | OUTPATIENT
Start: 2018-07-28

## 2018-07-28 RX ORDER — FERROUS SULFATE 325(65) MG
325 TABLET, DELAYED RELEASE (ENTERIC COATED) ORAL EVERY EVENING
COMMUNITY

## 2018-07-28 RX ADMIN — SODIUM CHLORIDE 1000 ML: 900 INJECTION, SOLUTION INTRAVENOUS at 16:26

## 2018-07-28 RX ADMIN — SODIUM CHLORIDE 75 ML/HR: 900 INJECTION, SOLUTION INTRAVENOUS at 23:00

## 2018-07-28 RX ADMIN — SODIUM CHLORIDE 1000 ML: 900 INJECTION, SOLUTION INTRAVENOUS at 14:53

## 2018-07-28 RX ADMIN — DILTIAZEM HYDROCHLORIDE 2.5 MG/HR: 5 INJECTION, SOLUTION INTRAVENOUS at 19:27

## 2018-07-28 NOTE — ED NOTES
Assumed care of pt at this time, [facility] included in care. Pt is resting in room, with call bell in reach. All questions answered.

## 2018-07-28 NOTE — ED NOTES
Bedside and Verbal shift change report given to Lidia Escalona by Candice Morales (offgoing nurse). Report included the following information SBAR, ED Summary and Recent Results.

## 2018-07-28 NOTE — ED NOTES
This RN tried to cath pt for specimen without success. Minimal drop of urine seen.   Informed MD.  Started second liter of fluids per verbal order from MD.

## 2018-07-28 NOTE — IP AVS SNAPSHOT
[address] [facility] 
[phone] Patient: Vic Heck MRN: [id number] Richie Fuentes About your hospitalization You were admitted on:  July 28, 2018 You last received care in the:  [facility] CARDIOPULMONARY CARE You were discharged on:  August 8, 2018 Why you were hospitalized Your primary diagnosis was:  Not on File Your diagnoses also included:  Atrial Flutter With Rapid Ventricular Response (Hcc) Follow-up Information Follow up With Details Comments Contact Info [address] [place] [address]   Luite Issac 71 
[phone] Brook Bolton MD In 1 week  [address] [place] [address] 
[phone] Discharge Orders None A check alis indicates which time of day the medication should be taken. My Medications START taking these medications Instructions Each Dose to Equal  
 Morning Noon Evening Bedtime * amiodarone 400 mg tablet Commonly known as:  Sarina Jarvis Your last dose was: Your next dose is: Take 1 Tab by mouth two (2) times a day for 3 days. Indications: VENTRICULAR RATE CONTROL IN ATRIAL FIBRILLATION  
 400 mg  
    
   
   
   
  
 * amiodarone 200 mg tablet Commonly known as:  CORDARONE Start taking on:  8/12/2018 Your last dose was: Your next dose is: Take 1 Tab by mouth daily. Indications: VENTRICULAR RATE CONTROL IN ATRIAL FIBRILLATION  
 200 mg  
    
   
   
   
  
 aspirin 81 mg chewable tablet Start taking on:  8/9/2018 Your last dose was: Your next dose is: Take 1 Tab by mouth daily. Indications: prevention of cerebrovascular accident 81 mg  
    
   
   
   
  
 * Notice: This list has 2 medication(s) that are the same as other medications prescribed for you.  Read the directions carefully, and ask your doctor or other care provider to review them with you. CHANGE how you take these medications Instructions Each Dose to Equal  
 Morning Noon Evening Bedtime  
 ferrous sulfate 325 mg (65 mg iron) EC tablet Commonly known as:  IRON What changed:  Another medication with the same name was removed. Continue taking this medication, and follow the directions you see here. Your last dose was: Your next dose is: Take 325 mg by mouth every evening. 325 mg  
    
   
   
   
  
 metoprolol succinate 100 mg tablet Commonly known as:  TOPROL-XL Start taking on:  8/9/2018 What changed:   
- medication strength 
- how much to take Your last dose was: Your next dose is: Take 1 Tab by mouth daily. 100 mg CONTINUE taking these medications Instructions Each Dose to Equal  
 Morning Noon Evening Bedtime  
 lutein 6 mg Tab Your last dose was: Your next dose is: Take 1 Tab by mouth daily. 1 Tab  
    
   
   
   
  
 oxybutynin 5 mg tablet Commonly known as:  RPWMMLTY Your last dose was: Your next dose is: Take 5 mg by mouth two (2) times a day. 5 mg SENOKOT 8.6 mg tablet Generic drug:  senna Your last dose was: Your next dose is: Take 2 Tabs by mouth every evening. 2 Tab  
    
   
   
   
  
 VITAMIN D3 2,000 unit Tab Generic drug:  cholecalciferol (vitamin D3) Your last dose was: Your next dose is: Take 2,000 Units by mouth every evening. 2000 Units STOP taking these medications   
 dilTIAZem  mg Tb24 tablet Commonly known as:  CARDIZEM LA  
   
  
 losartan 50 mg tablet Commonly known as:  COZAAR  
   
  
 XARELTO 15 mg Tab tablet Generic drug:  rivaroxaban Where to Get Your Medications Information on where to get these meds will be given to you by the nurse or doctor. ! Ask your nurse or doctor about these medications  
  amiodarone 200 mg tablet  
 amiodarone 400 mg tablet  
 aspirin 81 mg chewable tablet  
 metoprolol succinate 100 mg tablet Discharge Instructions Discharge SNF/Rehab Instructions/LTAC  
 
 
PATIENT ID: Marky Stanton MRN: [id number] YOB: 1929 DATE OF ADMISSION: 7/28/2018  2:11 PM   
DATE OF DISCHARGE: 8/8/2018 PRIMARY CARE PROVIDER: Aydin Rizzo MD  
 
 
DISCHARGING PHYSICIAN: Shola Olson MD   
To contact this individual call [phone] and ask the  to page. If unavailable ask to be transferred the Adult Hospitalist Department. CONSULTATIONS: IP CONSULT TO CARDIOLOGY PROCEDURES/SURGERIES: * No surgery found * ADMITTING DIAGNOSES & HOSPITAL COURSE:  
 
Rain Diaz is a 80 y.o.   female with a history of atrial flutter, HTN and dementia who was brought in to the ER because family was concern about a possible fall. She was found to be in atrial flutter with RVR in the ER and was started on cardizem drip. Cardiology was also consulted. Patient was transiently hypotensive on admission limiting the use of oral cardizem. She was started on amiodarone. Echo as stated below. Patient is current in Sinus rhythm and rate controlled and will continue out-patient follow up with cardiology DISCHARGE DIAGNOSES / PLAN:   
 
Atrial flutter with RVR POA Mild troponin elevation 0.83 POA Hypertension 
-previously on dilt, which is DCd d/t hypotension 
-cont metoprolol, PO amio -appreciate cardio input, was started on amio gtt given difficulty w rate control 
-Echo:  Systolic function was normal. Ejection fraction was estimated in the range of 55 % to 60%. Suboptimal endocardial visualization limits wall motion analysis.  
-elevated trop noted in setting of elevated Cr, mild rhabdo POD, felt to be nonspecific - Due to recurrent falls, patient is not a candidate for oral anticoagulant. Will be on aspirin. No BB or cardizem due to hypotension. On amiodarone for rate control. Patient will follow up with cardiology on an OP basis.    
Acute kidney injury POA  
-resolved, Cr at baseline 
   
Hypotension POA 81/46 in ED 
-resolved; -130's/60-70's 
   
Mild Rhabdomyolysis POA CPK 1480 
-resolved 
   
Frequent falls Alzheimer's dementia POA Sundowning  
-CT head negative 
-agitated throughout the day (8/3) will reinstate sitter for now 
-Now stable and calm, No sitter at bedside. 
   
UTI POA Overactive bladder 
-urine cx grew gram (-) rods; completed antibiotic therapy 5 days with rocephin. 
-hold oxybutynin while treating for UTI 
- resume oxybutynin. 
   
Code Status: [facility] Surrogate Decision Maker: Emanuel Alejo) is mPOA 
   
DVT Prophylaxis:  SCD 
GI Prophylaxis: not indicated 
   
Baseline:   Lives in [address] [place] [address],[address] calls or checks in on her daily  
   
Disposition prior to discharge: She is hemodynamically stable and has improved. PENDING TEST RESULTS:  
At the time of discharge the following test results are still pending: FOLLOW UP APPOINTMENTS:   
Follow-up Information Follow up With Details Comments Contact Info [address] [place] [address]   Luite Issac 71 
[phone] Flash Escobedo MD In 1 week  [address] [place] [address] 
[phone] ADDITIONAL CARE RECOMMENDATIONS:  
 
DIET: Cardiac Diet TUBE FEEDING INSTRUCTIONS: none OXYGEN / BiPAP SETTINGS:  
 
ACTIVITY: Activity as tolerated WOUND CARE:  
 
EQUIPMENT needed: 
 
 
DISCHARGE MEDICATIONS: 
 See Medication Reconciliation Form NOTIFY YOUR PHYSICIAN FOR ANY OF THE FOLLOWING:  
Fever over 101 degrees for 24 hours.   
Chest pain, shortness of breath, fever, chills, nausea, vomiting, diarrhea, change in mentation, falling, weakness, bleeding. Severe pain or pain not relieved by medications. Or, any other signs or symptoms that you may have questions about. DISPOSITION: 
  Home With: 
 OT  PT  HH  RN  
  
x SNF/Inpatient Rehab/LTAC Independent/assisted living Hospice Other:  
 
 
PATIENT CONDITION AT DISCHARGE:  
 
Functional status  
x Poor Deconditioned Independent Cognition  
 x Lucid Forgetful Dementia Catheters/lines (plus indication) Yarbrough PICC   
 PEG   
x None Code status Full code   
x DNR PHYSICAL EXAMINATION AT DISCHARGE: 
 Refer to Progress Note CHRONIC MEDICAL DIAGNOSES: 
Problem List as of 8/8/2018  Date Reviewed: 5/30/2018 Codes Class Noted - Resolved Atrial flutter with rapid ventricular response ([facility] [address] ICD-10-CM: K47.39 
ICD-9-CM: 427.32  7/28/2018 - Present Malnutrition ([facility] [address] ICD-10-CM: E46 
ICD-9-CM: 263.9  5/30/2018 - Present Hypertension ICD-10-CM: I10 
ICD-9-CM: 401.9  5/30/2018 - Present Atrial flutter ([facility] [address] ICD-10-CM: C12.35 
ICD-9-CM: 427.32  5/24/2018 - Present Late onset Alzheimer's disease without behavioral disturbance ICD-10-CM: G30.1, F02.80 ICD-9-CM: 331.0, 294.10  5/22/2018 - Present Advance care planning ICD-10-CM: Z71.89 ICD-9-CM: V65.49  12/5/2017 - Present Scoliosis ICD-10-CM: M41.9 ICD-9-CM: 737.30  11/21/2017 - Present Atrial fibrillation [facility]) ICD-10-CM: I48.91 
ICD-9-CM: 427.31  8/28/2017 - Present Adhesive capsulitis of both shoulders ICD-10-CM: M75.01, M75.02 
ICD-9-CM: 726.0  8/28/2017 - Present Osteoarthritis ICD-10-CM: M19.90 ICD-9-CM: 715.90  8/28/2017 - Present OAB (overactive bladder) ICD-10-CM: N32.81 ICD-9-CM: 596.51  8/28/2017 - Present CDMP Checked:  
Yes x Signed:  
Marah Gamez MD 
8/8/2018 
2:39 PM 
 
  
 
ACO Transitions of Care Introducing [employer] [address] Bri[address] Clemente offers a voluntary care coordination program to provide high quality service and care to [facility] fee-for-service beneficiaries. Tomas Diop was designed to help you enhance your health and well-being through the following services: ? Transitions of Care  support for individuals who are transitioning from one care setting to another (example: Hospital to home). ? Chronic and Complex Care Coordination  support for individuals and caregivers of those with serious or chronic illnesses or with more than one chronic (ongoing) condition and those who take a number of different medications. If you meet specific medical criteria, a [address] [place] [address] Rd may call you directly to coordinate your care with your primary care physician and your other care providers. For questions about the [facility] programs, please, contact your physicians office. For general questions or additional information about Accountable Care Organizations: 
Please visit www.medicare.gov/acos. html or call 1-800-MEDICARE (8-491.937.3469) TTY users should call 2-421.405.2998. Your Style Unzipped Announcement We are excited to announce that we are making your provider's discharge notes available to you in Your Style Unzipped. You will see these notes when they are completed and signed by the physician that discharged you from your recent hospital stay. If you have any questions or concerns about any information you see in Your Style Unzipped, please call the Health Information Department where you were seen or reach out to your Primary Care Provider for more information about your plan of care. Introducing Catarino Waggoner As a Jayleen Mock patient, I wanted to make you aware of our electronic visit tool called Catarino Waggoner. Jayleen Mock 24/7 allows you to connect within minutes with a medical provider 24 hours a day, seven days a week via a mobile device or tablet or logging into a secure website from your computer. You can access [employer] Wireless[employer] from anywhere in the [place]. A virtual visit might be right for you when you have a simple condition and feel like you just dont want to get out of bed, or cant get away from work for an appointment, when your regular [facility] provider is not available (evenings, weekends or holidays), or when youre out of town and need minor care. Electronic visits cost only $49 and if the [employer] 24/YEOXIN VMall provider determines a prescription is needed to treat your condition, one can be electronically transmitted to a nearby pharmacy*. Please take a moment to enroll today if you have not already done so. The enrollment process is free and takes just a few minutes. To enroll, please download the [employer] darien to your tablet or phone, or visit [email or web address] to enroll on your computer. And, as an [address] [place] [address] patient with a [employer] account, the results of your visits will be scanned into your electronic medical record and your primary care provider will be able to view the scanned results. We urge you to continue to see your regular [facility] provider for your ongoing medical care. And while your primary care provider may not be the one available when you seek a Catarino Waggoner virtual visit, the peace of mind you get from getting a real diagnosis real time can be priceless. For more information on Catarino Sukhwinderbradlyfin, view our Frequently Asked Questions (FAQs) at [email or web address]. Sincerely, 
 
Adri Taylor MD 
Chief Medical Officer [employer] *:  certain medications cannot be prescribed via Catarino Sukhwinderrell Providers Seen During Your Hospitalization Provider Specialty Primary office phone Miguel Short MD Emergency Medicine [phone] Pao Calles DO Emergency Medicine [phone] Molly Garvin MD Hospitalist [phone] Massiel Zaman DO Internal Medicine [phone] Russel Davis MD Internal Medicine [phone] Benny Barker MD Internal Medicine [phone] Your Primary Care Physician (PCP) Primary Care Physician Office Phone Office Fax [address] [place] [address] [phone] You are allergic to the following No active allergies Recent Documentation Height Weight BMI OB Status Smoking Status 1.549 m 53 kg 22.08 kg/m2 Postmenopausal Never Smoker Emergency Contacts Name Discharge Info Relation Home Work Mobile Pia Osuna DISCHARGE CAREGIVER [3] Other Relative [6] [phone] Patient Belongings The following personal items are in your possession at time of discharge: 
     Visual Aid: With patient, Glasses Please provide this summary of care documentation to your next provider. Signatures-by signing, you are acknowledging that this After Visit Summary has been reviewed with you and you have received a copy. Patient Signature:  ____________________________________________________________ Date:  ____________________________________________________________  
  
Carl Gonzalez Provider Signature:  ____________________________________________________________ Date:  ____________________________________________________________

## 2018-07-28 NOTE — H&P
Hospitalist Admission Note NAME: Jovanna Ang :  1929 MRN:  [id number] Date/Time:  2018 7:15 PM 
 
Patient PCP: Rd Hamilton MD 
_____________________________________________________________________ Given the patient's current clinical presentation, I have a high level of concern for decompensation if discharged from the emergency department. Complex decision making was performed, which includes reviewing the patient's available past medical records, laboratory results, and x-ray films. My assessment of this patient's clinical condition and my plan of care is as follows. Assessment / Plan: 
 
Atrial flutter with RVR POA Mild troponin elevation  
-continue cardizem drip. Hold oral cardizem 
-continue Toprol but decrease to 50mg given borderline low BP 
-hold xarelto. Patient with frequent falls and multiple bruises so not safe candidate for long term AC. Will wait for Cardiology input. ASA vs Xarelto Addendum: 
BP low side and unable to start cardizem drip. Will order prn IV cardizem. Amiodarone drip? - per cardiology Mild Rhabdomyolysis Frequent falls -CT head negative 
-gentle hydration 
-PT OT eval and treat 
   
Alzheimer's dementia POA 
-mood stable but at risk for sun downing. Sitter prn 
-discussed with mPOA and I agree that the patient is not safe to be living alone. They have applied for medicaid about a month ago. Will consult CM to review patient's options. Eventually she will need a memory unit  
  
Overactive bladder 
-continue oxybutynin  
  
 
 
Code Status:   DNR Surrogate Decision Maker:  Brook Brewster) is mPOA DVT Prophylaxis:  Holding xarelto GI Prophylaxis: not indicated Baseline:   Lives in [place]. Brook calls or checks in on her daily Subjective: CHIEF COMPLAINT:   Bruises and possible fall HISTORY OF PRESENT ILLNESS:    
Jovanna Ang is a 80 y.o.    female with a history of atrial flutter, HTN and dementia who was brought in to the ER because family was concern about a possible fall. Patient lives alone but her family checks in on her every day and calls her several times a day as well. Today her apartment was a mess with her furniture scattered, smell of urine and evidence of stool at different places. Patient also had new bruises in her arms but when asked if she fell, she was not sure if she did. ER evaluation shows her to be in rapid atrial flutter. Cardiology was consulted and patient started on IV diltiazem. We were asked to admit for work up and evaluation of the above problems. Past Medical History:  
Diagnosis Date  A-fib ([facility] [address]  Alzheimer's dementia  Arthritis Past Surgical History:  
Procedure Laterality Date  HX BREAST AUGMENTATION    
 HX ORTHOPAEDIC TKR--bilateral   
 
 
Social History Substance Use Topics  Smoking status: Never Smoker  Smokeless tobacco: Never Used  Alcohol use No  
  
 
Family History Problem Relation Age of Onset  Heart Attack Mother  Cancer Sister  Cancer Brother No Known Allergies Prior to Admission medications Medication Sig Start Date End Date Taking? Authorizing Provider  
ferrous sulfate (IRON) 325 mg (65 mg iron) EC tablet Take 325 mg by mouth every evening. Yes Historical Provider  
losartan (COZAAR) 50 mg tablet Take 50 mg by mouth daily. Yes Historical Provider  
oxybutynin (DITROPAN) 5 mg tablet Take 5 mg by mouth two (2) times a day. Yes Historical Provider  
dilTIAZem ER (CARDIZEM LA) 240 mg Tb24 tablet Take 240 mg by mouth daily. Yes Historical Provider  
metoprolol succinate (TOPROL-XL) 50 mg XL tablet Take 3 Tabs by mouth daily. 5/30/18  Yes Radha Wilson NP  
lutein 6 mg tab Take 1 Tab by mouth daily. Yes Historical Provider  
rivaroxaban (XARELTO) 15 mg tab tablet Take 15 mg by mouth daily.    Yes Historical Provider  
cholecalciferol, vitamin D3, (VITAMIN D3) 2,000 unit tab Take 2,000 Units by mouth every evening. Yes Historical Provider  
senna (SENOKOT) 8.6 mg tablet Take 2 Tabs by mouth every evening. Historical Provider REVIEW OF SYSTEMS:    
I am not able to complete the review of systems because: The patient is intubated and sedated The patient has altered mental status due to his acute medical problems The patient has baseline aphasia from prior stroke(s)  
x The patient has baseline dementia and is not reliable historian The patient is in acute medical distress and unable to provide information Objective: VITALS:   
Visit Vitals  BP 97/72 (BP 1 Location: Right arm, BP Patient Position: At rest)  Pulse 95  Temp 97.7 °F (36.5 °C)  Resp 14  
 Ht 5' 1\" (1.549 m)  Wt 53 kg (116 lb 13.5 oz)  SpO2 96%  BMI 22.08 kg/m2 PHYSICAL EXAM: 
 
General:    Alert, cooperative, no distress, appears stated age. HEENT: Atraumatic, anicteric sclerae, pink conjunctivae No oral ulcers, mucosa moist, throat clear, dentition fair Neck:  Supple, symmetrical,  thyroid: non tender Lungs:   Clear to auscultation bilaterally. No Wheezing or Rhonchi. No rales. Chest wall:  No tenderness  No Accessory muscle use. Heart:   irregular  rhythm,  No edema Abdomen:   Soft, non-tender. Not distended. Bowel sounds normal 
Extremities: No cyanosis. No clubbing,  Skin turgor normal, Capillary refill normal, Radial dial pulse 2+ Skin:     Not pale. Not Jaundiced  No rashes Psych:  Poor insight. Not depressed. Not anxious or agitated. Neurologic: EOMs intact. No facial asymmetry. No aphasia or slurred speech. Symmetrical strength, Sensation grossly intact. Alert and oriented X 2018, hospital, recognize family. Not oriented to situation  
 
_______________________________________________________________________ Care Plan discussed with: 
  Comments Patient x Family  x shayan RN Care Manager Consultant:     
_______________________________________________________________________ Expected  Disposition:  
Home with Family HH/PT/OT/RN   
SNF/LTC x  
MARCOS   
________________________________________________________________________ TOTAL TIME:  45   Minutes Critical Care Provided     Minutes non procedure based Comments  
 x Reviewed previous records  
>50% of visit spent in counseling and coordination of care  Discussion with patient and/or family and questions answered Given the patient's current clinical presentation, I have a high level of concern for decompensation if discharged from the ED. Complex decision making was performed which includes reviewing the patient's available past medical records, laboratory results, and Xray films. I have also directly communicated my plan and discussed this case with the involved ED physician.  
 
____________________________________________________________________ Yun Mcdaniels MD 
 
Procedures: see electronic medical records for all procedures/Xrays and details which were not copied into this note but were reviewed prior to creation of Plan. LAB DATA REVIEWED:   
Recent Results (from the past 24 hour(s)) EKG, 12 LEAD, INITIAL Collection Time: 07/28/18  2:26 PM  
Result Value Ref Range Ventricular Rate 124 BPM  
 Atrial Rate 271 BPM  
 QRS Duration 82 ms Q-T Interval 356 ms  
 QTC Calculation (Bezet) 511 ms Calculated R Axis 40 degrees Calculated T Axis 64 degrees Diagnosis Atrial flutter with variable AV block ST & T wave abnormality, consider inferior ischemia When compared with ECG of 24-MAY-2018 18:03, 
Vent. rate has increased BY  44 BPM 
ST more depressed in Inferior leads ST now depressed in Anterior leads METABOLIC PANEL, COMPREHENSIVE Collection Time: 07/28/18  2:57 PM  
Result Value Ref Range Sodium 139 136 - 145 mmol/L Potassium 3.7 3.5 - 5.1 mmol/L  Chloride 103 97 - 108 mmol/L  
 CO2 25 21 - 32 mmol/L Anion gap 11 5 - 15 mmol/L Glucose 106 (H) 65 - 100 mg/dL BUN 24 (H) 6 - 20 MG/DL Creatinine 1.14 (H) 0.55 - 1.02 MG/DL  
 BUN/Creatinine ratio 21 (H) 12 - 20 GFR est AA 55 (L) >60 ml/min/1.73m2 GFR est non-AA 45 (L) >60 ml/min/1.73m2 Calcium 9.1 8.5 - 10.1 MG/DL Bilirubin, total 1.7 (H) 0.2 - 1.0 MG/DL  
 ALT (SGPT) 34 12 - 78 U/L  
 AST (SGOT) 69 (H) 15 - 37 U/L Alk. phosphatase 58 45 - 117 U/L Protein, total 6.7 6.4 - 8.2 g/dL Albumin 3.9 3.5 - 5.0 g/dL Globulin 2.8 2.0 - 4.0 g/dL A-G Ratio 1.4 1.1 - 2.2 CK W/ CKMB & INDEX Collection Time: 07/28/18  2:57 PM  
Result Value Ref Range CK 1480 (H) 26 - 192 U/L  
 CK - MB 12.2 (H) <3.6 NG/ML  
 CK-MB Index 0.8 0 - 2.5    
CBC WITH AUTOMATED DIFF Collection Time: 07/28/18  2:57 PM  
Result Value Ref Range WBC 12.9 (H) 3.6 - 11.0 K/uL  
 RBC 4.10 3.80 - 5.20 M/uL  
 HGB 13.1 11.5 - 16.0 g/dL HCT 39.1 35.0 - 47.0 % MCV 95.4 80.0 - 99.0 FL  
 MCH 32.0 26.0 - 34.0 PG  
 MCHC 33.5 30.0 - 36.5 g/dL  
 RDW 14.6 (H) 11.5 - 14.5 % PLATELET 277 115 - 018 K/uL MPV 11.2 8.9 - 12.9 FL  
 NRBC 0.0 0  WBC ABSOLUTE NRBC 0.00 0.00 - 0.01 K/uL NEUTROPHILS 76 (H) 32 - 75 % LYMPHOCYTES 12 12 - 49 % MONOCYTES 12 5 - 13 % EOSINOPHILS 0 0 - 7 % BASOPHILS 0 0 - 1 % IMMATURE GRANULOCYTES 0 0.0 - 0.5 % ABS. NEUTROPHILS 9.7 (H) 1.8 - 8.0 K/UL  
 ABS. LYMPHOCYTES 1.6 0.8 - 3.5 K/UL  
 ABS. MONOCYTES 1.5 (H) 0.0 - 1.0 K/UL  
 ABS. EOSINOPHILS 0.0 0.0 - 0.4 K/UL  
 ABS. BASOPHILS 0.0 0.0 - 0.1 K/UL  
 ABS. IMM. GRANS. 0.1 (H) 0.00 - 0.04 K/UL  
 DF AUTOMATED    
TROPONIN I Collection Time: 07/28/18  2:57 PM  
Result Value Ref Range Troponin-I, Qt. 0.83 (H) <0.05 ng/mL LACTIC ACID Collection Time: 07/28/18  4:11 PM  
Result Value Ref Range  Lactic acid 1.9 0.4 - 2.0 MMOL/L

## 2018-07-28 NOTE — PROGRESS NOTES
Pharmacy Clarification of Prior to Admission Medication Regimen The patient was not interviewed regarding clarification of the prior to admission medication regimen due to AMS. Patient is currently staying at [address] [place] [address], [facility] nurse and family provided patient's medications. Patient's family members left a home medication list at bedside. [facility] called patient's family member, [place], who was able to confirm patient's PTA medication regimen and use of any other inhalers, topical products, over the counter medications, herbal medications, vitamin products or ophthalmic/nasal/otic medication use. Information Obtained From: Patient's Family Member via Phone, Home Medication List, and Rx Query Pertinent Pharmacy Findings:  Updated patients preferred outpatient pharmacy to: [facility] did not update the outpatient pharmacy due to the patient living at a SNF  
 Identified High Alert Medication Information 
o Current Anticoagulants: 
- Name: rivaroxaban (XARELTO) 15 mg tab tablet  Patient's family member, [place], stated that when family arrived at [facility] today, patient's prefixed 'morning and evening medications were missing, and she [patient] never takes her medications without us [family] present, so I do not know what happened today.'  senna (SENOKOT) 8.6 mg tablet: Patient's family member stated that 'they [family] had planned to remove this agent from patient's regimen today because she does not really eat anything.'  It is unclear when patient's last dose of this agent was received. PTA medication list was corrected to the following:  
 
Prior to Admission Medications Prescriptions Last Dose Informant Patient Reported? Taking? cholecalciferol, vitamin D3, (VITAMIN D3) 2,000 unit tab Unknown at Unknown time Other Yes Yes Sig: Take 2,000 Units by mouth every evening. dilTIAZem ER (CARDIZEM LA) 240 mg Tb24 tablet Unknown at Unknown time Other Yes Yes Sig: Take 240 mg by mouth daily. ferrous sulfate (IRON) 325 mg (65 mg iron) EC tablet Unknown at Unknown time Other Yes Yes Sig: Take 325 mg by mouth every evening. losartan (COZAAR) 50 mg tablet Unknown at Unknown time Other Yes Yes Sig: Take 50 mg by mouth daily. lutein 6 mg tab Unknown at Unknown time Other Yes Yes Sig: Take 1 Tab by mouth daily. metoprolol succinate (TOPROL-XL) 50 mg XL tablet Unknown at Unknown time Other No Yes Sig: Take 3 Tabs by mouth daily. oxybutynin (DITROPAN) 5 mg tablet Unknown at Unknown time Other Yes Yes Sig: Take 5 mg by mouth two (2) times a day. rivaroxaban (XARELTO) 15 mg tab tablet Unknown at Unknown time Other Yes Yes Sig: Take 15 mg by mouth daily. senna (SENOKOT) 8.6 mg tablet Not Taking at Unknown time Other Yes No  
Sig: Take 2 Tabs by mouth every evening. Facility-Administered Medications: None Thank you, Terrance Corona CPhT Medication History Pharmacy Technician

## 2018-07-28 NOTE — ED NOTES
Pt called out requesting food. Per MD pt able to eat. Pt resting on stretcher in POC with call bell in reach. Pt remains on monitor x 3. VSS at this time.

## 2018-07-28 NOTE — ED TRIAGE NOTES
Assumed care of pt via EMS stretcher. Pt is A&O to self only. Per EMS pt was picked up from [facility]. Pt's family arrived and reports pt lives at the [facility] side of [facility]. Per family, they saw pt last night and came back this morning to visit and found new bruising on pt's elbows, back, forehead, and lip that was not there last night. Pt's furniture was all over her apt out of place. Pt's family reports they are trying to get pt placed into an assisted living facility. Pt on monitor x 3.

## 2018-07-28 NOTE — ED NOTES
Pt calling out without using call bell asking for food again. Re-oriented pt and informed of tray coming.

## 2018-07-28 NOTE — ED NOTES
Notified Dr Mg Ohara regarding hold of Cardizem and BP. Pt's heart rate now in the 60's. Started 3rd liter of NS per Dr Mg Ohara.

## 2018-07-28 NOTE — ED PROVIDER NOTES
EMERGENCY DEPARTMENT HISTORY AND PHYSICAL EXAM 
 
 
Date: 7/28/2018 Patient Name: Marky Stanton History of Presenting Illness Chief Complaint Patient presents with  Fall Possible History Provided By: Patient, EMS 
 
HPI: Marky Stanton, 80 y.o. female with PMHx significant for dementia, Afib (takes xarelto) presents via EMS from [facility] to the ED with cc of bruises to bilateral elbows, blood around mouth, back pain, and bruise to head s/p possible fall. Patient's family reports they visit patient everyday and noticed bruises today and she wasn't like this yesterday. Patient states she fell yesterday but is unsure how it happened. Patient denies neck pain, CP, SOB, or abdominal pain. Patient's family reports patient uses rollator and that this AM her son spoke with her on the phone and that patient told him she couldn't find her medications. When they went to visit patient today, they report both her AM and PM meds were gone, her furniture was scattered, patient was trying to get out of bed, and that daughter-in-law noted it smelled like urine everywhere. There are no other complaints, changes, or physical findings at this time. PCP: MD Vesna Castellon MD 
 
Current Outpatient Prescriptions Medication Sig Dispense Refill  mirabegron ER (MYRBETRIQ) 25 mg ER tablet Take 1 Tab by mouth daily. 30 Tab 0  
 metoprolol succinate (TOPROL-XL) 50 mg XL tablet Take 3 Tabs by mouth daily. 90 Tab 0  
 ferrous sulfate (IRON) 325 mg (65 mg iron) EC tablet Take 1 Tab by mouth Daily (before breakfast). 30 Tab 2  
 lutein 6 mg tab Take  by mouth.  acetaminophen (TYLENOL) 500 mg tablet Take 1 Tab by mouth two (2) times a day. 60 Tab 5  
 rivaroxaban (XARELTO) 15 mg tab tablet Take  by mouth daily.  cholecalciferol, vitamin D3, (VITAMIN D3) 2,000 unit tab Take  by mouth.  senna (SENOKOT) 8.6 mg tablet Take 1 Tab by mouth daily. Take 2 tablets daily.     
 
 
Past History Past Medical History: 
Past Medical History:  
Diagnosis Date  A-fib ([address]  Alzheimer's dementia  Arthritis Past Surgical History: 
Past Surgical History:  
Procedure Laterality Date  HX BREAST AUGMENTATION    
 HX ORTHOPAEDIC TKR--bilateral   
 
 
Family History: 
Family History Problem Relation Age of Onset  Heart Attack Mother  Cancer Sister  Cancer Brother Social History: 
Social History Substance Use Topics  Smoking status: Never Smoker  Smokeless tobacco: Never Used  Alcohol use No  
 
 
Allergies: 
No Known Allergies Review of Systems Review of Systems Constitutional: Negative for fatigue and fever. HENT: Negative. Eyes: Negative. Respiratory: Negative for shortness of breath and wheezing. Cardiovascular: Negative for chest pain and leg swelling. Gastrointestinal: Negative for blood in stool, constipation, diarrhea, nausea and vomiting. Endocrine: Negative. Genitourinary: Negative for difficulty urinating and dysuria. Musculoskeletal: Positive for back pain. Skin: Positive for wound. Negative for rash. Allergic/Immunologic: Negative. Neurological: Negative for weakness and numbness. Hematological: Negative. Psychiatric/Behavioral: Negative. Physical Exam  
Physical Exam  
Constitutional: She appears well-developed and well-nourished. HENT:  
Head: Normocephalic and atraumatic. Mouth/Throat: Mucous membranes are dry. Eyes: EOM are normal. Pupils are equal, round, and reactive to light. Neck: Normal range of motion. No JVD present. No tracheal tenderness present. No tracheal deviation and normal range of motion present. No stepoff Cardiovascular: Regular rhythm, normal heart sounds and intact distal pulses. Tachycardia present. Exam reveals no gallop and no friction rub. No murmur heard. Pulmonary/Chest: Effort normal and breath sounds normal. No stridor. She has no wheezes. She has no rales. She exhibits no tenderness. Abdominal: Soft. Bowel sounds are normal. She exhibits no distension and no mass. There is no tenderness. There is no guarding. Musculoskeletal: Normal range of motion. She exhibits no edema. Midline thoracic tenderness; no swelling, lesions, or ecchymosis. Pelvis stable. No swelling of BLE. Neurological: She is alert. Sensations intact BLE. Oriented x 1 Skin: Skin is warm and dry. No rash noted. Bruise over left forehead. Healing abrasion over left elbow. Bruise over left lateral elbow. Bruise over right elbow. Bruise over right wrist.   
Psychiatric: She has a normal mood and affect. Her behavior is normal. Judgment and thought content normal.  
 
 
Diagnostic Study Results Labs - Recent Results (from the past 12 hour(s)) EKG, 12 LEAD, INITIAL Collection Time: 07/28/18  2:26 PM  
Result Value Ref Range Ventricular Rate 124 BPM  
 Atrial Rate 271 BPM  
 QRS Duration 82 ms Q-T Interval 356 ms  
 QTC Calculation (Bezet) 511 ms Calculated R Axis 40 degrees Calculated T Axis 64 degrees Diagnosis Atrial flutter with variable AV block ST & T wave abnormality, consider inferior ischemia When compared with ECG of 24-MAY-2018 18:03, 
Vent. rate has increased BY  44 BPM 
ST more depressed in Inferior leads ST now depressed in Anterior leads Radiologic Studies -  
CT HEAD WO CONT    (Results Pending) CT SPINE CERV WO CONT    (Results Pending) XR ELBOW RT MIN 3 V    (Results Pending) XR ELBOW LT MIN 3 V    (Results Pending) CT Results  (Last 48 hours) None CXR Results  (Last 48 hours) None Medical Decision Making I am the first provider for this patient. I reviewed the vital signs, available nursing notes, past medical history, past surgical history, family history and social history. Vital Signs-Reviewed the patient's vital signs.  
Patient Vitals for the past 12 hrs: 
 Temp Pulse Resp BP SpO2  
07/28/18 1415 97.7 °F (36.5 °C) (!) 142 25 (!) 81/46 96 % Pulse Oximetry Analysis - 96% on RA Cardiac Monitor:  
Rate: 142 bpm 
Rhythm: Sinus Tachycardia EKG interpretation: (Preliminary) 1426 Rhythm: atrial flutter with variable AV block. Rate (approx.): 124; Axis: normal; FL interval: 0 P waves; QRS interval: prolonged QTc; ST/T wave: ST & T wave abnormality; Other findings:  
Written by Zoltan Asif, ED Scribe, as dictated by Rick Jain DO. Records Reviewed: Nursing Notes and Old Medical Records Provider Notes (Medical Decision Making): Pt presenting for possible fall, was found to be in aflutter with rvr on arrival. Per family, patient has a hx of afib/aflutter, is followed by Dr. Natalia Li. Pt has been compliant with her medications, but has a hx of anorexia. DDx includes dehydration, electrolyte abnormality, acs, arrhythmia, uti, ICH, fracture, lizz. Will check labs, cardiac enzymes, ekg, CXR, head CT. Will get x-rays of bilateral elbows to r/o trauma. Pt BP is low at this time, will treat with IVF, hold off on rate control at this time given hypotension. ED Course:  
Initial assessment performed. The patients presenting problems have been discussed, and they are in agreement with the care plan formulated and outlined with them. I have encouraged them to ask questions as they arise throughout their visit. 3:05 PM 
Patient's BP has decreased and tachycardia has improved 4:25 PM 
Attempted to straight cath patient, no urine output, will give patient more fluids Consult Note: 
5:56 PM 
Rick Jain DO spoke with Andrea Preciado, Specialty: Cardiology Discussed pt's hx, disposition, and available diagnostic and imaging results. Reviewed care plans. Consultant agrees with plans as outlined. Recommends low does diltiazem IV drip CONSULT NOTE:  
6:15 PM 
Rick Jain DO spoke with Lacy Ghotra, Specialty: Hospitalist 
Discussed pt's hx, disposition, and available diagnostic and imaging results. Reviewed care plans. Consultant will evaluate pt for admission. Written by Melissa Barnes ED Scribe, as dictated by Kezia Joseph DO. 
 
 
7:43 PM 
BP dropped and HR went down to 60s just prior to starting diltiazem drip. Will hold off on drip Critical Care Time: CRITICAL CARE NOTE : 
 
7:21 PM 
 
 
IMPENDING DETERIORATION -Cardiovascular and Renal 
 
ASSOCIATED RISK FACTORS - Hypotension, Dysrhythmia and Dehydration, Vascular Compromise MANAGEMENT- Bedside Assessment and Supervision of Care INTERPRETATION -  Xrays, CT Scan, ECG and Blood Pressure INTERVENTIONS - hemodynamic mngmt and vascular control CASE REVIEW - Hospitalist, Medical Sub-Specialist, Nursing and Family TREATMENT RESPONSE -Improved PERFORMED BY - Self NOTES   : 
 
 
I have spent 60 minutes of critical care time involved in lab review, consultations with specialist, family decision- making, bedside attention and documentation. During this entire length of time I was immediately available to the patient . Kezia Joseph DO Disposition: 
Admit Note: 
6:15 PM 
Pt is being admitted by Velma Rock. The results of their tests and reason(s) for their admission have been discussed with pt and/or available family. They convey agreement and understanding for the need to be admitted and for admission diagnosis. PLAN: Admit Diagnosis Clinical Impression: 1. Atrial flutter with rapid ventricular response ([address] 2. MARTY (acute kidney injury) ([address] 3. Elevated troponin Attestations: This note is prepared by Melissa Barnes, acting as Scribe for DO Kezia Bright DO: The scribe's documentation has been prepared under my direction and personally reviewed by me in its entirety.  I confirm that the note above accurately reflects all work, treatment, procedures, and medical decision making performed by me.

## 2018-07-29 LAB
ANION GAP SERPL CALC-SCNC: 8 MMOL/L (ref 5–15)
APPEARANCE UR: ABNORMAL
ATRIAL RATE: 271 BPM
BACTERIA URNS QL MICRO: ABNORMAL /HPF
BASOPHILS # BLD: 0 K/UL (ref 0–0.1)
BASOPHILS NFR BLD: 0 % (ref 0–1)
BILIRUB UR QL: NEGATIVE
BUN SERPL-MCNC: 18 MG/DL (ref 6–20)
BUN/CREAT SERPL: 23 (ref 12–20)
CALCIUM SERPL-MCNC: 8 MG/DL (ref 8.5–10.1)
CALCULATED R AXIS, ECG10: 40 DEGREES
CALCULATED T AXIS, ECG11: 64 DEGREES
CHLORIDE SERPL-SCNC: 110 MMOL/L (ref 97–108)
CK SERPL-CCNC: 953 U/L (ref 26–192)
CO2 SERPL-SCNC: 24 MMOL/L (ref 21–32)
COLOR UR: ABNORMAL
CREAT SERPL-MCNC: 0.78 MG/DL (ref 0.55–1.02)
DIAGNOSIS, 93000: NORMAL
DIFFERENTIAL METHOD BLD: ABNORMAL
EOSINOPHIL # BLD: 0.1 K/UL (ref 0–0.4)
EOSINOPHIL NFR BLD: 1 % (ref 0–7)
EPITH CASTS URNS QL MICRO: ABNORMAL /LPF
ERYTHROCYTE [DISTWIDTH] IN BLOOD BY AUTOMATED COUNT: 14.6 % (ref 11.5–14.5)
GLUCOSE SERPL-MCNC: 81 MG/DL (ref 65–100)
GLUCOSE UR STRIP.AUTO-MCNC: NEGATIVE MG/DL
HCT VFR BLD AUTO: 35.4 % (ref 35–47)
HGB BLD-MCNC: 11.9 G/DL (ref 11.5–16)
HGB UR QL STRIP: ABNORMAL
HYALINE CASTS URNS QL MICRO: ABNORMAL /LPF (ref 0–5)
IMM GRANULOCYTES # BLD: 0 K/UL (ref 0–0.04)
IMM GRANULOCYTES NFR BLD AUTO: 0 % (ref 0–0.5)
KETONES UR QL STRIP.AUTO: NEGATIVE MG/DL
LEUKOCYTE ESTERASE UR QL STRIP.AUTO: ABNORMAL
LYMPHOCYTES # BLD: 4.1 K/UL (ref 0.8–3.5)
LYMPHOCYTES NFR BLD: 32 % (ref 12–49)
MCH RBC QN AUTO: 32.2 PG (ref 26–34)
MCHC RBC AUTO-ENTMCNC: 33.6 G/DL (ref 30–36.5)
MCV RBC AUTO: 95.7 FL (ref 80–99)
MONOCYTES # BLD: 1.7 K/UL (ref 0–1)
MONOCYTES NFR BLD: 13 % (ref 5–13)
NEUTS SEG # BLD: 6.9 K/UL (ref 1.8–8)
NEUTS SEG NFR BLD: 54 % (ref 32–75)
NITRITE UR QL STRIP.AUTO: POSITIVE
NRBC # BLD: 0 K/UL (ref 0–0.01)
NRBC BLD-RTO: 0 PER 100 WBC
PH UR STRIP: 5.5 [PH] (ref 5–8)
PLATELET # BLD AUTO: 215 K/UL (ref 150–400)
PMV BLD AUTO: 10.7 FL (ref 8.9–12.9)
POTASSIUM SERPL-SCNC: 3.2 MMOL/L (ref 3.5–5.1)
PROT UR STRIP-MCNC: NEGATIVE MG/DL
Q-T INTERVAL, ECG07: 356 MS
QRS DURATION, ECG06: 82 MS
QTC CALCULATION (BEZET), ECG08: 511 MS
RBC # BLD AUTO: 3.7 M/UL (ref 3.8–5.2)
RBC #/AREA URNS HPF: ABNORMAL /HPF (ref 0–5)
SODIUM SERPL-SCNC: 142 MMOL/L (ref 136–145)
SP GR UR REFRACTOMETRY: 1.02 (ref 1–1.03)
TROPONIN I SERPL-MCNC: 0.53 NG/ML
UA: UC IF INDICATED,UAUC: ABNORMAL
UROBILINOGEN UR QL STRIP.AUTO: 0.2 EU/DL (ref 0.2–1)
VENTRICULAR RATE, ECG03: 124 BPM
WBC # BLD AUTO: 12.9 K/UL (ref 3.6–11)
WBC URNS QL MICRO: >100 /HPF (ref 0–4)

## 2018-07-29 PROCEDURE — 87086 URINE CULTURE/COLONY COUNT: CPT | Performed by: INTERNAL MEDICINE

## 2018-07-29 PROCEDURE — 74011000250 HC RX REV CODE- 250: Performed by: INTERNAL MEDICINE

## 2018-07-29 PROCEDURE — 87186 SC STD MICRODIL/AGAR DIL: CPT | Performed by: INTERNAL MEDICINE

## 2018-07-29 PROCEDURE — 36415 COLL VENOUS BLD VENIPUNCTURE: CPT | Performed by: INTERNAL MEDICINE

## 2018-07-29 PROCEDURE — 87077 CULTURE AEROBIC IDENTIFY: CPT | Performed by: INTERNAL MEDICINE

## 2018-07-29 PROCEDURE — 65660000000 HC RM CCU STEPDOWN

## 2018-07-29 PROCEDURE — 74011250636 HC RX REV CODE- 250/636: Performed by: INTERNAL MEDICINE

## 2018-07-29 PROCEDURE — 85025 COMPLETE CBC W/AUTO DIFF WBC: CPT | Performed by: INTERNAL MEDICINE

## 2018-07-29 PROCEDURE — 80048 BASIC METABOLIC PNL TOTAL CA: CPT | Performed by: INTERNAL MEDICINE

## 2018-07-29 PROCEDURE — 74011000258 HC RX REV CODE- 258: Performed by: INTERNAL MEDICINE

## 2018-07-29 PROCEDURE — 81001 URINALYSIS AUTO W/SCOPE: CPT | Performed by: EMERGENCY MEDICINE

## 2018-07-29 PROCEDURE — 74011250637 HC RX REV CODE- 250/637: Performed by: INTERNAL MEDICINE

## 2018-07-29 PROCEDURE — 82550 ASSAY OF CK (CPK): CPT | Performed by: INTERNAL MEDICINE

## 2018-07-29 PROCEDURE — 84484 ASSAY OF TROPONIN QUANT: CPT | Performed by: INTERNAL MEDICINE

## 2018-07-29 RX ORDER — GUAIFENESIN 100 MG/5ML
81 LIQUID (ML) ORAL DAILY
Status: DISCONTINUED | OUTPATIENT
Start: 2018-07-30 | End: 2018-08-08 | Stop reason: HOSPADM

## 2018-07-29 RX ADMIN — OXYBUTYNIN CHLORIDE 5 MG: 5 TABLET ORAL at 20:44

## 2018-07-29 RX ADMIN — DILTIAZEM HYDROCHLORIDE 5 MG: 5 INJECTION INTRAVENOUS at 17:51

## 2018-07-29 RX ADMIN — SODIUM CHLORIDE 75 ML/HR: 900 INJECTION, SOLUTION INTRAVENOUS at 14:51

## 2018-07-29 RX ADMIN — VITAMIN D, TAB 1000IU (100/BT) 2000 UNITS: 25 TAB at 17:51

## 2018-07-29 RX ADMIN — METOPROLOL SUCCINATE 50 MG: 50 TABLET, EXTENDED RELEASE ORAL at 09:12

## 2018-07-29 RX ADMIN — OXYBUTYNIN CHLORIDE 5 MG: 5 TABLET ORAL at 09:12

## 2018-07-29 RX ADMIN — DILTIAZEM HYDROCHLORIDE 10 MG/HR: 5 INJECTION, SOLUTION INTRAVENOUS at 18:38

## 2018-07-29 NOTE — ED NOTES
TRANSFER - OUT REPORT: 
 
Verbal report given to ADY Morales(name) on Jules Ibarra  being transferred to [address] [place] [address] [address](unit) for routine progression of care Report consisted of patients Situation, Background, Assessment and  
Recommendations(SBAR). Information from the following report(s) SBAR, Kardex, ED Summary, MAR, Recent Results and Cardiac Rhythm a-flutter was reviewed with the receiving nurse. Lines:  
Peripheral IV 07/28/18 Left Antecubital (Active) Site Assessment Clean, dry, & intact 7/28/2018  2:52 PM  
Phlebitis Assessment 0 7/28/2018  2:52 PM  
Infiltration Assessment 0 7/28/2018  2:52 PM  
Dressing Status Clean, dry, & intact 7/28/2018  2:52 PM  
Dressing Type Transparent 7/28/2018  2:52 PM  
Hub Color/Line Status Pink;Flushed 7/28/2018  2:52 PM  
Action Taken Blood drawn 7/28/2018  2:52 PM  
   
Peripheral IV 07/28/18 Right Arm (Active) Site Assessment Clean, dry, & intact 7/28/2018  4:28 PM  
Phlebitis Assessment 0 7/28/2018  4:28 PM  
Infiltration Assessment 0 7/28/2018  4:28 PM  
Dressing Status Clean, dry, & intact 7/28/2018  4:28 PM  
Dressing Type Transparent 7/28/2018  4:28 PM  
Hub Color/Line Status Pink;Flushed 7/28/2018  4:28 PM  
Action Taken Blood drawn 7/28/2018  4:28 PM  
  
 
Opportunity for questions and clarification was provided. Patient transported with: 
 [facility]

## 2018-07-29 NOTE — CONSULTS
Rufus  MR#: [id number]  : 1929  ACCOUNT #: [de-identified]   DATE OF SERVICE: 2018    REQUESTING PHYSICIAN:  Yvette Benitez DO, [address] [facility].    REASON FOR CONSULTATION:  Evaluate atrial flutter, abnormal troponin. CHIEF COMPLAINT:  Patient voices no chief complaint. HISTORY OF PRESENT ILLNESS:  The patient is an 59-year-old female with a history of hypertension, paroxysmal atrial flutter, and dementia. She was brought in to the emergency room yesterday by her family after she was found at her apartment in a somewhat disheveled state, with evidence of recent falls. The patient's family had concerns about her ability to take care of herself. In the ER, she was noted to be in rapid atrial flutter. She was given a dose of IV diltiazem. Initial troponin was 0.53. The patient was subsequently admitted to the hospitalist service and placed on oral metoprolol. There is apparently no history of coronary artery disease or congestive heart failure. No prior echocardiogram.  The patient was seen by Dr. Rober Juarez in May of this year for her atrial flutter. She was started on Xarelto and a beta blocker for rate control. Conservative therapy was recommended. The Xarelto was stopped on admission here because of fall concerns. The patient herself is quite confused and cannot give a coherent history. She denies shortness of breath or chest pain. PAST MEDICAL HISTORY:  Dementia of uncertain type. Hypertension. PAST SURGICAL HISTORY:  Prior bilateral total knee replacement surgery. Prior breast augmentation surgery. CURRENT MEDICATIONS:  Toprol-XL 50 mg daily, aspirin, Ditropan, IV normal saline, vitamin D3. SOCIAL HISTORY:  The patient does not smoke or abuse alcohol. FAMILY HISTORY:  The patient's mother had a heart attack, per the chart. REVIEW OF SYSTEMS:  Not obtainable given the patient's condition.     PHYSICAL EXAMINATION:  GENERAL:  Reveals a restless-appearing elderly white female in no acute distress. VITAL SIGNS:  Blood pressure 136/70, pulse 69, respirations 20, temperature 98.4. HEENT:  Pupils are equal.  Oropharynx reveals moist oral mucosa. NECK:  Supple. No masses or thyromegaly. No cervical or supraclavicular adenopathy. No definite carotid bruits or JVD. CHEST:  Clear. No wheezes or crackles. SKIN:  Warm and dry. BACK:  Some kyphosis present. HEART:  Irregular/irregular rhythm, no obvious gallop. II/VI systolic murmur. No diastolic murmur heard. ABDOMEN:  Soft, nontender, no masses or organomegaly. Bowel sounds positive. EXTREMITIES:  No cyanosis, clubbing or edema. Distal pulses not well palpated. NEUROLOGIC:  There are no obvious gross motor deficits. The patient is talkative and answers questions, though clearly confused. LABORATORY DATA:  BUN 18, creatinine 0.8. Hemoglobin 11.9. Troponin 0.53. . EKG:  Atrial flutter, rate of 124 beats per minute, nonspecific ST and T changes. Currently, the patient appears to be in probable sinus rhythm. IMPRESSION:   1. Paroxysmal atrial flutter. 2.  Hypertension. 3.  Moderate dementia. 4.  Evidence of recent falls. 5.  Mildly elevated troponin. 6.  Elevated total CPK. RECOMMENDATIONS:  I think this patient should be treated conservatively. We will go ahead and repeat an EKG to confirm that she is in sinus rhythm. Continue the beta blocker. I agree with withholding anticoagulation because of fall risk. We will also order an echocardiogram as we do not have an estimate of EF or assessment of any valve problems. Thank you for this consult.       Maurice Weldon MD       [address] [place] [address] /   D: 07/29/2018 17:03     T: 07/29/2018 17:26  JOB #: [id number]  CC: Felix Johnson MD  CC: HELENA BUSTILLO DO

## 2018-07-29 NOTE — PROGRESS NOTES
Hospitalist Progress Note NAME: Hira Bell :  1929 MRN:  [id number] Admit date: 2018 Today's date: 18 PCP: MD Db García M.D. Cell [phone] Assessment / Plan: 
 
Acute kidney injury POA creatinine 1.14, baseline 0.6-0.7 Hypotension POA 81/46 in ED Mild Rhabdomyolysis POA CPK 1480, improving Frequent falls -CT head negative 
-BP improved holding mends and IVF 
-Renal function and CPK improving 
-PT OT eval and treat - D/C planning in AM if continues to improve Atrial flutter with RVR POA improved Mild troponin elevation 0.83 POA improving 
- off cardizem gtt, back on toprol XL(50% of home dose) - rate controlled, still in atrial fib 
- start ASA for now 
- losartan and cardizem CD on hald 
- hold xarelto. Patient with frequent falls, not sure safe - await cardiology input 
   
Alzheimer's dementia POA 
-mood stable but at risk for sun downing. S 
-not safe to be living alone. They have applied for medicaid about a month ago. \ 
Will consult CM to review patient's options. Eventually she will need a memory unit  
   
Overactive bladder 
-continue oxybutynin  
  
 
Code Status:   DNR Surrogate Decision Maker:  Brook Rosa Degree) is mPOA 
  
DVT Prophylaxis:  Holding xarelto GI Prophylaxis: not indicated 
  
Baseline:   Lives in [facility]. Brook calls or checks in on her daily Subjective: Chief Complaint / Reason for Physician Visit f/u atrial fib with RVR \" I am ready to go home\". Discussed with RN events overnight. Pt does not recall what happened yesterday [facility] Seen by family day before admit, was fine, yesterday Am bruised on floor, room ransacked PT denies complaints Off cardizem gtt Review of Systems: 
Symptom Y/N Comments  Symptom Y/N Comments Fever/Chills n   Chest Pain n   
Poor Appetite    Edema Cough n   Abdominal Pain n   
Sputum    Joint Pain SOB/ADAME n   Headache Nausea/vomit n   Tolerating PT/OT Diarrhea n   Tolerating Diet y Constipation    Other Could NOT obtain due to:   
 
Objective: VITALS:  
Last 24hrs VS reviewed since prior progress note. Most recent are: 
Patient Vitals for the past 24 hrs: 
 Temp Pulse Resp BP SpO2  
07/29/18 1051 98 °F (36.7 °C) 81 20 110/62 94 %  
07/29/18 0740 97.8 °F (36.6 °C) 77 18 122/64 95 %  
07/29/18 0357 98.3 °F (36.8 °C) 75 20 121/63 94 %  
07/28/18 2350 98.5 °F (36.9 °C) 75 16 113/51 93 % 07/28/18 2215 - (!) 102 22 103/56 94 %  
07/28/18 2200 - (!) 108 16 115/69 95 %  
07/28/18 2145 - 94 26 98/59 93 % 07/28/18 2130 - 81 15 97/51 94 %  
07/28/18 2115 - 100 16 91/41 94 %  
07/28/18 2100 - 97 15 100/47 95 %  
07/28/18 2045 - (!) 101 18 110/51 94 %  
07/28/18 2031 - 86 17 111/49 92 %  
07/28/18 2016 - 94 18 95/52 92 %  
07/28/18 2000 - 98 19 105/51 95 %  
07/28/18 1945 - 95 19 95/49 97 %  
07/28/18 1931 - 84 19 (!) 89/51 96 %  
07/28/18 1927 - (!) 102 15 94/68 92 %  
07/28/18 1915 - (!) 127 21 92/56 95 %  
07/28/18 1900 - (!) 138 14 103/62 93 % 07/28/18 1645 - 95 14 97/72 96 %  
07/28/18 1630 - (!) 126 14 105/54 97 %  
07/28/18 1445 - (!) 107 23 98/47 97 %  
07/28/18 1430 - (!) 130 23 (!) 81/46 96 %  
07/28/18 1415 97.7 °F (36.5 °C) (!) 142 25 (!) 81/46 96 % Intake/Output Summary (Last 24 hours) at 07/29/18 1303 Last data filed at 07/29/18 8625 Gross per 24 hour Intake                0 ml Output              300 ml Net             -300 ml Wt Readings from Last 12 Encounters:  
07/28/18 53 kg (116 lb 13.5 oz) 05/24/18 53 kg (116 lb 13.5 oz) 05/22/18 52.3 kg (115 lb 3.2 oz) 03/09/18 54.4 kg (120 lb)  
02/20/18 54.4 kg (120 lb) 12/05/17 52.2 kg (115 lb)  
08/28/17 52.2 kg (115 lb) PHYSICAL EXAM: 
General: WD, WN. Alert, cooperative, no acute distress   
EENT:  PERRL. Anicteric sclerae. MMM Neck:  No meningismus Resp:  CTA bilaterally, no wheezing or rales. No accessory muscle use CV:  Regular  rhythm,  No edema GI:  Soft, Non distended, Non tender.  +Bowel sounds, no rebound Neurologic:  Alert and oriented to birthday, current place and year, normal speech, non focal motor exam 
Psych:   Not anxious nor agitated Skin:  No rashes Bruise on left forehead, elbows, few on shins Reviewed most current lab test results and cultures  YES Reviewed most current radiology test results   YES Review and summation of old records today    NO Reviewed patient's current orders and MAR    YES 
PMH/SH reviewed - no change compared to H&P 
________________________________________________________________________ Care Plan discussed with: 
  Comments Patient x Family  x Niece RN x Care Manager Consultant Multidiciplinary team rounds were held today with , nursing, pharmacist and clinical coordinator. Patient's plan of care was discussed; medications were reviewed and discharge planning was addressed. ________________________________________________________________________ Total NON critical care TIME:  25  Minutes Total CRITICAL CARE TIME Spent:   Minutes non procedure based Comments >50% of visit spent in counseling and coordination of care    
________________________________________________________________________ Shadi Mack MD  
 
Procedures: see electronic medical records for all procedures/Xrays and details which were not copied into this note but were reviewed prior to creation of Plan. LABS: 
I reviewed today's most current labs and imaging studies. Pertinent labs include: 
Recent Labs  
   07/29/18 
 0239  07/28/18 
 1457 WBC  12.9*  12.9* HGB  11.9  13.1 HCT  35.4  39.1 PLT  215  245 Recent Labs  
   07/29/18 
 0239  07/28/18 
 1457 NA  142  139  
K  3.2*  3.7 CL  110*  103 CO2  24  25 GLU  81  106* BUN  18  24* CREA  0.78  1.14* CA  8.0*  9.1 ALB   -- 3. 9 TBILI   --   1.7* SGOT   --   69* ALT   --   34

## 2018-07-29 NOTE — PROGRESS NOTES
TRANSFER - IN REPORT: 
Verbal report received from Enid GARSIA(name) on Brigida Adler  being received from ED(unit) for routine progression of care Report consisted of patients Situation, Background, Assessment and  
Recommendations(SBAR). Information from the following report(s) SBAR, Kardex, ED Summary, Procedure Summary, Intake/Output, MAR and Recent Results was reviewed with the receiving nurse. Opportunity for questions and clarification was provided. Assessment completed upon patients arrival to unit and care assumed. Primary Nurse Geneva Donald RN and Nigel Catherine RN performed a dual skin assessment on this patient Impairment noted- see wound doc flow sheet Seng score is 16. Multiple scattered abrasions/wounds. BL lower extremities/feet/ankles. Sacrum red, blanches. BL elbows red, bruised, small abrasions.

## 2018-07-30 LAB
ANION GAP SERPL CALC-SCNC: 8 MMOL/L (ref 5–15)
BASOPHILS # BLD: 0 K/UL (ref 0–0.1)
BASOPHILS NFR BLD: 0 % (ref 0–1)
BUN SERPL-MCNC: 14 MG/DL (ref 6–20)
BUN/CREAT SERPL: 21 (ref 12–20)
CALCIUM SERPL-MCNC: 8.4 MG/DL (ref 8.5–10.1)
CHLORIDE SERPL-SCNC: 108 MMOL/L (ref 97–108)
CK MB CFR SERPL CALC: 0.8 % (ref 0–2.5)
CK MB SERPL-MCNC: 4 NG/ML (ref 5–25)
CK SERPL-CCNC: 524 U/L (ref 26–192)
CO2 SERPL-SCNC: 24 MMOL/L (ref 21–32)
CREAT SERPL-MCNC: 0.67 MG/DL (ref 0.55–1.02)
DIFFERENTIAL METHOD BLD: ABNORMAL
EOSINOPHIL # BLD: 0.1 K/UL (ref 0–0.4)
EOSINOPHIL NFR BLD: 2 % (ref 0–7)
ERYTHROCYTE [DISTWIDTH] IN BLOOD BY AUTOMATED COUNT: 14.4 % (ref 11.5–14.5)
GLUCOSE SERPL-MCNC: 89 MG/DL (ref 65–100)
HCT VFR BLD AUTO: 35.4 % (ref 35–47)
HGB BLD-MCNC: 12.1 G/DL (ref 11.5–16)
IMM GRANULOCYTES # BLD: 0 K/UL (ref 0–0.04)
IMM GRANULOCYTES NFR BLD AUTO: 0 % (ref 0–0.5)
LYMPHOCYTES # BLD: 3.1 K/UL (ref 0.8–3.5)
LYMPHOCYTES NFR BLD: 32 % (ref 12–49)
MCH RBC QN AUTO: 32.4 PG (ref 26–34)
MCHC RBC AUTO-ENTMCNC: 34.2 G/DL (ref 30–36.5)
MCV RBC AUTO: 94.7 FL (ref 80–99)
MONOCYTES # BLD: 1.5 K/UL (ref 0–1)
MONOCYTES NFR BLD: 15 % (ref 5–13)
NEUTS SEG # BLD: 4.9 K/UL (ref 1.8–8)
NEUTS SEG NFR BLD: 51 % (ref 32–75)
NRBC # BLD: 0 K/UL (ref 0–0.01)
NRBC BLD-RTO: 0 PER 100 WBC
PLATELET # BLD AUTO: 220 K/UL (ref 150–400)
PMV BLD AUTO: 10.7 FL (ref 8.9–12.9)
POTASSIUM SERPL-SCNC: 3.3 MMOL/L (ref 3.5–5.1)
RBC # BLD AUTO: 3.74 M/UL (ref 3.8–5.2)
SODIUM SERPL-SCNC: 140 MMOL/L (ref 136–145)
TROPONIN I SERPL-MCNC: 0.2 NG/ML
WBC # BLD AUTO: 9.6 K/UL (ref 3.6–11)

## 2018-07-30 PROCEDURE — 74011250636 HC RX REV CODE- 250/636: Performed by: NURSE PRACTITIONER

## 2018-07-30 PROCEDURE — 36415 COLL VENOUS BLD VENIPUNCTURE: CPT | Performed by: INTERNAL MEDICINE

## 2018-07-30 PROCEDURE — 74011250637 HC RX REV CODE- 250/637: Performed by: NURSE PRACTITIONER

## 2018-07-30 PROCEDURE — 84484 ASSAY OF TROPONIN QUANT: CPT | Performed by: INTERNAL MEDICINE

## 2018-07-30 PROCEDURE — 74011000250 HC RX REV CODE- 250: Performed by: INTERNAL MEDICINE

## 2018-07-30 PROCEDURE — 97165 OT EVAL LOW COMPLEX 30 MIN: CPT

## 2018-07-30 PROCEDURE — 74011250637 HC RX REV CODE- 250/637: Performed by: INTERNAL MEDICINE

## 2018-07-30 PROCEDURE — G8979 MOBILITY GOAL STATUS: HCPCS

## 2018-07-30 PROCEDURE — 77030038269 HC DRN EXT URIN PURWCK BARD -A

## 2018-07-30 PROCEDURE — 74011000258 HC RX REV CODE- 258: Performed by: NURSE PRACTITIONER

## 2018-07-30 PROCEDURE — 80048 BASIC METABOLIC PNL TOTAL CA: CPT | Performed by: INTERNAL MEDICINE

## 2018-07-30 PROCEDURE — 82550 ASSAY OF CK (CPK): CPT | Performed by: INTERNAL MEDICINE

## 2018-07-30 PROCEDURE — G8988 SELF CARE GOAL STATUS: HCPCS

## 2018-07-30 PROCEDURE — 85025 COMPLETE CBC W/AUTO DIFF WBC: CPT | Performed by: INTERNAL MEDICINE

## 2018-07-30 PROCEDURE — G8987 SELF CARE CURRENT STATUS: HCPCS

## 2018-07-30 PROCEDURE — 65660000000 HC RM CCU STEPDOWN

## 2018-07-30 PROCEDURE — 97535 SELF CARE MNGMENT TRAINING: CPT

## 2018-07-30 PROCEDURE — 97161 PT EVAL LOW COMPLEX 20 MIN: CPT

## 2018-07-30 PROCEDURE — 74011000258 HC RX REV CODE- 258: Performed by: INTERNAL MEDICINE

## 2018-07-30 PROCEDURE — 77030013160 HC PROTCT ARM THMB DERY -A

## 2018-07-30 PROCEDURE — 93306 TTE W/DOPPLER COMPLETE: CPT

## 2018-07-30 PROCEDURE — 77030032490 HC SLV COMPR SCD KNE COVD -B

## 2018-07-30 PROCEDURE — G8978 MOBILITY CURRENT STATUS: HCPCS

## 2018-07-30 RX ORDER — METOPROLOL SUCCINATE 50 MG/1
100 TABLET, EXTENDED RELEASE ORAL DAILY
Status: DISCONTINUED | OUTPATIENT
Start: 2018-07-31 | End: 2018-08-08 | Stop reason: HOSPADM

## 2018-07-30 RX ORDER — POTASSIUM CHLORIDE 750 MG/1
20 TABLET, FILM COATED, EXTENDED RELEASE ORAL
Status: COMPLETED | OUTPATIENT
Start: 2018-07-30 | End: 2018-07-30

## 2018-07-30 RX ORDER — POTASSIUM CHLORIDE 750 MG/1
40 TABLET, FILM COATED, EXTENDED RELEASE ORAL
Status: COMPLETED | OUTPATIENT
Start: 2018-07-30 | End: 2018-07-30

## 2018-07-30 RX ADMIN — VITAMIN D, TAB 1000IU (100/BT) 2000 UNITS: 25 TAB at 18:13

## 2018-07-30 RX ADMIN — DILTIAZEM HYDROCHLORIDE 10 MG/HR: 5 INJECTION, SOLUTION INTRAVENOUS at 18:15

## 2018-07-30 RX ADMIN — CEFTRIAXONE 1 G: 1 INJECTION, POWDER, FOR SOLUTION INTRAMUSCULAR; INTRAVENOUS at 12:15

## 2018-07-30 RX ADMIN — POTASSIUM CHLORIDE 20 MEQ: 750 TABLET, EXTENDED RELEASE ORAL at 11:08

## 2018-07-30 RX ADMIN — OXYBUTYNIN CHLORIDE 5 MG: 5 TABLET ORAL at 11:13

## 2018-07-30 RX ADMIN — DILTIAZEM HYDROCHLORIDE 10 MG/HR: 5 INJECTION, SOLUTION INTRAVENOUS at 05:01

## 2018-07-30 RX ADMIN — ASPIRIN 81 MG 81 MG: 81 TABLET ORAL at 11:08

## 2018-07-30 RX ADMIN — POTASSIUM CHLORIDE 40 MEQ: 750 TABLET, EXTENDED RELEASE ORAL at 22:45

## 2018-07-30 RX ADMIN — METOPROLOL SUCCINATE 50 MG: 50 TABLET, EXTENDED RELEASE ORAL at 11:08

## 2018-07-30 NOTE — PROGRESS NOTES
Hospitalist Progress Note NAME: Mansoor Palafox :  1929 MRN:  [id number] Interim Hospital Summary: 80 y.o. female whom presented on 2018 with Assessment / Plan: 
Atrial flutter with RVR POA Mild troponin elevation 0.83 POA Hypertension - appreciate cardiology input; continue with cardizem gtt, avoid antiarrhythmic therapy, no AC due to frequent fall and high risk of bleeding 
- continue with BB Acute kidney injury POA  
- resolved after ; creat 0.6 Hypotension POA 81/46 in ED 
- resolved; -130's/60-70's Mild Rhabdomyolysis POA CPK 1480, improving 
-  today Frequent falls Alzheimer's dementia POA 
- CT head negative - pt's mental status is her baseline per family 
- required 1:1 sitter - working SNF placement; appreciate CM input UTI POA Overactive bladder 
- urine cx grew gram (-) rods; will complete total of 5 days 
- hold oxybutynin while treating for UTI Code Status: [facility] Surrogate Decision Maker: Noam Shrestha) is mPOA 
   
DVT Prophylaxis:  SCD 
GI Prophylaxis: not indicated 
   
Baseline:   Lives in [address] calls or checks in on her daily Recommended Disposition: SNF/LTC Subjective: Chief Complaint / Reason for Physician Visit [place] to self only. Remain very confused. Discussed with RN events overnight. Review of Systems: 
Symptom Y/N Comments  Symptom Y/N Comments Fever/Chills    Chest Pain Poor Appetite    Edema Cough    Abdominal Pain Sputum    Joint Pain SOB/ADAME    Pruritis/Rash Nausea/vomit    Tolerating PT/OT Diarrhea    Tolerating Diet Constipation    Other Could NOT obtain due to:   
 
Objective: VITALS:  
Last 24hrs VS reviewed since prior progress note. Most recent are: 
Patient Vitals for the past 24 hrs: 
 Temp Pulse Resp BP SpO2  
18 1501 98.2 °F (36.8 °C) 80 20 134/74 96 % 18 1103 98.7 °F (37.1 °C) 67 18 114/57 99 % 18 0840 98.2 °F (36.8 °C) 99 20 156/67 98 % 07/30/18 0410 98.8 °F (37.1 °C) 86 18 154/85 96 % 07/30/18 0048 98.2 °F (36.8 °C) 66 17 137/72 94 %  
07/29/18 2039 98 °F (36.7 °C) 64 20 131/86 97 %  
07/29/18 1838 - (!) 150 - - -  
07/29/18 1751 - (!) 155 - 111/74 - Intake/Output Summary (Last 24 hours) at 07/30/18 1632 Last data filed at 07/30/18 1324 Gross per 24 hour Intake              300 ml Output              650 ml Net             -350 ml PHYSICAL EXAM: 
General: Ill appearing. Alert, uncooperative/very impulsive behavior, no acute distress   
EENT:  EOMI. Anicteric sclerae. MMM Resp:  CTA bilaterally, no wheezing or rales. No accessory muscle use CV:  Regular  rhythm,  No edema GI:  Soft, Non distended, Non tender.  +Bowel sounds Neurologic:  Alert and oriented X 1, normal speech, Psych:   poor insight.  
Skin:  No rashes. No jaundice Reviewed most current lab test results and cultures  YES Reviewed most current radiology test results   YES Review and summation of old records today    NO Reviewed patient's current orders and MAR    YES 
PMH/ reviewed - no change compared to H&P 
________________________________________________________________________ Care Plan discussed with: 
  Comments Patient y Family RN y   
Care Manager y Consultant     
                 y Multidiciplinary team rounds were held today with , nursing, pharmacist and clinical coordinator. Patient's plan of care was discussed; medications were reviewed and discharge planning was addressed. ________________________________________________________________________ Total NON critical care TIME:  30  Minutes Total CRITICAL CARE TIME Spent:   Minutes non procedure based Comments >50% of visit spent in counseling and coordination of care    
________________________________________________________________________ Radha Castellanos NP Procedures: see electronic medical records for all procedures/Xrays and details which were not copied into this note but were reviewed prior to creation of Plan. LABS: 
I reviewed today's most current labs and imaging studies. Pertinent labs include: 
Recent Labs  
   07/30/18 
 0033  07/29/18 
 0239  07/28/18 
 1457 WBC  9.6  12.9*  12.9* HGB  12.1  11.9  13.1 HCT  35.4  35.4  39.1 PLT  220  215  245 Recent Labs  
   07/30/18 0033  07/29/18 
 0239  07/28/18 
 1457 NA  140  142  139  
K  3.3*  3.2*  3.7 CL  108  110*  103 CO2  24  24  25 GLU  89  81  106* BUN  14  18  24* CREA  0.67  0.78  1.14* CA  8.4*  8.0*  9.1 ALB   --    --   3.9 TBILI   --    --   1.7* SGOT   --    --   69* ALT   --    --   34 Signed: )Jazmín Guerra, NP

## 2018-07-30 NOTE — PROGRESS NOTES
1800- pt HR in the 150's sustained. PRN diltiazem was given with no response. Dr. Cristel Garcia was made aware. S/w Dr. Escobar Mess and diltiazem drip was ordered. 1845 - pt HR still elevated on drip. Pt is confused and trying to get oob. Pt wanting to s/w her niece, Marie Peoples. Marie Peoples was contacted via phone and transferred to pt's room. 1900 -  Bedside shift report given to Ros Thomas RN.

## 2018-07-30 NOTE — PROGRESS NOTES
Bedside shift change report given to Davina Cranker, RN (oncoming nurse). Report included the following information SBAR, Kardex, ED Summary, Intake/Output and MAR. SHIFT SUMMARY: 
 
 
 
 
 
[address] NURSING NOTE Admission Date 7/28/2018 Admission Diagnosis Atrial flutter with rapid ventricular response ([address] Consults IP CONSULT TO CARDIOLOGY Cardiac Monitoring [x] Yes [] No  
  
Purposeful Hourly Rounding [x] Yes   
Rose Mary Score Total Score: 5 Rose Mary score 3 or > [x] Bed Alarm [x] Avasys [] 1:1 sitter [] Patient refused (Signed refusal form in chart) Seng Score Seng Score: 17 Seng score 14 or < [] PMT consult [] Wound Care consult  
 []  Specialty bed  [] Nutrition consult Influenza Vaccine Received Flu Vaccine for Current Season (usually Sept-March): Not Flu Season Oxygen needs? [x] Room air Oxygen @  []1L    []2L    []3L   []4L    []5L   []6L via NC Chronic home O2 use? [] Yes [] No 
Perform O2 challenge test and document in progress note using smartphrase (.Homeoxygen) Last bowel movement Urinary Catheter LDAs Peripheral IV 07/28/18 Left Antecubital (Active) Site Assessment Clean, dry, & intact 7/30/2018  3:15 AM  
Phlebitis Assessment 0 7/30/2018  3:15 AM  
Infiltration Assessment 0 7/30/2018  3:15 AM  
Dressing Status Clean, dry, & intact 7/30/2018  3:15 AM  
Dressing Type Tape;Transparent 7/30/2018  3:15 AM  
Hub Color/Line Status Pink;Flushed;Patent 7/30/2018  3:15 AM  
Action Taken Blood drawn 7/28/2018  2:52 PM  
   
Peripheral IV 07/29/18 Right Arm (Active) Site Assessment Clean, dry, & intact 7/30/2018  3:15 AM  
Phlebitis Assessment 0 7/30/2018  3:15 AM  
Infiltration Assessment 0 7/30/2018  3:15 AM  
Dressing Status Clean, dry, & intact 7/30/2018  3:15 AM  
Dressing Type Tape;Transparent 7/30/2018  3:15 AM  
Hub Color/Line Status Blue;Flushed;Patent 7/30/2018  3:15 AM  
                  
  
Readmission Risk Assessment Tool Score Medium Risk   
      
 17 Total Score 3 Has Seen PCP in Last 6 Months (Yes=3, No=0)  
 4 IP Visits Last 12 Months (1-3=4, 4=9, >4=11) 5 Pt. Coverage (Medicare=5 , Medicaid, or Self-Pay=4) 5 Charlson Comorbidity Score (Age + Comorbid Conditions) Criteria that do not apply:  
 . Living with Significant Other. Assisted Living. LTAC. SNF. or  
Rehab Patient Length of Stay (>5 days = 3) Expected Length of Stay - - - Actual Length of Stay 2

## 2018-07-30 NOTE — PROGRESS NOTES
CM spoke with pt's mPOA Alpha Zainab, joselin) regarding discharge planning. Pt is not safe to return to Independent Living at this time. Per Efe Holcomb, they cannot afford [facility] at this time (approx 3,000+ per month) as pt only makes about $1700/month. Efe Holcomb submitted pt's Medicaid Application to [facility] about 2 months ago but has been unable to get information on whether it's been processed or not. Efe Holcomb has the case number and 's information at home, will leave VM with CM to follow-up with. CM contacted Rosemarie Cutler ([employer]) to check on Medicaid application status. 4:20PM UPDATE Pt's mPOA called CM with DSS information. Informed that niece just received letter from [facility] ([facility]) in the mail stating that pt was denied due to not providing all necessary information. Pt's mPOA is very frustrated because she applied over the phone and was told they had everything that was needed. Also stated that pt's last name was spelled wrong in the letter, wondering if this as to do with it (spelled Addie Dalal) in paperwork. Pt has no other family in the area and pt's niece stated she cannot provide care for the pt at home that is needed, nor can she return to [address] [place] [address] (pt found with urine/feces all over floor, clothes piled in the center, and furniture all over the place). [facility] updated [employer] [facility] with information. Case #[id number] Annabeldionneclinton I[address] BRENDAN Gomez Supervisee in Social Work, [facility] [phone]

## 2018-07-30 NOTE — PROGRESS NOTES
Reason for Admission: Atrial flutter with rapid ventricular response RRAT Score: 17 Do you (patient/family) have any concerns for transition/discharge? [place] living [place] at [facility] but is not safe to return home. Pt had episode in which family returned to facility and all furniture had been thrown/moved around. Unsafe to return with no supervision. Family is currently looking at care home or memory care units, may discharge to SNF and transition to [facility]. Plan for utilizing home health: PT/OT recommendation for SNF Likelihood of readmission? Low-Moderate Transition of Care Plan: Possible SNF at discharge with transition to [facility] for increased supervision. Pt is an 79 yo  female admitted on 7/28/18 for Atrial flutter with rapid ventricular response. Pt lives alone in a [place] apartment at [address] [place] [address]. Pt has baseline dementia, only oriented to self at this time. PTA pt able to ambulate using her rollator at home. Pt's mPOA is her niece Dondra Severe, [phone]) and checks on her everyday. Prior to present admission, pt's family found that her apartment was a mess with her furniture scattered, smell of urine and evidence of stool at different places. Patient also had new bruises in her arms but when asked if she fell, she was not sure if she did. Significant concerns for safety at this time, thus pt's family are looking into care home and Memory Care Units. Pt has been to [facility] in the past for rehab. Pt sees Dr. Chikis Nunez (PCP) and Dr. Drea Vaughn (Cardio) OP. PT/OT to evaluate for needs. CM will continue to follow-up to ensure additional CM needs are met. Care Management Interventions PCP Verified by CM: Yes 
Palliative Care Criteria Met (RRAT>21 & CHF Dx)?: No 
Mode of Transport at Discharge: Other (see comment) (by private vehicle with niece vs BLS) Transition of Care Consult (CM Consult): Discharge Planning Discharge Durable Medical Equipment: No (rollator at home) Health Maintenance Reviewed: Yes Physical Therapy Consult: Yes Occupational Therapy Consult: Yes Speech Therapy Consult: No 
Current Support Network: Lives Alone, Other (Pt lives alone at [facility]; niece is looking into other options, as pt is not safe at this time) Confirm Follow Up Transport: Family Plan discussed with Pt/Family/Caregiver: Yes Discharge Location Discharge Placement:  (TBD) Maria Guadalupe Arevalo MSW Supervisee in Social Work, [facility] [phone]

## 2018-07-30 NOTE — PROGRESS NOTES
Problem: Falls - Risk of 
Goal: *Absence of Falls Document Marta Olson Fall Risk and appropriate interventions in the flowsheet. Outcome: Progressing Towards Goal 
Fall Risk Interventions: 
Mobility Interventions: Bed/chair exit alarm, Communicate number of staff needed for ambulation/transfer, OT consult for ADLs, Patient to call before getting OOB, PT Consult for mobility concerns Mentation Interventions: Bed/chair exit alarm, Door open when patient unattended, Reorient patient, Room close to nurse's station, More frequent rounding Medication Interventions: Bed/chair exit alarm, Patient to call before getting OOB, Teach patient to arise slowly Elimination Interventions: Bed/chair exit alarm, Call light in reach, Patient to call for help with toileting needs, Toileting schedule/hourly rounds History of Falls Interventions: Bed/chair exit alarm, Door open when patient unattended, Consult care management for discharge planning, Investigate reason for fall, Room close to nurse's station

## 2018-07-30 NOTE — PROGRESS NOTES
Initial Nutrition Assessment: 
 
INTERVENTIONS/RECOMMENDATIONS:  
· Consider regular diet due to poor PO intake and age · Ensure Enlive TID (chocolate) · May need MVI if pt does not consume ensure · Standing weight when/if able ASSESSMENT:  
Chart reviewed, medically noted for Alzheimer's dementia. Nutrition consulted due to poor PO intake. Pt was sleeping during visit and RN notes pt is confused at baseline. Pt known to me from admission in May at which time she met criteria for malnutrition. Weight history does not show any weight loss since last admission however current weight is from bed scale. She was receiving ensure enlive last admission. Past Medical History:  
Diagnosis Date  A-fib ([facility]  Alzheimer's dementia  Arthritis Diet Order: Cardiac 
% Eaten:  No data found. Pertinent Medications: [x]Reviewed: vit D3 Pertinent Labs: [x]Reviewed: K+ 3.3 Food Allergies: [x]NKFA  []Other Last BM:  
Edema:        []RUE   []LUE   [x]RLE 3+  [x]LLE 3+ Pressure Injury:      [] Stage I   [] Stage II   [] Stage III   [] Stage IV Wt Readings from Last 30 Encounters:  
07/28/18 53 kg (116 lb 13.5 oz) 05/24/18 53 kg (116 lb 13.5 oz) 05/22/18 52.3 kg (115 lb 3.2 oz) 03/09/18 54.4 kg (120 lb)  
02/20/18 54.4 kg (120 lb) 12/05/17 52.2 kg (115 lb)  
08/28/17 52.2 kg (115 lb) Anthropometrics:  
Height: 5' 1\" (154.9 cm) Weight: 53 kg (116 lb 13.5 oz) IBW (%IBW):   ( ) UBW (%UBW):   (  %) Last Weight Metrics: 
Weight Loss Metrics 7/28/2018 5/24/2018 5/22/2018 3/9/2018 2/20/2018 12/5/2017 11/21/2017 Today's Wt 116 lb 13.5 oz 116 lb 13.5 oz 115 lb 3.2 oz 120 lb 120 lb 115 lb -  
BMI 22.08 kg/m2 22.08 kg/m2 21.77 kg/m2 22.67 kg/m2 22.67 kg/m2 21.73 kg/m2 -  
 
 
BMI: Body mass index is 22.08 kg/(m^2). This BMI is indicative of: 
 []Underweight    [x]Normal    []Overweight    [] Obesity   [] Extreme Obesity (BMI>40) Estimated Nutrition Needs (Based on):  
5617 Kcals/day (BMR: 900 x 1.2) , 55 g (1 g/kg) Protein Carbohydrate: At Least 130 g/day  Fluids: 1075 mL/day (1ml/kcal) or per primary team 
 
NUTRITION DIAGNOSES:  
Problem:  Inadequate protein-energy intake Etiology: related to Alzheimer's dementia Signs/Symptoms: as evidenced by RN report of poor PO intake NUTRITION INTERVENTIONS: 
Meals/Snacks: General/healthful diet   Supplements: Commercial supplement GOAL:  
consume >50% of meals and ONS in 2-4 days LEARNING NEEDS (Diet, Food/Nutrient-Drug Interaction):  
 [x] None Identified 
 [] Identified and Education Provided/Documented 
 [] Identified and Pt declined/was not appropriate Cultureal, Mandaeism, OR Ethnic Dietary Needs:  
 [x] None Identified 
 [] Identified and Addressed 
 
 [x] Interdisciplinary Care Plan Reviewed/Documented  
 [x] Discharge Planning: General healthy diet, ONS prn to prevent weight loss MONITORING /EVALUATION:  
  
Food/Nutrient Intake Outcomes: Total energy intake Physical Signs/Symptoms Outcomes: Weight/weight change, Electrolyte and renal profile, GI 
 
NUTRITION RISK:  
 [x] High              [] Moderate           []  Low  []  Minimal/Uncompromised PT SEEN FOR:  
 [x]  MD Consult: []Calorie Count []Diabetic Diet Education []Diet Education []Electrolyte Management 
   [x]General Nutrition Management and Supplements []Management of Tube Feeding []TPN Recommendations []  RN Referral:  []MST score >=2 
   []Enteral/Parenteral Nutrition PTA []Pregnant: Gestational DM or Multigestation 
   []Pressure Ulcer/Wound Care needs 
     
[]  Low BMI 
[]  LOS Referral  
 
 
Suha Maya RDN Pager [phone] Weekend Pager [phone]

## 2018-07-30 NOTE — PROGRESS NOTES
Problem: Self Care Deficits Care Plan (Adult) Goal: *Acute Goals and Plan of Care (Insert Text) Occupational Therapy Goals Initiated 7/30/2018 1. Patient will perform self-feeding with supervision/set-up within 7 day(s). 2.  Patient will perform grooming seated with minimal assistance/contact guard assist within 7 day(s). 3.  Patient will perform upper body dressing with moderate assistance  within 7 day(s). 4.  Patient will perform toilet transfers with minimal assistance/contact guard assist within 7 day(s). 5.  Patient will perform all aspects of toileting with minimal assistance/contact guard assist within 7 day(s). 6.  Patient will participate in upper extremity therapeutic exercise/activities with minimal assistance/contact guard assist for 5 minutes within 7 day(s). 7.  Patient will utilize energy conservation techniques during functional activities with verbal and tactile cues within 7 day(s). Occupational Therapy EVALUATION Patient: Margi Borja (66 y.o. female) Date: 7/30/2018 Primary Diagnosis: Atrial fibrillation, rapid ([facility] [address] Precautions:   Fall ASSESSMENT : 
Based on the objective data described below, the patient presents with generalized weakness, decreased cognition, endurance, strength, functional mobility, and ADLs. Pt was living at [facility] in independent living and family calling her daily. Pt was cleared to be seen for therapy and per chart pt was having numerous falls and house was found with urine and feces all over the furniture from pt. Pt was confused and did not want to get up and move due to she was too tired. OT encouraged pt to at least sit on EOB. Pt was max of 1 and min of another for bed mobility. She needed min to CGA for sitting, until she understood that she had to sit up withno assist.  Pt was mahin to stand with min assist of 2 and take side steps to the [facility] .   Pt was mod to max assist for sit to supine and left in bed in chair position. OT placed pillows under right and left arms and pt was able to hold cup of fruit and scoop with a spoon. .  Pt was left sitting up in bed with sitter in room and bed alarm activated. Recommend that pt have further therapy at discharge at rehab at [facility]. Patient will benefit from skilled intervention to address the above impairments. Patients rehabilitation potential is considered to be Fair Factors which may influence rehabilitation potential include:  
[]             None noted [x]             Mental ability/status []             Medical condition [x]             Home/family situation and support systems []             Safety awareness []             Pain tolerance/management 
[]             Other: PLAN : 
Recommendations and Planned Interventions: 
[x]               Self Care Training                  []        Therapeutic Activities [x]               Functional Mobility Training    [x]        Cognitive Retraining 
[x]               Therapeutic Exercises           [x]        Endurance Activities [x]               Balance Training                   []        Neuromuscular Re-Education []               Visual/Perceptual Training     [x]   Home Safety Training 
[x]               Patient Education                 [x]        Family Training/Education []               Other (comment): Frequency/Duration: Patient will be followed by occupational therapy 4 times a week to address goals. Discharge Recommendations: Justin Avila Further Equipment Recommendations for Discharge: tbd SUBJECTIVE:  
Patient stated I really don't feel strong enough to do anything today.  OBJECTIVE DATA SUMMARY:  
HISTORY:  
Past Medical History:  
Diagnosis Date  Atrial arrhythmia 12/17/2014  Bowel obstruction ([address]  DJD (degenerative joint disease) 6/25/2014  
 HTN (hypertension) 6/13/2011  Hypertension  Vertigo 6/13/2011 Past Surgical History:  
Procedure Laterality Date  HX CHOLECYSTECTOMY  HX GYN    
 c section Prior Level of Function/Environment/Context: pt lives at [facility] in [address] living and family calling to check on her daily. Pts home was found to be unclean and urine and stool on furniture and furniture was scattered around the room. Pt has had frequent falls Occupations in which the patient is/was successful, what are the barriers preventing that success:  
Performance Patterns (routines, roles, habits, and rituals):  
Personal Interests and/or values:  
Expanded or extensive additional review of patient history:  
 
Home Situation Home Environment: Private residence # Steps to Enter: 4 Rails to Enter: Yes Hand Rails : Right One/Two Story Residence: One story Living Alone: Yes Support Systems: Child(bart) Patient Expects to be Discharged to[de-identified] Private residence Current DME Used/Available at Home: Marne Boys, straight, Mariza Brighter, rollator Tub or Shower Type: Shower Hand dominance: Right EXAMINATION OF PERFORMANCE DEFICITS: 
Cognitive/Behavioral Status: 
Neurologic State: Alert;Confused Orientation Level: Oriented to person;Disoriented to time;Disoriented to situation;Disoriented to place Cognition: No command following; Impaired decision making Skin: in fair health Edema: none noted Hearing: Auditory Auditory Impairment: Hard of hearing, bilateral 
 
Vision/Perceptual:   
    
    
    
 grossly intact Range of Motion: 
 
AROM: Generally decreased, functional 
PROM: Generally decreased, functional 
  
  
  
  
  
  
 
Strength: 
 
Strength: Generally decreased, functional 
  
  
  
  
 
Coordination: 
Coordination: Within functional limits Fine Motor Skills-Upper: Left Intact; Right Intact Gross Motor Skills-Upper: Left Impaired;Right Impaired Tone & Sensation: 
 
Tone: Normal 
  
  
  
  
  
  
  
 
Balance: 
Sitting: Impaired; Without support Sitting - Static: Good (unsupported) Sitting - Dynamic: Fair (occasional) Standing: Impaired; With support Standing - Static: Good Standing - Dynamic : Fair Functional Mobility and Transfers for ADLs: 
Bed Mobility: 
Rolling: Minimum assistance; Moderate assistance;Assist x2 Supine to Sit: Minimum assistance; Moderate assistance;Assist x2 Scooting: Moderate assistance;Assist x2 Transfers: 
Sit to Stand: Minimum assistance;Assist x2 Stand to Sit: Minimum assistance ADL Assessment: 
Feeding: Minimum assistance;Setup Oral Facial Hygiene/Grooming: Minimum assistance;Setup Bathing: Maximum assistance;Minimum assistance Upper Body Dressing: Maximum assistance;Minimum assistance Lower Body Dressing: Maximum assistance;Minimum assistance Toileting: Maximum assistance Functional Measure: 
Barthel Index: 
 
Bathin Bladder: 5 Bowels: 5 Groomin Dressin Feedin Mobility: 5 Stairs: 0 Toilet Use: 0 Transfer (Bed to Chair and Back): 5 Total: 25 Barthel and G-code impairment scale: 
Percentage of impairment CH 
0% CI 
1-19% CJ 
20-39% CK 
40-59% CL 
60-79% CM 
80-99% CN 
100% Barthel Score 0-100 100 99-80 79-60 59-40 20-39 1-19 
 0 Barthel Score 0-20 20 17-19 13-16 9-12 5-8 1-4 0 The Barthel ADL Index: Guidelines 1. The index should be used as a record of what a patient does, not as a record of what a patient could do. 2. The main aim is to establish degree of independence from any help, physical or verbal, however minor and for whatever reason. 3. The need for supervision renders the patient not independent. 4. A patient's performance should be established using the best available evidence. Asking the patient, friends/relatives and nurses are the usual sources, but direct observation and common sense are also important. However direct testing is not needed. 5. Usually the patient's performance over the preceding 24-48 hours is important, but occasionally longer periods will be relevant.  
6. Middle categories imply that the patient supplies over 50 per cent of the effort. 7. Use of aids to be independent is allowed. Clara Rasheed., Barthel, D.W. ([id number]). Functional evaluation: the Barthel Index. [address] (14)2. ADALID Cantu [place]., Everton Morales., [place], [address] (1999). Measuring the change indisability after inpatient rehabilitation; comparison of the responsiveness of the Barthel Index and Functional [place] Measure. Journal of Neurology, Neurosurgery, and Psychiatry, 66(4), 228-356. AROLDO Sifuentes, JOSÉ ANTONIO Farrell, & Yu Rivas MASHLEY. (2004.) Assessment of post-stroke quality of life in cost-effectiveness studies: The usefulness of the Barthel Index and the EuroQoL-5D. [facility], 13, 309-49 G codes: In compliance with CMSs Claims Based Outcome Reporting, the following G-code set was chosen for this patient based on their primary functional limitation being treated: The outcome measure chosen to determine the severity of the functional limitation was the Barthel with a score of 25/100 which was correlated with the impairment scale. ? Self Care:  
  - CURRENT STATUS: CL - 60%-79% impaired, limited or restricted  - GOAL STATUS: CK - 40%-59% impaired, limited or restricted  - D/C STATUS:  ---------------To be determined--------------- Occupational Therapy Evaluation Charge Determination History Examination Decision-Making MEDIUM Complexity : Expanded review of history including physical, cognitive and psychosocial  history  MEDIUM Complexity : 3-5 performance deficits relating to physical, cognitive , or psychosocial skils that result in activity limitations and / or participation restrictions MEDIUM Complexity : Patient may present with comorbidities that affect occupational performnce.  Miniml to moderate modification of tasks or assistance (eg, physical or verbal ) with assesment(s) is necessary to enable patient to complete evaluation Based on the above components, the patient evaluation is determined to be of the following complexity level: MEDIUM Pain: 
Pain Scale 1: Visual 
Pain Intensity 1: 0 Activity Tolerance:  
Fair/vss Please refer to the flowsheet for vital signs taken during this treatment. After treatment:  
[] Patient left in no apparent distress sitting up in chair 
[x] Patient left in no apparent distress in bed 
[x] Call bell left within reach [x] Nursing notified 
[x] Caregiver present [x] Bed alarm activated COMMUNICATION/EDUCATION:  
The patients plan of care was discussed with: Physical Therapist and Registered Nurse. [x] Home safety education was provided and the patient/caregiver indicated understanding. [] Patient/family have participated as able in goal setting and plan of care. [] Patient/family agree to work toward stated goals and plan of care. [] Patient understands intent and goals of therapy, but is neutral about his/her participation. [x] Patient is unable to participate in goal setting and plan of care. This patients plan of care is appropriate for delegation to [facility]. Thank you for this referral. 
Erika Campos OT Time Calculation: 37 mins

## 2018-07-30 NOTE — PROGRESS NOTES
Problem: Mobility Impaired (Adult and Pediatric) Goal: *Acute Goals and Plan of Care (Insert Text) Physical Therapy Goals Initiated 7/30/2018 1. Patient will move from supine to sit and sit to supine , scoot up and down and roll side to side in bed with minimal assistance/contact guard assist x 1 within 7 day(s). 2.  Patient will transfer from bed to chair and chair to bed with supervision/set-up using the least restrictive device within 7 day(s). 3.  Patient will perform sit to stand with supervision/set-up within 7 day(s). 4.  Patient will ambulate with minimal assistance/contact guard assist for 50 feet with the least restrictive device within 7 day(s). physical Therapy EVALUATION Patient: Herminia Baltazar (54 y.o. female) Date: 7/30/2018 Primary Diagnosis: Atrial flutter with rapid ventricular response ([address] Precautions:  Fall ASSESSMENT : 
Based on the objective data described below, the patient presents with impaired functional mobility secondary to impaired sitting and standing balance, generalized weakness, impaired gait mechanics, increased confusion, decreased ROM, and decreased activity tolerance following admission for atrial flutter with RVR. Pt received supine in bed with sitter present and needed MAX encouragement and education to participate in acute PT. Pt cleared by nursing for mobility and VSS. Bed mobility performed with max A of 1 and min A of another to come to seated position at EOB. Needed cueing and assist to scoot hips towards EOB. Sit<>stand transfers performed with min A x 2. Pt only performed side steps at EOB towards [facility] and she did not want to ambulate further and needed continued encouragement to progress mobility. Needed mod A x 2 to return to bed. Pt placed in modified chair position at the conclusion of therapy session and left with all needs met, bed alarm on, sitter present, and RN aware.  Pt with confusion throughout therapy session, needing frequent cueing to progress mobility. Recommend pt discharge to SNF rehab to improve functional mobility and independence prior to returning to [place]. PT will continue to follow to address mobility impairments as noted above. Patient will benefit from skilled intervention to address the above impairments. Patients rehabilitation potential is considered to be Fair Factors which may influence rehabilitation potential include:  
[]         None noted 
[x]         Mental ability/status []         Medical condition 
[x]         Home/family situation and support systems 
[x]         Safety awareness 
[]         Pain tolerance/management 
[]         Other: PLAN : 
Recommendations and Planned Interventions: 
[x]           Bed Mobility Training             []    Neuromuscular Re-Education 
[x]           Transfer Training                   []    Orthotic/Prosthetic Training 
[x]           Gait Training                         []    Modalities [x]           Therapeutic Exercises           []    Edema Management/Control 
[x]           Therapeutic Activities            [x]    Patient and Family Training/Education 
[]           Other (comment): Frequency/Duration: Patient will be followed by physical therapy  4 times a week to address goals. Discharge Recommendations: Justin Avila Further Equipment Recommendations for Discharge: TBD by rehab SUBJECTIVE:  
Patient stated Not today.  OBJECTIVE DATA SUMMARY:  
HISTORY:   
Past Medical History:  
Diagnosis Date  A-fib ([address]  Alzheimer's dementia  Arthritis Past Surgical History:  
Procedure Laterality Date  HX BREAST AUGMENTATION    
 HX ORTHOPAEDIC TKR--bilateral   
 
Prior Level of Function/Home Situation: Pt is a poor historian, however per chart, pt lives in [place] at [facility]. Pt reports that she uses rollator for ambulation. H/o GLFs. Lives alone in [place]. Family in the area.  
Personal factors and/or comorbidities impacting plan of care: dementia Home Situation Home Environment: Independent living # Steps to Enter: 0 One/Two Story Residence: One story Living Alone: Yes Support Systems: Family member(s) Patient Expects to be Discharged to[de-identified] Unknown Current DME Used/Available at Home: juan josé Muñoz EXAMINATION/PRESENTATION/DECISION MAKING:  
Critical Behavior: 
Neurologic State: Alert, Confused Orientation Level: Oriented to person, Oriented to time, Disoriented to situation, Disoriented to place Cognition: Poor safety awareness, No command following Hearing: Auditory Auditory Impairment: Hard of hearing, bilateral 
Skin:  Intact Edema: None Range Of Motion: 
AROM: Generally decreased, functional 
  
  
  
PROM: Generally decreased, functional 
  
  
  
Strength:   
Strength: Generally decreased, functional 
  
  
  
  
  
  
Tone & Sensation:  
Tone: Normal 
  
  
  
  
  
  
  
  
   
Coordination: 
Coordination: Generally decreased, functional 
Vision:  
  
Functional Mobility: 
Bed Mobility: 
Rolling: Minimum assistance;Maximum assistance;Assist x2 Supine to Sit: Minimum assistance;Maximum assistance;Assist x2 Sit to Supine: Moderate assistance;Assist x2 Scooting: Moderate assistance Transfers: 
Sit to Stand: Minimum assistance;Assist x2 Stand to Sit: Minimum assistance Balance:  
Sitting: Intact Standing: Impaired; With support Standing - Static: Fair Standing - Dynamic : Fair Ambulation/Gait Training: 
Distance (ft): 3 Feet (ft) (side steps at EOB towards [facility]) Assistive Device: Gait belt (HHA x 2) Ambulation - Level of Assistance: Minimal assistance;Assist x2; Additional time Gait Description (WDL): Exceptions to [facility] Gait Abnormalities: Decreased step clearance;Shuffling gait; Step to gait Base of Support: Narrowed Speed/Mindi: Pace decreased (<100 feet/min); Shuffled Step Length: Left shortened;Right shortened Functional Measure: 
Barthel Index: 
 
Bathin Bladder: 0 Bowels: 0 Groomin Dressin Feedin Mobility: 0 Stairs: 0 Toilet Use: 0 Transfer (Bed to Chair and Back): 5 Total: 15 Barthel and G-code impairment scale: 
Percentage of impairment CH 
0% CI 
1-19% CJ 
20-39% CK 
40-59% CL 
60-79% CM 
80-99% CN 
100% Barthel Score 0-100 100 99-80 79-60 59-40 20-39 1-19 
 0 Barthel Score 0-20 20 17-19 13-16 9-12 5-8 1-4 0 The Barthel ADL Index: Guidelines 1. The index should be used as a record of what a patient does, not as a record of what a patient could do. 2. The main aim is to establish degree of independence from any help, physical or verbal, however minor and for whatever reason. 3. The need for supervision renders the patient not independent. 4. A patient's performance should be established using the best available evidence. Asking the patient, friends/relatives and nurses are the usual sources, but direct observation and common sense are also important. However direct testing is not needed. 5. Usually the patient's performance over the preceding 24-48 hours is important, but occasionally longer periods will be relevant. 6. Middle categories imply that the patient supplies over 50 per cent of the effort. 7. Use of aids to be independent is allowed. Haley Olvera., Barthel, DJoshuaW. ([id number]). Functional evaluation: the Barthel Index. [address] [facility] (14)2. ADALID Faria, Hermilo Jimenez., Angel French., [place], [address] [place] [address] (). Measuring the change indisability after inpatient rehabilitation; comparison of the responsiveness of the Barthel Index and Functional [place] Measure. Journal of Neurology, Neurosurgery, and Psychiatry, 66(4), 146-080. Darlene Wagner, N.J.A, JOSÉ ANTONIO Farrell, & Martha Stanton MJoshuaA. (2004.) Assessment of post-stroke quality of life in cost-effectiveness studies: The usefulness of the Barthel Index and the EuroQoL-5D. [facility], 13, 249-46 G codes: In compliance with CMSs Claims Based Outcome Reporting, the following G-code set was chosen for this patient based on their primary functional limitation being treated: The outcome measure chosen to determine the severity of the functional limitation was the Barthel Index with a score of 15/100 which was correlated with the impairment scale. ? Mobility - Walking and Moving Around:  
  - CURRENT STATUS: CM - 80%-99% impaired, limited or restricted  - GOAL STATUS: CK - 40%-59% impaired, limited or restricted  - D/C STATUS:  ---------------To be determined--------------- Physical Therapy Evaluation Charge Determination History Examination Presentation Decision-Making MEDIUM  Complexity : 1-2 comorbidities / personal factors will impact the outcome/ POC  HIGH Complexity : 4+ Standardized tests and measures addressing body structure, function, activity limitation and / or participation in recreation  LOW Complexity : Stable, uncomplicated  Other outcome measures Barthel Index  HIGH Based on the above components, the patient evaluation is determined to be of the following complexity level: LOW Pain: 
Pain Scale 1: Numeric (0 - 10) Pain Intensity 1: 0 Activity Tolerance:  
Fair - pt with increased fatigue; HR stable; increased confusion Please refer to the flowsheet for vital signs taken during this treatment. After treatment:  
[]         Patient left in no apparent distress sitting up in chair 
[x]         Patient left in no apparent distress in bed 
[x]         Call bell left within reach [x]         Nursing notified 
[x]         Caregiver present [x]         Bed alarm activated COMMUNICATION/EDUCATION:  
The patients plan of care was discussed with: Physical Therapist, Occupational Therapist and Registered Nurse. [x]         Fall prevention education was provided and the patient/caregiver indicated understanding.  
[x]         Patient/family have participated as able in goal setting and plan of care. [x]         Patient/family agree to work toward stated goals and plan of care. []         Patient understands intent and goals of therapy, but is neutral about his/her participation. []         Patient is unable to participate in goal setting and plan of care. Thank you for this referral. 
Justin Shah, PT, DPT Time Calculation: 18 mins

## 2018-07-30 NOTE — PROGRESS NOTES
EP/ Arrhythmia Progress Note Patient ID: 
Patient: Lidia Phillips  MRN: [id number] Age: 80 y.o.  : 1929 10:52 AM 
Admit Date: 2018 Assessment: 1. Paroxysmal atrial flutter with variable block, now back in sinus. 2. Positive troponin, but also in setting of mild rhabdomyolysis, UTI. Nonspecific. 3. Dementia. 4. Chronic anticoagulation with Xarelto as an OP, stopped here. Falls, bleeding risk. 5. UTI POA. Gram negative rods. 6. DNR. Plan: 1. Rate control strategy preferred. On Bb, also diltiazem infusion. Transition to oral diltiazem soon. 2. Will try to avoid antiarrhythmic therapy if rate control is achievable. 3. Agree with no chronic anticoagulation given falls, major bleeding risk. 4. Would treat UTI. [x]       High complexity decision making was performed in this patient Subjective:  
 
Lidia Phillips doesn't deny or suggest chest pain, dyspnea, palpitations. Does not participate in conversation. Review of Systems - Cannot give full ROS due to dementia. Objective:  
 
Physical Exam: 
Temp (24hrs), Av.3 °F (36.8 °C), Min:98 °F (36.7 °C), Max:98.8 °F (37.1 °C) Patient Vitals for the past 8 hrs: 
 Pulse 18 0840 99  
18 0410 86 Patient Vitals for the past 8 hrs: 
 Resp  
18 0840 20  
18 0410 18 Patient Vitals for the past 8 hrs: 
 BP  
18 0840 156/67  
18 0410 154/85 Intake/Output Summary (Last 24 hours) at 18 1052 Last data filed at 18 7538 Gross per 24 hour Intake                0 ml Output              650 ml Net             -650 ml Nondiaphoretic, not in acute distress. MMM, no jaundice, HEENT stable. Unlabored, clear to auscultation bilaterally, symmetric air movement. Regular rate and rhythm, + soft systolic murmur, no pericardial rub, knock, or gallop.   No JVD or peripheral edema. Palpable radial pulses bilaterally. Abdomen soft, nontender, nondistended. No pulsatile masses or bruit. No cyanosis. Skin warm and dry. Awakens, doesn't participate with conversation today. No tremor. Cardiographics and Studies, I personally reviewed: 
 
Telemetry:  SR 70's. ECG 7/28: May be typical atrial flutter with variable block, mostly 2:1.  Prior ECG in 5/2018 shows 4:1 conduction, typical morphology. ECHO:  Pending. LAB Review:  
 
Recent Labs  
   07/30/18 
 0033  07/29/18 
 0239  07/28/18 
 1457 CPK  524*  953*  1480* CKMB  4.0*   --   12.2* CKNDX  0.8   --   0.8  
TROIQ  0.20*  0.53*  0.83* Lab Results Component Value Date/Time Cholesterol, total 153 02/20/2018 02:08 PM  
 HDL Cholesterol 72 02/20/2018 02:08 PM  
 LDL, calculated 67 02/20/2018 02:08 PM  
 Triglyceride 70 02/20/2018 02:08 PM  
 
No results for input(s): INR, PTP, APTT in the last 72 hours. No lab exists for component: INREXT Recent Labs  
   07/30/18 
 0033  07/29/18 
 0239  07/28/18 
 1457 NA  140  142  139  
K  3.3*  3.2*  3.7 CL  108  110*  103 CO2  24  24  25 BUN  14  18  24* CREA  0.67  0.78  1.14* GLU  89  81  106* CA  8.4*  8.0*  9.1 ALB   --    --   3.9 WBC  9.6  12.9*  12.9* HGB  12.1  11.9  13.1 HCT  35.4  35.4  39.1 PLT  220  215  245 Recent Labs  
   07/28/18 
 1457 SGOT  69* AP  58  
TP  6.7 ALB  3.9 GLOB  2.8 No components found for: [place] No results for input(s): PH, PCO2, PO2 in the last 72 hours. Medications Reviewed:  
No Known Allergies Current Facility-Administered Medications Medication Dose Route Frequency  potassium chloride SR (KLOR-CON 10) tablet 20 mEq  20 mEq Oral NOW  aspirin chewable tablet 81 mg  81 mg Oral DAILY  dilTIAZem (CARDIZEM) 125 mg in 0.9% sodium chloride 125 mL infusion  10 mg/hr IntraVENous CONTINUOUS  cholecalciferol (VITAMIN D3) tablet 2,000 Units  2,000 Units Oral QPM  
 oxybutynin (DITROPAN) tablet 5 mg  5 mg Oral BID  metoprolol succinate (TOPROL-XL) XL tablet 50 mg  50 mg Oral DAILY  acetaminophen (TYLENOL) tablet 650 mg  650 mg Oral Q4H PRN  
 ondansetron (ZOFRAN) injection 4 mg  4 mg IntraVENous Q4H PRN  
 dilTIAZem (CARDIZEM) injection 5 mg  5 mg IntraVENous Q4H PRN Dee Trejo MD 
7/30/2018

## 2018-07-30 NOTE — PROGRESS NOTES
0700: Bedside report received from Alyssa Giang, [facility]. Assumed patient care 0715: Patient continues to be very confused stating \"Yall need to help me get up and get out of here now!!\" Patient appears very agitated and is actively trying to pull off telemetry leads and get out of bed. [place] sitter discontinued and 1:1 sitter placed in room with patient. MD notified. Will continue to monitor 1330: Patient resting comfortably. Family at bedside, updated on plan of care/ overnight events. Family stating this level of confusion is patients baseline mentation.

## 2018-07-31 LAB
ANION GAP SERPL CALC-SCNC: 8 MMOL/L (ref 5–15)
BACTERIA SPEC CULT: ABNORMAL
BUN SERPL-MCNC: 13 MG/DL (ref 6–20)
BUN/CREAT SERPL: 17 (ref 12–20)
CALCIUM SERPL-MCNC: 8.3 MG/DL (ref 8.5–10.1)
CC UR VC: ABNORMAL
CHLORIDE SERPL-SCNC: 108 MMOL/L (ref 97–108)
CK SERPL-CCNC: 170 U/L (ref 26–192)
CO2 SERPL-SCNC: 23 MMOL/L (ref 21–32)
CREAT SERPL-MCNC: 0.77 MG/DL (ref 0.55–1.02)
GLUCOSE SERPL-MCNC: 117 MG/DL (ref 65–100)
POTASSIUM SERPL-SCNC: 4.1 MMOL/L (ref 3.5–5.1)
SERVICE CMNT-IMP: ABNORMAL
SODIUM SERPL-SCNC: 139 MMOL/L (ref 136–145)

## 2018-07-31 PROCEDURE — 36415 COLL VENOUS BLD VENIPUNCTURE: CPT | Performed by: NURSE PRACTITIONER

## 2018-07-31 PROCEDURE — 82550 ASSAY OF CK (CPK): CPT | Performed by: NURSE PRACTITIONER

## 2018-07-31 PROCEDURE — 97530 THERAPEUTIC ACTIVITIES: CPT

## 2018-07-31 PROCEDURE — 74011000258 HC RX REV CODE- 258: Performed by: NURSE PRACTITIONER

## 2018-07-31 PROCEDURE — 65660000000 HC RM CCU STEPDOWN

## 2018-07-31 PROCEDURE — 74011250637 HC RX REV CODE- 250/637: Performed by: INTERNAL MEDICINE

## 2018-07-31 PROCEDURE — 77030038269 HC DRN EXT URIN PURWCK BARD -A

## 2018-07-31 PROCEDURE — 80048 BASIC METABOLIC PNL TOTAL CA: CPT | Performed by: NURSE PRACTITIONER

## 2018-07-31 PROCEDURE — 74011250636 HC RX REV CODE- 250/636: Performed by: NURSE PRACTITIONER

## 2018-07-31 RX ORDER — DILTIAZEM HYDROCHLORIDE 30 MG/1
60 TABLET, FILM COATED ORAL 3 TIMES DAILY
Status: DISCONTINUED | OUTPATIENT
Start: 2018-07-31 | End: 2018-08-05

## 2018-07-31 RX ORDER — QUETIAPINE FUMARATE 25 MG/1
25 TABLET, FILM COATED ORAL
Status: DISCONTINUED | OUTPATIENT
Start: 2018-07-31 | End: 2018-08-08 | Stop reason: HOSPADM

## 2018-07-31 RX ORDER — DILTIAZEM HYDROCHLORIDE 5 MG/ML
5 INJECTION INTRAVENOUS
Status: DISCONTINUED | OUTPATIENT
Start: 2018-07-31 | End: 2018-08-08 | Stop reason: HOSPADM

## 2018-07-31 RX ADMIN — CEFTRIAXONE 1 G: 1 INJECTION, POWDER, FOR SOLUTION INTRAMUSCULAR; INTRAVENOUS at 15:07

## 2018-07-31 RX ADMIN — METOPROLOL SUCCINATE 100 MG: 50 TABLET, EXTENDED RELEASE ORAL at 08:42

## 2018-07-31 RX ADMIN — VITAMIN D, TAB 1000IU (100/BT) 2000 UNITS: 25 TAB at 17:58

## 2018-07-31 RX ADMIN — DILTIAZEM HYDROCHLORIDE 60 MG: 30 TABLET, FILM COATED ORAL at 20:19

## 2018-07-31 RX ADMIN — ASPIRIN 81 MG 81 MG: 81 TABLET ORAL at 08:42

## 2018-07-31 NOTE — PROGRESS NOTES
Problem: Mobility Impaired (Adult and Pediatric) Goal: *Acute Goals and Plan of Care (Insert Text) Physical Therapy Goals Initiated 7/30/2018 1. Patient will move from supine to sit and sit to supine , scoot up and down and roll side to side in bed with minimal assistance/contact guard assist x 1 within 7 day(s). 2.  Patient will transfer from bed to chair and chair to bed with supervision/set-up using the least restrictive device within 7 day(s). 3.  Patient will perform sit to stand with supervision/set-up within 7 day(s). 4.  Patient will ambulate with minimal assistance/contact guard assist for 50 feet with the least restrictive device within 7 day(s). physical Therapy TREATMENT Patient: Jules Ibarra (21 y.o. female) Date: 7/31/2018 Diagnosis: Atrial flutter with rapid ventricular response (HCC) <principal problem not specified> Precautions: Fall Chart, physical therapy assessment, plan of care and goals were reviewed. ASSESSMENT: 
Pt cleared by nurse to mobilize. Pt received in bed supine asleep but easily awakened. Pt agreeable to therapy. Once moving pt reported having to use restroom. Pt preformed supine to sit at [id number] with additional time and cueing for sequencing. Pt performed supine to sit at min A x2. Pt able to take steps to [facility] at min A x2 with HHA x2. Pt able to stand at min A/CGA x1 for hygiene x2 mins. Pt ambulated 4ft with HHA x2 at min A to chair. Pt left up in chair with alarm and sitter in place. Pt with improved transfers today. Pt will benefit from SNF rehab to improve strength and safe mobility. Progression toward goals:  
[]    Improving appropriately and progressing toward goals [x]    Improving slowly and progressing toward goals 
[]    Not making progress toward goals and plan of care will be adjusted PLAN: 
Patient continues to benefit from skilled intervention to address the above impairments.   Continue treatment per established plan of care. 
Discharge Recommendations:  Justin Avila Further Equipment Recommendations for Discharge:  TBD by rehab SUBJECTIVE:  
Patient stated I need to go.  OBJECTIVE DATA SUMMARY:  
Critical Behavior: 
Neurologic State: Alert, Confused Orientation Level: Oriented to person, Disoriented to time, Disoriented to situation, Disoriented to place Cognition: Poor safety awareness, Follows commands Functional Mobility Training: 
Bed Mobility: 
Rolling: Contact guard assistance Supine to Sit: Contact guard assistance Scooting: Contact guard assistance Transfers: 
Sit to Stand: Minimum assistance;Assist x2 Stand to Sit: Contact guard assistance;Assist x2 Balance: 
Sitting: Intact Standing: Impaired; With support Standing - Static: Good;Constant support Standing - Dynamic : Fair Ambulation/Gait Training: 
Distance (ft): 6 Feet (ft) Assistive Device: Gait belt (HHA x2) Ambulation - Level of Assistance: Minimal assistance;Contact guard assistance;Assist x2 Gait Abnormalities: Decreased step clearance (flexed trunk ) Base of Support: Narrowed Speed/Mindi: Slow Step Length: Right shortened;Left shortened Pain: Pt with no complaints of pain. Activity Tolerance: Pt with improved tranfers. After treatment:  Sitter in room [x]    Patient left in no apparent distress sitting up in chair 
[]    Patient left in no apparent distress in bed 
[x]    Call bell left within reach [x]    Nursing notified 
[]    Caregiver present [x]    Bed alarm activated COMMUNICATION/COLLABORATION:  
The patients plan of care was discussed with: Registered Nurse Lina Villatoro Time Calculation: 15 mins

## 2018-07-31 NOTE — PROGRESS NOTES
EP/ Arrhythmia Progress Note Patient ID: 
Patient: Autumn March  MRN: [id number] Age: 80 y.o.  : 1929 Admit Date: 2018 Assessment: 1. Paroxysmal atrial flutter with variable block. Rapid ventricular response when 2:1 conduction  bpm. 
2. Positive troponin, but also in setting of mild rhabdomyolysis, UTI. Nonspecific. 3. Dementia, significant. Poor appetite, oral intake. 4. Chronic anticoagulation with Xarelto as an OP, stopped here. Falls, bleeding risk. 5. UTI POA. Pan-susceptible E. coli. 6. DNR. Plan: 1. Rate control strategy preferred, but difficult to do with atrial flutter. On metoprolol  daily. Add diltiazem IR 60 mg tid with a hold parameter, first dose now. (She was on more metoprolol and diltiazem in the past). Trying to get her to oral therapy, but if needed has diltiazem 5 mg IV q4h PRN tachycardia to use. 2. Still trying to avoid antiarrhythmic therapy if rate control is achievable. Will see if 3. Agree with no chronic anticoagulation given falls, major bleeding risk UNLESS these risks can be reduced. For now, she's on ASA. 4. Agree with treating UTI. Normally I would ablate the atrial flutter, but more conservative approach chosen given age and dementia. [facility] [phone] to update her on the cardiac status. We talked about anticoagulation, rate control, etc.  
 
 [x]       High complexity decision making was performed in this patient Subjective:  
 
Autumn March doesn't deny or suggest chest pain, dyspnea, palpitations. More interactive today, but still oriented to self. Review of Systems - Cannot give full ROS due to dementia. Objective:  
 
Physical Exam: 
Temp (24hrs), Av.9 °F (36.6 °C), Min:97.8 °F (36.6 °C), Max:98 °F (36.7 °C) Patient Vitals for the past 8 hrs: 
 Pulse 18 1530 (!) 150  
18 1058 74 Patient Vitals for the past 8 hrs: 
 Resp  
07/31/18 1530 18  
07/31/18 1058 18 Patient Vitals for the past 8 hrs: 
 BP  
07/31/18 1530 125/78  
07/31/18 1058 97/51 Intake/Output Summary (Last 24 hours) at 07/31/18 1730 Last data filed at 07/30/18 1811 Gross per 24 hour Intake              200 ml Output                0 ml Net              200 ml Nondiaphoretic, not in acute distress. MMM, no jaundice, HEENT stable. Unlabored, clear to auscultation bilaterally, symmetric air movement. Tachycardic rhythm, +soft systolic murmur, no pericardial rub, knock, or gallop. No JVD or peripheral edema. Palpable radial pulses bilaterally. Abdomen soft, nontender, nondistended. No pulsatile masses or bruit. No cyanosis. Skin warm and dry. Awakens, does participate with conversation today, denies discomfort. No tremor. Cardiographics and Studies, I personally reviewed: 
 
Telemetry:  Aflutter with 's ECG 7/28: May be typical atrial flutter with variable block, mostly 2:1.  Prior ECG in 5/2018 shows 4:1 conduction, typical morphology. ECHO 7/30:   
LEFT VENTRICLE: Size was normal. Systolic function was normal. Ejection 
fraction was estimated in the range of 55 % to 60 %. Suboptimal 
endocardial visualization limits wall motion analysis. Wall thickness was 
normal. 
 
RIGHT VENTRICLE: The size was normal. Systolic function was normal. 
 
LEFT ATRIUM: Size was normal. 
 
RIGHT ATRIUM: Size was normal. 
 
MITRAL VALVE: There was mild thickening. There was minimal calcification. DOPPLER: There was trivial regurgitation. AORTIC VALVE: Not well visualized. DOPPLER: Transaortic velocity was 
within the normal range. There was no stenosis. There was no regurgitation. TRICUSPID VALVE: Normal valve structure. There was normal leaflet 
separation. DOPPLER: There was no significant regurgitation. Pulmonary 
artery systolic pressure: 32 mmHg.  
 
PULMONIC VALVE: Not well visualized. AORTA: The root exhibited normal size. PERICARDIUM: Insignificant pericardial effusion and/or pericardial fat was present. LAB Review:  
 
Recent Labs  
   07/31/18 0427 07/30/18 0033 07/29/18 0239 CPK  170  524*  953* CKMB   --   4.0*   --   
CKNDX   --   0.8   --   
TROIQ   --   0.20*  0.53* Lab Results Component Value Date/Time Cholesterol, total 153 02/20/2018 02:08 PM  
 HDL Cholesterol 72 02/20/2018 02:08 PM  
 LDL, calculated 67 02/20/2018 02:08 PM  
 Triglyceride 70 02/20/2018 02:08 PM  
 
No results for input(s): INR, PTP, APTT in the last 72 hours. No lab exists for component: INREXT, INREXT Recent Labs  
   07/31/18 0427 07/30/18 0033 07/29/18 0239 NA  139  140  142  
K  4.1  3.3*  3.2*  
CL  108  108  110* CO2  23  24  24 BUN  13  14  18 CREA  0.77  0.67  0.78 GLU  117*  89  81  
CA  8.3*  8.4*  8.0* WBC   --   9.6  12.9* HGB   --   12.1  11.9 HCT   --   35.4  35.4 PLT   --   220  215 No results for input(s): SGOT, GPT, AP, TBIL, TP, ALB, GLOB, GGT, AML, LPSE in the last 72 hours. No lab exists for component: AMYP, HLPSE No components found for: [place] No results for input(s): PH, PCO2, PO2 in the last 72 hours. Medications Reviewed:  
No Known Allergies Current Facility-Administered Medications Medication Dose Route Frequency  QUEtiapine (SEROquel) tablet 25 mg  25 mg Oral QHS PRN  
 cefTRIAXone (ROCEPHIN) 1 g in 0.9% sodium chloride (MBP/ADV) 50 mL  1 g IntraVENous Q24H  
 metoprolol succinate (TOPROL-XL) XL tablet 100 mg  100 mg Oral DAILY  aspirin chewable tablet 81 mg  81 mg Oral DAILY  cholecalciferol (VITAMIN D3) tablet 2,000 Units  2,000 Units Oral QPM  
 acetaminophen (TYLENOL) tablet 650 mg  650 mg Oral Q4H PRN  
 ondansetron (ZOFRAN) injection 4 mg  4 mg IntraVENous Q4H PRN  
 dilTIAZem (CARDIZEM) injection 5 mg  5 mg IntraVENous Q4H PRN Corin Pinon MD 
7/31/2018

## 2018-07-31 NOTE — PROGRESS NOTES
Hospitalist Progress Note NAME: Prieto Fitzpatrick :  1929 MRN:  [id number] Interim Hospital Summary: 80 y.o. female whom presented on 2018 with Assessment / Plan: 
Atrial flutter with RVR POA Mild troponin elevation 0.83 POA Hypertension - appreciate cardiology input; cardizem gtt off, not on PO cardizem, avoid AC due to frequent fall and high risk of bleeding 
- continue BB with parameter - Echo:  Systolic function was normal. Ejection fraction was estimated in the range of 55 % to 60 %. Suboptimal endocardial visualization limits wall motion analysis. Acute kidney injury POA  
- resolved after; creat 0.7 
  
Hypotension POA 81/46 in ED 
- resolved; -130's/60-70's 
  
Mild Rhabdomyolysis POA CPK 1480, improving 
-  (peak 953 on admission) 
  Frequent falls Alzheimer's dementia POA   
- CT head negative - pt's mental status is her baseline per family 
- required 1:1 sitter; start low dose of seroquel in pm 
- working SNF placement; appreciate CM input 
  
UTI POA Overactive bladder 
- urine cx grew gram (-) rods; will complete total of 5 days 
- hold oxybutynin while treating for UTI 
  
  
  
Code Status: [facility] Surrogate Decision Maker: Stephen Silva Alpha Cough) is mPOA 
   
DVT Prophylaxis:  SCD 
GI Prophylaxis: not indicated 
   
Baseline:   Lives in [address] calls or checks in on her daily  
  
Recommended Disposition: SNF/LTC Subjective: Chief Complaint / Reason for Physician Visit Pleasantly confused. Discussed with RN events overnight. Review of Systems: 
Symptom Y/N Comments  Symptom Y/N Comments Fever/Chills    Chest Pain Poor Appetite    Edema Cough    Abdominal Pain Sputum    Joint Pain SOB/ADAME    Pruritis/Rash Nausea/vomit    Tolerating PT/OT Diarrhea    Tolerating Diet Constipation    Other Could NOT obtain due to:   
 
Objective: VITALS:  
Last 24hrs VS reviewed since prior progress note. Most recent are: 
Patient Vitals for the past 24 hrs: 
 Temp Pulse Resp BP SpO2  
07/31/18 1530 - (!) 150 18 125/78 95 % 07/31/18 1058 97.9 °F (36.6 °C) 74 18 97/51 96 % 07/31/18 0728 98 °F (36.7 °C) 63 20 136/69 95 % 07/31/18 0334 97.8 °F (36.6 °C) 78 20 118/71 96 % 07/30/18 2350 98 °F (36.7 °C) 63 20 149/71 97 % 07/30/18 1922 98 °F (36.7 °C) 68 16 97/63 96 % Intake/Output Summary (Last 24 hours) at 07/31/18 1656 Last data filed at 07/30/18 1811 Gross per 24 hour Intake              200 ml Output                0 ml Net              200 ml PHYSICAL EXAM: 
General: Ill appearing. Alert, uncooperative at times, no acute distress   
EENT:  EOMI. Anicteric sclerae. MMM Resp:  CTA bilaterally, no wheezing or rales. No accessory muscle use CV:  Regular  rhythm,  No edema GI:  Soft, Non distended, Non tender.  +Bowel sounds Neurologic:  Alert and oriented X self only, normal speech, Psych:   Poor insight. gets anxious and agitated Skin:  No rashes. No jaundice Reviewed most current lab test results and cultures  YES Reviewed most current radiology test results   YES Review and summation of old records today    NO Reviewed patient's current orders and MAR    YES 
PMH/ reviewed - no change compared to H&P 
________________________________________________________________________ Care Plan discussed with: 
  Comments Patient y Family RN y   
Care Manager y Consultant     
                 y Multidiciplinary team rounds were held today with , nursing, pharmacist and clinical coordinator. Patient's plan of care was discussed; medications were reviewed and discharge planning was addressed. ________________________________________________________________________ Total NON critical care TIME:  30  Minutes Total CRITICAL CARE TIME Spent:   Minutes non procedure based Comments >50% of visit spent in counseling and coordination of care    
________________________________________________________________________ Radha Herrera NP Procedures: see electronic medical records for all procedures/Xrays and details which were not copied into this note but were reviewed prior to creation of Plan. LABS: 
I reviewed today's most current labs and imaging studies. Pertinent labs include: 
Recent Labs  
   07/30/18 
 0033  07/29/18 
 0239 WBC  9.6  12.9* HGB  12.1  11.9 HCT  35.4  35.4 PLT  220  215 Recent Labs  
   07/31/18 
 0427  07/30/18 0033  07/29/18 
 0239 NA  139  140  142  
K  4.1  3.3*  3.2*  
CL  108  108  110* CO2  23  24  24 GLU  117*  89  81 BUN  13  14  18 CREA  0.77  0.67  0.78  
CA  8.3*  8.4*  8.0* Signed: )Luba Bone NP

## 2018-07-31 NOTE — PROGRESS NOTES
Patient anointed with the [facility] [address] by Rev. Ruben Castanon on July 30, 2018. Davi Patient, MPS, [address], Staff [facility] Paging Service  [phone])

## 2018-07-31 NOTE — PROGRESS NOTES
Spiritual Care Assessment/Progress Note [address] 
 
 
NAME: Magda Jarvis      MRN: [id number] AGE: 80 y.o. SEX: female Latter day Affiliation: Adventism Language: [place] 7/31/2018     Total Time (in minutes): 7 Spiritual Assessment begun in [facility] CARDIOPULMONARY CARE through conversation with: 
  
    [x]Patient        [] Family    [] Friend(s) Reason for Consult: Palliative Care, Initial/Spiritual Assessment Spiritual beliefs: (Please include comment if needed) [x] Identifies with a violet tradition:     
   [] Supported by a violet community:        
   [] Claims no spiritual orientation:       
   [] Seeking spiritual identity:            
   [] Adheres to an individual form of spirituality:       
   [] Not able to assess:                   
 
    
Identified resources for coping:  
   [] Prayer                           
   [] Music                  [] Guided Imagery 
   [] Family/friends                 [] Pet visits [] Devotional reading                         [x] Unknown 
   [] Other:                                          
 
 
Interventions offered during this visit: (See comments for more details) Patient Interventions: Affirmation of emotions/emotional suffering, Catharsis/review of pertinent events in supportive environment, Coping skills reviewed/reinforced, Iconic (affirming the presence of God/Higher Power), Initial/Spiritual assessment, patient floor, Normalization of emotional/spiritual concerns Plan of Care: 
 
 [] Support spiritual and/or cultural needs  
 [] Support AMD and/or advance care planning process    
 [] Support grieving process 
 [] Coordinate Rites and/or Rituals  
 [] Coordination with community clergy  [] No spiritual needs identified at this time 
 [] Detailed Plan of Care below (See Comments)  [] Make referral to Music Therapy 
[] Make referral to Pet Therapy    
[] Make referral to Addiction services 
[] Make referral to [facility] 
[] Make referral to Spiritual Care Partner 
[] No future visits requested       
[x] Follow up visits as needed Comments: Initial visit with patient on [facility] in response to new palliative care consult. Provided ministry of presence and empathetic listening to patient who could not recall events leading to her hospitalization. Offered words of encouragement and calming presence as patient tried to recall people, places, and events from her time in [place], indicating that she is originally from [place]. Redirected patient to focus on the care being provided her at this time which appeared to be successful in addressing patient's concerns. Patient stated an appreciation for  support and openness to follow-up. Advised of  availability and assured of ongoing support as needed and desired.  JORGE ChristensenDiv.  Paging Service [phone])

## 2018-08-01 LAB
ANION GAP SERPL CALC-SCNC: 6 MMOL/L (ref 5–15)
BASOPHILS # BLD: 0 K/UL (ref 0–0.1)
BASOPHILS NFR BLD: 0 % (ref 0–1)
BUN SERPL-MCNC: 15 MG/DL (ref 6–20)
BUN/CREAT SERPL: 20 (ref 12–20)
CALCIUM SERPL-MCNC: 8.4 MG/DL (ref 8.5–10.1)
CHLORIDE SERPL-SCNC: 104 MMOL/L (ref 97–108)
CO2 SERPL-SCNC: 27 MMOL/L (ref 21–32)
CREAT SERPL-MCNC: 0.76 MG/DL (ref 0.55–1.02)
DIFFERENTIAL METHOD BLD: ABNORMAL
EOSINOPHIL # BLD: 0.1 K/UL (ref 0–0.4)
EOSINOPHIL NFR BLD: 1 % (ref 0–7)
ERYTHROCYTE [DISTWIDTH] IN BLOOD BY AUTOMATED COUNT: 14.6 % (ref 11.5–14.5)
GLUCOSE SERPL-MCNC: 110 MG/DL (ref 65–100)
HCT VFR BLD AUTO: 39.7 % (ref 35–47)
HGB BLD-MCNC: 13.8 G/DL (ref 11.5–16)
IMM GRANULOCYTES # BLD: 0.1 K/UL (ref 0–0.04)
IMM GRANULOCYTES NFR BLD AUTO: 1 % (ref 0–0.5)
LYMPHOCYTES # BLD: 2.4 K/UL (ref 0.8–3.5)
LYMPHOCYTES NFR BLD: 22 % (ref 12–49)
MCH RBC QN AUTO: 32.4 PG (ref 26–34)
MCHC RBC AUTO-ENTMCNC: 34.8 G/DL (ref 30–36.5)
MCV RBC AUTO: 93.2 FL (ref 80–99)
MONOCYTES # BLD: 1.4 K/UL (ref 0–1)
MONOCYTES NFR BLD: 13 % (ref 5–13)
NEUTS SEG # BLD: 7.1 K/UL (ref 1.8–8)
NEUTS SEG NFR BLD: 64 % (ref 32–75)
NRBC # BLD: 0 K/UL (ref 0–0.01)
NRBC BLD-RTO: 0 PER 100 WBC
PLATELET # BLD AUTO: 279 K/UL (ref 150–400)
PMV BLD AUTO: 10.3 FL (ref 8.9–12.9)
POTASSIUM SERPL-SCNC: 4.1 MMOL/L (ref 3.5–5.1)
RBC # BLD AUTO: 4.26 M/UL (ref 3.8–5.2)
SODIUM SERPL-SCNC: 137 MMOL/L (ref 136–145)
WBC # BLD AUTO: 11 K/UL (ref 3.6–11)

## 2018-08-01 PROCEDURE — 74011250637 HC RX REV CODE- 250/637: Performed by: INTERNAL MEDICINE

## 2018-08-01 PROCEDURE — 97530 THERAPEUTIC ACTIVITIES: CPT

## 2018-08-01 PROCEDURE — 74011000250 HC RX REV CODE- 250: Performed by: INTERNAL MEDICINE

## 2018-08-01 PROCEDURE — 36415 COLL VENOUS BLD VENIPUNCTURE: CPT | Performed by: INTERNAL MEDICINE

## 2018-08-01 PROCEDURE — 74011250636 HC RX REV CODE- 250/636: Performed by: NURSE PRACTITIONER

## 2018-08-01 PROCEDURE — 80048 BASIC METABOLIC PNL TOTAL CA: CPT | Performed by: INTERNAL MEDICINE

## 2018-08-01 PROCEDURE — 74011250637 HC RX REV CODE- 250/637: Performed by: NURSE PRACTITIONER

## 2018-08-01 PROCEDURE — 85025 COMPLETE CBC W/AUTO DIFF WBC: CPT | Performed by: INTERNAL MEDICINE

## 2018-08-01 PROCEDURE — 74011000258 HC RX REV CODE- 258: Performed by: NURSE PRACTITIONER

## 2018-08-01 PROCEDURE — 65660000000 HC RM CCU STEPDOWN

## 2018-08-01 RX ADMIN — DILTIAZEM HYDROCHLORIDE 60 MG: 30 TABLET, FILM COATED ORAL at 22:30

## 2018-08-01 RX ADMIN — METOPROLOL SUCCINATE 100 MG: 50 TABLET, EXTENDED RELEASE ORAL at 08:41

## 2018-08-01 RX ADMIN — DILTIAZEM HYDROCHLORIDE 60 MG: 30 TABLET, FILM COATED ORAL at 13:11

## 2018-08-01 RX ADMIN — DILTIAZEM HYDROCHLORIDE 5 MG: 5 INJECTION, SOLUTION INTRAVENOUS at 10:15

## 2018-08-01 RX ADMIN — CEFTRIAXONE 1 G: 1 INJECTION, POWDER, FOR SOLUTION INTRAMUSCULAR; INTRAVENOUS at 13:11

## 2018-08-01 RX ADMIN — DILTIAZEM HYDROCHLORIDE 5 MG: 5 INJECTION, SOLUTION INTRAVENOUS at 06:44

## 2018-08-01 RX ADMIN — VITAMIN D, TAB 1000IU (100/BT) 2000 UNITS: 25 TAB at 18:42

## 2018-08-01 RX ADMIN — ASPIRIN 81 MG 81 MG: 81 TABLET ORAL at 08:41

## 2018-08-01 RX ADMIN — DILTIAZEM HYDROCHLORIDE 5 MG: 5 INJECTION, SOLUTION INTRAVENOUS at 02:03

## 2018-08-01 RX ADMIN — DILTIAZEM HYDROCHLORIDE 60 MG: 30 TABLET, FILM COATED ORAL at 08:41

## 2018-08-01 NOTE — PROGRESS NOTES
EP/ Arrhythmia Progress Note Patient ID: 
Patient: Nataly Madison  MRN: [id number] Age: 80 y.o.  : 1929 Admit Date: 2018 Assessment: 1. Paroxysmal right atrial flutter with variable block. Rapid ventricular response. Back in sinus briefly , then to to atrial fibrillation. 2. Positive troponin, but also in setting of mild rhabdomyolysis, UTI. Nonspecific. 3. Dementia, significant. Poor appetite, oral intake. 4. Chronic anticoagulation with Xarelto as an OP, stopped here. Falls, bleeding risk. 5. UTI POA. Pan-susceptible E. coli. 6. DNR. Plan: 1. Rate control strategy. On metoprolol  daily. Added diltiazem IR 60 mg tid with a hold parameter on . 
2. Still trying to avoid antiarrhythmic therapy if rate control is achievable. 3. Agree with no chronic anticoagulation given falls, major bleeding risk UNLESS these risks can be reduced. For now, she's on ASA. 4. Agree with treating UTI. Normally I would ablate the right atrial flutter, but more conservative approach chosen given age and dementia. Also, she's showing some atrial fibrillation today which would not be treated by an atrial flutter ablation. [x]       High complexity decision making was performed in this patient Subjective:  
 
Nataly Madison doesn't deny or suggest chest pain, dyspnea, palpitations. Oriented to self. Review of Systems - Cannot give full ROS due to dementia. Objective:  
 
Physical Exam: 
Temp (24hrs), Av.3 °F (36.8 °C), Min:97.7 °F (36.5 °C), Max:98.7 °F (37.1 °C) Patient Vitals for the past 8 hrs: 
 Pulse 18 1446 75  
18 1048 74 Patient Vitals for the past 8 hrs: 
 Resp  
18 1446 18  
18 1048 18 Patient Vitals for the past 8 hrs: 
 BP  
18 1446 107/67  
18 1048 122/68 Intake/Output Summary (Last 24 hours) at 08/01/18 1815 Last data filed at 08/01/18 5476 Gross per 24 hour Intake                0 ml Output             1375 ml Net            -1375 ml Nondiaphoretic, not in acute distress. MMM, no jaundice, HEENT stable. Unlabored, clear to auscultation bilaterally, symmetric air movement. Irregular rhythm, +soft systolic murmur, no pericardial rub, knock, or gallop. No JVD or peripheral edema. Palpable radial pulses bilaterally. Abdomen soft, nontender, nondistended. No pulsatile masses or bruit. No cyanosis. Skin warm and dry. Awakens, does participate with conversation today, denies discomfort. No tremor. Cardiographics and Studies, I personally reviewed: 
 
Telemetry:  Atrial fibrillation, rate-controlled. She did have SR briefly earlier today. ECG 7/28: May be typical atrial flutter with variable block, mostly 2:1.  Prior ECG in 5/2018 shows 4:1 conduction, typical morphology. ECHO 7/30:   
LEFT VENTRICLE: Size was normal. Systolic function was normal. Ejection 
fraction was estimated in the range of 55 % to 60 %. Suboptimal 
endocardial visualization limits wall motion analysis. Wall thickness was 
normal. 
 
RIGHT VENTRICLE: The size was normal. Systolic function was normal. 
 
LEFT ATRIUM: Size was normal. 
 
RIGHT ATRIUM: Size was normal. 
 
MITRAL VALVE: There was mild thickening. There was minimal calcification. DOPPLER: There was trivial regurgitation. AORTIC VALVE: Not well visualized. DOPPLER: Transaortic velocity was 
within the normal range. There was no stenosis. There was no regurgitation. TRICUSPID VALVE: Normal valve structure. There was normal leaflet 
separation. DOPPLER: There was no significant regurgitation. Pulmonary 
artery systolic pressure: 32 mmHg. PULMONIC VALVE: Not well visualized. AORTA: The root exhibited normal size. PERICARDIUM: Insignificant pericardial effusion and/or pericardial fat was present. LAB Review:  
 
Recent Labs 07/31/18 
 5130  07/30/18 
 6464 CPK  170  524* CKMB   --   4.0* CKNDX   --   0.8  
TROIQ   --   0.20* Lab Results Component Value Date/Time Cholesterol, total 153 02/20/2018 02:08 PM  
 HDL Cholesterol 72 02/20/2018 02:08 PM  
 LDL, calculated 67 02/20/2018 02:08 PM  
 Triglyceride 70 02/20/2018 02:08 PM  
 
No results for input(s): INR, PTP, APTT in the last 72 hours. No lab exists for component: INREXT, INREXT Recent Labs 08/01/18 
 1640  07/31/18 
 0427  07/30/18 
 0033 NA  137  139  140  
K  4.1  4.1  3.3*  
CL  104  108  108 CO2  27  23  24 BUN  15  13  14 CREA  0.76  0.77  0.67 GLU  110*  117*  89  
CA  8.4*  8.3*  8.4* WBC  11.0   --   9.6 HGB  13.8   --   12.1 HCT  39.7   --   35.4 PLT  279   --   220 No results for input(s): SGOT, GPT, AP, TBIL, TP, ALB, GLOB, GGT, AML, LPSE in the last 72 hours. No lab exists for component: AMYP, HLPSE No components found for: [place] No results for input(s): PH, PCO2, PO2 in the last 72 hours. Medications Reviewed:  
No Known Allergies Current Facility-Administered Medications Medication Dose Route Frequency  QUEtiapine (SEROquel) tablet 25 mg  25 mg Oral QHS PRN  
 dilTIAZem (CARDIZEM) IR tablet 60 mg  60 mg Oral TID  dilTIAZem (CARDIZEM) injection 5 mg  5 mg IntraVENous Q4H PRN  
 cefTRIAXone (ROCEPHIN) 1 g in 0.9% sodium chloride (MBP/ADV) 50 mL  1 g IntraVENous Q24H  
 metoprolol succinate (TOPROL-XL) XL tablet 100 mg  100 mg Oral DAILY  aspirin chewable tablet 81 mg  81 mg Oral DAILY  cholecalciferol (VITAMIN D3) tablet 2,000 Units  2,000 Units Oral QPM  
 acetaminophen (TYLENOL) tablet 650 mg  650 mg Oral Q4H PRN  
 ondansetron (ZOFRAN) injection 4 mg  4 mg IntraVENous Q4H PRN Harley Page MD 
8/1/2018

## 2018-08-01 NOTE — PROGRESS NOTES
Received report from [place], [facility]. Assumed care for patient.

## 2018-08-02 LAB
ANION GAP SERPL CALC-SCNC: 8 MMOL/L (ref 5–15)
BASOPHILS # BLD: 0 K/UL (ref 0–0.1)
BASOPHILS NFR BLD: 0 % (ref 0–1)
BUN SERPL-MCNC: 17 MG/DL (ref 6–20)
BUN/CREAT SERPL: 23 (ref 12–20)
CALCIUM SERPL-MCNC: 8.5 MG/DL (ref 8.5–10.1)
CHLORIDE SERPL-SCNC: 103 MMOL/L (ref 97–108)
CO2 SERPL-SCNC: 27 MMOL/L (ref 21–32)
CREAT SERPL-MCNC: 0.73 MG/DL (ref 0.55–1.02)
DIFFERENTIAL METHOD BLD: ABNORMAL
EOSINOPHIL # BLD: 0.2 K/UL (ref 0–0.4)
EOSINOPHIL NFR BLD: 2 % (ref 0–7)
ERYTHROCYTE [DISTWIDTH] IN BLOOD BY AUTOMATED COUNT: 14.6 % (ref 11.5–14.5)
GLUCOSE SERPL-MCNC: 108 MG/DL (ref 65–100)
HCT VFR BLD AUTO: 36 % (ref 35–47)
HGB BLD-MCNC: 12.4 G/DL (ref 11.5–16)
IMM GRANULOCYTES # BLD: 0.1 K/UL (ref 0–0.04)
IMM GRANULOCYTES NFR BLD AUTO: 1 % (ref 0–0.5)
LYMPHOCYTES # BLD: 3.2 K/UL (ref 0.8–3.5)
LYMPHOCYTES NFR BLD: 34 % (ref 12–49)
MCH RBC QN AUTO: 32.5 PG (ref 26–34)
MCHC RBC AUTO-ENTMCNC: 34.4 G/DL (ref 30–36.5)
MCV RBC AUTO: 94.5 FL (ref 80–99)
MONOCYTES # BLD: 1.4 K/UL (ref 0–1)
MONOCYTES NFR BLD: 14 % (ref 5–13)
NEUTS SEG # BLD: 4.7 K/UL (ref 1.8–8)
NEUTS SEG NFR BLD: 49 % (ref 32–75)
NRBC # BLD: 0 K/UL (ref 0–0.01)
NRBC BLD-RTO: 0 PER 100 WBC
PLATELET # BLD AUTO: 280 K/UL (ref 150–400)
PMV BLD AUTO: 10.1 FL (ref 8.9–12.9)
POTASSIUM SERPL-SCNC: 3.8 MMOL/L (ref 3.5–5.1)
RBC # BLD AUTO: 3.81 M/UL (ref 3.8–5.2)
SODIUM SERPL-SCNC: 138 MMOL/L (ref 136–145)
WBC # BLD AUTO: 9.6 K/UL (ref 3.6–11)

## 2018-08-02 PROCEDURE — 97535 SELF CARE MNGMENT TRAINING: CPT

## 2018-08-02 PROCEDURE — 65660000000 HC RM CCU STEPDOWN

## 2018-08-02 PROCEDURE — 74011250637 HC RX REV CODE- 250/637: Performed by: INTERNAL MEDICINE

## 2018-08-02 PROCEDURE — 85025 COMPLETE CBC W/AUTO DIFF WBC: CPT | Performed by: INTERNAL MEDICINE

## 2018-08-02 PROCEDURE — 80048 BASIC METABOLIC PNL TOTAL CA: CPT | Performed by: INTERNAL MEDICINE

## 2018-08-02 PROCEDURE — 74011250636 HC RX REV CODE- 250/636: Performed by: NURSE PRACTITIONER

## 2018-08-02 PROCEDURE — 74011000258 HC RX REV CODE- 258: Performed by: NURSE PRACTITIONER

## 2018-08-02 PROCEDURE — 36415 COLL VENOUS BLD VENIPUNCTURE: CPT | Performed by: INTERNAL MEDICINE

## 2018-08-02 RX ADMIN — DILTIAZEM HYDROCHLORIDE 60 MG: 30 TABLET, FILM COATED ORAL at 14:19

## 2018-08-02 RX ADMIN — DILTIAZEM HYDROCHLORIDE 60 MG: 30 TABLET, FILM COATED ORAL at 22:07

## 2018-08-02 RX ADMIN — DILTIAZEM HYDROCHLORIDE 60 MG: 30 TABLET, FILM COATED ORAL at 07:12

## 2018-08-02 RX ADMIN — ASPIRIN 81 MG 81 MG: 81 TABLET ORAL at 10:02

## 2018-08-02 RX ADMIN — CEFTRIAXONE 1 G: 1 INJECTION, POWDER, FOR SOLUTION INTRAMUSCULAR; INTRAVENOUS at 14:19

## 2018-08-02 RX ADMIN — VITAMIN D, TAB 1000IU (100/BT) 2000 UNITS: 25 TAB at 17:55

## 2018-08-02 NOTE — PROGRESS NOTES
Lilia 64 MEWS 5 d/t increase HR. Patient being treated for a-flutter. Medications administered. HR currently 104. Will continue to monitor closely. Charge aware.

## 2018-08-02 NOTE — PROGRESS NOTES
Hospitalist Progress Note NAME: Mansoor Palafox :  1929 MRN:  [id number] Assessment / Plan: 
Atrial flutter with RVR POA Mild troponin elevation 0.83 POA Hypertension -appreciate cardiology input; cardizem gtt off, not on PO cardizem, avoid AC due to frequent fall and high risk of bleeding 
-continue BB with parameter 
-Echo:  Systolic function was normal. Ejection fraction was estimated in the range of 55 % to 60%. Suboptimal endocardial visualization limits wall motion analysis. 
  
Acute kidney injury POA  
-resolved, Cr at baseline 
   
Hypotension POA 81/46 in ED 
-resolved; -130's/60-70's 
   
Mild Rhabdomyolysis POA CPK 1480 
-resolved 
   
Frequent falls Alzheimer's dementia POA   
-CT head negative 
-pt's mental status is her baseline per family 
-required 1:1 sitter; start low dose of seroquel in pm 
-working SNF placement; appreciate CM input 
   
UTI POA Overactive bladder 
-urine cx grew gram (-) rods; will complete total of 5 days 
-hold oxybutynin while treating for UTI 
   
Code Status: Critical access hospital Surrogate Decision Maker: Noam Shrestha) is mPOA 
   
DVT Prophylaxis:  SCD 
GI Prophylaxis: not indicated 
   
Baseline:   Lives in [address] calls or checks in on her daily  
   
Recommended Disposition: SNF/LTC Subjective: Chief Complaint / Reason for Physician Visit No acute complaints, some confusion which is baseline. Review of Systems: 
Symptom Y/N Comments  Symptom Y/N Comments Fever/Chills n   Chest Pain n   
Poor Appetite    Edema Cough    Abdominal Pain n   
Sputum n   Joint Pain SOB/ADAME n   Pruritis/Rash Nausea/vomit n   Tolerating PT/OT Diarrhea    Tolerating Diet Constipation    Other Could NOT obtain due to:   
 
Objective: VITALS:  
Last 24hrs VS reviewed since prior progress note.  Most recent are: 
Patient Vitals for the past 24 hrs: 
 Temp Pulse Resp BP SpO2  
18 1855 98.3 °F (36.8 °C) 65 16 108/57 99 % 08/01/18 1446 98.7 °F (37.1 °C) 75 18 107/67 94 % 08/01/18 1053 - - - - 99 % 08/01/18 1048 98.7 °F (37.1 °C) 74 18 122/68 98 % 08/01/18 0700 98 °F (36.7 °C) 73 20 133/72 96 % 08/01/18 0644 - (!) 168 - - -  
08/01/18 0300 98.4 °F (36.9 °C) 71 22 120/70 96 % 08/01/18 0203 - (!) 160 - - -  
08/01/18 0140 97.9 °F (36.6 °C) 84 20 143/80 95 % 07/31/18 2300 97.7 °F (36.5 °C) (!) 104 18 113/74 97 % Intake/Output Summary (Last 24 hours) at 08/01/18 2200 Last data filed at 08/01/18 3930 Gross per 24 hour Intake                0 ml Output             1375 ml Net            -1375 ml PHYSICAL EXAM: 
General: frail, cooperative, no acute distress   
EENT:  EOMI. Anicteric sclerae. MMM Resp:  CTA bilaterally, no wheezing or rales. No accessory muscle use CV:  Regular  rhythm,  No edema GI:  Soft, Non distended, Non tender.  +Bowel sounds Neurologic:  Alert and oriented X 3, normal speech, Psych:   Poor insight. Not anxious nor agitated Skin:  No rashes. No jaundice Reviewed most current lab test results and cultures  YES Reviewed most current radiology test results   YES Review and summation of old records today    NO Reviewed patient's current orders and MAR    YES 
PMH/SH reviewed - no change compared to H&P 
________________________________________________________________________ Care Plan discussed with: 
  Comments Patient x Family RN x Care Manager Consultant     
                 x Multidiciplinary team rounds were held today with , nursing, pharmacist and clinical coordinator. Patient's plan of care was discussed; medications were reviewed and discharge planning was addressed. ________________________________________________________________________ Total NON critical care TIME:  30  Minutes Total CRITICAL CARE TIME Spent:   Minutes non procedure based Comments >50% of visit spent in counseling and coordination of care    
________________________________________________________________________ Evonne Rboison DO  
 
Procedures: see electronic medical records for all procedures/Xrays and details which were not copied into this note but were reviewed prior to creation of Plan. LABS: 
I reviewed today's most current labs and imaging studies. Pertinent labs include: 
Recent Labs 08/01/18 
 1640  07/30/18 
 0033 WBC  11.0  9.6 HGB  13.8  12.1 HCT  39.7  35.4 PLT  279  220 Recent Labs 08/01/18 
 1640  07/31/18 
 0427  07/30/18 
 0033 NA  137  139  140  
K  4.1  4.1  3.3*  
CL  104  108  108 CO2  27  23  24 GLU  110*  117*  89 BUN  15  13  14 CREA  0.76  0.77  0.67 CA  8.4*  8.3*  8.4* Signed: Evonne Robison DO

## 2018-08-02 NOTE — PROGRESS NOTES
Chart received and visist attempted pt refused stating she just got back in bed and not wanting to get up right now. Will continue to follow.

## 2018-08-02 NOTE — PROGRESS NOTES
Late entry 8/7/2018 Avasys Telesitter Monitor initiated on 8/7/2018 at 1200 for the following reason(s): patient has 1:1 sitter, would like to remove sitter and attempt to reorient patient with avasys . Patient educated on use of camera and is in agreement. If patient unable to verbalize understanding of camera necessity, the responsible party notified and educated:  Yes or N/A  Family members notified and educated. [facility] SBAR, Kardex, ED Summary, Procedure Summary, Intake/Output, MAR, Accordion, Recent Results, Med Rec Status and Cardiac Rhythm a-fib/a-flutter were discussed. 524 Dr. Lionel Ayala Bedside shift change report given to [facility] (oncoming nurse). Report included the following information SBAR, Kardex, ED Summary, Procedure Summary, Intake/Output, MAR, Accordion, Recent Results, Med Rec Status and Cardiac Rhythm a-fib/a-flutter. SHIFT SUMMARY: 
 
Held metoprolol dose today d/t BP falling below parameters. MD aware. Patient had a one to one sitter for a portion of the day but patient was calm and not attempting to get out of bed by herself or pull at her lines. Sitter removed and DANNY telesitter in the room. Patient not attempting to get out of bed but will yell out for nurses to come and see her. Patient pleasantly confused. [address] [facility] [address] [facility] [address] NURSING NOTE Admission Date 7/28/2018 Admission Diagnosis Atrial flutter with rapid ventricular response ([facility] [address] Consults IP CONSULT TO CARDIOLOGY Cardiac Monitoring [x] Yes [] No  
  
Purposeful Hourly Rounding [x] Yes   
Rose Mary Score Total Score: 5 Rose Mary score 3 or > [x] Bed Alarm [x] Avasys [] 1:1 sitter [] Patient refused (Signed refusal form in chart) Seng Score Seng Score: 17 Seng score 14 or < [] PMT consult [] Wound Care consult  
 []  Specialty bed  [] Nutrition consult Influenza Vaccine Received Flu Vaccine for Current Season (usually Sept-March): Not Flu Season Oxygen needs? [x] Room air Oxygen @  []1L    []2L    []3L   []4L    []5L   []6L via NC Chronic home O2 use? [] Yes [] No 
Perform O2 challenge test and document in progress note using smartphrase (.Homeoxygen) Last bowel movement Last Bowel Movement Date: 07/31/18 Urinary Catheter LDAs Peripheral IV 07/29/18 Right Arm (Active) Site Assessment Clean, dry, & intact 8/2/2018  7:36 PM  
Phlebitis Assessment 0 8/2/2018  7:36 PM  
Infiltration Assessment 0 8/2/2018  7:36 PM  
Dressing Status Clean, dry, & intact 8/2/2018  7:36 PM  
Dressing Type Transparent 8/2/2018  7:36 PM  
Hub Color/Line Status Flushed 8/2/2018  7:36 PM  
                  
  
Readmission Risk Assessment Tool Score Medium Risk   
      
 20 Total Score 3 Has Seen PCP in Last 6 Months (Yes=3, No=0) 3 Patient Length of Stay (>5 days = 3) 4 IP Visits Last 12 Months (1-3=4, 4=9, >4=11) 5 Pt. Coverage (Medicare=5 , Medicaid, or Self-Pay=4) 5 Charlson Comorbidity Score (Age + Comorbid Conditions) Criteria that do not apply:  
 . Living with Significant Other. Assisted Living. LTAC. SNF. or  
Rehab Expected Length of Stay 3d 7h Actual Length of Stay 5

## 2018-08-02 NOTE — PROGRESS NOTES
Problem: Self Care Deficits Care Plan (Adult) Goal: *Acute Goals and Plan of Care (Insert Text) Problem: Self Care Deficits Care Plan (Adult) Goal: *Acute Goals and Plan of Care (Insert Text) Occupational Therapy Goals Initiated 7/30/2018 1. Patient will perform self-feeding with supervision/set-up within 7 day(s). 2.  Patient will perform grooming seated with minimal assistance/contact guard assist within 7 day(s). 3.  Patient will perform upper body dressing with moderate assistance  within 7 day(s). 4.  Patient will perform toilet transfers with minimal assistance/contact guard assist within 7 day(s). 5.  Patient will perform all aspects of toileting with minimal assistance/contact guard assist within 7 day(s). 6.  Patient will participate in upper extremity therapeutic exercise/activities with minimal assistance/contact guard assist for 5 minutes within 7 day(s). 7.  Patient will utilize energy conservation techniques during functional activities with verbal and tactile cues within 7 day(s). Occupational Therapy TREATMENT: WEEKLY REASSESSMENT Patient: Byron Lubin (74 y.o. female) Date: 8/2/2018 Diagnosis: Atrial flutter with rapid ventricular response (HCC) <principal problem not specified> Precautions: Fall Chart, occupational therapy assessment, plan of care, and goals were reviewed. ASSESSMENT: 
Pt was cleared to be seen for therapy and she was supine in bed and all VSS. Pt was mod to max assist for bed mobility, and then she was max to scoot to EOB. Pt was nervous that she would not be able to stand but was mod assist of 1 to stand. Pt was CGA to min assist for transfer to toilet and she was able to toilet self. Pt was CGA to stand and wash her hands at the sink. She able stand and brush her teeth and comb her hair with CGA. Pt was left sitting in chair with sitter in room and call bell with in reach.   Recommend that pt have further therapy at discharge at rehab at SNF. 
Progression toward goals: 
[x]            Improving appropriately and progressing toward goals []            Improving slowly and progressing toward goals []            Not making progress toward goals and plan of care will be adjusted PLAN: 
Goals have been updated based on progression since last assessment. Patient continues to benefit from skilled intervention to address the above impairments. Continue to follow patient 3 times a week to address goals. Planned Interventions: 
[x]                    Self Care Training                  []             Therapeutic Activities [x]                    Functional Mobility Training    []             Cognitive Retraining 
[x]                    Therapeutic Exercises           [x]             Endurance Activities []                    Balance Training                   []             Neuromuscular Re-Education []                    Visual/Perceptual Training     [x]        Home Safety Training 
[x]                    Patient Education                 [x]             Family Training/Education []                    Other (comment): 
Discharge Recommendations: Justin Avila Further Equipment Recommendations for Discharge: tbd SUBJECTIVE:  
Patient stated I dont know if my legs are strong enough. Rafy Self OBJECTIVE DATA SUMMARY:  
Cognitive/Behavioral Status: 
Neurologic State: Alert;Confused Orientation Level: Oriented to person Cognition: Follows commands;Memory loss; Impaired decision making Functional Mobility and Transfers for ADLs: 
Bed Mobility: 
 mod to max Transfers: 
 min assist with RW Balance: 
 impaired ADL Intervention: 
  
Pt is CGA for grooming and mod assist for UB and mod to max for LB ADLs Barthel Index: 
 
Bathin Bladder: 5 Bowels: 5 Groomin Dressin Feedin Mobility: 5 Stairs: 0 Toilet Use: 5 Transfer (Bed to Chair and Back): 5 Total: 35 Barthel and G-code impairment scale: 
Percentage of impairment CH 
0% CI 
1-19% CJ 
20-39% CK 
40-59% CL 
60-79% CM 
80-99% CN 
100% Barthel Score 0-100 100 99-80 79-60 59-40 20-39 1-19 
 0 Barthel Score 0-20 20 17-19 13-16 9-12 5-8 1-4 0 The Barthel ADL Index: Guidelines 1. The index should be used as a record of what a patient does, not as a record of what a patient could do. 2. The main aim is to establish degree of independence from any help, physical or verbal, however minor and for whatever reason. 3. The need for supervision renders the patient not independent. 4. A patient's performance should be established using the best available evidence. Asking the patient, friends/relatives and nurses are the usual sources, but direct observation and common sense are also important. However direct testing is not needed. 5. Usually the patient's performance over the preceding 24-48 hours is important, but occasionally longer periods will be relevant. 6. Middle categories imply that the patient supplies over 50 per cent of the effort. 7. Use of aids to be independent is allowed. Ross Orantes., Barthel, D.W. (5196). Functional evaluation: the Barthel Index. [address] [facility] (14)2. Nilton Meehan evens ADALID Diehl, Maxine Snyder, Kunal Mcguire., [place], [address] [place] [address] (1999). Measuring the change indisability after inpatient rehabilitation; comparison of the responsiveness of the Barthel Index and Functional [place] Measure. Journal of Neurology, Neurosurgery, and Psychiatry, 66(4), 623-189. Weston Birch, N.[place].A, JOSÉ ANTONIO Farrell, & Alan Crespo M.A. (2004.) Assessment of post-stroke quality of life in cost-effectiveness studies: The usefulness of the Barthel Index and the EuroQoL-5D. [facility], 13, 829-25 Activity Tolerance:  
vss/fair Please refer to the flowsheet for vital signs taken during this treatment.  
After treatment:  
[x] Patient left in no apparent distress sitting up in chair 
[] Patient left in no apparent distress in bed 
[x] Call bell left within reach [x] Nursing notified 
[] Caregiver present [x] Bed alarm activated COMMUNICATION/COLLABORATION:  
The patients plan of care was discussed with: Physical Therapist and Registered Nurse DAGOBERTO Jo Time Calculation: 44 mins

## 2018-08-02 NOTE — PROGRESS NOTES
Problem: Falls - Risk of 
Goal: *Absence of Falls Document Marta Olson Fall Risk and appropriate interventions in the flowsheet. Outcome: Progressing Towards Goal 
Fall Risk Interventions: 
Mobility Interventions: OT consult for ADLs, PT Consult for mobility concerns Mentation Interventions: Adequate sleep, hydration, pain control Medication Interventions: Patient to call before getting OOB, Teach patient to arise slowly Elimination Interventions: Bed/chair exit alarm History of Falls Interventions: Bed/chair exit alarm

## 2018-08-02 NOTE — PROGRESS NOTES
EP/ Arrhythmia Progress Note Patient ID: 
Patient: Nikki Nunez  MRN: [id number] Age: 80 y.o.  : 1929 Admit Date: 2018 Assessment: 1. Paroxysmal right atrial flutter with variable block. Rapid ventricular response. Back in sinus briefly , then to to atrial fibrillation. 2. Positive troponin, but also in setting of mild rhabdomyolysis, UTI. Nonspecific. 3. Dementia, significant. Poor appetite, oral intake. 4. Chronic anticoagulation with Xarelto as an OP, stopped here. Falls, bleeding risk. 5. UTI POA. Pan-susceptible E. coli. 6. DNR. Plan: 1. Rate control strategy. On metoprolol  daily. Added diltiazem IR 60 mg tid with a hold parameter on . 
2. Still trying to avoid antiarrhythmic therapy if rate control is achievable. 3. Agree with no chronic anticoagulation given falls, major bleeding risk UNLESS these risks can be reduced. For now, she's on ASA. 4. Agree with treating UTI. Normally I would ablate the right atrial flutter, but more conservative approach chosen given age and dementia. Also, she's showing some atrial fibrillation which would not be treated by an atrial flutter ablation. Her metoprolol was held today () based on BP, will watch to see what she does tomorrow. [x]       High complexity decision making was performed in this patient Subjective:  
 
Nikki Nunez doesn't deny or suggest chest pain, dyspnea, palpitations. Oriented to self. Review of Systems - Cannot give full ROS due to dementia. Objective:  
 
Physical Exam: 
Temp (24hrs), Av.4 °F (36.9 °C), Min:98 °F (36.7 °C), Max:98.6 °F (37 °C) Patient Vitals for the past 8 hrs: 
 Pulse 18 1449 80  
18 1419 80  
18 1046 69  
18 1002 73 Patient Vitals for the past 8 hrs: 
 Resp  
18 1449 20  
18 1046 24  Patient Vitals for the past 8 hrs: 
 BP  
08/02/18 1449 103/55  
08/02/18 1419 114/56  
08/02/18 1046 98/54  
08/02/18 1002 105/72 Intake/Output Summary (Last 24 hours) at 08/02/18 1640 Last data filed at 08/02/18 5773 Gross per 24 hour Intake              240 ml Output                0 ml Net              240 ml Nondiaphoretic, not in acute distress. MMM, no jaundice, HEENT stable. Unlabored, clear to auscultation bilaterally, symmetric air movement. Irregular rhythm, +soft systolic murmur, no pericardial rub, knock, or gallop. No JVD or peripheral edema. Palpable radial pulses bilaterally. Abdomen soft, nontender, nondistended. No pulsatile masses or bruit. No cyanosis. Skin warm and dry. Awakens, does participate with conversation today, denies discomfort. No tremor. Cardiographics and Studies, I personally reviewed: 
 
Telemetry:  Atrial fibrillation in 90's. ECG 7/28: May be typical atrial flutter with variable block, mostly 2:1.  Prior ECG in 5/2018 shows 4:1 conduction, typical morphology. ECHO 7/30:   
LEFT VENTRICLE: Size was normal. Systolic function was normal. Ejection 
fraction was estimated in the range of 55 % to 60 %. Suboptimal 
endocardial visualization limits wall motion analysis. Wall thickness was 
normal. 
 
RIGHT VENTRICLE: The size was normal. Systolic function was normal. 
 
LEFT ATRIUM: Size was normal. 
 
RIGHT ATRIUM: Size was normal. 
 
MITRAL VALVE: There was mild thickening. There was minimal calcification. DOPPLER: There was trivial regurgitation. AORTIC VALVE: Not well visualized. DOPPLER: Transaortic velocity was 
within the normal range. There was no stenosis. There was no regurgitation. TRICUSPID VALVE: Normal valve structure. There was normal leaflet 
separation. DOPPLER: There was no significant regurgitation. Pulmonary 
artery systolic pressure: 32 mmHg. PULMONIC VALVE: Not well visualized.  
 
AORTA: The root exhibited normal size. 
 
PERICARDIUM: Insignificant pericardial effusion and/or pericardial fat was present. LAB Review:  
 
Recent Labs  
   07/31/18 0427 CPK  170 Lab Results Component Value Date/Time Cholesterol, total 153 02/20/2018 02:08 PM  
 HDL Cholesterol 72 02/20/2018 02:08 PM  
 LDL, calculated 67 02/20/2018 02:08 PM  
 Triglyceride 70 02/20/2018 02:08 PM  
 
No results for input(s): INR, PTP, APTT in the last 72 hours. No lab exists for component: INREXT, INREXT Recent Labs 08/02/18 
 0310  08/01/18 
 1640  07/31/18 
 0427 NA  138  137  139  
K  3.8  4.1  4.1 CL  103  104  108 CO2  27  27  23 BUN  17  15  13 CREA  0.73  0.76  0.77 GLU  108*  110*  117* CA  8.5  8.4*  8.3* WBC  9.6  11.0   --   
HGB  12.4  13.8   --   
HCT  36.0  39.7   --   
PLT  280  279   -- No results for input(s): SGOT, GPT, AP, TBIL, TP, ALB, GLOB, GGT, AML, LPSE in the last 72 hours. No lab exists for component: AMYP, HLPSE No components found for: [place] No results for input(s): PH, PCO2, PO2 in the last 72 hours. Medications Reviewed:  
No Known Allergies Current Facility-Administered Medications Medication Dose Route Frequency  QUEtiapine (SEROquel) tablet 25 mg  25 mg Oral QHS PRN  
 dilTIAZem (CARDIZEM) IR tablet 60 mg  60 mg Oral TID  dilTIAZem (CARDIZEM) injection 5 mg  5 mg IntraVENous Q4H PRN  
 cefTRIAXone (ROCEPHIN) 1 g in 0.9% sodium chloride (MBP/ADV) 50 mL  1 g IntraVENous Q24H  
 metoprolol succinate (TOPROL-XL) XL tablet 100 mg  100 mg Oral DAILY  aspirin chewable tablet 81 mg  81 mg Oral DAILY  cholecalciferol (VITAMIN D3) tablet 2,000 Units  2,000 Units Oral QPM  
 acetaminophen (TYLENOL) tablet 650 mg  650 mg Oral Q4H PRN  
 ondansetron (ZOFRAN) injection 4 mg  4 mg IntraVENous Q4H PRN Yonathan Pizano MD 
8/2/2018

## 2018-08-02 NOTE — PROGRESS NOTES
Pt's mPOA contacted CM requesting updates with Medicaid status. Both CM and Med Assist CM have left 's for [facility] and have not heard back yet at this time. Pt's mPOA reported she wants pt to go to SNF and transition to LTC at same faciltiy, CM explained that this is extremely difficult to find as Medicaid and Medicaid pending beds are difficult to find. Requesting for CM to find out of [address] has LTC or Medicaid pending beds available. Referral sent via CC link, awaiting acceptance. CM to complete UAI - still awaiting to hearback of Medicaid status as pt's mPOA made it very clear that she will not take care of pt if discharged home nor approve of such to happen. Sarah Yepez, MSW Supervisee in Social Work, [facility] [facility] [phone]

## 2018-08-02 NOTE — PROGRESS NOTES
Nutrition Assessment: 
 
INTERVENTIONS/RECOMMENDATIONS:  
Consider regular diet due to advanced age Ensure BID ASSESSMENT:  
Chart reviewed. Pt has varied PO intake over the past couple days % of meals. Sitter states pt ate well for breakfast this morning. She had one empty ensure enlive on her table and two unopened. Will decreased quantitiy to BID. Diet Order: Cardiac 
% Eaten:  Patient Vitals for the past 72 hrs: 
 % Diet Eaten 08/02/18 1025 75 % 08/01/18 1900 10 % 07/31/18 1800 10 % 07/31/18 1320 90 % 07/31/18 0900 100 % 07/30/18 1811 10 % Pertinent Medications: [x]Reviewed: vit D3, Pertinent Labs: [x]Reviewed:  
Food Allergies: [x]NKFA  []Other Last BM:  7/31 Edema:      []RUE   []LUE   [x]RLE 2+  [x]LLE 2+ Pressure Ulcer:      [] Stage I   [] Stage II   [] Stage III   [] Stage IV Anthropometrics: Height: 5' 1\" (154.9 cm) Weight: 53 kg (116 lb 13.5 oz) IBW (%IBW):   ( ) UBW (%UBW):   (  %) BMI: Body mass index is 22.08 kg/(m^2). This BMI is indicative of: 
[]Underweight   [x]Normal   []Overweight   [] Obesity   [] Extreme Obesity (BMI>40) Last Weight Metrics: 
Weight Loss Metrics 7/28/2018 5/24/2018 5/22/2018 3/9/2018 2/20/2018 12/5/2017 11/21/2017 Today's Wt 116 lb 13.5 oz 116 lb 13.5 oz 115 lb 3.2 oz 120 lb 120 lb 115 lb -  
BMI 22.08 kg/m2 22.08 kg/m2 21.77 kg/m2 22.67 kg/m2 22.67 kg/m2 21.73 kg/m2 -  
 
 
Estimated Nutrition Needs (Based on): 1075 Kcals/day (BMR: 900 x 1.2) , 55 g (1 g/kg) Protein Carbohydrate: At Least 130 g/day  Fluids: 1075 mL/day or per primary team 
 
NUTRITION DIAGNOSES:  
Problem:  Inadequate protein-energy intake Etiology: related to Alzheimer's dementia Signs/Symptoms: as evidenced by RN report of poor PO intake Previous Nutrition Dx:  [] Resolved  [] Unresolved           [x] Progressing NUTRITION INTERVENTIONS: 
Meals/Snacks: General/healthful diet   Supplements: Commercial supplement GOAL: consume >50% of meals and ONS in 2-4 days NUTRITION MONITORING AND EVALUATION Food/Nutrient Intake Outcomes: Total energy intake Physical Signs/Symptoms Outcomes: Weight/weight change, Electrolyte and renal profile, GI 
 
Previous Goal Met: 
 [] Met              [x] Progressing Towards Goal              [] Not Progressing Towards Goal  
Previous Recommendations: 
 [x] Implemented          [] Not Implemented          [] Not Applicable LEARNING NEEDS (Diet, Food/Nutrient-Drug Interaction):  
 [x] None Identified 
 [] Identified and Education Provided/Documented 
 [] Identified and Pt declined/was not appropriate Cultural, Orthodox, OR Ethnic Dietary Needs:  
 [x] None Identified 
 [] Identified and Addressed 
 
 [x] Interdisciplinary Care Plan Reviewed/Documented  
 [x] Discharge Planning: General healthy diet  
 [] Participated in Interdisciplinary Rounds NUTRITION RISK:  
 [] High              [x] Moderate           []  Low  []  Minimal/Uncompromised Bunny Jensen RDN Pager [phone] Weekend Pager [phone]

## 2018-08-03 PROCEDURE — 74011250637 HC RX REV CODE- 250/637: Performed by: INTERNAL MEDICINE

## 2018-08-03 PROCEDURE — 74011250636 HC RX REV CODE- 250/636: Performed by: INTERNAL MEDICINE

## 2018-08-03 PROCEDURE — 74011250637 HC RX REV CODE- 250/637: Performed by: NURSE PRACTITIONER

## 2018-08-03 PROCEDURE — 77030013160 HC PROTCT ARM THMB DERY -A

## 2018-08-03 PROCEDURE — 65660000000 HC RM CCU STEPDOWN

## 2018-08-03 PROCEDURE — 74011000258 HC RX REV CODE- 258: Performed by: INTERNAL MEDICINE

## 2018-08-03 PROCEDURE — 74011000258 HC RX REV CODE- 258: Performed by: NURSE PRACTITIONER

## 2018-08-03 PROCEDURE — 93005 ELECTROCARDIOGRAM TRACING: CPT

## 2018-08-03 PROCEDURE — 74011250636 HC RX REV CODE- 250/636: Performed by: NURSE PRACTITIONER

## 2018-08-03 PROCEDURE — 74011000250 HC RX REV CODE- 250: Performed by: INTERNAL MEDICINE

## 2018-08-03 RX ADMIN — DILTIAZEM HYDROCHLORIDE 60 MG: 30 TABLET, FILM COATED ORAL at 11:30

## 2018-08-03 RX ADMIN — DILTIAZEM HYDROCHLORIDE 5 MG: 5 INJECTION, SOLUTION INTRAVENOUS at 11:49

## 2018-08-03 RX ADMIN — AMIODARONE HYDROCHLORIDE 0.5 MG/MIN: 50 INJECTION, SOLUTION INTRAVENOUS at 13:28

## 2018-08-03 RX ADMIN — VITAMIN D, TAB 1000IU (100/BT) 2000 UNITS: 25 TAB at 18:23

## 2018-08-03 RX ADMIN — METOPROLOL SUCCINATE 100 MG: 50 TABLET, EXTENDED RELEASE ORAL at 11:30

## 2018-08-03 RX ADMIN — QUETIAPINE FUMARATE 25 MG: 25 TABLET ORAL at 01:55

## 2018-08-03 RX ADMIN — CEFTRIAXONE 1 G: 1 INJECTION, POWDER, FOR SOLUTION INTRAMUSCULAR; INTRAVENOUS at 14:38

## 2018-08-03 RX ADMIN — DILTIAZEM HYDROCHLORIDE 60 MG: 30 TABLET, FILM COATED ORAL at 22:53

## 2018-08-03 RX ADMIN — AMIODARONE HYDROCHLORIDE 150 MG: 50 INJECTION, SOLUTION INTRAVENOUS at 13:18

## 2018-08-03 NOTE — PROGRESS NOTES
Hospitalist Progress Note NAME: Jnenifer Shafer :  1929 MRN:  [id number] Assessment / Plan: 
Atrial flutter with RVR POA Mild troponin elevation 0.83 POA Hypertension -appreciate cardiology input; cardizem gtt off, not on PO cardizem, avoid AC due to frequent fall and high risk of bleeding 
-continue metoprolol ER [id number]  
-Echo:  Systolic function was normal. Ejection fraction was estimated in the range of 55 % to 60%. Suboptimal endocardial visualization limits wall motion analysis. 
  
Acute kidney injury POA  
-resolved, Cr at baseline 
   
Hypotension POA 81/46 in ED 
-resolved; -130's/60-70's 
   
Mild Rhabdomyolysis POA CPK 1480 
-resolved 
   
Frequent falls Alzheimer's dementia POA   
-CT head negative 
-pt's mental status is her baseline per family 
-required 1:1 sitter; start low dose of seroquel in pm 
-working SNF placement; appreciate CM input 
   
UTI POA Overactive bladder 
-urine cx grew gram (-) rods; will complete total of 5 days 
-hold oxybutynin while treating for UTI 
   
Code Status: [facility] Surrogate Decision Maker: Shahana Bañuelos) is mPOA 
   
DVT Prophylaxis:  SCD 
GI Prophylaxis: not indicated 
   
Baseline:   Lives in [address] calls or checks in on her daily  
   
Recommended Disposition: SNF/LTC. See notes from case management. [facility] recently filed medicaid application, which was rejected. Wants LTC which will be a challenge. Pt is not safe w/o 24hr supervision, which [facility] cannot provide. Working with case management to arrange for safe DC Subjective: Chief Complaint / Reason for Physician Visit Patient resting comfortably, no acute complaints. Review of Systems: 
Symptom Y/N Comments  Symptom Y/N Comments Fever/Chills n   Chest Pain n   
Poor Appetite    Edema Cough    Abdominal Pain n   
Sputum n   Joint Pain SOB/ADAME n   Pruritis/Rash Nausea/vomit n   Tolerating PT/OT Diarrhea    Tolerating Diet Constipation    Other Could NOT obtain due to:   
 
Objective: VITALS:  
Last 24hrs VS reviewed since prior progress note. Most recent are: 
Patient Vitals for the past 24 hrs: 
 Temp Pulse Resp BP SpO2  
08/02/18 2309 98.3 °F (36.8 °C) 85 18 130/62 99 % 08/02/18 2207 - 79 - 142/69 -  
08/02/18 1913 98.3 °F (36.8 °C) 79 17 101/62 97 % 08/02/18 1449 98.4 °F (36.9 °C) 80 20 103/55 95 % 08/02/18 1419 - 80 - 114/56 -  
08/02/18 1046 - 69 24 98/54 94 % 08/02/18 1030 98.4 °F (36.9 °C) - - - -  
08/02/18 1002 - 73 - 105/72 -  
08/02/18 0800 - (!) 104 - - -  
08/02/18 0704 98.4 °F (36.9 °C) (!) 155 20 106/60 99 % 08/02/18 0334 98 °F (36.7 °C) 81 20 107/52 97 % Intake/Output Summary (Last 24 hours) at 08/02/18 2310 Last data filed at 08/02/18 1180 Gross per 24 hour Intake              240 ml Output                0 ml Net              240 ml PHYSICAL EXAM: 
General: frail, cooperative, no acute distress   
EENT:  EOMI. Anicteric sclerae. MMM Resp:  CTA bilaterally, no wheezing or rales. No accessory muscle use CV:  Regular  rhythm,  No edema GI:  Soft, Non distended, Non tender.  +Bowel sounds Neurologic:  Alert and oriented X 3, normal speech, Psych:   Poor insight. Not anxious nor agitated Skin:  No rashes. No jaundice Reviewed most current lab test results and cultures  YES Reviewed most current radiology test results   YES Review and summation of old records today    NO Reviewed patient's current orders and MAR    YES 
PMH/SH reviewed - no change compared to H&P 
________________________________________________________________________ Care Plan discussed with: 
  Comments Patient x Family RN x Care Manager Consultant     
                 x Multidiciplinary team rounds were held today with , nursing, pharmacist and clinical coordinator.   Patient's plan of care was discussed; medications were reviewed and discharge planning was addressed. ________________________________________________________________________ Total NON critical care TIME:  30  Minutes Total CRITICAL CARE TIME Spent:   Minutes non procedure based Comments >50% of visit spent in counseling and coordination of care    
________________________________________________________________________ Ned Party, DO  
 
Procedures: see electronic medical records for all procedures/Xrays and details which were not copied into this note but were reviewed prior to creation of Plan. LABS: 
I reviewed today's most current labs and imaging studies. Pertinent labs include: 
Recent Labs 08/02/18 0310  08/01/18 
 1640 WBC  9.6  11.0 HGB  12.4  13.8 HCT  36.0  39.7 PLT  280  279 Recent Labs 08/02/18 
 0310  08/01/18 
 1640  07/31/18 
 0427 NA  138  137  139  
K  3.8  4.1  4.1 CL  103  104  108 CO2  27  27  23 GLU  108*  110*  117* BUN  17  15  13 CREA  0.73  0.76  0.77 CA  8.5  8.4*  8.3* Signed: Ned Brewster, DO

## 2018-08-03 NOTE — PROGRESS NOTES
ALEX Supervisor contacted [employer] Supervisor to get call back from 's left since Monday of this week from [address] called back CM and reported that it appears was denied for Medicaid because not all materials, documents (bank statements, life insurance, resources, etc) were submitted within 45 days of the application submission. Informed CM that pt's mPOA should re-apply for Medicaid and ensure all documentation submitted. CM updated CM supervisor, Med Assist contact, and pt's mPOA. Michelle is verbally frustrated with the process as she applied online and said she submitted what was requested of her. CM offered support and empathy, will reach out to [employer] to assist mPOA with submission process this time. CM informed mPOA that pt had been denied from [facility] at this time.  left with [facility] to determine if SNF or LTC bed was unavailable (or both). Pt's mPOA declining for referral to be sent to [facility] or [place] at this time. CM to leave SNF list at bedside for mPOA to take time to review. CM advised to expand options for placement as mPOA has made it very clear she cannot provide care and pt is not safe to discharge home at this time due to mental status, safety, and need for mod-max A x 2 at this time. CM will continue to follow-up as placement continues to be a barrier for pt - will need all Medicaid materials submitted to ensure long-term placement and reduce the risk for readmission. 11:40AM UPDATE Pt's mPOA is bedside at this time. CM [facility] with [facility] AM requesting information. CM to follow-up. Dee ([facility]) called back CM to inform that they would be able to accept pt for short-term skilled SNF but do not have any LTC beds available at this time. 4:00PM UPDATE 
UAI completed for pt. CM called mPOA to discuss updates. Will review SNF list over the weekend.  [facility]. ALEX provided contact information for weekend CM to [facility]. Olivier Gregg, MSW Supervisee in Social Work, [facility] [phone]

## 2018-08-03 NOTE — PROGRESS NOTES
Occupational Therapy Discussed with PT who reported elevated 's, will continue to follow.  
Lilly Jacobson OTR/L

## 2018-08-03 NOTE — PROGRESS NOTES
0700: Bedside shift change report given to Ezra Marquez RN (oncoming nurse) by Theodore Bang RN (offgoing nurse). Report included the following information SBAR, Kardex and ED Summary. 1022: Patient continues to try to get out of bed and verbally threatens staff to kick. Pt refusing all medication. Paged MD in regards to this. Awaiting call back from Dr. Viktor Anguiano. 1030: Spoke with Dr. Viktor Anguiano and will come up to unit and evaluate patient. Discussed pt refusing medication despite numerous attempts to educate and prompt patient to take medication. Will consider adding a sitter 1045: Patient's HR sustaining in the 160s appears to be in SVT. Pt continues to refuse all medication. Paged Dr. Genesis Bruno. Awaiting call back. Paged Dr. Viktor Anguiano as well and awaiting call back. Charge nurse notified. Pt is otherwise alert and appears asymptomatic. 1048: Pt continues to be combative and refusing IV dilt. Spoke with Dr. Viktor Anguiano who agreed to place pt in restraints rollator belt and to place a sitter in the room. 1145: Pt given PO meds with niece at bedside. HR sustaining in the 170s. Given IV dilt S/P 15 minutes of PO administration and  currently. Will page cards. 1235: Spoke with Dr. Genesis Bruno who would like to start patient on Amio bolus of 150 mg followed by infusion of 0.5mg/min. Spoke with pharmacist who verified orders for amio. Pt HR sustaining 160s despite IV dilt x1 and PO medications metoprolol and dilt. 1457: Patient continues tachy up from 110s as high as 204 without sustaining. Sent tiger text to Dr. Genesis Bruno. Pt currently receiving  amio gtt. Genesis Bruno will be up to eval patient. 1820: Dr. Viktor Anguiano at bedside with patient as she attempts to get out of bed. Per MD place a sitter in the room for patient's safety. 1900:  
Bedside shift change report given to ADY Johnson (oncoming nurse). Report included the following information SBAR, Kardex and ED Summary.   
 
SHIFT SUMMARY: 
 
 
 
 
 
[address] NURSING NOTE Admission Date 7/28/2018 Admission Diagnosis Atrial flutter with rapid ventricular response ([address] Consults IP CONSULT TO CARDIOLOGY Cardiac Monitoring [x] Yes [] No  
  
Purposeful Hourly Rounding [x] Yes   
Rose Mary Score Total Score: 5 Rose Mary score 3 or > [x] Bed Alarm [] Avasys [x] 1:1 sitter [] Patient refused (Signed refusal form in chart) Seng Score Seng Score: 17 Seng score 14 or < [] PMT consult [] Wound Care consult  
 []  Specialty bed  [] Nutrition consult Influenza Vaccine Received Flu Vaccine for Current Season (usually Sept-March): Not Flu Season Oxygen needs? [x] Room air Oxygen @  []1L    []2L    []3L   []4L    []5L   []6L via NC Chronic home O2 use? [] Yes [x] No 
Perform O2 challenge test and document in progress note using smartphrase (.Homeoxygen) Last bowel movement Last Bowel Movement Date: 07/31/18 Urinary Catheter LDAs Peripheral IV 07/29/18 Right Arm (Active) Site Assessment Clean, dry, & intact 8/3/2018  2:00 PM  
Phlebitis Assessment 0 8/3/2018  2:00 PM  
Infiltration Assessment 0 8/3/2018  2:00 PM  
Dressing Status Clean, dry, & intact 8/3/2018  2:00 PM  
Dressing Type Transparent 8/3/2018  2:00 PM  
Hub Color/Line Status Flushed;Blue; Infusing 8/3/2018  2:00 PM  
   
Peripheral IV 08/03/18 Left Antecubital (Active) Site Assessment Clean, dry, & intact 8/3/2018  3:30 PM  
Phlebitis Assessment 0 8/3/2018  3:30 PM  
Infiltration Assessment 0 8/3/2018  3:30 PM  
Dressing Status Clean, dry, & intact 8/3/2018  3:30 PM  
Dressing Type Transparent 8/3/2018  3:30 PM  
Hub Color/Line Status Blue;Flushed 8/3/2018  3:30 PM  
                  
  
Readmission Risk Assessment Tool Score Medium Risk   
      
 20 Total Score 3 Has Seen PCP in Last 6 Months (Yes=3, No=0) 3 Patient Length of Stay (>5 days = 3) 4 IP Visits Last 12 Months (1-3=4, 4=9, >4=11) 5 Pt.  Coverage (Medicare=5 , Medicaid, or Self-Pay=4) 5 Charlson Comorbidity Score (Age + Comorbid Conditions) Criteria that do not apply:  
 . Living with Significant Other. Assisted Living. LTAC. SNF. or  
Rehab Expected Length of Stay 3d 7h Actual Length of Stay 6

## 2018-08-03 NOTE — PROGRESS NOTES
Physical Therapy Note Chart reviewed. Noted pt with HR sustaining in 170s at this time. Pt is not appropriate for OOB mobiltiy. Will defer and continue to follow.  
 
Thank you, 
Aretha Flores, PT, DPT

## 2018-08-03 NOTE — PROGRESS NOTES
Cardiopulmonary Care Interdisciplinary rounds were held today to discuss patient's plan of care and outcomes. The following members were present: NP/Physician, Pharmacy, Nursing and Case Management. Expected Length of Stay:  3d 7h 
 
Plan of Care: Continue current treatment plan. Patient discharge pending SNF placement.

## 2018-08-03 NOTE — PROGRESS NOTES
EP/ Arrhythmia Progress Note Patient ID: 
Patient: Nato Valdes  MRN: [id number] Age: 80 y.o.  : 1929 
 
8/3/2018 Admit Date: 2018 Assessment: 1. Paroxysmal atrial fibrillation and atrial flutter with rapid ventricular response. Lower BP and kate hypotension, transient, on rate control. 2. Positive troponin, but also in setting of mild rhabdomyolysis, UTI. Nonspecific. 3. Dementia, significant. Poor appetite, oral intake. 4. Chronic anticoagulation with Xarelto as an OP, stopped here. Falls, bleeding risk. 5. UTI POA. Pan-susceptible E. coli. 6. DNR. Plan: 1. Rate control strategy has been limited by hypotension. On metoprolol  daily. Added diltiazem IR 60 mg tid with a hold parameter on . 
2. At this point, using antiarrhythmic therapy since rate control not easily achievable. Amiodarone. 3. Agree with no chronic anticoagulation given falls, major bleeding risk UNLESS these risks can be reduced. For now, she's on ASA. 4. Agree with treating UTI. Using amio infusion for at least 48 hours, then convert to oral load. Need arrhythmia suppression with at least lenient rate control for minimal breakthrough. An alternative to this is pacemaker-AV node ablation, which I'd leave as a last resort. She is NOT a candidate for combined atrial flutter and fibrillation ablation due to procedure risk. [x]       High complexity decision making was performed in this patient Subjective:  
 
Nato Valdes doesn't deny or suggest chest pain, dyspnea, palpitations. Oriented to self only. Review of Systems - Cannot give full ROS due to dementia. Objective:  
 
Physical Exam: 
Temp (24hrs), Av.4 °F (36.9 °C), Min:98 °F (36.7 °C), Max:99 °F (37.2 °C) Patient Vitals for the past 8 hrs: 
 Pulse 18 1339 (!) 105  
18 1334 92  
18 1328 (!) 149 08/03/18 1325 (!) 151  
08/03/18 1318 (!) 163  
08/03/18 1310 (!) 160  
08/03/18 1237 (!) 160  
08/03/18 1149 (!) 170  
08/03/18 1045 (!) 166  
08/03/18 0811 (!) 108 Patient Vitals for the past 8 hrs: 
 Resp  
08/03/18 1310 18  
08/03/18 1045 20  
08/03/18 0811 20 Patient Vitals for the past 8 hrs: 
 BP  
08/03/18 1339 128/73  
08/03/18 1334 108/66  
08/03/18 1328 116/79  
08/03/18 1325 109/71  
08/03/18 1318 110/72  
08/03/18 1310 102/66  
08/03/18 1045 134/84  
08/03/18 0811 157/81 Intake/Output Summary (Last 24 hours) at 08/03/18 1503 Last data filed at 08/02/18 2354 Gross per 24 hour Intake                0 ml Output              250 ml Net             -250 ml Nondiaphoretic, not in acute distress. MMM, no jaundice, HEENT stable. Unlabored, clear to auscultation bilaterally, symmetric air movement. Irregular rhythm, +soft systolic murmur, no pericardial rub, knock, or gallop. No JVD or peripheral edema. Palpable radial pulses bilaterally. Abdomen soft, nontender, nondistended. No pulsatile masses or bruit. No cyanosis. Skin warm and dry. Awake, no tremor. Cardiographics and Studies, I personally reviewed: 
 
Telemetry:  8/3 Atrial fibrillation/flutter with RVR. ECG 7/28: May be typical atrial flutter with variable block, mostly 2:1.  Prior ECG in 5/2018 shows 4:1 conduction, typical morphology. ECHO 7/30:   
LEFT VENTRICLE: Size was normal. Systolic function was normal. Ejection 
fraction was estimated in the range of 55 % to 60 %. Suboptimal 
endocardial visualization limits wall motion analysis. Wall thickness was 
normal. 
 
RIGHT VENTRICLE: The size was normal. Systolic function was normal. 
 
LEFT ATRIUM: Size was normal. 
 
RIGHT ATRIUM: Size was normal. 
 
MITRAL VALVE: There was mild thickening. There was minimal calcification. DOPPLER: There was trivial regurgitation. AORTIC VALVE: Not well visualized.  DOPPLER: Transaortic velocity was 
within the normal range. There was no stenosis. There was no regurgitation. TRICUSPID VALVE: Normal valve structure. There was normal leaflet 
separation. DOPPLER: There was no significant regurgitation. Pulmonary 
artery systolic pressure: 32 mmHg. PULMONIC VALVE: Not well visualized. AORTA: The root exhibited normal size. PERICARDIUM: Insignificant pericardial effusion and/or pericardial fat was present. LAB Review: No results for input(s): CPK, CKMB, CKNDX, TROIQ in the last 72 hours. No lab exists for component: CPKMB Lab Results Component Value Date/Time Cholesterol, total 153 02/20/2018 02:08 PM  
 HDL Cholesterol 72 02/20/2018 02:08 PM  
 LDL, calculated 67 02/20/2018 02:08 PM  
 Triglyceride 70 02/20/2018 02:08 PM  
 
No results for input(s): INR, PTP, APTT in the last 72 hours. No lab exists for component: INREXT, INREXT Recent Labs 08/02/18 
 0310  08/01/18 
 1640 NA  138  137  
K  3.8  4.1 CL  103  104 CO2  27  27 BUN  17  15 CREA  0.73  0.76 GLU  108*  110* CA  8.5  8.4* WBC  9.6  11.0 HGB  12.4  13.8 HCT  36.0  39.7 PLT  280  279 No results for input(s): SGOT, GPT, AP, TBIL, TP, ALB, GLOB, GGT, AML, LPSE in the last 72 hours. No lab exists for component: AMYP, HLPSE No components found for: [place] No results for input(s): PH, PCO2, PO2 in the last 72 hours. Medications Reviewed:  
No Known Allergies Current Facility-Administered Medications Medication Dose Route Frequency  amiodarone (CORDARONE) 375 mg/250 mL D5W infusion  0.5 mg/min IntraVENous CONTINUOUS  
 QUEtiapine (SEROquel) tablet 25 mg  25 mg Oral QHS PRN  
 dilTIAZem (CARDIZEM) IR tablet 60 mg  60 mg Oral TID  dilTIAZem (CARDIZEM) injection 5 mg  5 mg IntraVENous Q4H PRN  
 cefTRIAXone (ROCEPHIN) 1 g in 0.9% sodium chloride (MBP/ADV) 50 mL  1 g IntraVENous Q24H  
 metoprolol succinate (TOPROL-XL) XL tablet 100 mg  100 mg Oral DAILY  aspirin chewable tablet 81 mg 81 mg Oral DAILY  cholecalciferol (VITAMIN D3) tablet 2,000 Units  2,000 Units Oral QPM  
 acetaminophen (TYLENOL) tablet 650 mg  650 mg Oral Q4H PRN  
 ondansetron (ZOFRAN) injection 4 mg  4 mg IntraVENous Q4H PRN Serena James MD 
8/3/2018

## 2018-08-03 NOTE — PROGRESS NOTES
1931-Bedside shift change report given to ADY Duvall  by Rik Zamora. Report included the following information SBAR and Kardex. 0140- Pt. HR elevated 140-150s asymptomatic. Performed ECG, HR 110s. MEWS 4/Charge RN made aware. Will continue to monitor.

## 2018-08-04 PROCEDURE — 74011250637 HC RX REV CODE- 250/637: Performed by: INTERNAL MEDICINE

## 2018-08-04 PROCEDURE — 74011250636 HC RX REV CODE- 250/636: Performed by: INTERNAL MEDICINE

## 2018-08-04 PROCEDURE — 74011250637 HC RX REV CODE- 250/637: Performed by: NURSE PRACTITIONER

## 2018-08-04 PROCEDURE — 65660000000 HC RM CCU STEPDOWN

## 2018-08-04 RX ADMIN — VITAMIN D, TAB 1000IU (100/BT) 2000 UNITS: 25 TAB at 18:15

## 2018-08-04 RX ADMIN — METOPROLOL SUCCINATE 100 MG: 50 TABLET, EXTENDED RELEASE ORAL at 10:15

## 2018-08-04 RX ADMIN — DILTIAZEM HYDROCHLORIDE 60 MG: 30 TABLET, FILM COATED ORAL at 10:15

## 2018-08-04 RX ADMIN — DILTIAZEM HYDROCHLORIDE 60 MG: 30 TABLET, FILM COATED ORAL at 18:15

## 2018-08-04 RX ADMIN — AMIODARONE HYDROCHLORIDE 0.5 MG/MIN: 50 INJECTION, SOLUTION INTRAVENOUS at 10:56

## 2018-08-04 RX ADMIN — DILTIAZEM HYDROCHLORIDE 60 MG: 30 TABLET, FILM COATED ORAL at 22:19

## 2018-08-04 RX ADMIN — ASPIRIN 81 MG 81 MG: 81 TABLET ORAL at 10:15

## 2018-08-04 NOTE — PROGRESS NOTES
Hospitalist Progress Note NAME: Pilo Higgins :  1929 MRN:  [id number] Assessment / Plan: 
Atrial flutter with RVR POA Mild troponin elevation 0.83 POA Hypertension 
-HR intermittently up to 200 (briefly early AM) 
-patient agitated throughout the day 
-appreciate cardio input, was started on amio gtt given difficulty w rate control 
-cardizem gtt off, not on PO cardizem, avoid AC due to frequent fall and high risk of  
-cont PO dilt 60mg tid 
-Echo:  Systolic function was normal. Ejection fraction was estimated in the range of 55 % to 60%. Suboptimal endocardial visualization limits wall motion analysis. -elevated trop noted in setting of elevated Cr, mild rhabdo POD 
-no cp 
  
Acute kidney injury POA  
-resolved, Cr at baseline 
   
Hypotension POA 81/46 in ED 
-resolved; -130's/60-70's 
   
Mild Rhabdomyolysis POA CPK 1480 
-resolved 
   
Frequent falls Alzheimer's dementia POA   
-CT head negative 
-agitated throughout the day (8/3) will reinstate sitter for now 
-working SNF placement; appreciate CM input 
   
UTI POA Overactive bladder 
-urine cx grew gram (-) rods; will complete total of 5 days 
-hold oxybutynin while treating for UTI 
   
Code Status: [facility] Surrogate Decision Maker: Criselda Cook) is mPOA 
   
DVT Prophylaxis:  SCD 
GI Prophylaxis: not indicated 
   
Baseline:   Lives in [address] calls or checks in on her daily  
   
Recommended Disposition: SNF/LTC. See notes from case management. [facility] recently filed medicaid application, which was rejected. Wants LTC which will be a challenge. Pt is not safe w/o 24hr supervision, which [facility] cannot provide. Working with case management to arrange for safe DC Subjective: Chief Complaint / Reason for Physician Visit Agitated, oriented to person. Pt asking to get OOB and discussed OOB to chair with sitter as agitation not helping with HR. Review of Systems: 
Symptom Y/N Comments Symptom Y/N Comments Fever/Chills    Chest Pain Poor Appetite    Edema Cough    Abdominal Pain Sputum    Joint Pain SOB/ADAME    Pruritis/Rash Nausea/vomit    Tolerating PT/OT Diarrhea    Tolerating Diet Constipation    Other Could NOT obtain due to: agitated Objective: VITALS:  
Last 24hrs VS reviewed since prior progress note. Most recent are: 
Patient Vitals for the past 24 hrs: 
 Temp Pulse Resp BP SpO2  
08/03/18 2001 97.9 °F (36.6 °C) (!) 118 22 126/78 95 % 08/03/18 1518 98.4 °F (36.9 °C) 98 16 128/87 97 % 08/03/18 1339 - (!) 105 - 128/73 96 % 08/03/18 1334 - 92 - 108/66 96 % 08/03/18 1328 - (!) 149 - 116/79 97 % 08/03/18 1325 - (!) 151 - 109/71 97 % 08/03/18 1318 - (!) 163 - 110/72 97 % 08/03/18 1310 99 °F (37.2 °C) (!) 160 18 102/66 96 % 08/03/18 1237 - (!) 160 - - -  
08/03/18 1149 - (!) 170 - - -  
08/03/18 1045 98.1 °F (36.7 °C) (!) 166 20 134/84 98 % 08/03/18 0811 98.7 °F (37.1 °C) (!) 108 20 157/81 99 % 08/03/18 0345 98 °F (36.7 °C) (!) 104 18 116/67 96 % 08/03/18 0159 98.1 °F (36.7 °C) (!) 116 22 115/71 96 % Intake/Output Summary (Last 24 hours) at 08/03/18 2319 Last data filed at 08/02/18 2354 Gross per 24 hour Intake                0 ml Output              250 ml Net             -250 ml PHYSICAL EXAM: 
General: frail, agitated but no acute distress  
EENT:  EOMI. Anicteric sclerae. MMM Resp:  CTA bilaterally, no wheezing or rales. No accessory muscle use CV:  Regular  rhythm,  No edema GI:  Soft, Non distended, Non tender.  +Bowel sounds Neurologic:  Alert and oriented X 3, normal speech, Psych:   Poor insight. anxious, agitated Skin:  No rashes. No jaundice Reviewed most current lab test results and cultures  YES Reviewed most current radiology test results   YES Review and summation of old records today    NO Reviewed patient's current orders and MAR    YES 
PMH/SH reviewed - no change compared to H&P 
________________________________________________________________________ Care Plan discussed with: 
  Comments Patient x Family RN x Care Manager Consultant     
                 x Multidiciplinary team rounds were held today with , nursing, pharmacist and clinical coordinator. Patient's plan of care was discussed; medications were reviewed and discharge planning was addressed. ________________________________________________________________________ Total NON critical care TIME:  25  Minutes Total CRITICAL CARE TIME Spent:   Minutes non procedure based Comments >50% of visit spent in counseling and coordination of care    
________________________________________________________________________ Jay Rodriguez DO  
 
Procedures: see electronic medical records for all procedures/Xrays and details which were not copied into this note but were reviewed prior to creation of Plan. LABS: 
I reviewed today's most current labs and imaging studies. Pertinent labs include: 
Recent Labs 08/02/18 0310 08/01/18 
 1640 WBC  9.6  11.0 HGB  12.4  13.8 HCT  36.0  39.7 PLT  280  279 Recent Labs 08/02/18 0310 08/01/18 
 1640 NA  138  137  
K  3.8  4.1 CL  103  104 CO2  27  27 GLU  108*  110* BUN  17  15 CREA  0.73  0.76 CA  8.5  8.4* Signed: Jay Rodriguez DO

## 2018-08-04 NOTE — PROGRESS NOTES
Hospitalist Progress Note NAME: Hira Bell :  1929 MRN:  [id number] Assessment / Plan: 
Atrial flutter with RVR POA Mild troponin elevation 0.83 POA Hypertension 
-HR much improved 
-sitter in room, much less agitated today, HR  
-appreciate cardio input, was started on amio gtt given difficulty w rate control 
-cardizem gtt off, not on PO cardizem, avoid AC due to frequent fall and high risk of  
-cont PO dilt 60mg tid 
-Echo:  Systolic function was normal. Ejection fraction was estimated in the range of 55 % to 60%. Suboptimal endocardial visualization limits wall motion analysis. -elevated trop noted in setting of elevated Cr, mild rhabdo POD 
-no cp 
  
Acute kidney injury POA  
-resolved, Cr at baseline 
   
Hypotension POA 81/46 in ED 
-resolved; -130's/60-70's 
   
Mild Rhabdomyolysis POA CPK 1480 
-resolved 
   
Frequent falls Alzheimer's dementia POA   
-CT head negative 
-agitated throughout the day (8/3) will reinstate sitter for now 
-working SNF placement; appreciate CM input 
   
UTI POA Overactive bladder 
-urine cx grew gram (-) rods; will complete total of 5 days 
-hold oxybutynin while treating for UTI 
   
Code Status: Critical access hospital Surrogate Decision Maker: King Zakia Lee Degree) [facility] 
   
DVT Prophylaxis:  SCD 
GI Prophylaxis: not indicated 
   
Baseline:   Lives in [address] calls or checks in on her daily  
   
Recommended Disposition: SNF/LTC. See notes from case management. [facility] recently filed medicaid application, which was rejected. Wants LTC which will be a challenge. Pt is not safe w/o 24hr supervision, which [facility] cannot provide. Working with case management to arrange for safe DC Subjective: Chief Complaint / Reason for Physician Visit Resting in comfortably, no acute issues overnight Review of Systems: 
Symptom Y/N Comments  Symptom Y/N Comments Fever/Chills    Chest Pain Poor Appetite    Edema Cough    Abdominal Pain    
Sputum    Joint Pain SOB/ADAME    Pruritis/Rash Nausea/vomit    Tolerating PT/OT Diarrhea    Tolerating Diet Constipation    Other Could NOT obtain due to: agitated Objective: VITALS:  
Last 24hrs VS reviewed since prior progress note. Most recent are: 
Patient Vitals for the past 24 hrs: 
 Temp Pulse Resp BP SpO2  
08/04/18 1014 98 °F (36.7 °C) 93 18 110/58 97 % 08/04/18 0959 - (!) 116 - - -  
08/04/18 0400 - 83 14 - -  
08/04/18 0013 99 °F (37.2 °C) 87 16 116/66 98 % 08/03/18 2001 97.9 °F (36.6 °C) (!) 118 18 126/78 95 % Intake/Output Summary (Last 24 hours) at 08/04/18 1649 Last data filed at 08/03/18 2001 Gross per 24 hour Intake                0 ml Output              250 ml Net             -250 ml PHYSICAL EXAM: 
General: frail, agitated but no acute distress  
EENT:  EOMI. Anicteric sclerae. MMM Resp:  CTA bilaterally, no wheezing or rales. No accessory muscle use CV:  Regular  rhythm,  No edema GI:  Soft, Non distended, Non tender.  +Bowel sounds Neurologic:  Alert and oriented X 3, normal speech, Psych:   Poor insight. anxious, agitated Skin:  No rashes. No jaundice Reviewed most current lab test results and cultures  YES Reviewed most current radiology test results   YES Review and summation of old records today    NO Reviewed patient's current orders and MAR    YES 
PMH/ reviewed - no change compared to H&P 
________________________________________________________________________ Care Plan discussed with: 
  Comments Patient x Family RN x Care Manager Consultant     
                 x Multidiciplinary team rounds were held today with , nursing, pharmacist and clinical coordinator. Patient's plan of care was discussed; medications were reviewed and discharge planning was addressed. ________________________________________________________________________ Total NON critical care TIME:  20 Minutes Total CRITICAL CARE TIME Spent:   Minutes non procedure based Comments >50% of visit spent in counseling and coordination of care    
________________________________________________________________________ Yao Kessler, DO  
 
Procedures: see electronic medical records for all procedures/Xrays and details which were not copied into this note but were reviewed prior to creation of Plan. LABS: 
I reviewed today's most current labs and imaging studies. Pertinent labs include: 
Recent Labs 08/02/18 
 0310 WBC  9.6 HGB  12.4 HCT  36.0 PLT  280 Recent Labs 08/02/18 
 0310 NA  138  
K  3.8 CL  103 CO2  27 GLU  108* BUN  17 CREA  0.73 CA  8.5 Signed: Yao Kessler DO

## 2018-08-04 NOTE — PROGRESS NOTES
Pt refused for Tech and nurse to obtain VS stating, \" Go away, leave me alone\". Pt pushed away nurse and tech and pulled covers over arms. No acute s/s of distress noted will continue to monitor

## 2018-08-04 NOTE — PROGRESS NOTES
Cardiology Progress Note 8/4/2018     Admit Date: 7/28/2018 Admit Diagnosis: Atrial flutter with rapid ventricular response (HCC)  CC: none currently Assessment (as per Dr Genesis Bruno): 1. Paroxysmal atrial fibrillation and atrial flutter with rapid ventricular response. Lower BP and kate hypotension, transient, on rate control. 2. Positive troponin, but also in setting of mild rhabdomyolysis, UTI. Nonspecific. 3. Dementia, significant. Poor appetite, oral intake. 4. Chronic anticoagulation with Xarelto as an OP, stopped here. Falls, bleeding risk. 5. UTI POA. Pan-susceptible E. coli. 6. DNR. 
  
Plan (as per Dr Genesis Bruno:  
  
1. Rate control strategy has been limited by hypotension. On metoprolol  daily. Added diltiazem IR 60 mg tid with a hold parameter on 7/31. 
2. At this point, using antiarrhythmic therapy since rate control not easily achievable. Amiodarone. 3. Agree with no chronic anticoagulation given falls, major bleeding risk UNLESS these risks can be reduced. For now, she's on ASA. 4. Agree with treating UTI. 
  
Using amio infusion for at least 48 hours, then convert to oral load. Need arrhythmia suppression with at least lenient rate control for minimal breakthrough. An alternative to this is pacemaker-AV node ablation, which I'd leave as a last resort. 
  
She is NOT a candidate for combined atrial flutter and fibrillation ablation due to procedure risk. 8/4: Cont aflutter; rates ok. No new recs. For other plans, see orders. Hospital problem list  
Active Hospital Problems Diagnosis Date Noted  Atrial flutter with rapid ventricular response ([facility] [address] 07/28/2018 Subjective: Magda Jarvis reports Chest pain X none  consistent with:  Non-cardiac CP Atypical CP None now  On going  Anginal CP Dyspnea X none  at rest  with exertion    
    improved  unchanged  worse PND X none  overnight Orthopnea X none  improved  unchanged  worse Presyncope X none  improved  unchanged  worse Ambulated in hallway without symptoms   Yes Ambulated in room without symptoms  Yes Objective:  
 Physical Exam: 
Overall VSSAF;   
Visit Vitals  /58 (BP 1 Location: Left arm, BP Patient Position: At rest)  Pulse 93  Temp 98 °F (36.7 °C)  Resp 18  Ht 5' 1\" (1.549 m)  Wt 53 kg (116 lb 13.5 oz)  SpO2 97%  BMI 22.08 kg/m2 Temp (24hrs), Av.3 °F (36.8 °C), Min:97.9 °F (36.6 °C), Max:99 °F (37.2 °C) Patient Vitals for the past 8 hrs: 
 Pulse 18 1014 93  
18 0959 (!) 116 Patient Vitals for the past 8 hrs: 
 Resp  
18 1014 18 Patient Vitals for the past 8 hrs: 
 BP  
18 1014 110/58 Intake/Output Summary (Last 24 hours) at 18 1325 Last data filed at 18 Gross per 24 hour Intake                0 ml Output              250 ml Net             -250 ml General Appearance: Well developed, well nourished, no acute distress. Ears/Nose/Mouth/Throat:   Normal MM; anicteric. JVP: WNL Resp:   clear to auscultation bilaterally. Nl resp effort. Cardiovascular:  IRRR, S1, S2 normal, no new murmur. No gallop or rub. Abdomen:   Soft, non-tender, bowel sounds are present. Extremities: No edema bilaterally. Skin: 
Neuro: Warm and dry. A; pleasantly demented, grossly nonfocal  
cath site intact w/o hematoma or new bruit; distal pulse unchanged  Yes Data Review:    
Telemetry independently reviewed  sinus x Persistent aflutter  parox afib  NSVT  
 
ECG independently reviewed  NSR  afib  no significant changes  NSST-Tw chgs  
x no new ECG provided for review Lab results reviewed as noted below. Current medications reviewed as noted below. No results for input(s): PH, PCO2, PO2 in the last 72 hours. No results for input(s): CPK, CKMB, CKNDX, TROIQ in the last 72 hours. Recent Labs 18 
 0310  18 
 1640 NA 138  137  
K  3.8  4.1 CL  103  104 CO2  27  27 BUN  17  15 CREA  0.73  0.76 GFRAA  >60  >60  
GLU  108*  110* CA  8.5  8.4* WBC  9.6  11.0 HGB  12.4  13.8 HCT  36.0  39.7 PLT  280  279 Lab Results Component Value Date/Time Cholesterol, total 153 02/20/2018 02:08 PM  
 HDL Cholesterol 72 02/20/2018 02:08 PM  
 LDL, calculated 67 02/20/2018 02:08 PM  
 Triglyceride 70 02/20/2018 02:08 PM  
 
No results for input(s): SGOT, GPT, AP, TBIL, TP, ALB, GLOB, GGT, AML, LPSE in the last 72 hours. No lab exists for component: AMYP, HLPSE No results for input(s): INR, PTP, APTT in the last 72 hours. No lab exists for component: INREXT No components found for: [place] Current Facility-Administered Medications Medication Dose Route Frequency  amiodarone (CORDARONE) 375 mg/250 mL D5W infusion  0.5 mg/min IntraVENous CONTINUOUS  
 QUEtiapine (SEROquel) tablet 25 mg  25 mg Oral QHS PRN  
 dilTIAZem (CARDIZEM) IR tablet 60 mg  60 mg Oral TID  dilTIAZem (CARDIZEM) injection 5 mg  5 mg IntraVENous Q4H PRN  
 metoprolol succinate (TOPROL-XL) XL tablet 100 mg  100 mg Oral DAILY  aspirin chewable tablet 81 mg  81 mg Oral DAILY  cholecalciferol (VITAMIN D3) tablet 2,000 Units  2,000 Units Oral QPM  
 acetaminophen (TYLENOL) tablet 650 mg  650 mg Oral Q4H PRN  
 ondansetron (ZOFRAN) injection 4 mg  4 mg IntraVENous Q4H PRN Erika Walden MD

## 2018-08-04 NOTE — PROGRESS NOTES
Notified Dr. Sean Rizzo via telephone that Patient converted from A-flutter to NSR. No new orders received. MD instructed to continue pt on Amio tano.

## 2018-08-05 PROCEDURE — 65660000000 HC RM CCU STEPDOWN

## 2018-08-05 PROCEDURE — 74011250637 HC RX REV CODE- 250/637: Performed by: INTERNAL MEDICINE

## 2018-08-05 PROCEDURE — 74011250637 HC RX REV CODE- 250/637: Performed by: NURSE PRACTITIONER

## 2018-08-05 PROCEDURE — 74011250636 HC RX REV CODE- 250/636: Performed by: INTERNAL MEDICINE

## 2018-08-05 PROCEDURE — 77030037878 HC DRSG MEPILEX >48IN BORD MOLN -B

## 2018-08-05 PROCEDURE — 77030038269 HC DRN EXT URIN PURWCK BARD -A

## 2018-08-05 RX ORDER — DILTIAZEM HYDROCHLORIDE 30 MG/1
30 TABLET, FILM COATED ORAL 3 TIMES DAILY
Status: DISCONTINUED | OUTPATIENT
Start: 2018-08-05 | End: 2018-08-06

## 2018-08-05 RX ORDER — AMIODARONE HYDROCHLORIDE 200 MG/1
400 TABLET ORAL 2 TIMES DAILY
Status: DISCONTINUED | OUTPATIENT
Start: 2018-08-05 | End: 2018-08-08 | Stop reason: HOSPADM

## 2018-08-05 RX ORDER — FACIAL-BODY WIPES
10 EACH TOPICAL DAILY PRN
Status: DISCONTINUED | OUTPATIENT
Start: 2018-08-05 | End: 2018-08-08 | Stop reason: HOSPADM

## 2018-08-05 RX ADMIN — ASPIRIN 81 MG 81 MG: 81 TABLET ORAL at 08:29

## 2018-08-05 RX ADMIN — DILTIAZEM HYDROCHLORIDE 30 MG: 30 TABLET, FILM COATED ORAL at 23:25

## 2018-08-05 RX ADMIN — BISACODYL 10 MG: 10 SUPPOSITORY RECTAL at 15:07

## 2018-08-05 RX ADMIN — AMIODARONE HYDROCHLORIDE 0.5 MG/MIN: 50 INJECTION, SOLUTION INTRAVENOUS at 02:56

## 2018-08-05 RX ADMIN — METOPROLOL SUCCINATE 100 MG: 50 TABLET, EXTENDED RELEASE ORAL at 08:28

## 2018-08-05 RX ADMIN — DILTIAZEM HYDROCHLORIDE 30 MG: 30 TABLET, FILM COATED ORAL at 15:15

## 2018-08-05 RX ADMIN — VITAMIN D, TAB 1000IU (100/BT) 2000 UNITS: 25 TAB at 17:45

## 2018-08-05 RX ADMIN — AMIODARONE HYDROCHLORIDE 400 MG: 200 TABLET ORAL at 17:39

## 2018-08-05 RX ADMIN — DILTIAZEM HYDROCHLORIDE 60 MG: 30 TABLET, FILM COATED ORAL at 08:29

## 2018-08-05 RX ADMIN — AMIODARONE HYDROCHLORIDE 400 MG: 200 TABLET ORAL at 12:34

## 2018-08-05 RX ADMIN — QUETIAPINE FUMARATE 25 MG: 25 TABLET ORAL at 17:37

## 2018-08-05 NOTE — PROGRESS NOTES
Cardiology Progress Note  8/5/2018     Admit Date: 7/28/2018  Admit Diagnosis: Atrial flutter with rapid ventricular response (HCC)  CC: none currently  Assessment (as per Dr Lorenzo Schumacher): 1. Paroxysmal atrial fibrillation and atrial flutter with rapid ventricular response. Lower BP and kate hypotension, transient, on rate control. 2. Positive troponin, but also in setting of mild rhabdomyolysis, UTI. Nonspecific. 3. Dementia, significant. Poor appetite, oral intake. 4. Chronic anticoagulation with Xarelto as an OP, stopped here. Falls, bleeding risk. 5. UTI POA. Pan-susceptible E. coli. 6. DNR.     Plan (as per Dr Lorenzo Schumacher:      1. Rate control strategy has been limited by hypotension. On metoprolol  daily. Added diltiazem IR 60 mg tid with a hold parameter on 7/31.  2. At this point, using antiarrhythmic therapy since rate control not easily achievable. Amiodarone. 3. Agree with no chronic anticoagulation given falls, major bleeding risk UNLESS these risks can be reduced. For now, she's on ASA. 4. Agree with treating UTI.     Using amio infusion for at least 48 hours, then convert to oral load. Need arrhythmia suppression with at least lenient rate control for minimal breakthrough. An alternative to this is pacemaker-AV node ablation, which I'd leave as a last resort.     She is NOT a candidate for combined atrial flutter and fibrillation ablation due to procedure risk. 8/4: Cont aflutter; rates ok. No new recs. 8/5: Cont flutter with CVR; Change amio to PO (400 bid). Lower dilt to 30 tid (from 60) due to low BPs. For other plans, see orders.   Hospital problem list   Active Hospital Problems    Diagnosis Date Noted    Atrial flutter with rapid ventricular response ([facility] [address] 07/28/2018        Subjective: Chris Rapp reports   Chest pain X none  consistent with:  Non-cardiac CP         Atypical CP     None now  On going  Anginal CP     Dyspnea X none  at rest  with exertion         improved  unchanged  worse              PND X none  overnight       Orthopnea X none  improved  unchanged  worse   Presyncope X none  improved  unchanged  worse     Ambulated in hallway without symptoms   Yes   Ambulated in room without symptoms  Yes   Objective:    Physical Exam:  Overall VSSAF;    Visit Vitals    BP (!) 85/53 (BP 1 Location: Right arm, BP Patient Position: At rest)    Pulse 78    Temp 97.7 °F (36.5 °C)    Resp 18    Ht 5' 1\" (1.549 m)    Wt 53 kg (116 lb 13.5 oz)    SpO2 97%    BMI 22.08 kg/m2     Temp (24hrs), Av.1 °F (36.7 °C), Min:97.6 °F (36.4 °C), Max:99.1 °F (37.3 °C)    Patient Vitals for the past 8 hrs:   Pulse   18 1107 78   18 1105 78   18 0745 67   18 0400 72    Patient Vitals for the past 8 hrs:   Resp   18 1107 18   18 1105 18   18 0745 19   18 0400 20    Patient Vitals for the past 8 hrs:   BP   18 1107 (!) 85/53   18 1105 94/43   18 0745 139/63   18 0400 119/62          Intake/Output Summary (Last 24 hours) at 18 1119  Last data filed at 18 3939   Gross per 24 hour   Intake              305 ml   Output              450 ml   Net             -145 ml     General Appearance: Well developed, well nourished, no acute distress. Ears/Nose/Mouth/Throat:   Normal MM; anicteric. JVP: WNL   Resp:   clear to auscultation bilaterally. Nl resp effort. Cardiovascular:  IRRR, S1, S2 normal, no new murmur. No gallop or rub. Abdomen:   Soft, non-tender, bowel sounds are present. Extremities: No edema bilaterally. Skin:  Neuro: Warm and dry.   A; pleasantly demented, grossly nonfocal   cath site intact w/o hematoma or new bruit; distal pulse unchanged  Yes   Data Review:     Telemetry independently reviewed  sinus x Persistent aflutter  parox afib  NSVT     ECG independently reviewed  NSR  afib  no significant changes  NSST-Tw chgs   x no new ECG provided for review Lab results reviewed as noted below. Current medications reviewed as noted below. No results for input(s): PH, PCO2, PO2 in the last 72 hours. No results for input(s): CPK, CKMB, CKNDX, TROIQ in the last 72 hours. No results for input(s): NA, K, CL, CO2, BUN, CREA, GFRAA, GLU, PHOS, CA, ALB, WBC, HGB, HCT, PLT, HGBEXT, HCTEXT, PLTEXT, HGBEXT, HCTEXT, PLTEXT in the last 72 hours. No lab exists for component: GFRNON-AA  Lab Results   Component Value Date/Time    Cholesterol, total 153 02/20/2018 02:08 PM    HDL Cholesterol 72 02/20/2018 02:08 PM    LDL, calculated 67 02/20/2018 02:08 PM    Triglyceride 70 02/20/2018 02:08 PM     No results for input(s): SGOT, GPT, AP, TBIL, TP, ALB, GLOB, GGT, AML, LPSE in the last 72 hours. No lab exists for component: AMYP, HLPSE  No results for input(s): INR, PTP, APTT in the last 72 hours.     No lab exists for component: INREXT, INREXT   No components found for: [place]    Current Facility-Administered Medications   Medication Dose Route Frequency    amiodarone (CORDARONE) tablet 400 mg  400 mg Oral BID    QUEtiapine (SEROquel) tablet 25 mg  25 mg Oral QHS PRN    dilTIAZem (CARDIZEM) IR tablet 60 mg  60 mg Oral TID    dilTIAZem (CARDIZEM) injection 5 mg  5 mg IntraVENous Q4H PRN    metoprolol succinate (TOPROL-XL) XL tablet 100 mg  100 mg Oral DAILY    aspirin chewable tablet 81 mg  81 mg Oral DAILY    cholecalciferol (VITAMIN D3) tablet 2,000 Units  2,000 Units Oral QPM    acetaminophen (TYLENOL) tablet 650 mg  650 mg Oral Q4H PRN    ondansetron (ZOFRAN) injection 4 mg  4 mg IntraVENous Q4H PRN        Maria Eugenia Andrews MD

## 2018-08-05 NOTE — PROGRESS NOTES
Hospitalist Progress Note    NAME: Tyree Powers   :  1929   MRN:  [id number]       Assessment / Plan:  Atrial flutter with RVR POA  Mild troponin elevation 0.83 POA  Hypertension  -Hypotensive this AM, dilt decreased by cardio to 30 tid   -HR stable  -c/w metoprolol, amiodarone  -appreciate cardio input, was started on amio gtt given difficulty w rate control  -Echo:  Systolic function was normal. Ejection fraction was estimated in the range of 55 % to 60%. Suboptimal endocardial visualization limits wall motion analysis. -elevated trop noted in setting of elevated Cr, mild rhabdo POD, felt to be nonspecific    Acute kidney injury POA   -resolved, Cr at baseline      Hypotension POA 81/46 in ED  -resolved; -130's/60-70's      Mild Rhabdomyolysis POA CPK 1480  -resolved      Frequent falls  Alzheimer's dementia POA     -CT head negative  -agitated throughout the day (8/3) will reinstate sitter for now  -working SNF placement; appreciate CM input      UTI POA  Overactive bladder  -urine cx grew gram (-) rods; will complete total of 5 days  -hold oxybutynin while treating for UTI      Code Status:   [facility]  Surrogate Decision Maker: Deandre Brothers Evangelista Dumont) is mPOA      DVT Prophylaxis:  SCD  GI Prophylaxis: not indicated      Baseline:   Lives in [address] calls or checks in on her daily       Recommended Disposition: SNF/LTC. See notes from case management. Joselin recently filed medicaid application, which was rejected. Wants LTC which will be a challenge. Pt is not safe w/o 24hr supervision, which joselin cannot provide.  Working with case management to arrange for safe DC     Subjective:     Chief Complaint / Reason for Physician Visit  Lying in bed with sitter present, no acute distress    Review of Systems:  Symptom Y/N Comments  Symptom Y/N Comments   Fever/Chills    Chest Pain     Poor Appetite    Edema     Cough    Abdominal Pain     Sputum    Joint Pain     SOB/ADAME    Pruritis/Rash Nausea/vomit    Tolerating PT/OT     Diarrhea    Tolerating Diet     Constipation    Other       Could NOT obtain due to: agitated     Objective:     VITALS:   Last 24hrs VS reviewed since prior progress note. Most recent are:  Patient Vitals for the past 24 hrs:   Temp Pulse Resp BP SpO2   08/05/18 1447 97.7 °F (36.5 °C) 69 18 132/66 95 %   08/05/18 1107 - 78 18 (!) 85/53 -   08/05/18 1105 97.7 °F (36.5 °C) 78 18 94/43 97 %   08/05/18 0745 97.9 °F (36.6 °C) 67 19 139/63 99 %   08/05/18 0400 97.6 °F (36.4 °C) 72 20 119/62 98 %   08/04/18 2351 99.1 °F (37.3 °C) 69 20 109/49 95 %   08/04/18 1914 98.1 °F (36.7 °C) 60 18 107/63 97 %       Intake/Output Summary (Last 24 hours) at 08/05/18 1451  Last data filed at 08/05/18 0835   Gross per 24 hour   Intake              305 ml   Output              450 ml   Net             -145 ml        PHYSICAL EXAM:  General: frail, agitated but no acute distress   EENT:  EOMI. Anicteric sclerae. MMM  Resp:  CTA bilaterally, no wheezing or rales. No accessory muscle use  CV:  Regular  rhythm,  No edema  GI:  Soft, Non distended, Non tender.  +Bowel sounds  Neurologic:  Alert and oriented X 3, normal speech,   Psych:   Poor insight. anxious, agitated  Skin:  No rashes. No jaundice    Reviewed most current lab test results and cultures  YES  Reviewed most current radiology test results   YES  Review and summation of old records today    NO  Reviewed patient's current orders and MAR    YES  PMH/SH reviewed - no change compared to H&P  ________________________________________________________________________  Care Plan discussed with:    Comments   Patient x    Family      RN x    Care Manager     Consultant                       x Multidiciplinary team rounds were held today with , nursing, pharmacist and clinical coordinator. Patient's plan of care was discussed; medications were reviewed and discharge planning was addressed. ________________________________________________________________________  Total NON critical care TIME:  20 Minutes    Total CRITICAL CARE TIME Spent:   Minutes non procedure based      Comments   >50% of visit spent in counseling and coordination of care     ________________________________________________________________________  Armand Villalobos DO     Procedures: see electronic medical records for all procedures/Xrays and details which were not copied into this note but were reviewed prior to creation of Plan. LABS:  I reviewed today's most current labs and imaging studies. Pertinent labs include:  No results for input(s): WBC, HGB, HCT, PLT, HGBEXT, HCTEXT, PLTEXT, HGBEXT, HCTEXT, PLTEXT in the last 72 hours. No results for input(s): NA, K, CL, CO2, GLU, BUN, CREA, CA, MG, PHOS, ALB, TBIL, TBILI, SGOT, ALT, INR in the last 72 hours.     No lab exists for component: [place], INREXT    Signed: Armand Villalobos DO

## 2018-08-06 LAB
ANION GAP SERPL CALC-SCNC: 9 MMOL/L (ref 5–15)
ATRIAL RATE: 300 BPM
BASOPHILS # BLD: 0 K/UL (ref 0–0.1)
BASOPHILS NFR BLD: 0 % (ref 0–1)
BUN SERPL-MCNC: 22 MG/DL (ref 6–20)
BUN/CREAT SERPL: 31 (ref 12–20)
CALCIUM SERPL-MCNC: 8.3 MG/DL (ref 8.5–10.1)
CALCULATED R AXIS, ECG10: 25 DEGREES
CALCULATED T AXIS, ECG11: 95 DEGREES
CHLORIDE SERPL-SCNC: 101 MMOL/L (ref 97–108)
CO2 SERPL-SCNC: 26 MMOL/L (ref 21–32)
CREAT SERPL-MCNC: 0.71 MG/DL (ref 0.55–1.02)
DIAGNOSIS, 93000: NORMAL
DIFFERENTIAL METHOD BLD: ABNORMAL
EOSINOPHIL # BLD: 0.3 K/UL (ref 0–0.4)
EOSINOPHIL NFR BLD: 3 % (ref 0–7)
ERYTHROCYTE [DISTWIDTH] IN BLOOD BY AUTOMATED COUNT: 14.2 % (ref 11.5–14.5)
GLUCOSE SERPL-MCNC: 84 MG/DL (ref 65–100)
HCT VFR BLD AUTO: 36.9 % (ref 35–47)
HGB BLD-MCNC: 12.4 G/DL (ref 11.5–16)
IMM GRANULOCYTES # BLD: 0 K/UL (ref 0–0.04)
IMM GRANULOCYTES NFR BLD AUTO: 0 % (ref 0–0.5)
LYMPHOCYTES # BLD: 3.3 K/UL (ref 0.8–3.5)
LYMPHOCYTES NFR BLD: 29 % (ref 12–49)
MCH RBC QN AUTO: 32.1 PG (ref 26–34)
MCHC RBC AUTO-ENTMCNC: 33.6 G/DL (ref 30–36.5)
MCV RBC AUTO: 95.6 FL (ref 80–99)
MONOCYTES # BLD: 1.6 K/UL (ref 0–1)
MONOCYTES NFR BLD: 14 % (ref 5–13)
NEUTS SEG # BLD: 6.1 K/UL (ref 1.8–8)
NEUTS SEG NFR BLD: 54 % (ref 32–75)
NRBC # BLD: 0 K/UL (ref 0–0.01)
NRBC BLD-RTO: 0 PER 100 WBC
PLATELET # BLD AUTO: 327 K/UL (ref 150–400)
PMV BLD AUTO: 10 FL (ref 8.9–12.9)
POTASSIUM SERPL-SCNC: 4.2 MMOL/L (ref 3.5–5.1)
Q-T INTERVAL, ECG07: 286 MS
QRS DURATION, ECG06: 94 MS
QTC CALCULATION (BEZET), ECG08: 420 MS
RBC # BLD AUTO: 3.86 M/UL (ref 3.8–5.2)
RBC MORPH BLD: ABNORMAL
SODIUM SERPL-SCNC: 136 MMOL/L (ref 136–145)
VENTRICULAR RATE, ECG03: 130 BPM
WBC # BLD AUTO: 11.3 K/UL (ref 3.6–11)

## 2018-08-06 PROCEDURE — 97530 THERAPEUTIC ACTIVITIES: CPT

## 2018-08-06 PROCEDURE — 77030038269 HC DRN EXT URIN PURWCK BARD -A

## 2018-08-06 PROCEDURE — 97530 THERAPEUTIC ACTIVITIES: CPT | Performed by: OCCUPATIONAL THERAPIST

## 2018-08-06 PROCEDURE — 36415 COLL VENOUS BLD VENIPUNCTURE: CPT | Performed by: INTERNAL MEDICINE

## 2018-08-06 PROCEDURE — 85025 COMPLETE CBC W/AUTO DIFF WBC: CPT | Performed by: INTERNAL MEDICINE

## 2018-08-06 PROCEDURE — 80048 BASIC METABOLIC PNL TOTAL CA: CPT | Performed by: INTERNAL MEDICINE

## 2018-08-06 PROCEDURE — 74011250637 HC RX REV CODE- 250/637: Performed by: INTERNAL MEDICINE

## 2018-08-06 PROCEDURE — 74011250637 HC RX REV CODE- 250/637: Performed by: NURSE PRACTITIONER

## 2018-08-06 PROCEDURE — 65660000000 HC RM CCU STEPDOWN

## 2018-08-06 RX ADMIN — DILTIAZEM HYDROCHLORIDE 30 MG: 30 TABLET, FILM COATED ORAL at 09:47

## 2018-08-06 RX ADMIN — AMIODARONE HYDROCHLORIDE 400 MG: 200 TABLET ORAL at 09:47

## 2018-08-06 RX ADMIN — VITAMIN D, TAB 1000IU (100/BT) 2000 UNITS: 25 TAB at 18:50

## 2018-08-06 RX ADMIN — METOPROLOL SUCCINATE 100 MG: 50 TABLET, EXTENDED RELEASE ORAL at 09:47

## 2018-08-06 RX ADMIN — ASPIRIN 81 MG 81 MG: 81 TABLET ORAL at 09:47

## 2018-08-06 RX ADMIN — AMIODARONE HYDROCHLORIDE 400 MG: 200 TABLET ORAL at 18:50

## 2018-08-06 NOTE — PROGRESS NOTES
Problem: Self Care Deficits Care Plan (Adult)  Goal: *Acute Goals and Plan of Care (Insert Text)  Problem: Self Care Deficits Care Plan (Adult)  Goal: *Acute Goals and Plan of Care (Insert Text)  Occupational Therapy Goals  Initiated 7/30/2018  1. Patient will perform self-feeding with supervision/set-up within 7 day(s). 2.  Patient will perform grooming seated with minimal assistance/contact guard assist within 7 day(s). 3.  Patient will perform upper body dressing with moderate assistance  within 7 day(s). 4.  Patient will perform toilet transfers with minimal assistance/contact guard assist within 7 day(s). 5.  Patient will perform all aspects of toileting with minimal assistance/contact guard assist within 7 day(s). 6.  Patient will participate in upper extremity therapeutic exercise/activities with minimal assistance/contact guard assist for 5 minutes within 7 day(s). 7.  Patient will utilize energy conservation techniques during functional activities with verbal and tactile cues within 7 day(s). Occupational Therapy TREATMENT  Patient: Sp Simpson (85 y.o. female)  Date: 8/6/2018  Diagnosis: Atrial flutter with rapid ventricular response ([facility] [address] <principal problem not specified>       Precautions: Fall  Chart, occupational therapy assessment, plan of care, and goals were reviewed. ASSESSMENT:  Limited session today due to low BP. Pt remains confused but pleasant and cooperative. Attempted bed level activity prior to attempt at EOB tasks but BP was to low to attempt standing. Pt was competently asymptomatic however. Min assist to roll left and right in bed x4 to straighten pads under her. Max assist to scoot up in bed with pt pushing with BLE to assist.  Max assist for supine to sit edge of bed with verbal cues for technique. Max assist to scoot to edge of bed. During seated exercise pt had posterior LOB and needed min assist to correct.   Returned to supine at end of session and BP improved. Performed bed level application of deodorant with max assist. Recommend SNF for rehab at discharge. Vitals:     08/06/18 1202 08/06/18 1203 08/06/18 1208 08/06/18 1210   BP: 96/46 97/50 90/49 (!) 88/47   BP 1 Location: Right arm Right arm Right arm Right arm   BP Patient Position: Supine  Comment: HOB flat after rolling and supine exercise Comment: bed in chair position Sitting  Comment: EOB Sitting  Comment: EOB after LE exercise   Pulse: 64 64       Resp:           Temp:           SpO2:           Weight:           Height:                 Supine at end of session 103/58    Progression toward goals:  []       Improving appropriately and progressing toward goals  [x]       Improving slowly and progressing toward goals  []       Not making progress toward goals and plan of care will be adjusted     PLAN:  Patient continues to benefit from skilled intervention to address the above impairments. Continue treatment per established plan of care. Discharge Recommendations:  Skilled Nursing Facility  Further Equipment Recommendations for Discharge:  TBD     SUBJECTIVE:   Patient stated I feel fine.     OBJECTIVE DATA SUMMARY:   Cognitive/Behavioral Status:  Neurologic State: Alert;Confused  Orientation Level: Oriented to person;Disoriented to time;Disoriented to situation;Disoriented to place  Cognition: Memory loss; Impaired decision making;Decreased attention/concentration;Poor safety awareness  Perception: Appears intact  Perseveration: No perseveration noted  Safety/Judgement: Fall prevention    Functional Mobility and Transfers for ADLs:  Bed Mobility:  Rolling: Minimum assistance (left and right in bed x4)  Supine to Sit: Maximum assistance  Sit to Supine: Maximum assistance    Transfers:             Balance:  Sitting: Impaired  Sitting - Static: Good (unsupported)  Sitting - Dynamic: Fair (occasional) (seated edge of bed LE exercise without back support)  Standing:  (unable to attempt today due to low BP)    ADL Intervention:       Grooming  Grooming Assistance: Maximum assistance (applying deoderant at bed level)       Cognitive Retraining  Safety/Judgement: Fall prevention    Therapeutic Exercises:   Seated edge of bed LE exercise for BP regulation  Pain:  Pain Scale 1: Numeric (0 - 10)  Pain Intensity 1: 0              Activity Tolerance:     Please refer to the flowsheet for vital signs taken during this treatment.   After treatment:   [] Patient left in no apparent distress sitting up in chair  [x] Patient left in no apparent distress in bed  [x] Call bell left within reach  [x] Nursing notified  [x] Caregiver present  [] Bed alarm activated    COMMUNICATION/COLLABORATION:   The patients plan of care was discussed with: Physical Therapist, Registered Nurse and patient    Jaci Kemp OTR/L  Time Calculation: 23 mins

## 2018-08-06 NOTE — PROGRESS NOTES
Per conversation with niece, requesting referrals to be sent to [facility] (already sent last week), [facility], and [facility]. FOC completed and placed on bedside chart. Referrals sent via [employer] [facility] [employer] link, awaiting acceptance. 3:35PM UPDATE  CM informed that [address] [address] and [facility] do not have any LTC beds available at this time. [facility] is able to accept pt for SNF to transition to LTC. Still awaiting decision from [facility].      Gage Garcia, MSW Supervisee in Social Work, [employer]  [phone]

## 2018-08-06 NOTE — PROGRESS NOTES
EP/ Arrhythmia Progress Note    Patient ID:  Patient: Mansoor Palafox  MRN: [id number]  Age: 80 y.o.  : 1929   Admit Date: 2018    Assessment: 1. Paroxysmal atrial fibrillation and atrial flutter with rapid ventricular response. Lower BP and kate hypotension, transient, on rate control. 2. Positive troponin, but also in setting of mild rhabdomyolysis, UTI. Nonspecific. 3. Dementia, significant. Poor appetite, oral intake. 4. Chronic anticoagulation with Xarelto as an OP, stopped here. Falls, bleeding risk. 5. UTI POA. Pan-susceptible E. coli. 6. DNR. Plan:     1. Rate control strategy has been limited by hypotension. On metoprolol  daily. Added diltiazem IR 60 mg tid, decreased to 30 tid due to transiently low BP.  2. At this point, using antiarrhythmic therapy since rate control not easily achievable. Amiodarone started here. 3. Agree with no chronic anticoagulation given falls, major bleeding risk UNLESS these risks can be reduced. For now, she's on ASA. Need arrhythmia suppression with at least lenient rate control for minimal breakthrough. An alternative to this is pacemaker-AV node ablation, which I'd leave as a last resort. She is NOT a candidate for combined atrial flutter and fibrillation ablation due to procedure risk. She's back in sinus with decent HR's at this time. Plan for amiodarone 400 mg po bid x 7 days, then down to 200 mg daily thereafter on . []       High complexity decision making was performed in this patient    Subjective:     Mansoor Palafox doesn't deny or suggest chest pain, dyspnea, palpitations. Oriented to self only. Eating breakfast.    Review of Systems - Cannot give full ROS due to dementia.     Objective:     Physical Exam:  Temp (24hrs), Av.1 °F (36.7 °C), Min:97.7 °F (36.5 °C), Max:98.9 °F (37.2 °C)    Patient Vitals for the past 8 hrs:   Pulse   08/06/18 0730 70   08/06/18 0513 64    Patient Vitals for the past 8 hrs:   Resp   08/06/18 0730 16   08/06/18 0513 20    Patient Vitals for the past 8 hrs:   BP   08/06/18 0730 132/74   08/06/18 0513 120/48          Intake/Output Summary (Last 24 hours) at 08/06/18 0910  Last data filed at 08/06/18 0848   Gross per 24 hour   Intake                0 ml   Output              301 ml   Net             -301 ml       Nondiaphoretic, not in acute distress. MMM, no jaundice, HEENT stable. Unlabored, clear to auscultation bilaterally, symmetric air movement. Irregular rhythm, +soft systolic murmur, no pericardial rub, knock, or gallop. No JVD or peripheral edema. Palpable radial pulses bilaterally. Abdomen soft, nontender, nondistended. No pulsatile masses or bruit. No cyanosis. Skin warm and dry. Awake, no tremor. Cardiographics and Studies, I personally reviewed:    Telemetry:  Sinus rhythm 60-70's. ECG 7/28: May be typical atrial flutter with variable block, mostly 2:1.  Prior ECG in 5/2018 shows 4:1 conduction, typical morphology. ECHO 7/30:    LEFT VENTRICLE: Size was normal. Systolic function was normal. Ejection  fraction was estimated in the range of 55 % to 60 %. Suboptimal  endocardial visualization limits wall motion analysis. Wall thickness was  normal.    RIGHT VENTRICLE: The size was normal. Systolic function was normal.    LEFT ATRIUM: Size was normal.    RIGHT ATRIUM: Size was normal.    MITRAL VALVE: There was mild thickening. There was minimal calcification. DOPPLER: There was trivial regurgitation. AORTIC VALVE: Not well visualized. DOPPLER: Transaortic velocity was  within the normal range. There was no stenosis. There was no regurgitation. TRICUSPID VALVE: Normal valve structure. There was normal leaflet  separation. DOPPLER: There was no significant regurgitation. Pulmonary  artery systolic pressure: 32 mmHg.     PULMONIC VALVE: Not well visualized. AORTA: The root exhibited normal size. PERICARDIUM: Insignificant pericardial effusion and/or pericardial fat was present. LAB Review:     No results for input(s): CPK, CKMB, CKNDX, TROIQ in the last 72 hours. No lab exists for component: CPKMB  Lab Results   Component Value Date/Time    Cholesterol, total 153 02/20/2018 02:08 PM    HDL Cholesterol 72 02/20/2018 02:08 PM    LDL, calculated 67 02/20/2018 02:08 PM    Triglyceride 70 02/20/2018 02:08 PM     No results for input(s): INR, PTP, APTT in the last 72 hours. No lab exists for component: INREXT, INREXT   Recent Labs      08/06/18   0036   NA  136   K  4.2   CL  101   CO2  26   BUN  22*   CREA  0.71   GLU  84   CA  8.3*   WBC  11.3*   HGB  12.4   HCT  36.9   PLT  327     No results for input(s): SGOT, GPT, AP, TBIL, TP, ALB, GLOB, GGT, AML, LPSE in the last 72 hours. No lab exists for component: AMYP, HLPSE  No components found for: [place]  No results for input(s): PH, PCO2, PO2 in the last 72 hours.     Medications Reviewed:   No Known Allergies     Current Facility-Administered Medications   Medication Dose Route Frequency    amiodarone (CORDARONE) tablet 400 mg  400 mg Oral BID    dilTIAZem (CARDIZEM) IR tablet 30 mg  30 mg Oral TID    bisacodyl (DULCOLAX) suppository 10 mg  10 mg Rectal DAILY PRN    QUEtiapine (SEROquel) tablet 25 mg  25 mg Oral QHS PRN    dilTIAZem (CARDIZEM) injection 5 mg  5 mg IntraVENous Q4H PRN    metoprolol succinate (TOPROL-XL) XL tablet 100 mg  100 mg Oral DAILY    aspirin chewable tablet 81 mg  81 mg Oral DAILY    cholecalciferol (VITAMIN D3) tablet 2,000 Units  2,000 Units Oral QPM    acetaminophen (TYLENOL) tablet 650 mg  650 mg Oral Q4H PRN    ondansetron (ZOFRAN) injection 4 mg  4 mg IntraVENous Q4H PRN          Kitty Gutierrez MD  8/6/2018

## 2018-08-06 NOTE — PROGRESS NOTES
Vitals:    08/06/18 1202 08/06/18 1203 08/06/18 1208 08/06/18 1210   BP: 96/46 97/50 90/49 (!) 88/47   BP 1 Location: Right arm Right arm Right arm Right arm   BP Patient Position: Supine  Comment: HOB flat after rolling and supine exercise Comment: bed in chair position Sitting  Comment: EOB Sitting  Comment: EOB after LE exercise   Pulse: 64 64     Resp:       Temp:       SpO2:       Weight:       Height:           Supine at end of session 103/58    Limited session today due to low BP. Pt was asymptomatic and was returned back to supine at end of session.

## 2018-08-06 NOTE — PROGRESS NOTES
Bedside and Verbal shift change report given to Jose Camacho RN (oncoming nurse). Report included the following information SBAR, Kardex, ED Summary, Procedure Summary, Intake/Output, MAR and Recent Results. SHIFT SUMMARY:  Uneventful shift.

## 2018-08-06 NOTE — PROGRESS NOTES
Problem: Mobility Impaired (Adult and Pediatric)  Goal: *Acute Goals and Plan of Care (Insert Text)  Physical Therapy Goals  Goals reviewed and remain appropriate 8/6/18  Initiated 7/30/2018  1. Patient will move from supine to sit and sit to supine , scoot up and down and roll side to side in bed with minimal assistance/contact guard assist x 1 within 7 day(s). 2.  Patient will transfer from bed to chair and chair to bed with supervision/set-up using the least restrictive device within 7 day(s). 3.  Patient will perform sit to stand with supervision/set-up within 7 day(s). 4.  Patient will ambulate with minimal assistance/contact guard assist for 50 feet with the least restrictive device within 7 day(s). physical Therapy TREATMENT: WEEKLY REASSESSMENT  Patient: Hira Bell (36 y.o. female)  Date: 8/6/2018  Diagnosis: Atrial flutter with rapid ventricular response ([address] <principal problem not specified>       Precautions: Fall  Chart, physical therapy assessment, plan of care and goals were reviewed. ASSESSMENT:  Pt received supine in bed with sitter present and agreeable to PT intervention. Pt cleared by nursing for mobility. BP low throughout therapy session as noted below, which limited functional mobility. BP 84/44 on arrival in supine with HOB elevated, therefore had patient performed rolling R and L in bed x 2 with min A and attempt bridging in supine (pt unable to complete despite assist for positioning and manual cueing at distal femurs and gluts). BP improved with this activity, therefore placed pt in chair position. Pt with c/o dizziness in chair position, however BP remained stable. She performed supine<>sit transfers with max A and needed increased time and assist to position self seated on EOB with BUE support. BP trending down again and unable to improve despite instruction through seated BLE exercises, therefore returned pt to bed.  Pt was left supine in bed with all needs met, RN aware, and sitter present following therapy session. Continue to recommend pt discharge to SNF rehab to improve functional mobility. Pt with slow progress towards therapy goals and continues to be limited by generalized weakness, impaired balance, and decreased activity tolerance. PT will continue to follow to address mobility impairments as noted above. Patient Vitals for the past 8 hrs:   Temp Pulse Resp BP SpO2   08/06/18 1223 - - - 103/58 -   08/06/18 1210 - - - (!) 88/47 -   08/06/18 1208 - - - 90/49 -   08/06/18 1203 - 64 - 97/50 -   08/06/18 1202 - 64 - 96/46 -   08/06/18 1157 - 65 - (!) 84/44 -   08/06/18 1102 98.3 °F (36.8 °C) 69 24 90/53 93 %   08/06/18 0730 98.3 °F (36.8 °C) 70 16 132/74 94 %       Patient's progression toward goals since last assessment: Slow progress     PLAN:  Goals have been updated based on progression since last assessment. Patient continues to benefit from skilled intervention to address the above impairments. Continue to follow the patient 3 times a week to address goals. Planned Interventions:  [x]              Bed Mobility Training             []       Neuromuscular Re-Education  [x]              Transfer Training                   []       Orthotic/Prosthetic Training  [x]              Gait Training                         []       Modalities  [x]              Therapeutic Exercises           []       Edema Management/Control  [x]              Therapeutic Activities            [x]       Patient and Family Training/Education  []              Other (comment):  Discharge Recommendations: Skilled Nursing Facility  Further Equipment Recommendations for Discharge: TBD by rehab     SUBJECTIVE:   Patient stated I feel a little dizzy.     OBJECTIVE DATA SUMMARY:   Critical Behavior:  Neurologic State: Alert, Confused  Orientation Level: Oriented to person, Disoriented to time, Disoriented to situation, Disoriented to place  Cognition: Memory loss, Impaired decision making, Decreased attention/concentration, Poor safety awareness  Safety/Judgement: Fall prevention  Functional Mobility Training:  Bed Mobility:  Rolling: Minimum assistance (left and right in bed x4)  Supine to Sit: Maximum assistance  Sit to Supine: Maximum assistance  Scooting: Maximum assistance  Balance:  Sitting: Impaired  Sitting - Static: Good (unsupported)  Sitting - Dynamic: Fair (occasional) (seated edge of bed LE exercise without back support)  Standing:  (unable to attempt today due to low BP)    Therapeutic Exercises:   LAQ 10x BLE  Seated hip flexion 10x BLE  Pain:  Pain Scale 1: Numeric (0 - 10)  Pain Intensity 1: 0              Activity Tolerance:   Poor - low BP in supine and sitting with activity and with c/o dizziness  Please refer to the flowsheet for vital signs taken during this treatment.   After treatment:   []    Patient left in no apparent distress sitting up in chair  [x]    Patient left in no apparent distress in bed  [x]    Call bell left within reach  [x]    Nursing notified  [x]    Caregiver present  [x]    Bed alarm activated    COMMUNICATION/COLLABORATION:   The patients plan of care was discussed with: Physical Therapist, Occupational Therapist and Registered Nurse    Joyce Currie, PT, DPT   Time Calculation: 20 mins

## 2018-08-07 PROCEDURE — 74011250637 HC RX REV CODE- 250/637: Performed by: INTERNAL MEDICINE

## 2018-08-07 PROCEDURE — 74011250637 HC RX REV CODE- 250/637: Performed by: NURSE PRACTITIONER

## 2018-08-07 PROCEDURE — 94760 N-INVAS EAR/PLS OXIMETRY 1: CPT

## 2018-08-07 PROCEDURE — 97116 GAIT TRAINING THERAPY: CPT

## 2018-08-07 PROCEDURE — 65660000000 HC RM CCU STEPDOWN

## 2018-08-07 RX ADMIN — ASPIRIN 81 MG 81 MG: 81 TABLET ORAL at 09:21

## 2018-08-07 RX ADMIN — AMIODARONE HYDROCHLORIDE 400 MG: 200 TABLET ORAL at 18:05

## 2018-08-07 RX ADMIN — METOPROLOL SUCCINATE 100 MG: 50 TABLET, EXTENDED RELEASE ORAL at 09:21

## 2018-08-07 RX ADMIN — AMIODARONE HYDROCHLORIDE 400 MG: 200 TABLET ORAL at 09:21

## 2018-08-07 RX ADMIN — VITAMIN D, TAB 1000IU (100/BT) 2000 UNITS: 25 TAB at 18:05

## 2018-08-07 NOTE — PROGRESS NOTES
EP/ Arrhythmia Progress Note    Patient ID:  Patient: Nataly Madison  MRN: [id number]  Age: 80 y.o.  : 1929   Admit Date: 2018    Assessment: 1. BACK IN SINUS. Paroxysmal atrial fibrillation and atrial flutter with rapid ventricular response. Lower BP and kate hypotension, transient, on rate control. 2. Positive troponin, but also in setting of mild rhabdomyolysis, UTI. Nonspecific. 3. Dementia, significant. Poor appetite, oral intake. 4. Chronic anticoagulation with Xarelto as an OP, stopped here. Falls, bleeding risk. 5. UTI POA. Pan-susceptible E. Coli, treated. 6. DNR. Plan:     1. Rate control strategy has been limited by hypotension. On metoprolol  daily. Stopped diltiazem IR due to transiently low BP.  2. At this point, using antiarrhythmic therapy since rate control not easily achievable. Amiodarone started here, holding sinus. 3. Agree with no chronic anticoagulation given falls, major bleeding risk UNLESS these risks can be reduced. For now, she's on ASA. She is NOT a candidate for combined atrial flutter and fibrillation ablation due to procedure risk. She's back in sinus with decent HR's at this time. Plan for amiodarone load 400 mg po bid x 7 days total (got 2 days of IV amiodarone prior to this as well), then down to 200 mg daily thereafter on . []       High complexity decision making was performed in this patient    Subjective:     Nataly Madison doesn't deny or suggest chest pain, dyspnea, palpitations. Oriented to self only. Review of Systems - Cannot give full ROS due to dementia.     Objective:     Physical Exam:  Temp (24hrs), Av.1 °F (36.7 °C), Min:97.5 °F (36.4 °C), Max:98.6 °F (37 °C)    Patient Vitals for the past 8 hrs:   Pulse   18 1507 64    Patient Vitals for the past 8 hrs:   Resp   18 1507 20    Patient Vitals for the past 8 hrs:   BP   08/07/18 1507 138/60          Intake/Output Summary (Last 24 hours) at 08/07/18 1953  Last data filed at 08/07/18 1831   Gross per 24 hour   Intake              480 ml   Output             1350 ml   Net             -870 ml       Nondiaphoretic, not in acute distress. MMM, no jaundice, HEENT stable. Unlabored, clear to auscultation bilaterally, symmetric air movement. Irregular rhythm, +soft systolic murmur, no pericardial rub, knock, or gallop. No JVD or peripheral edema. Palpable radial pulses bilaterally. Abdomen soft, nontender, nondistended. No pulsatile masses or bruit. No cyanosis. Skin warm and dry. Awake, no tremor. Cardiographics and Studies, I personally reviewed:    Telemetry:  Sinus rhythm 60-70's. ECG 7/28, no study since:  May be typical atrial flutter with variable block, mostly 2:1.  Prior ECG in 5/2018 shows 4:1 conduction, typical morphology. ECHO 7/30:    LEFT VENTRICLE: Size was normal. Systolic function was normal. Ejection  fraction was estimated in the range of 55 % to 60 %. Suboptimal  endocardial visualization limits wall motion analysis. Wall thickness was  normal.    RIGHT VENTRICLE: The size was normal. Systolic function was normal.    LEFT ATRIUM: Size was normal.    RIGHT ATRIUM: Size was normal.    MITRAL VALVE: There was mild thickening. There was minimal calcification. DOPPLER: There was trivial regurgitation. AORTIC VALVE: Not well visualized. DOPPLER: Transaortic velocity was  within the normal range. There was no stenosis. There was no regurgitation. TRICUSPID VALVE: Normal valve structure. There was normal leaflet  separation. DOPPLER: There was no significant regurgitation. Pulmonary  artery systolic pressure: 32 mmHg. PULMONIC VALVE: Not well visualized. AORTA: The root exhibited normal size. PERICARDIUM: Insignificant pericardial effusion and/or pericardial fat was present.       LAB Review:     No results for input(s): CPK, CKMB, CKNDX, TROIQ in the last 72 hours. No lab exists for component: CPKMB  Lab Results   Component Value Date/Time    Cholesterol, total 153 02/20/2018 02:08 PM    HDL Cholesterol 72 02/20/2018 02:08 PM    LDL, calculated 67 02/20/2018 02:08 PM    Triglyceride 70 02/20/2018 02:08 PM     No results for input(s): INR, PTP, APTT in the last 72 hours. No lab exists for component: INREXT, INREXT   Recent Labs      08/06/18   0036   NA  136   K  4.2   CL  101   CO2  26   BUN  22*   CREA  0.71   GLU  84   CA  8.3*   WBC  11.3*   HGB  12.4   HCT  36.9   PLT  327     No results for input(s): SGOT, GPT, AP, TBIL, TP, ALB, GLOB, GGT, AML, LPSE in the last 72 hours. No lab exists for component: AMYP, HLPSE  No components found for: GLPOC  No results for input(s): PH, PCO2, PO2 in the last 72 hours.     Medications Reviewed:   No Known Allergies     Current Facility-Administered Medications   Medication Dose Route Frequency    amiodarone (CORDARONE) tablet 400 mg  400 mg Oral BID    bisacodyl (DULCOLAX) suppository 10 mg  10 mg Rectal DAILY PRN    QUEtiapine (SEROquel) tablet 25 mg  25 mg Oral QHS PRN    dilTIAZem (CARDIZEM) injection 5 mg  5 mg IntraVENous Q4H PRN    metoprolol succinate (TOPROL-XL) XL tablet 100 mg  100 mg Oral DAILY    aspirin chewable tablet 81 mg  81 mg Oral DAILY    cholecalciferol (VITAMIN D3) tablet 2,000 Units  2,000 Units Oral QPM    acetaminophen (TYLENOL) tablet 650 mg  650 mg Oral Q4H PRN    ondansetron (ZOFRAN) injection 4 mg  4 mg IntraVENous Q4H PRN          Serena Smith MD  8/7/2018

## 2018-08-07 NOTE — PROGRESS NOTES
Problem: Mobility Impaired (Adult and Pediatric)  Goal: *Acute Goals and Plan of Care (Insert Text)  Physical Therapy Goals  Goals reviewed and remain appropriate 8/6/18  Initiated 7/30/2018  1. Patient will move from supine to sit and sit to supine , scoot up and down and roll side to side in bed with minimal assistance/contact guard assist x 1 within 7 day(s). 2.  Patient will transfer from bed to chair and chair to bed with supervision/set-up using the least restrictive device within 7 day(s). 3.  Patient will perform sit to stand with supervision/set-up within 7 day(s). 4.  Patient will ambulate with minimal assistance/contact guard assist for 50 feet with the least restrictive device within 7 day(s). physical Therapy TREATMENT  Patient: Chris Rapp (71 y.o. female)  Date: 8/7/2018  Diagnosis: Atrial flutter with rapid ventricular response ([facility]) <principal problem not specified>       Precautions: Fall, Bed Alarm, Other (comment) (AMS)  Chart, physical therapy assessment, plan of care and goals were reviewed. ASSESSMENT:  Patient lying in bed when PT arrived. She agreed to PT and was cleared by nursing. She is mildly confused and disoriented, but cooperative. Provided bed exercises with fair tolerance, but needed verbal and tactile cues to maintain correct performance of each exercise. Currently demonstrates min a x 1-2 for rolling, supine to sit and sit to stand transfers. Progressed ambulation with walker to 35 feet and cg assistance x 2 - gait pattern was slow with narrow step width, decreased step clearance and path deviations. Patient denied feeling lightheaded during session, only complaint was rapid fatigue. Returned patient to bed with mod a x 2 for sit to supine transfer and repositioning per RN request.  She was left resting comfortably in bed and having all needs met.   Recommend  Progression toward goals:  [x]    Improving appropriately and progressing toward goals  []    Improving slowly and progressing toward goals   []    Not making progress toward goals and plan of care will be adjusted     PLAN:  Patient continues to benefit from skilled intervention to address the above impairments. Continue treatment per established plan of care. Discharge Recommendations:  [facility]  Further Equipment Recommendations for Discharge:  TBD - by rehab     SUBJECTIVE:   Patient stated This hospital gown doesn't cover me enough.     OBJECTIVE DATA SUMMARY:   Critical Behavior:  Neurologic State: Alert, Confused, Irritable  Orientation Level: Oriented to person, Disoriented to time, Disoriented to situation, Disoriented to place  Cognition: Impulsive, Memory loss, Poor safety awareness, Decreased attention/concentration, Impaired decision making, Decreased command following  Safety/Judgement: Fall prevention  Functional Mobility Training:  Bed Mobility:  Rolling: Minimum assistance;Assist x1  Supine to Sit: Minimum assistance;Assist x1;Additional time  Sit to Supine: Moderate assistance;Assist x2; Additional time  Scooting: Minimum assistance;Assist x1;Additional time        Transfers:  Sit to Stand: Contact guard assistance;Minimum assistance;Assist x1  Stand to Sit: Contact guard assistance        Bed to Chair:  (RN requested patient be returned to bed due to confusion)                    Balance:  Sitting: Impaired  Sitting - Static: Good (unsupported)  Sitting - Dynamic: Fair (occasional)  Standing: Impaired  Standing - Static: Fair;Constant support  Standing - Dynamic : Poor  Ambulation/Gait Training:  Distance (ft): 35 Feet (ft)  Assistive Device: Walker, rolling;Gait belt  Ambulation - Level of Assistance: Contact guard assistance;Assist x1        Gait Abnormalities: Decreased step clearance; Path deviations; Step to gait        Base of Support: Narrowed; Center of gravity altered     Speed/Mindi: Slow  Step Length: Left shortened;Right shortened                 Therapeutic Exercises: Supine - ankle pumps, heel slides, slr, and hip abd/add. All x 10 reps each side. Pain:  Pain Scale 1: Numeric (0 - 10)  Pain Intensity 1: 0              Activity Tolerance:   Fair. Please refer to the flowsheet for vital signs taken during this treatment.   After treatment:   []    Patient left in no apparent distress sitting up in chair  [x]    Patient left in no apparent distress in bed  [x]    Call bell left within reach  [x]    Nursing notified  []    Caregiver present  [x]    Bed alarm activated    COMMUNICATION/COLLABORATION:   The patients plan of care was discussed with: Registered Nurse    Gabby Tony, PT   Time Calculation: 19 mins

## 2018-08-07 NOTE — PROGRESS NOTES
CM called [facility] ([phone]) regarding referral sent yesterday for pt. CM informed that they do not have any beds available at this time and there is a wait list.     12:15PM UPDATE  CM met with pt's mPOA who is bedside. Gerda Galvan (Med Assist) also present working with mPOA to work on getting Medicaid resubmitted and approved for LTC. Pt has been accepted to [facility] who have both short-term and LTC beds available. mPOA is agreeable at this time. Pt's sitter discontinued today - anticipate discharge tomorrow after 11:00AM pending clearance from MD.     4:00PM UPDATE  CM faxed pt's processed [facility] to [facility].     5:11PM UPDATE  Referral sent to [facility] for BLS transport tomorrow to [facility].  Pt placed on Will Call list.     BRENDAN Carlson Supervisee in Social Work, [address] [place] [address]  [phone]

## 2018-08-07 NOTE — PROGRESS NOTES
Problem: Falls - Risk of  Goal: *Absence of Falls  Document Rose Mary Fall Risk and appropriate interventions in the flowsheet.    Outcome: Progressing Towards Goal  Fall Risk Interventions:  Mobility Interventions: Bed/chair exit alarm, Communicate number of staff needed for ambulation/transfer, OT consult for ADLs, Patient to call before getting OOB, PT Consult for mobility concerns, Strengthening exercises (ROM-active/passive), PT Consult for assist device competence, Utilize walker, cane, or other assistive device, Utilize gait belt for transfers/ambulation    Mentation Interventions: Adequate sleep, hydration, pain control, Bed/chair exit alarm, Door open when patient unattended, Evaluate medications/consider consulting pharmacy, Family/sitter at bedside, Increase mobility, More frequent rounding, Reorient patient, Room close to nurse's station, Self-releasing belt, Toileting rounds, Update white board    Medication Interventions: Bed/chair exit alarm, Evaluate medications/consider consulting pharmacy, Patient to call before getting OOB    Elimination Interventions: Bed/chair exit alarm, Call light in reach, Patient to call for help with toileting needs, Toileting schedule/hourly rounds    History of Falls Interventions: Bed/chair exit alarm, Consult care management for discharge planning, Door open when patient unattended, Evaluate medications/consider consulting pharmacy, Investigate reason for fall, Room close to nurse's station, Utilize gait belt for transfer/ambulation

## 2018-08-07 NOTE — PROGRESS NOTES
Hospitalist Progress Note    NAME: Darlin Davis   :  1929   MRN:  [id number]       Assessment / Plan:  Atrial flutter with RVR POA  Mild troponin elevation 0.83 POA  Hypertension  -previously on dilt, which is DCd d/t hypotension  -cont metoprolol, PO amio  -appreciate cardio input, was started on amio gtt given difficulty w rate control  -Echo:  Systolic function was normal. Ejection fraction was estimated in the range of 55 % to 60%. Suboptimal endocardial visualization limits wall motion analysis. -elevated trop noted in setting of elevated Cr, mild rhabdo POD, felt to be nonspecific    Acute kidney injury POA   -resolved, Cr at baseline      Hypotension POA 81/46 in ED  -resolved; -130's/60-70's      Mild Rhabdomyolysis POA CPK 1480  -resolved      Frequent falls  Alzheimer's dementia POA     -CT head negative  -agitated throughout the day (8/3) will reinstate sitter for now  -Pending SNF placement  -per case mgmnt bed avail at [facility] as of PM , joselin wants referrals sent to different facility.       UTI POA  Overactive bladder  -urine cx grew gram (-) rods; will complete total of 5 days  -hold oxybutynin while treating for UTI      Code Status:   [facility]  Surrogate Decision Maker: Paras Gee) is mPOA      DVT Prophylaxis:  SCD  GI Prophylaxis: not indicated      Baseline:   Lives in [facility]. Nilayderek Shiva calls or checks in on her daily       Recommended Disposition: SNF/LTC. See notes from case management. Joselin recently filed medicaid application, which was rejected. Wants LTC which will be a challenge. Pt is not safe w/o 24hr supervision, which joselin cannot provide.  Working with case management to arrange for safe DC     Subjective:     Chief Complaint / Reason for Physician Visit  Lying in bed with sitter present, no acute distress    Review of Systems:  Symptom Y/N Comments  Symptom Y/N Comments   Fever/Chills    Chest Pain     Poor Appetite    Edema     Cough    Abdominal Pain     Sputum    Joint Pain     SOB/ADAME    Pruritis/Rash     Nausea/vomit    Tolerating PT/OT     Diarrhea    Tolerating Diet     Constipation    Other       Could NOT obtain due to: agitated     Objective:     VITALS:   Last 24hrs VS reviewed since prior progress note. Most recent are:  Patient Vitals for the past 24 hrs:   Temp Pulse Resp BP SpO2   08/06/18 2334 98.3 °F (36.8 °C) 66 18 124/62 97 %   08/06/18 1850 98.3 °F (36.8 °C) 64 20 113/59 98 %   08/06/18 1511 97.6 °F (36.4 °C) 69 26 112/53 97 %   08/06/18 1410 - - - 100/57 -   08/06/18 1223 - - - 103/58 -   08/06/18 1210 - - - (!) 88/47 -   08/06/18 1208 - - - 90/49 -   08/06/18 1203 - 64 - 97/50 -   08/06/18 1202 - 64 - 96/46 -   08/06/18 1157 - 65 - (!) 84/44 -   08/06/18 1102 98.3 °F (36.8 °C) 69 24 90/53 93 %   08/06/18 0730 98.3 °F (36.8 °C) 70 16 132/74 94 %   08/06/18 0513 98.9 °F (37.2 °C) 64 20 120/48 96 %       Intake/Output Summary (Last 24 hours) at 08/07/18 0402  Last data filed at 08/06/18 2247   Gross per 24 hour   Intake              120 ml   Output              775 ml   Net             -655 ml        PHYSICAL EXAM:  General: frail, agitated but no acute distress   EENT:  EOMI. Anicteric sclerae. MMM  Resp:  CTA bilaterally, no wheezing or rales. No accessory muscle use  CV:  Regular  rhythm,  No edema  GI:  Soft, Non distended, Non tender.  +Bowel sounds  Neurologic:  Alert and oriented X 3, normal speech,   Psych:   Poor insight. anxious, agitated  Skin:  No rashes.   No jaundice    Reviewed most current lab test results and cultures  YES  Reviewed most current radiology test results   YES  Review and summation of old records today    NO  Reviewed patient's current orders and MAR    YES  PMH/SH reviewed - no change compared to H&P  ________________________________________________________________________  Care Plan discussed with:    Comments   Patient x    Family      RN x    Care Manager     Consultant                       x Multidiciplinary team rounds were held today with , nursing, pharmacist and clinical coordinator. Patient's plan of care was discussed; medications were reviewed and discharge planning was addressed. ________________________________________________________________________  Total NON critical care TIME:  20 Minutes    Total CRITICAL CARE TIME Spent:   Minutes non procedure based      Comments   >50% of visit spent in counseling and coordination of care     ________________________________________________________________________  [address], DO     Procedures: see electronic medical records for all procedures/Xrays and details which were not copied into this note but were reviewed prior to creation of Plan. LABS:  I reviewed today's most current labs and imaging studies.   Pertinent labs include:  Recent Labs      08/06/18 0036   WBC  11.3*   HGB  12.4   HCT  36.9   PLT  327     Recent Labs      08/06/18 0036   NA  136   K  4.2   CL  101   CO2  26   GLU  84   BUN  22*   CREA  0.71   CA  8.3*       Signed: [address], DO

## 2018-08-07 NOTE — PROGRESS NOTES
Problem: Falls - Risk of  Goal: *Absence of Falls  Document Rose Mary Fall Risk and appropriate interventions in the flowsheet.    Outcome: Progressing Towards Goal  Fall Risk Interventions:  Mobility Interventions: Bed/chair exit alarm, Communicate number of staff needed for ambulation/transfer, OT consult for ADLs, Patient to call before getting OOB, PT Consult for mobility concerns, PT Consult for assist device competence, Strengthening exercises (ROM-active/passive), Utilize walker, cane, or other assistive device, Utilize gait belt for transfers/ambulation    Mentation Interventions: Adequate sleep, hydration, pain control, Bed/chair exit alarm, Door open when patient unattended, Evaluate medications/consider consulting pharmacy, Gait belt with transfers/ambulation, Increase mobility, More frequent rounding, Reorient patient, Room close to nurse's station, Toileting rounds    Medication Interventions: Bed/chair exit alarm, Evaluate medications/consider consulting pharmacy, Patient to call before getting OOB, Teach patient to arise slowly, Utilize gait belt for transfers/ambulation    Elimination Interventions: Bed/chair exit alarm, Call light in reach, Patient to call for help with toileting needs, Toileting schedule/hourly rounds    History of Falls Interventions: Bed/chair exit alarm, Consult care management for discharge planning, Door open when patient unattended, Evaluate medications/consider consulting pharmacy, Investigate reason for fall, Room close to nurse's station, Utilize gait belt for transfer/ambulation

## 2018-08-07 NOTE — PROGRESS NOTES
Will discontinue diltiazem altogether due to transient hypotension. Continuing with metoprolol, amiodarone.

## 2018-08-07 NOTE — PROGRESS NOTES
Bedside and Verbal shift change report given to ADY solis (oncoming nurse). Report included the following information SBAR, Kardex, ED Summary, Procedure Summary, Intake/Output, MAR and Recent Results. SHIFT SUMMARY:  970: sitter d/c'd.

## 2018-08-07 NOTE — PROGRESS NOTES
Nutrition Assessment:    INTERVENTIONS/RECOMMENDATIONS:   Continue current diet and supplements     ASSESSMENT:   Chart reviewed. Pt pleasantly confused. PCT reports pt consumed ~25% of breakfast this morning. RN note pt usually in not a bigger eater. Flowsheet documentation shows pt is consuming ~25-75% of meals over the past couple of days. Pt waiting on placement. Diet Order: Cardiac  % Eaten:  Patient Vitals for the past 72 hrs:   % Diet Eaten   08/07/18 1049 25 %   08/06/18 1439 65 %   08/06/18 0946 75 %   08/04/18 2014 240 %     Pertinent Medications: [x]Reviewed: dulcolax, vit D3  Pertinent Labs: [x]Reviewed:   Food Allergies: [x]NKFA  []Other   Last BM:  8/5  Edema:      []RUE   []LUE   []RLE   []LLE      Pressure Ulcer:      [] Stage I   [] Stage II   [] Stage III   [] Stage IV      Anthropometrics: Height: 5' 1\" (154.9 cm) Weight: 53 kg (116 lb 13.5 oz)    IBW (%IBW):   ( ) UBW (%UBW):   (  %)    BMI: Body mass index is 22.08 kg/(m^2). This BMI is indicative of:  []Underweight   [x]Normal   []Overweight   [] Obesity   [] Extreme Obesity (BMI>40)  Last Weight Metrics:  Weight Loss Metrics 7/28/2018 5/24/2018 5/22/2018 3/9/2018 2/20/2018 12/5/2017 11/21/2017   Today's Wt 116 lb 13.5 oz 116 lb 13.5 oz 115 lb 3.2 oz 120 lb 120 lb 115 lb -   BMI 22.08 kg/m2 22.08 kg/m2 21.77 kg/m2 22.67 kg/m2 22.67 kg/m2 21.73 kg/m2 -       Estimated Nutrition Needs (Based on): 1075 Kcals/day (BMR: 900 x 1.2) , 55 g (1 g/kg) Protein  Carbohydrate:  At Least 130 g/day  Fluids: 1075 mL/day or per primary team    NUTRITION DIAGNOSES:   Problem:  Inadequate protein-energy intake      Etiology: related to Alzheimer's dementia      Signs/Symptoms: as evidenced by RN report of poor PO intake    Previous Nutrition Dx:  [] Resolved  [] Unresolved           [x] Progressing    NUTRITION INTERVENTIONS:  Meals/Snacks: General/healthful diet   Supplements: Commercial supplement              GOAL:   consume >50% of meals and ONS in 3-5 days    NUTRITION MONITORING AND EVALUATION      Food/Nutrient Intake Outcomes:  Total energy intake  Physical Signs/Symptoms Outcomes: Weight/weight change, Electrolyte and renal profile, GI    Previous Goal Met:   [] Met              [x] Progressing Towards Goal              [] Not Progressing Towards Goal   Previous Recommendations:   [] Implemented          [] Not Implemented          [x] Not Applicable    LEARNING NEEDS (Diet, Food/Nutrient-Drug Interaction):    [x] None Identified   [] Identified and Education Provided/Documented   [] Identified and Pt declined/was not appropriate     Cultural, Zoroastrianism, OR Ethnic Dietary Needs:    [x] None Identified   [] Identified and Addressed     [x] Interdisciplinary Care Plan Reviewed/Documented    [x] Discharge Planning: General healthy diet   [] Participated in Interdisciplinary Rounds    NUTRITION RISK:    [] High              [x] Moderate           []  Low  []  Minimal/Uncompromised      Shirley Donahue RDN  Pager [phone]  Weekend Pager [phone]

## 2018-08-08 VITALS
WEIGHT: 116.84 LBS | BODY MASS INDEX: 22.06 KG/M2 | SYSTOLIC BLOOD PRESSURE: 103 MMHG | TEMPERATURE: 97.4 F | DIASTOLIC BLOOD PRESSURE: 52 MMHG | HEART RATE: 64 BPM | RESPIRATION RATE: 18 BRPM | OXYGEN SATURATION: 96 % | HEIGHT: 61 IN

## 2018-08-08 PROCEDURE — 74011250637 HC RX REV CODE- 250/637: Performed by: INTERNAL MEDICINE

## 2018-08-08 PROCEDURE — 74011250637 HC RX REV CODE- 250/637: Performed by: NURSE PRACTITIONER

## 2018-08-08 RX ORDER — AMIODARONE HYDROCHLORIDE 200 MG/1
200 TABLET ORAL DAILY
Qty: 30 TAB | Refills: 3 | Status: SHIPPED | OUTPATIENT
Start: 2018-08-12

## 2018-08-08 RX ORDER — AMIODARONE HYDROCHLORIDE 200 MG/1
200 TABLET ORAL DAILY
Status: DISCONTINUED | OUTPATIENT
Start: 2018-08-12 | End: 2018-08-08 | Stop reason: HOSPADM

## 2018-08-08 RX ORDER — GUAIFENESIN 100 MG/5ML
81 LIQUID (ML) ORAL DAILY
Qty: 60 TAB | Refills: 2 | Status: SHIPPED | OUTPATIENT
Start: 2018-08-09

## 2018-08-08 RX ORDER — AMIODARONE HYDROCHLORIDE 400 MG/1
400 TABLET ORAL 2 TIMES DAILY
Qty: 6 TAB | Refills: 0 | Status: SHIPPED | OUTPATIENT
Start: 2018-08-08 | End: 2018-08-17

## 2018-08-08 RX ORDER — METOPROLOL SUCCINATE 100 MG/1
100 TABLET, EXTENDED RELEASE ORAL DAILY
Qty: 60 TAB | Refills: 3 | Status: SHIPPED | OUTPATIENT
Start: 2018-08-09 | End: 2018-08-17

## 2018-08-08 RX ADMIN — METOPROLOL SUCCINATE 100 MG: 50 TABLET, EXTENDED RELEASE ORAL at 09:43

## 2018-08-08 RX ADMIN — AMIODARONE HYDROCHLORIDE 400 MG: 200 TABLET ORAL at 09:43

## 2018-08-08 RX ADMIN — VITAMIN D, TAB 1000IU (100/BT) 2000 UNITS: 25 TAB at 19:32

## 2018-08-08 RX ADMIN — AMIODARONE HYDROCHLORIDE 400 MG: 200 TABLET ORAL at 19:32

## 2018-08-08 RX ADMIN — ASPIRIN 81 MG 81 MG: 81 TABLET ORAL at 09:43

## 2018-08-08 NOTE — PROGRESS NOTES
Chart reviewed and visit attempted pt refused stating she just wants to rest now. Will continue to follow.

## 2018-08-08 NOTE — DISCHARGE INSTRUCTIONS
Discharge SNF/Rehab Instructions/LTAC       PATIENT ID: Janie Simmonds  MRN: [id number]   YOB: 1929    DATE OF ADMISSION: 7/28/2018  2:11 PM    DATE OF DISCHARGE: 8/8/2018    PRIMARY CARE PROVIDER: Jemima Baum MD       DISCHARGING PHYSICIAN: Clementina Aden MD    To contact this individual call [phone] and ask the  to page. If unavailable ask to be transferred the Adult Hospitalist Department. CONSULTATIONS: IP CONSULT TO CARDIOLOGY    PROCEDURES/SURGERIES: * No surgery found *    ADMITTING DIAGNOSES & HOSPITAL COURSE:     Rain Diaz is a 80 y.o.   female with a history of atrial flutter, HTN and dementia who was brought in to the ER because family was concern about a possible fall. She was found to be in atrial flutter with RVR in the ER and was started on cardizem drip. Cardiology was also consulted. Patient was transiently hypotensive on admission limiting the use of oral cardizem. She was started on amiodarone. Echo as stated below. Patient is current in Sinus rhythm and rate controlled and will continue out-patient follow up with cardiology      DISCHARGE DIAGNOSES / PLAN:      Atrial flutter with RVR POA  Mild troponin elevation 0.83 POA  Hypertension  -previously on dilt, which is DCd d/t hypotension  -cont metoprolol, PO amio  -appreciate cardio input, was started on amio gtt given difficulty w rate control  -Echo:  Systolic function was normal. Ejection fraction was estimated in the range of 55 % to 60%. Suboptimal endocardial visualization limits wall motion analysis. -elevated trop noted in setting of elevated Cr, mild rhabdo POD, felt to be nonspecific  - Due to recurrent falls, patient is not a candidate for oral anticoagulant. Will be on aspirin. No BB or cardizem due to hypotension. On amiodarone for rate control. Patient will follow up with cardiology on an OP basis.       Acute kidney injury POA   -resolved, Cr at baseline      Hypotension POA 81/46 in ED  -resolved; -130's/60-70's      Mild Rhabdomyolysis POA CPK 1480  -resolved      Frequent falls  Alzheimer's dementia POA  Sundowning   -CT head negative  -agitated throughout the day (8/3) will reinstate sitter for now  -Now stable and calm, No sitter at bedside.      UTI POA  Overactive bladder  -urine cx grew gram (-) rods; completed antibiotic therapy 5 days with rocephin.  -hold oxybutynin while treating for UTI  - resume oxybutynin.      Code Status:   JCV  Surrogate Decision Maker: David Dumont) is mPOA      DVT Prophylaxis:  SCD  GI Prophylaxis: not indicated      Baseline:   Lives in [address] calls or checks in on her daily       Disposition prior to discharge: She is hemodynamically stable and has improved. PENDING TEST RESULTS:   At the time of discharge the following test results are still pending:     FOLLOW UP APPOINTMENTS:    Follow-up Information     Follow up With Details Comments Contact Info    [address] [place] [address]  [phone]      Gayla Ceballos MD In 1 week  [address]  [address] [facility]  [phone]             ADDITIONAL CARE RECOMMENDATIONS:     DIET: Cardiac Diet    TUBE FEEDING INSTRUCTIONS: none    OXYGEN / BiPAP SETTINGS:     ACTIVITY: Activity as tolerated    WOUND CARE:     EQUIPMENT needed:      DISCHARGE MEDICATIONS:   See Medication Reconciliation Form      NOTIFY YOUR PHYSICIAN FOR ANY OF THE FOLLOWING:   Fever over 101 degrees for 24 hours. Chest pain, shortness of breath, fever, chills, nausea, vomiting, diarrhea, change in mentation, falling, weakness, bleeding. Severe pain or pain not relieved by medications. Or, any other signs or symptoms that you may have questions about.     DISPOSITION:    Home With:   OT  PT  HH  RN      x SNF/Inpatient Rehab/LTAC    Independent/assisted living    Hospice    Other:       PATIENT CONDITION AT DISCHARGE:     Functional status x Poor     Deconditioned     Independent      Cognition    x Lucid     Forgetful     Dementia      Catheters/lines (plus indication)    Yarbrough     PICC     PEG    x None      Code status     Full code    x DNR      PHYSICAL EXAMINATION AT DISCHARGE:   Refer to Progress Note      CHRONIC MEDICAL DIAGNOSES:  Problem List as of 8/8/2018  Date Reviewed: 5/30/2018          Codes Class Noted - Resolved    Atrial flutter with rapid ventricular response ([facility] ICD-10-CM: I48.92  ICD-9-CM: 427.32  7/28/2018 - Present        Malnutrition ([facility] ICD-10-CM: E46  ICD-9-CM: 263.9  5/30/2018 - Present        Hypertension ICD-10-CM: I10  ICD-9-CM: 401.9  5/30/2018 - Present        Atrial flutter ([facility] ICD-10-CM: O40.95  ICD-9-CM: 427.32  5/24/2018 - Present        Late onset Alzheimer's disease without behavioral disturbance ICD-10-CM: G30.1, F02.80  ICD-9-CM: 331.0, 294.10  5/22/2018 - Present        Advance care planning ICD-10-CM: Z71.89  ICD-9-CM: V65.49  12/5/2017 - Present        Scoliosis ICD-10-CM: M41.9  ICD-9-CM: 737.30  11/21/2017 - Present        Atrial fibrillation ([facility] ICD-10-CM: I48.91  ICD-9-CM: 427.31  8/28/2017 - Present        Adhesive capsulitis of both shoulders ICD-10-CM: M75.01, M75.02  ICD-9-CM: 726.0  8/28/2017 - Present        Osteoarthritis ICD-10-CM: M19.90  ICD-9-CM: 715.90  8/28/2017 - Present        OAB (overactive bladder) ICD-10-CM: N32.81  ICD-9-CM: 596.51  8/28/2017 - Present                CDMP Checked:   Yes x         Signed:   Tosin Rose MD  8/8/2018  2:39 PM

## 2018-08-08 NOTE — DISCHARGE SUMMARY
Discharge Summary       PATIENT ID: Dejuan Cordero  MRN: [id number]   YOB: 1929    DATE OF ADMISSION: 7/28/2018  2:11 PM    DATE OF DISCHARGE: 8/8/2018   PRIMARY CARE PROVIDER: Robinson White MD       DISCHARGING PHYSICIAN: Rudy Trotter MD    To contact this individual call [phone] and ask the  to page. If unavailable ask to be transferred the Adult Hospitalist Department. CONSULTATIONS: IP CONSULT TO CARDIOLOGY    PROCEDURES/SURGERIES: * No surgery found *    ADMITTING DIAGNOSES & HOSPITAL COURSE:   Dejuan Cordero is a 80 y.o.  female with a history of atrial flutter, HTN and dementia who was brought in to the ER because family was concern about a possible fall. She was found to be in atrial flutter with RVR in the ER and was started on cardizem drip. Cardiology was also consulted. Patient was transiently hypotensive on admission limiting the use of oral cardizem. She was started on amiodarone. Echo as stated below. Patient is current in Sinus rhythm and rate controlled and will continue out-patient follow up with cardiology. DISCHARGE DIAGNOSES / PLAN:      Atrial flutter with RVR POA  Mild troponin elevation 0.83 POA  Hypertension  -previously on dilt, which is DCd d/t hypotension  -cont metoprolol, PO amio  -appreciate cardio input, was started on amio gtt given difficulty w rate control  -Echo:  Systolic function was normal. Ejection fraction was estimated in the range of 55 % to 60%. Suboptimal endocardial visualization limits wall motion analysis. -elevated trop noted in setting of elevated Cr, mild rhabdo POD, felt to be nonspecific  - Due to recurrent falls, patient is not a candidate for oral anticoagulant. Will be on aspirin. No BB or cardizem due to hypotension. On amiodarone for rate control.  Patient will follow up with cardiology on an OP basis.     Acute kidney injury POA   -resolved, Cr at baseline      Hypotension POA 81/46 in ED  -resolved; BP 110-130's/60-70's      Mild Rhabdomyolysis POA CPK 1480  -resolved      Frequent falls  Alzheimer's dementia POA  Sundowning   -CT head negative  -agitated throughout the day (8/3) will reinstate sitter for now  -Now stable and calm, No sitter at bedside.      UTI POA  Overactive bladder  -urine cx grew gram (-) rods; completed antibiotic therapy 5 days with rocephin.  -hold oxybutynin while treating for UTI  - resume oxybutynin.      Code Status:   RGJ  Surrogate Decision Maker: Di Seth) is mPOA      DVT Prophylaxis:  SCD  GI Prophylaxis: not indicated      Baseline:   Lives in [address] calls or checks in on her daily       Disposition prior to discharge: She is hemodynamically stable and has improved. PENDING TEST RESULTS:   At the time of discharge the following test results are still pending:     FOLLOW UP APPOINTMENTS:    Follow-up Information     Follow up With Details Comments Contact Info    [address] [place] [address]  [phone]      Ale Leyva MD   [address]  [address] [facility]  [phone]             ADDITIONAL CARE RECOMMENDATIONS:     DIET: Cardiac Diet    ACTIVITY: Activity as tolerated    WOUND CARE: none    EQUIPMENT needed:       DISCHARGE MEDICATIONS:  Current Discharge Medication List      START taking these medications    Details   !! amiodarone (CORDARONE) 200 mg tablet Take 1 Tab by mouth daily. Indications: VENTRICULAR RATE CONTROL IN ATRIAL FIBRILLATION  Qty: 30 Tab, Refills: 3      !! amiodarone (PACERONE) 400 mg tablet Take 1 Tab by mouth two (2) times a day for 3 days. Indications: VENTRICULAR RATE CONTROL IN ATRIAL FIBRILLATION  Qty: 6 Tab, Refills: 0      aspirin 81 mg chewable tablet Take 1 Tab by mouth daily. Indications: prevention of cerebrovascular accident  Qty: 60 Tab, Refills: 2       !! - Potential duplicate medications found. Please discuss with provider.       CONTINUE these medications which have CHANGED    Details   metoprolol succinate (TOPROL-XL) 100 mg tablet Take 1 Tab by mouth daily. Qty: 60 Tab, Refills: 3         CONTINUE these medications which have NOT CHANGED    Details   ferrous sulfate (IRON) 325 mg (65 mg iron) EC tablet Take 325 mg by mouth every evening. oxybutynin (DITROPAN) 5 mg tablet Take 5 mg by mouth two (2) times a day. lutein 6 mg tab Take 1 Tab by mouth daily. cholecalciferol, vitamin D3, (VITAMIN D3) 2,000 unit tab Take 2,000 Units by mouth every evening. senna (SENOKOT) 8.6 mg tablet Take 2 Tabs by mouth every evening. STOP taking these medications       losartan (COZAAR) 50 mg tablet Comments:   Reason for Stopping:         dilTIAZem ER (CARDIZEM LA) 240 mg Tb24 tablet Comments:   Reason for Stopping:         rivaroxaban (XARELTO) 15 mg tab tablet Comments:   Reason for Stopping:                 NOTIFY YOUR PHYSICIAN FOR ANY OF THE FOLLOWING:   Fever over 101 degrees for 24 hours. Chest pain, shortness of breath, fever, chills, nausea, vomiting, diarrhea, change in mentation, falling, weakness, bleeding. Severe pain or pain not relieved by medications. Or, any other signs or symptoms that you may have questions about. DISPOSITION:    Home With:   OT  PT  HH  RN      x Long term SNF/Inpatient Rehab    Independent/assisted living    Hospice    Other:       PATIENT CONDITION AT DISCHARGE:     Functional status    Poor    x Deconditioned     Independent      Cognition     Lucid     Forgetful    x Dementia      Catheters/lines (plus indication)    Yarbrough     PICC     PEG    x None      Code status     Full code    x DNR      PHYSICAL EXAMINATION AT DISCHARGE:     Refer to Progress Note    General:                    frail, agitated but no acute distress   EENT:                                  EOMI. Anicteric sclerae. MMM  Resp:                                   CTA bilaterally, no wheezing or rales.   No accessory muscle use  CV: Regular  rhythm,  No edema  GI:                                       Soft, Non distended, Non tender.  +Bowel sounds  Neurologic:                Alert and oriented X 3, normal speech,   Psych:                       Poor insight. anxious, agitated  Skin:                                    No rashes.   No jaundice    CHRONIC MEDICAL DIAGNOSES:  Problem List as of 8/8/2018  Date Reviewed: 5/30/2018          Codes Class Noted - Resolved    Atrial flutter with rapid ventricular response ([facility] ICD-10-CM: I48.92  ICD-9-CM: 427.32  7/28/2018 - Present        Malnutrition ([facility] ICD-10-CM: E46  ICD-9-CM: 263.9  5/30/2018 - Present        Hypertension ICD-10-CM: I10  ICD-9-CM: 401.9  5/30/2018 - Present        Atrial flutter ([facility] ICD-10-CM: D29.33  ICD-9-CM: 427.32  5/24/2018 - Present        Late onset Alzheimer's disease without behavioral disturbance ICD-10-CM: G30.1, F02.80  ICD-9-CM: 331.0, 294.10  5/22/2018 - Present        Advance care planning ICD-10-CM: Z71.89  ICD-9-CM: V65.49  12/5/2017 - Present        Scoliosis ICD-10-CM: M41.9  ICD-9-CM: 737.30  11/21/2017 - Present        Atrial fibrillation ([facility] ICD-10-CM: I48.91  ICD-9-CM: 427.31  8/28/2017 - Present        Adhesive capsulitis of both shoulders ICD-10-CM: M75.01, M75.02  ICD-9-CM: 726.0  8/28/2017 - Present        Osteoarthritis ICD-10-CM: M19.90  ICD-9-CM: 715.90  8/28/2017 - Present        OAB (overactive bladder) ICD-10-CM: N32.81  ICD-9-CM: 596.51  8/28/2017 - Present              Greater than 30 minutes were spent with the patient on counseling and coordination of care    Signed:   Benny Barker MD  8/8/2018  2:26 PM

## 2018-08-08 NOTE — PROGRESS NOTES
Bedside and Verbal shift change report received from [facility]. Report included the following information SBAR, Kardex and Recent Results.

## 2018-08-08 NOTE — PROGRESS NOTES
0700: Received bedside report from Alyssa Leach, off going nurse. Assumed care of patient. 1700: [facility] here to get patient, patient a DNR, no inpatient code status on chart, no DDNR on chart, paging Dr. Akash Olmedo. Discharge delayed, patient unable to sign own DDNR, MPOA on the way to sign DDNR with physician, Magno Fowler notified, [facility] rescheduled for 9.       Hospital to SNF SBAR Handoff - Mansoor Palafox                                                                        80 y.o.   female    [address] [facility]   Room: 2216/01    [facility] CARDIOPULMONARY CARE  Unit Phone# :  [phone]      [address]  [facility] [address] [facility]  [address] [address]  Dept: [phone]  Loc: [phone]                    SITUATION     Admitted:  7/28/2018         Attending Provider:  Julee Mcneal MD       Consultations:  IP CONSULT TO CARDIOLOGY    PCP:  Elayne Soulier, MD   [phone]    Treatment Team: Attending Provider: Julee Mcneal MD; Consulting Provider: Kayley Clmeons MD; Consulting Provider: Sabrina Pond MD; Consulting Provider: Darius Rousseau MD; Utilization Review: Hari Gordillo RN; Care Manager: Johanny Chow    Admitting Dx:  Atrial flutter with rapid ventricular response ([facility]       Principal Problem: <principal problem not specified>    * No surgery found * of      BY: * Surgery not found *             ON: * No surgery found *                  Code Status: DNR                Advance Directives:   Advance Care Planning 7/28/2018   Patient's Healthcare Decision Maker is: Legal Next of [place] [address]   Primary Decision Maker Name -   Primary Decision Maker Phone Number -   Primary Decision Maker Relationship to Patient -   Confirm Advance Directive -   Patient Would Like to Complete Advance Directive -   Does the patient have other document types -    (Send w/patient)   Yes Not W Pt       Isolation:  There are currently no Active Isolations       MDRO: No current active infections    Pain Medications given:  n/a    Last dose:  at  n/a    Special Equipment needed: no  Type of equipment:         BACKGROUND     Allergies:  No Known Allergies    Past Medical History:   Diagnosis Date    A-fib ([address]     Alzheimer's dementia     Arthritis        Past Surgical History:   Procedure Laterality Date    HX BREAST AUGMENTATION      HX ORTHOPAEDIC      TKR--bilateral        Prescriptions Prior to Admission   Medication Sig    ferrous sulfate (IRON) 325 mg (65 mg iron) EC tablet Take 325 mg by mouth every evening.  losartan (COZAAR) 50 mg tablet Take 50 mg by mouth daily.  oxybutynin (DITROPAN) 5 mg tablet Take 5 mg by mouth two (2) times a day.  dilTIAZem ER (CARDIZEM LA) 240 mg Tb24 tablet Take 240 mg by mouth daily.  metoprolol succinate (TOPROL-XL) 50 mg XL tablet Take 3 Tabs by mouth daily.  lutein 6 mg tab Take 1 Tab by mouth daily.  rivaroxaban (XARELTO) 15 mg tab tablet Take 15 mg by mouth daily.  cholecalciferol, vitamin D3, (VITAMIN D3) 2,000 unit tab Take 2,000 Units by mouth every evening.  senna (SENOKOT) 8.6 mg tablet Take 2 Tabs by mouth every evening. Hard scripts included in transfer packet no    Vaccinations:    Immunization History   Administered Date(s) Administered    Pneumococcal Conjugate (PCV-13) 12/05/2017       Readmission Risks:    Known Risks: dementia, age        The Charlson CoMorbitiy Index tool is an evidenced based tool that has more automatic generated information. The tool looks at many different items such as the age of the patient, how many times they were admitted in the last calendar year, current length of stay in the hospital and their diagnosis. All of these items are pulled automatically from information documented in the chart from various places and will generate a score that predicts whether a patient is at low (less than 13), medium (13-20) or high (21 or greater) risk of being readmitted.         ASSESSMENT Temp: 97.6 °F (36.4 °C) (08/08/18 1148) Pulse (Heart Rate): 64 (08/08/18 1148)     Resp Rate: 18 (08/08/18 1148)           BP: 97/53 (08/08/18 1148)     O2 Sat (%): 97 % (08/08/18 1148)     Weight: 53 kg (116 lb 13.5 oz)    Height: 5' 1\" (154.9 cm) (07/28/18 1415)       If above not within 1 hour of discharge:    BP:_113/56____  P:_66___  R:_18___ T:_97.6_ O2 Sat: _97__%  O2: _RA__    Active Orders   Diet    DIET CARDIAC Regular         Orientation: disoriented and only aware of  place and person     Active Behaviors: None                                   Active Lines/Drains:  (Peg Tube / Yarbrough / CL or S/L?): no    Urinary Status: Voiding     Last BM: Last Bowel Movement Date: 08/04/18     Skin Integrity: Abrasion             Mobility: Slightly limited   Weight Bearing Status: WBAT (Weight Bearing as Tolerated)      Gait Training  Assistive Device: Walker, rolling, Gait belt  Ambulation - Level of Assistance: Contact guard assistance, Assist x1  Distance (ft): 35 Feet (ft)         Lab Results   Component Value Date/Time    Glucose 84 08/06/2018 12:36 AM    INR 1.3 (H) 05/24/2018 05:43 PM    HGB 12.4 08/06/2018 12:36 AM    HGB 12.4 08/02/2018 03:10 AM        RECOMMENDATION     See After Visit Summary (AVS) for:  · Discharge instructions  · After [address]   · Special equipment needed (entered pre-discharge by Care Management)  · Medication Reconciliation    · Follow up Appointment(s)         Report given/sent by:  [employer]                    Verbal report given to:  Taniya Raymond FAXED to:         Estimated discharge time:  8/8/2018 at 1400

## 2018-08-08 NOTE — PROGRESS NOTES
Hospitalist Progress Note    NAME: Andreia Brooke   :  1929   MRN:  [id number]       Assessment / Plan:  Atrial flutter with RVR POA  Mild troponin elevation 0.83 POA  Hypertension  -previously on dilt, which is DCd d/t hypotension  -cont metoprolol, PO amio  -appreciate cardio input, was started on amio gtt given difficulty w rate control  -Echo:  Systolic function was normal. Ejection fraction was estimated in the range of 55 % to 60%. Suboptimal endocardial visualization limits wall motion analysis. -elevated trop noted in setting of elevated Cr, mild rhabdo POD, felt to be nonspecific  - Now rate controlled. Cardiology following    Acute kidney injury POA   -resolved, Cr at baseline      Hypotension POA 81/46 in ED  -resolved; -130's/60-70's      Mild Rhabdomyolysis POA CPK 1480  -resolved      Frequent falls  Alzheimer's dementia POA     -CT head negative  -agitated throughout the day (8/3) will reinstate sitter for now  -Pending SNF placement  -per case mgmnt bed [facility] as of PM , joselin wants referrals sent to different facility.       UTI POA  Overactive bladder  -urine cx grew gram (-) rods; will complete total of 5 days  -hold oxybutynin while treating for UTI      Code Status:   AZV  Surrogate Decision Maker: Jayant Canela) is mPOA      DVT Prophylaxis:  SCD  GI Prophylaxis: not indicated      Baseline:   Lives in [address] [place] [address],[address] calls or checks in on her daily       Recommended Disposition: SNF/LTC.  working on placement     Subjective:     Chief Complaint / Reason for Physician Visit  Lying in bed. She does not have any complaints.   She denies chest pain or SOB    Review of Systems:  Symptom Y/N Comments  Symptom Y/N Comments   Fever/Chills    Chest Pain     Poor Appetite    Edema     Cough    Abdominal Pain     Sputum    Joint Pain     SOB/ADAME    Pruritis/Rash     Nausea/vomit    Tolerating PT/OT     Diarrhea    Tolerating Diet     Constipation Other       Could NOT obtain due to: agitated     Objective:     VITALS:   Last 24hrs VS reviewed since prior progress note. Most recent are:  Patient Vitals for the past 24 hrs:   Temp Pulse Resp BP SpO2   08/08/18 1401 - 66 - 113/56 -   08/08/18 1148 97.6 °F (36.4 °C) 64 18 97/53 97 %   08/08/18 0806 98.3 °F (36.8 °C) 66 18 132/81 97 %   08/08/18 0500 97.9 °F (36.6 °C) 77 18 129/82 97 %   08/07/18 2237 98.9 °F (37.2 °C) 69 18 154/71 97 %   08/07/18 2026 98.2 °F (36.8 °C) 70 16 138/68 95 %   08/07/18 1507 98.6 °F (37 °C) 64 20 138/60 96 %       Intake/Output Summary (Last 24 hours) at 08/08/18 1506  Last data filed at 08/08/18 0541   Gross per 24 hour   Intake              240 ml   Output              625 ml   Net             -385 ml        PHYSICAL EXAM:  General: frail, agitated but no acute distress   EENT:  EOMI. Anicteric sclerae. MMM  Resp:  CTA bilaterally, no wheezing or rales. No accessory muscle use  CV:  Regular  rhythm,  No edema  GI:  Soft, Non distended, Non tender.  +Bowel sounds  Neurologic:  Alert and oriented X 3, normal speech,   Psych:   Poor insight. anxious, agitated  Skin:  No rashes. No jaundice    Reviewed most current lab test results and cultures  YES  Reviewed most current radiology test results   YES  Review and summation of old records today    NO  Reviewed patient's current orders and MAR    YES  PMH/ reviewed - no change compared to H&P  ________________________________________________________________________  Care Plan discussed with:    Comments   Patient x    Family      RN x    Care Manager     Consultant                       x Multidiciplinary team rounds were held today with , nursing, pharmacist and clinical coordinator. Patient's plan of care was discussed; medications were reviewed and discharge planning was addressed.      ________________________________________________________________________  Total NON critical care TIME:  20 Minutes    Total CRITICAL CARE TIME Spent:   Minutes non procedure based      Comments   >50% of visit spent in counseling and coordination of care     ________________________________________________________________________  Robert Barbosa MD     Procedures: see electronic medical records for all procedures/Xrays and details which were not copied into this note but were reviewed prior to creation of Plan. LABS:  I reviewed today's most current labs and imaging studies.   Pertinent labs include:  Recent Labs      08/06/18 0036   WBC  11.3*   HGB  12.4   HCT  36.9   PLT  327     Recent Labs      08/06/18 0036   NA  136   K  4.2   CL  101   CO2  26   GLU  84   BUN  22*   CREA  0.71   CA  8.3*       Signed: Robert Barbosa MD

## 2018-08-08 NOTE — PROGRESS NOTES
DISCHARGE NOTE  Pt to discharge to [facility] via [facility] BLS transport at 5:00PM. PCS paperwork placed on bedside chart. CM notified mPOA of discharge. Pt to discharge to SNF for short-term rehab and transition to LTC. mPOA to continue working with [employer] with Medicaid application outpatient. All information entered into pt AVS.     Pt has no additional CM needs at this time. Floor nurse notified. Call Report [phone]. Please ensure any hard prescriptions for narcotics discharge with pt to SNF. Care Management Interventions  PCP Verified by CM:  Yes  Palliative Care Criteria Met (RRAT>21 & CHF Dx)?: No  Mode of Transport at Discharge: BLS ([facility])  Hospital Transport Time of Discharge: [address]  (CM Consult): SNF ([facility])  Partner SNF: Yes  Discharge Durable Medical Equipment: No (rollator at home)  Health Maintenance Reviewed: Yes  Physical Therapy Consult: Yes  Occupational Therapy Consult: Yes  Speech Therapy Consult: No  Current Support Network: Lives Alone, Other (Pt lives alone at [place] [facility]; niece is looking into other options, as pt is not safe at this time)  Confirm Follow Up Transport: Family  Plan discussed with Pt/Family/Caregiver: Yes  Freedom of Choice Offered: Yes  Discharge Location  Discharge Placement: Skilled nursing facility [facility])    BRENDAN Mcgowan Supervisee in Social Work, [address]  [phone]

## 2018-08-09 ENCOUNTER — PATIENT OUTREACH (OUTPATIENT)
Dept: CASE MANAGEMENT | Age: 83
End: 2018-08-09

## 2018-08-09 ENCOUNTER — PATIENT OUTREACH (OUTPATIENT)
Dept: INTERNAL MEDICINE CLINIC | Age: 83
End: 2018-08-09

## 2018-08-09 NOTE — PROGRESS NOTES
Noted on the NN discharge report, patient discharged from ED [facility] 8/8/2018 dx atrial flutter with rapid ventriclar response. Per patient's discharge summary patient discharged to [facility] SNF. Outgoing call placed to [facility] via [facility] weekly call. Patient was admitted to facility. 2nd call placed to [facility] spoke to Kem requested copy of patient's medication report for review. This writer office fax number confirmed.

## 2018-08-09 NOTE — PROGRESS NOTES
Community Care Team documentation for patient in [facility]  Initial Follow Up       Patient was admitted to [facility] on 7/28/18 and discharged 8/8/18 to [address] [place] [address]. Hospital Discharge diagnosis:  A flutter with RVR    RRAT score: 20    Advance Care Planning:  Not on file. PCP : Dipak Blackburn MD  Nurse Navigator in PCP office: Chayito Baker  Note routed to Nurse Navigator team.    SNF Attending:  Andrade Wang MD    Spoke with Nidia Johnson  and team at SNF. Ensured patient arrived to SNF safely with admission packet in order. PT/OT evaluations to be completed today. Family has requested LTC for patient. UAI was done in hospital and sent to SNF. Per hospital notes, family continues to work with [employer] to complete Medicaid application, and family is looking into [facility] and memory care. Disposition/LOS TBD. Community Care Team will follow up weekly with [facility] until discharge. Medications were not reconciled and general patient assessment was not completed during this [facility] outreach.       Valery Meyer, MSN, RN, ACNS-BC, [facility]  Nurse Navigator, [employer] [phone]

## 2018-08-11 ENCOUNTER — APPOINTMENT (OUTPATIENT)
Dept: GENERAL RADIOLOGY | Age: 83
DRG: 481 | End: 2018-08-11
Attending: EMERGENCY MEDICINE
Payer: MEDICARE

## 2018-08-11 ENCOUNTER — HOSPITAL ENCOUNTER (INPATIENT)
Age: 83
LOS: 6 days | Discharge: NURSING, REHAB OR LONG-TERM CARE | DRG: 481 | End: 2018-08-17
Attending: EMERGENCY MEDICINE | Admitting: ORTHOPAEDIC SURGERY
Payer: MEDICARE

## 2018-08-11 ENCOUNTER — APPOINTMENT (OUTPATIENT)
Dept: GENERAL RADIOLOGY | Age: 83
DRG: 481 | End: 2018-08-11
Attending: NURSE PRACTITIONER
Payer: MEDICARE

## 2018-08-11 DIAGNOSIS — S72.401A CLOSED FRACTURE OF DISTAL END OF RIGHT FEMUR, UNSPECIFIED FRACTURE MORPHOLOGY, INITIAL ENCOUNTER (HCC): Primary | ICD-10-CM

## 2018-08-11 PROBLEM — M97.11XA PERIPROSTHETIC FRACTURE AROUND INTERNAL PROSTHETIC RIGHT KNEE JOINT: Status: ACTIVE | Noted: 2018-08-11

## 2018-08-11 LAB
ALBUMIN SERPL-MCNC: 3.5 G/DL (ref 3.5–5)
ALBUMIN/GLOB SERPL: 1.1 {RATIO} (ref 1.1–2.2)
ALP SERPL-CCNC: 66 U/L (ref 45–117)
ALT SERPL-CCNC: 26 U/L (ref 12–78)
ANION GAP SERPL CALC-SCNC: 6 MMOL/L (ref 5–15)
APPEARANCE UR: CLEAR
AST SERPL-CCNC: 17 U/L (ref 15–37)
BACTERIA URNS QL MICRO: NEGATIVE /HPF
BASOPHILS # BLD: 0.1 K/UL (ref 0–0.1)
BASOPHILS NFR BLD: 1 % (ref 0–1)
BILIRUB SERPL-MCNC: 0.6 MG/DL (ref 0.2–1)
BILIRUB UR QL: NEGATIVE
BUN SERPL-MCNC: 29 MG/DL (ref 6–20)
BUN/CREAT SERPL: 29 (ref 12–20)
CALCIUM SERPL-MCNC: 8.7 MG/DL (ref 8.5–10.1)
CHLORIDE SERPL-SCNC: 102 MMOL/L (ref 97–108)
CK SERPL-CCNC: 46 U/L (ref 26–192)
CO2 SERPL-SCNC: 30 MMOL/L (ref 21–32)
COLOR UR: ABNORMAL
CREAT SERPL-MCNC: 1 MG/DL (ref 0.55–1.02)
DIFFERENTIAL METHOD BLD: ABNORMAL
EOSINOPHIL # BLD: 0.4 K/UL (ref 0–0.4)
EOSINOPHIL NFR BLD: 4 % (ref 0–7)
EPITH CASTS URNS QL MICRO: ABNORMAL /LPF
ERYTHROCYTE [DISTWIDTH] IN BLOOD BY AUTOMATED COUNT: 13.9 % (ref 11.5–14.5)
GLOBULIN SER CALC-MCNC: 3.2 G/DL (ref 2–4)
GLUCOSE SERPL-MCNC: 117 MG/DL (ref 65–100)
GLUCOSE UR STRIP.AUTO-MCNC: NEGATIVE MG/DL
HCT VFR BLD AUTO: 35.3 % (ref 35–47)
HGB BLD-MCNC: 11.8 G/DL (ref 11.5–16)
HGB UR QL STRIP: NEGATIVE
HYALINE CASTS URNS QL MICRO: ABNORMAL /LPF (ref 0–5)
IMM GRANULOCYTES # BLD: 0 K/UL (ref 0–0.04)
IMM GRANULOCYTES NFR BLD AUTO: 0 % (ref 0–0.5)
INR PPP: 1.1 (ref 0.9–1.1)
KETONES UR QL STRIP.AUTO: ABNORMAL MG/DL
LEUKOCYTE ESTERASE UR QL STRIP.AUTO: NEGATIVE
LYMPHOCYTES # BLD: 1.9 K/UL (ref 0.8–3.5)
LYMPHOCYTES NFR BLD: 19 % (ref 12–49)
MCH RBC QN AUTO: 32.1 PG (ref 26–34)
MCHC RBC AUTO-ENTMCNC: 33.4 G/DL (ref 30–36.5)
MCV RBC AUTO: 95.9 FL (ref 80–99)
MONOCYTES # BLD: 0.9 K/UL (ref 0–1)
MONOCYTES NFR BLD: 9 % (ref 5–13)
MUCOUS THREADS URNS QL MICRO: ABNORMAL /LPF
NEUTS SEG # BLD: 6.7 K/UL (ref 1.8–8)
NEUTS SEG NFR BLD: 67 % (ref 32–75)
NITRITE UR QL STRIP.AUTO: NEGATIVE
NRBC # BLD: 0 K/UL (ref 0–0.01)
NRBC BLD-RTO: 0 PER 100 WBC
PH UR STRIP: 5.5 [PH] (ref 5–8)
PLATELET # BLD AUTO: 450 K/UL (ref 150–400)
PMV BLD AUTO: 9.8 FL (ref 8.9–12.9)
POTASSIUM SERPL-SCNC: 4.7 MMOL/L (ref 3.5–5.1)
PROT SERPL-MCNC: 6.7 G/DL (ref 6.4–8.2)
PROT UR STRIP-MCNC: NEGATIVE MG/DL
PROTHROMBIN TIME: 11.2 SEC (ref 9–11.1)
RBC # BLD AUTO: 3.68 M/UL (ref 3.8–5.2)
RBC #/AREA URNS HPF: ABNORMAL /HPF (ref 0–5)
SODIUM SERPL-SCNC: 138 MMOL/L (ref 136–145)
SP GR UR REFRACTOMETRY: 1.02 (ref 1–1.03)
TROPONIN I SERPL-MCNC: <0.05 NG/ML
UA: UC IF INDICATED,UAUC: ABNORMAL
UROBILINOGEN UR QL STRIP.AUTO: 0.2 EU/DL (ref 0.2–1)
WBC # BLD AUTO: 10.1 K/UL (ref 3.6–11)
WBC URNS QL MICRO: ABNORMAL /HPF (ref 0–4)

## 2018-08-11 PROCEDURE — 96374 THER/PROPH/DIAG INJ IV PUSH: CPT

## 2018-08-11 PROCEDURE — 85025 COMPLETE CBC W/AUTO DIFF WBC: CPT | Performed by: EMERGENCY MEDICINE

## 2018-08-11 PROCEDURE — 36415 COLL VENOUS BLD VENIPUNCTURE: CPT | Performed by: EMERGENCY MEDICINE

## 2018-08-11 PROCEDURE — 81001 URINALYSIS AUTO W/SCOPE: CPT | Performed by: NURSE PRACTITIONER

## 2018-08-11 PROCEDURE — 84484 ASSAY OF TROPONIN QUANT: CPT | Performed by: EMERGENCY MEDICINE

## 2018-08-11 PROCEDURE — 74011250636 HC RX REV CODE- 250/636: Performed by: EMERGENCY MEDICINE

## 2018-08-11 PROCEDURE — 65270000029 HC RM PRIVATE

## 2018-08-11 PROCEDURE — 96361 HYDRATE IV INFUSION ADD-ON: CPT

## 2018-08-11 PROCEDURE — 74011250637 HC RX REV CODE- 250/637: Performed by: EMERGENCY MEDICINE

## 2018-08-11 PROCEDURE — 85610 PROTHROMBIN TIME: CPT | Performed by: EMERGENCY MEDICINE

## 2018-08-11 PROCEDURE — 99285 EMERGENCY DEPT VISIT HI MDM: CPT

## 2018-08-11 PROCEDURE — 93005 ELECTROCARDIOGRAM TRACING: CPT

## 2018-08-11 PROCEDURE — 80053 COMPREHEN METABOLIC PANEL: CPT | Performed by: EMERGENCY MEDICINE

## 2018-08-11 PROCEDURE — 82550 ASSAY OF CK (CPK): CPT | Performed by: EMERGENCY MEDICINE

## 2018-08-11 PROCEDURE — 73562 X-RAY EXAM OF KNEE 3: CPT

## 2018-08-11 PROCEDURE — 86901 BLOOD TYPING SEROLOGIC RH(D): CPT | Performed by: EMERGENCY MEDICINE

## 2018-08-11 PROCEDURE — 74011250637 HC RX REV CODE- 250/637: Performed by: NURSE PRACTITIONER

## 2018-08-11 PROCEDURE — 71045 X-RAY EXAM CHEST 1 VIEW: CPT

## 2018-08-11 PROCEDURE — 86923 COMPATIBILITY TEST ELECTRIC: CPT | Performed by: EMERGENCY MEDICINE

## 2018-08-11 RX ORDER — ACETAMINOPHEN 500 MG
1000 TABLET ORAL
Status: COMPLETED | OUTPATIENT
Start: 2018-08-11 | End: 2018-08-11

## 2018-08-11 RX ORDER — SODIUM CHLORIDE 9 MG/ML
500 INJECTION, SOLUTION INTRAVENOUS ONCE
Status: COMPLETED | OUTPATIENT
Start: 2018-08-11 | End: 2018-08-11

## 2018-08-11 RX ORDER — FENTANYL CITRATE 50 UG/ML
25 INJECTION, SOLUTION INTRAMUSCULAR; INTRAVENOUS
Status: COMPLETED | OUTPATIENT
Start: 2018-08-11 | End: 2018-08-11

## 2018-08-11 RX ORDER — AMOXICILLIN 250 MG
2 CAPSULE ORAL 2 TIMES DAILY
Status: DISCONTINUED | OUTPATIENT
Start: 2018-08-11 | End: 2018-08-13

## 2018-08-11 RX ORDER — SENNOSIDES 8.6 MG/1
2 TABLET ORAL EVERY EVENING
Status: DISCONTINUED | OUTPATIENT
Start: 2018-08-12 | End: 2018-08-17 | Stop reason: HOSPADM

## 2018-08-11 RX ORDER — OXYCODONE HYDROCHLORIDE 5 MG/1
5 TABLET ORAL
Status: DISCONTINUED | OUTPATIENT
Start: 2018-08-11 | End: 2018-08-13

## 2018-08-11 RX ORDER — GUAIFENESIN 100 MG/5ML
81 LIQUID (ML) ORAL DAILY
Status: DISCONTINUED | OUTPATIENT
Start: 2018-08-12 | End: 2018-08-13

## 2018-08-11 RX ORDER — FENTANYL CITRATE 50 UG/ML
25 INJECTION, SOLUTION INTRAMUSCULAR; INTRAVENOUS
Status: COMPLETED | OUTPATIENT
Start: 2018-08-11 | End: 2018-08-12

## 2018-08-11 RX ORDER — METOPROLOL SUCCINATE 50 MG/1
100 TABLET, EXTENDED RELEASE ORAL DAILY
Status: DISCONTINUED | OUTPATIENT
Start: 2018-08-12 | End: 2018-08-12

## 2018-08-11 RX ORDER — NALOXONE HYDROCHLORIDE 0.4 MG/ML
0.4 INJECTION, SOLUTION INTRAMUSCULAR; INTRAVENOUS; SUBCUTANEOUS AS NEEDED
Status: DISCONTINUED | OUTPATIENT
Start: 2018-08-11 | End: 2018-08-13

## 2018-08-11 RX ORDER — OXYCODONE HYDROCHLORIDE 5 MG/1
2.5 TABLET ORAL
Status: DISCONTINUED | OUTPATIENT
Start: 2018-08-11 | End: 2018-08-13

## 2018-08-11 RX ORDER — SODIUM CHLORIDE 9 MG/ML
75 INJECTION, SOLUTION INTRAVENOUS CONTINUOUS
Status: DISCONTINUED | OUTPATIENT
Start: 2018-08-11 | End: 2018-08-13

## 2018-08-11 RX ORDER — AMIODARONE HYDROCHLORIDE 200 MG/1
200 TABLET ORAL DAILY
Status: DISCONTINUED | OUTPATIENT
Start: 2018-08-12 | End: 2018-08-17 | Stop reason: HOSPADM

## 2018-08-11 RX ORDER — OXYBUTYNIN CHLORIDE 5 MG/1
10 TABLET, EXTENDED RELEASE ORAL DAILY
Status: DISCONTINUED | OUTPATIENT
Start: 2018-08-12 | End: 2018-08-17 | Stop reason: HOSPADM

## 2018-08-11 RX ORDER — CEFAZOLIN SODIUM/WATER 2 G/20 ML
2 SYRINGE (ML) INTRAVENOUS ONCE
Status: DISPENSED | OUTPATIENT
Start: 2018-08-11 | End: 2018-08-12

## 2018-08-11 RX ORDER — SODIUM CHLORIDE 0.9 % (FLUSH) 0.9 %
5-10 SYRINGE (ML) INJECTION EVERY 8 HOURS
Status: DISCONTINUED | OUTPATIENT
Start: 2018-08-11 | End: 2018-08-13

## 2018-08-11 RX ORDER — SODIUM CHLORIDE 0.9 % (FLUSH) 0.9 %
5-10 SYRINGE (ML) INJECTION AS NEEDED
Status: DISCONTINUED | OUTPATIENT
Start: 2018-08-11 | End: 2018-08-13

## 2018-08-11 RX ORDER — ONDANSETRON 2 MG/ML
4 INJECTION INTRAMUSCULAR; INTRAVENOUS
Status: DISCONTINUED | OUTPATIENT
Start: 2018-08-11 | End: 2018-08-13

## 2018-08-11 RX ORDER — ACETAMINOPHEN 325 MG/1
650 TABLET ORAL EVERY 6 HOURS
Status: DISCONTINUED | OUTPATIENT
Start: 2018-08-11 | End: 2018-08-13

## 2018-08-11 RX ADMIN — SODIUM CHLORIDE 500 ML: 900 INJECTION, SOLUTION INTRAVENOUS at 19:38

## 2018-08-11 RX ADMIN — FENTANYL CITRATE 25 MCG: 50 INJECTION, SOLUTION INTRAMUSCULAR; INTRAVENOUS at 21:13

## 2018-08-11 RX ADMIN — OXYCODONE HYDROCHLORIDE 5 MG: 5 TABLET ORAL at 22:55

## 2018-08-11 RX ADMIN — ACETAMINOPHEN 1000 MG: 500 TABLET ORAL at 21:13

## 2018-08-11 NOTE — IP AVS SNAPSHOT
[address] 
[phone] Patient: Jovanna Ang MRN: [id number] Laqueta Holiday About your hospitalization You were admitted on:  August 11, 2018 You last received care in the:  [facility] ORTHOPEDICS You were discharged on:  August 17, 2018 Why you were hospitalized Your primary diagnosis was:  Not on File Your diagnoses also included:  Periprosthetic Fracture Around Internal Prosthetic Right Knee Joint, Late Onset Alzheimer's Disease Without Behavioral Disturbance, Paroxysmal Atrial Fibrillation (Hcc), Bradycardia Follow-up Information Follow up With Details Comments Contact Info [address] [place] [address]  This is yo post acute care provider  Luite Issac [address] 
[phone] Natalia Sanon MD In 2 weeks For Follow up after surgery [address] 
[phone] [address] Hamilton MD   [address] [place] [address] 
[phone] Discharge Orders None A check alis indicates which time of day the medication should be taken. My Medications START taking these medications Instructions Each Dose to Equal  
 Morning Noon Evening Bedtime  
 acetaminophen 325 mg tablet Commonly known as:  TYLENOL Your last dose was: Your next dose is: Take 2 Tabs by mouth every six (6) hours as needed for Pain. 650 mg  
    
   
   
   
  
 dilTIAZem 30 mg tablet Commonly known as:  CARDIZEM Your last dose was: Your next dose is: Take 1 Tab by mouth Before breakfast, lunch, and dinner. 30 mg  
    
   
   
   
  
 enoxaparin 30 mg/0.3 mL injection Commonly known as:  LOVENOX Start taking on:  8/18/2018 Your last dose was: Your next dose is: 0.3 mL by SubCUTAneous route every twenty-four (24) hours. 30 mg CHANGE how you take these medications Instructions Each Dose to Equal  
 Morning Noon Evening Bedtime  
 amiodarone 200 mg tablet Commonly known as:  CORDARONE What changed:  Another medication with the same name was removed. Continue taking this medication, and follow the directions you see here. Your last dose was: Your next dose is: Take 1 Tab by mouth daily. Indications: VENTRICULAR RATE CONTROL IN ATRIAL FIBRILLATION  
 200 mg CONTINUE taking these medications Instructions Each Dose to Equal  
 Morning Noon Evening Bedtime  
 aspirin 81 mg chewable tablet Your last dose was: Your next dose is: Take 1 Tab by mouth daily. Indications: prevention of cerebrovascular accident 81 mg  
    
   
   
   
  
 ferrous sulfate 325 mg (65 mg iron) EC tablet Commonly known as:  IRON Your last dose was: Your next dose is: Take 325 mg by mouth every evening. 325 mg  
    
   
   
   
  
 lutein 6 mg Tab Your last dose was: Your next dose is: Take 1 Tab by mouth daily. 1 Tab  
    
   
   
   
  
 oxybutynin 5 mg tablet Commonly known as:  TKMOIMNZ Your last dose was: Your next dose is: Take 5 mg by mouth two (2) times a day. 5 mg SENOKOT 8.6 mg tablet Generic drug:  senna Your last dose was: Your next dose is: Take 2 Tabs by mouth every evening. 2 Tab  
    
   
   
   
  
 VITAMIN D3 2,000 unit Tab Generic drug:  cholecalciferol (vitamin D3) Your last dose was: Your next dose is: Take 2,000 Units by mouth every evening. 2000 Units STOP taking these medications   
 metoprolol succinate 100 mg tablet Commonly known as:  TOPROL-XL Where to Get Your Medications These [facility] [address] [facility] [facility] [address] [facility] [address], [facility] [address] Phone:  [phone]  
  acetaminophen 325 mg tablet  
 dilTIAZem 30 mg tablet  
 enoxaparin 30 mg/0.3 mL injection Discharge Instructions [facility] Surgery: Right Femur Fracture Repair Surgeon:   Cortney Elena MD 
Surgery Date:  8/13/2018 ? To relieve pain: ? Use ice/gel packs. ? Put the ice pack directly over the wound, or anywhere you are hurting or swollen. ? To control pain and swelling, keep ice on regularly, especially after physical activity. ? The packs should stay cold for 3-4 hours. When it is not cold anymore, rotate with the packs in the freezer. ? Elevate your leg. This will also keep swelling down. ? Rest for at least 20 minutes between activity or exercises. ? To keep track of your pain medications, write down what you take and when you take it. ? The last dose of pain medication you got in the hospital was:    
Medication Dose Date & Time ? Choose your medications based on the pain scale below: ? To keep your pain under control, take Tylenol every 6 hours for 14 days - even if you feel like you dont need it. ? For mild to moderate pain (1-6 on pain scale), take one pain pill every 3-4 hours as needed. ? For severe pain (7-10 on pain scale), take two pain pills every 3-4 hours as needed. ? To prevent nausea, take your pain medications with food. Pain Scale ? As your pain lessens: 
 
? Slowly start taking less pain medication. You may do this by waiting longer between doses or by taking smaller doses. ? Stop using the pain medications as soon as you no longer need it, usually in 2-3 weeks. ? Lovenox ? To prevent blood clots, you will will need to take Lovenox 30 mg daily for total of 21 days. ? It is an injection that you receive in the side of your  stomach. ? Your nursing will teach you or a caregiver how to     do this before you go home. ? To prevent stomach upset or bleeding: ? Take Pepcid 20 mg twice a day, or a similar home medication, while you are taking a blood thinner. ? Do not take non-steroidal anti-inflammatory (NSAID) medications (Ibuprofen, Advil, Motrin, Naproxen, etc.) OPSITE (Honeycomb dressing) ? Keep your clear, waterproof dressing in place for 5-7 days after your leave the hospital. 
  
? If you are still having drainage, you will need to change your dressing in 5-7 days. You will be given one extra dressing to use at home. ? If there is no more drainage from the wound, you may leave it open to the air. OPSITE DRESSING INSTRUCTIONS ? DO NOTs: 
? Do not rub your surgical wound ? Do not put lotion or oils on your wound. ? Do not take a tub bath or go swimming until your doctor says it is ok. 
 
? You may shower with this dressing over your wound. ? After showering pat the dressing dry. ? If you have staples a home health nurse will remove them in about 10-14 days post op. ? To increase and promote healing: 
 
? Stop Smoking (or at least cut back on 
     Smoking). ? Eat a well-balanced diet (high in protein 
     and vitamin C). ? If you have a poor appetite, drink Ensure, Glucerna, or CarnationInstant Breakfast for the next 30 days. ? If you are diabetic, you should control your blood  
      sugars to prevent infection and help your wound  
      to heal. 
 
 
? To prevent constipation, stay active & drink plenty of fluid. ? While using pain medications, you should also take stool softeners and laxatives, such as Pericolace and Miralax.      
? If you are having too many bowel movements, then you may need  to stop taking the laxatives. ? You should have a bowel movement 3-4 days after surgery and then at least every other day while on pain medication. ? To improve your recovery, you must be WORK with PT/OT ? WEIGHT BEARING 
? None = you may not put any weight or pressure through that leg. Wear the knee brace! May be removed for bathing, skin care and knee ROM. ? THERAPY ? If you go to a rehab facility, physical therapy (PT) will need to work with you daily, and sometimes twice a day to teach you how to: 
 
? Get in and out of the bed -  caution due to  
             dementia !!!!!!!!! 
 
? Walk (gait training) and climb stairs-  
 
 
? You may also need to have occupational therapy (OT) work with you to help you practice: 
 
? Getting on and off the toilet ? Self-care (brushing teeth, eating, bathing, etc) ? If you go directly home, home health physical therapy will come the day after you leave the hospital.  They will visit about 4 times over the next couple of weeks to teach you how to get out of bed, to safely walk in your home, and to do your exercises. ? NO DRIVING until your surgeon tells you it is ok. 
 
? You can return to work when cleared by a physician. ? Please call your physician immediately if you have: 
 
? Constant bleeding from your wound. ? Increasing redness or swelling around your wound (Some warmth, bruising and swelling is normal). ? Change in wound drainage (increase in amount, color, or bad smell). ? Change in mental status (unusual behavior) ? Temperature over 101.5 degrees Fahrenheit ? Increased pain, swelling, or redness in the calf (back of your lower leg), thigh, ankle or foot. ? Emergency: CALL 911 if you have: 
? Shortness of breath ? Chest pain when you cough or taking a deep breath ? Please call your surgeons office at [facility] for a follow up appointment. ? You should call as soon as you get home or the next day after discharge. Ask to make an appointment in 2 weeks. ACO Transitions of Care Introducing [employer] [address] offers a voluntary care coordination program to provide high quality service and care to [place] fee-for-service beneficiaries. Tamika Linares was designed to help you enhance your health and well-being through the following services: ? Transitions of Care  support for individuals who are transitioning from one care setting to another (example: Hospital to home). ? Chronic and Complex Care Coordination  support for individuals and caregivers of those with serious or chronic illnesses or with more than one chronic (ongoing) condition and those who take a number of different medications. If you meet specific medical criteria, a [facility] may call you directly to coordinate your care with your primary care physician and your other care providers. For questions about the [facility] programs, please, contact your physicians office. For general questions or additional information about Accountable Care Organizations: 
Please visit www.medicare.gov/acos. html or call 1-800-MEDICARE (7-724.289.7133) TTY users should call 2-663.767.4941. Quarterly Announcement We are excited to announce that we are making your provider's discharge notes available to you in [employer]. You will see these notes when they are completed and signed by the physician that discharged you from your recent hospital stay. If you have any questions or concerns about any information you see in [employer], please call the Health Information Department where you were seen or reach out to your Primary Care Provider for more information about your plan of care. Introducing Catarino Waggoner As a [employer] patient, I wanted to make you aware of our electronic visit tool called Catarino Waggoner. [employer] 24/7 allows you to connect within minutes with a medical provider 24 hours a day, seven days a week via a mobile device or tablet or logging into a secure website from your computer. You can access Catarino Buifin from anywhere in the [place]. A virtual visit might be right for you when you have a simple condition and feel like you just dont want to get out of bed, or cant get away from work for an appointment, when your regular [employer] provider is not available (evenings, weekends or holidays), or when youre out of town and need minor care. Electronic visits cost only $49 and if the [employer] 24/7 provider determines a prescription is needed to treat your condition, one can be electronically transmitted to a nearby pharmacy*. Please take a moment to enroll today if you have not already done so. The enrollment process is free and takes just a few minutes. To enroll, please download the [employer] 24/7 darien to your tablet or phone, or visit [email or web address] to enroll on your computer. And, as an [address] [place] [address] patient with a [employer]MeThat account, the results of your visits will be scanned into your electronic medical record and your primary care provider will be able to view the scanned results. We urge you to continue to see your regular [employer] provider for your ongoing medical care. And while your primary care provider may not be the one available when you seek a Catarino Prettybradlyfin virtual visit, the peace of mind you get from getting a real diagnosis real time can be priceless. For more information on Catarino Sukhwinderbradlyfin, view our Frequently Asked Questions (FAQs) at [email or web address]. Sincerely, 
 
Babita Avilez MD 
Chief Medical Officer [facility] *:  certain medications cannot be prescribed via Catarino Waggoner Providers Seen During Your Hospitalization Provider Specialty Primary office phone [employer]. Sherlyn Marquis MD Emergency Medicine [phone] Lenna Canavan, MD Orthopedic Surgery [phone] Alicia Ramon MD Orthopedic Surgery [phone] Your Primary Care Physician (PCP) Primary Care Physician Office Phone Office Fax [address], [address] [address] [phone] You are allergic to the following No active allergies Recent Documentation Weight BMI OB Status Smoking Status 52.6 kg 21.92 kg/m2 Postmenopausal Never Smoker Emergency Contacts Name Discharge Info Relation Home Work Mobile Pia Osuna DISCHARGE CAREGIVER [3] Other Relative [6] [phone] Patient Belongings The following personal items are in your possession at time of discharge: 
  Dental Appliances: None  Visual Aid: Glasses      Home Medications: None   Jewelry: None  Clothing: None    Other Valuables: None  Personal Items Sent to Safe: na 
 
  
  
 Please provide this summary of care documentation to your next provider. Signatures-by signing, you are acknowledging that this After Visit Summary has been reviewed with you and you have received a copy. Patient Signature:  ____________________________________________________________ Date:  ____________________________________________________________  
  
Herminioyn Zita Provider Signature:  ____________________________________________________________ Date:  ____________________________________________________________

## 2018-08-11 NOTE — IP AVS SNAPSHOT
Summary of Care Report The Summary of Care report has been created to help improve care coordination. Users with access to [employer] or [facility] (Web-based application) may access additional patient information including the Discharge Summary. If you are not currently a [facility] user and need more information, please call the number listed below in the [place] [address] section and ask to be connected with Medical Records. Facility Information Name Address Phone [address] [address] [phone] Patient Information Patient Name Sex ECTOR Parker ([id number]) Female 1929 Discharge Information Admitting Provider Service Area Unit Chana Salcido MD / [address] [place] [address] 3 Orthopedics  [phone] Discharge Provider Discharge Date/Time Discharge Disposition Destination (none) 2018 (Pending) SNF (none) Patient Language Language ENGLISH [13] Hospital Problems as of 2018  Reviewed: 2018 10:07 AM by Nesha Altamirano MD  
  
  
  
 Class Noted - Resolved Last Modified POA Active Problems Paroxysmal atrial fibrillation (HCC) (Chronic)  2017 - Present 2018 by Nesha Altamirano MD Yes Entered by Sang Dubose NP Late onset Alzheimer's disease without behavioral disturbance  2018 - Present 2018 by Nesha Altamirano MD Yes Entered by Vania Gauthier MD  
  Periprosthetic fracture around internal prosthetic right knee joint  2018 - Present 2018 by Nesha Altamirano MD Yes Entered by Shilpi Lambert NP Bradycardia  2018 - Present 2018 by Nesha Altamirano MD Yes   Entered by Nesha Altamirano MD  
  
Non-Hospital Problems as of 2018  Reviewed: 2018 10:07 AM by Nesha Altamirano MD  
  
  
  
 Class Noted - Resolved Last Modified Active Problems Adhesive capsulitis of both shoulders  8/28/2017 - Present 8/28/2017 by Salvatore Song NP Entered by Salvatore Song NP  
  Osteoarthritis  8/28/2017 - Present 8/28/2017 by Salvatore Song NP Entered by Salvatore Song NP  
  OAB (overactive bladder)  8/28/2017 - Present 8/28/2017 by Salvatore Song NP Entered by Salvatore Song NP Scoliosis  11/21/2017 - Present 11/21/2017 by Gerardo Mercado MD  
  Entered by Gerardo Mercado MD  
  Advance care planning  12/5/2017 - Present 12/5/2017 by Gerardo Mercado MD  
  Entered by Gerardo Mercado MD  
  Atrial flutter [facility])  5/24/2018 - Present 5/30/2018 by Ceola Harada, NP Entered by Marguerite Perry MD  
  Malnutrition ([facility] [address]  5/30/2018 - Present 5/30/2018 by Ceola Harada, NP Entered by Ceola Harada, NP Hypertension  5/30/2018 - Present 5/30/2018 by Ceola Harada, NP Entered by Ceola Harada, NP Atrial flutter with rapid ventricular response ([facility] [address]  7/28/2018 - Present 7/28/2018 by Justin Seth MD  
  Entered by Justin Seth MD  
  
You are allergic to the following No active allergies Current Discharge Medication List  
  
START taking these medications Dose & Instructions Dispensing Information Comments  
 acetaminophen 325 mg tablet Commonly known as:  TYLENOL Dose:  650 mg Take 2 Tabs by mouth every six (6) hours as needed for Pain. Quantity:  40 Tab Refills:  0  
   
 dilTIAZem 30 mg tablet Commonly known as:  CARDIZEM Dose:  30 mg Take 1 Tab by mouth Before breakfast, lunch, and dinner. Quantity:  90 Tab Refills:  1  
   
 enoxaparin 30 mg/0.3 mL injection Commonly known as:  LOVENOX Start taking on:  8/18/2018 Dose:  30 mg  
0.3 mL by SubCUTAneous route every twenty-four (24) hours. Quantity:  14 Syringe Refills:  0 CONTINUE these medications which have CHANGED Dose & Instructions Dispensing Information Comments  
 amiodarone 200 mg tablet Commonly known as:  CORDARONE What changed:  Another medication with the same name was removed. Continue taking this medication, and follow the directions you see here. Dose:  200 mg Take 1 Tab by mouth daily. Indications: VENTRICULAR RATE CONTROL IN ATRIAL FIBRILLATION Quantity:  30 Tab Refills:  3 CONTINUE these medications which have NOT CHANGED Dose & Instructions Dispensing Information Comments  
 aspirin 81 mg chewable tablet Dose:  81 mg Take 1 Tab by mouth daily. Indications: prevention of cerebrovascular accident Quantity:  60 Tab Refills:  2  
   
 ferrous sulfate 325 mg (65 mg iron) EC tablet Commonly known as:  IRON Dose:  325 mg Take 325 mg by mouth every evening. Refills:  0  
   
 lutein 6 mg Tab Dose:  1 Tab Take 1 Tab by mouth daily. Refills:  0  
   
 oxybutynin 5 mg tablet Commonly known as:  VYGKZDAG Dose:  5 mg Take 5 mg by mouth two (2) times a day. Refills:  0 SENOKOT 8.6 mg tablet Generic drug:  senna Dose:  2 Tab Take 2 Tabs by mouth every evening. Refills:  0  
   
 VITAMIN D3 2,000 unit Tab Generic drug:  cholecalciferol (vitamin D3) Dose:  2000 Units Take 2,000 Units by mouth every evening. Refills:  0 STOP taking these medications Comments  
 metoprolol succinate 100 mg tablet Commonly known as:  TOPROL-XL Current Immunizations Name Date Pneumococcal Conjugate (PCV-13) 12/5/2017 Surgery Information ID Date/Time Status Primary Surgeon All Procedures Location [id number] 8/13/2018 MD Rosalina DISTAL RIGHT FEMUR, OPEN REDUCTION INTERNAL FIXATION [facility]IN OR Follow-up Information Follow up With Details Comments Contact Info  [address] [facility]  This is yoru post acute care provider  [address] [facility] [address] 
 [facility] [address] 
[phone] Brandon Landa MD In 2 weeks For Follow up after surgery [address] [facility] 
[phone] Doreen Nath MD   [address] 
[phone] Discharge Instructions Nataly Madison Surgery: Right Femur Fracture Repair Surgeon:   Brandon Landa MD 
Surgery Date:  8/13/2018 ? To relieve pain: ? Use ice/gel packs. ? Put the ice pack directly over the wound, or anywhere you are hurting or swollen. ? To control pain and swelling, keep ice on regularly, especially after physical activity. ? The packs should stay cold for 3-4 hours. When it is not cold anymore, rotate with the packs in the freezer. ? Elevate your leg. This will also keep swelling down. ? Rest for at least 20 minutes between activity or exercises. ? To keep track of your pain medications, write down what you take and when you take it. ? The last dose of pain medication you got in the hospital was:    
Medication Dose Date & Time ? Choose your medications based on the pain scale below: ? To keep your pain under control, take Tylenol every 6 hours for 14 days - even if you feel like you dont need it. ? For mild to moderate pain (1-6 on pain scale), take one pain pill every 3-4 hours as needed. ? For severe pain (7-10 on pain scale), take two pain pills every 3-4 hours as needed. ? To prevent nausea, take your pain medications with food. Pain Scale ? As your pain lessens: 
 
? Slowly start taking less pain medication. You may do this by waiting longer between doses or by taking smaller doses. ? Stop using the pain medications as soon as you no longer need it, usually in 2-3 weeks. ? Lovenox ? To prevent blood clots, you will will need to take Lovenox 30 mg daily for total of 21 days. ? It is an injection that you receive in the side of your  stomach. ? Your nursing will teach you or a caregiver how to     do this before you go home. ? To prevent stomach upset or bleeding: ? Take Pepcid 20 mg twice a day, or a similar home medication, while you are taking a blood thinner. ? Do not take non-steroidal anti-inflammatory (NSAID) medications (Ibuprofen, Advil, Motrin, Naproxen, etc.) OPSITE (Honeycomb dressing) ? Keep your clear, waterproof dressing in place for 5-7 days after your leave the hospital. 
  
? If you are still having drainage, you will need to change your dressing in 5-7 days. You will be given one extra dressing to use at home. ? If there is no more drainage from the wound, you may leave it open to the air. OPSITE DRESSING INSTRUCTIONS ? DO NOTs: 
? Do not rub your surgical wound ? Do not put lotion or oils on your wound. ? Do not take a tub bath or go swimming until your doctor says it is ok. 
 
? You may shower with this dressing over your wound. ? After showering pat the dressing dry. ? If you have staples a home health nurse will remove them in about 10-14 days post op. ? To increase and promote healing: 
 
? Stop Smoking (or at least cut back on 
     Smoking). ? Eat a well-balanced diet (high in protein 
     and vitamin C). ? If you have a poor appetite, drink Ensure, Glucerna, or CarnationInstant Breakfast for the next 30 days. ? If you are diabetic, you should control your blood  
      sugars to prevent infection and help your wound  
      to heal. 
 
 
? To prevent constipation, stay active & drink plenty of fluid. ? While using pain medications, you should also take stool softeners and laxatives, such as Pericolace and Miralax. ? If you are having too many bowel movements, then you may need  to stop taking the laxatives. ? You should have a bowel movement 3-4 days after surgery and then at least every other day while on pain medication. ? To improve your recovery, you must be WORK with PT/OT ? WEIGHT BEARING 
? None = you may not put any weight or pressure through that leg. Wear the knee brace! May be removed for bathing, skin care and knee ROM. ? THERAPY ? If you go to a rehab facility, physical therapy (PT) will need to work with you daily, and sometimes twice a day to teach you how to: 
 
? Get in and out of the bed -  caution due to  
             dementia !!!!!!!!! 
 
? Walk (gait training) and climb stairs-  
 
 
? You may also need to have occupational therapy (OT) work with you to help you practice: 
 
? Getting on and off the toilet ? Self-care (brushing teeth, eating, bathing, etc) ? If you go directly home, home health physical therapy will come the day after you leave the hospital.  They will visit about 4 times over the next couple of weeks to teach you how to get out of bed, to safely walk in your home, and to do your exercises. ? NO DRIVING until your surgeon tells you it is ok. 
 
? You can return to work when cleared by a physician. ? Please call your physician immediately if you have: 
 
? Constant bleeding from your wound. ? Increasing redness or swelling around your wound (Some warmth, bruising and swelling is normal). ? Change in wound drainage (increase in amount, color, or bad smell). ? Change in mental status (unusual behavior) ? Temperature over 101.5 degrees Fahrenheit ? Increased pain, swelling, or redness in the calf (back of your lower leg), thigh, ankle or foot. ? Emergency: CALL 911 if you have: 
? Shortness of breath ? Chest pain when you cough or taking a deep breath ? Please call your surgeons office at [facility] for a follow up appointment. ? You should call as soon as you get home or the next day after discharge. Ask to make an appointment in 2 weeks. Chart Review Routing History Recipient Method Report Sent By Yris Bravo MD  
Phone: [phone] In Basket IP Auto Routed Notes Juli Self MD [[id number]] 5/24/2018  8:40 PM 05/24/2018 Uriah Bravo MD  
Phone: [phone] In Basket IP Auto Routed Notes David Leahy MD [[id number]] 5/30/2018 10:28 PM 05/30/2018 Uriah Bravo MD  
Phone: [phone] In Basket IP Auto Routed Notes Yun Mcdaniels MD [place] 7/28/2018  8:04 PM 07/28/2018 Uriah Bravo MD  
Phone: [phone] In Basket IP Auto Routed Notes Yun Mcdaniels MD [place] 7/28/2018  8:05 PM 07/28/2018 Uriah Bravo MD  
Phone: [phone] In Basket IP Auto Routed Notes Yun Mcdaniels MD [place] 7/28/2018  9:13 PM 07/28/2018 Uriah Bravo MD  
Phone: [phone] In Basket IP Auto Routed Notes Adrianna Buckner MD [[id number]] 8/8/2018  2:38 PM 08/08/2018 Jordyn Jang MD  
Phone: [phone] In Basket IP Auto Routed Karla Spence  [id number] 8/13/2018  8:25 AM 08/13/2018 Wally Davis DO Phone: [phone] In Basket IP Auto Routed Karla Spence  [id number] 8/13/2018  8:25 AM 08/13/2018

## 2018-08-11 NOTE — ED PROVIDER NOTES
EMERGENCY DEPARTMENT HISTORY AND PHYSICAL EXAM      Date: 8/11/2018  Patient Name: Dejuan Cordero    History of Presenting Illness     Chief Complaint   Patient presents with    Knee Injury     RIGHT knee deformity    Fall       History Provided By: Patient and EMS    HPI: Dejuan Cordero, 80 y.o. female with PMHx significant for A-fib, Arthritis, Alzheimer's, presents via EMS to the ED with cc of new onset right knee pain with associated deformity today after mechanical GLF. Pt reports walking onto a slippery floor when she slipped and fell. Pt fell onto her right knee and experienced immediate pain with notable deformity. Pt denies any CP, dizziness, or lightheadedness prior to her fall. Pt was given some fentanyl for her pain by EMS with some relief. Pt denies any hip pain, ankle pain, foot pain, SOB, cough, abdominal pain, nausea, vomiting, diarrhea, recent fever, dysuria, hematuria, urinary frequency. Social Hx: - Tobacco (-), - EtOH (-), - illicit drug use (-)    There are no other complaints, changes, or physical findings at this time. PCP: Robinson White MD    Current Outpatient Prescriptions   Medication Sig Dispense Refill    [START ON 8/12/2018] amiodarone (CORDARONE) 200 mg tablet Take 1 Tab by mouth daily. Indications: VENTRICULAR RATE CONTROL IN ATRIAL FIBRILLATION 30 Tab 3    amiodarone (PACERONE) 400 mg tablet Take 1 Tab by mouth two (2) times a day for 3 days. Indications: VENTRICULAR RATE CONTROL IN ATRIAL FIBRILLATION 6 Tab 0    aspirin 81 mg chewable tablet Take 1 Tab by mouth daily. Indications: prevention of cerebrovascular accident 61 Tab 2    metoprolol succinate (TOPROL-XL) 100 mg tablet Take 1 Tab by mouth daily. 60 Tab 3    ferrous sulfate (IRON) 325 mg (65 mg iron) EC tablet Take 325 mg by mouth every evening.  oxybutynin (DITROPAN) 5 mg tablet Take 5 mg by mouth two (2) times a day.  lutein 6 mg tab Take 1 Tab by mouth daily.       cholecalciferol, vitamin D3, (VITAMIN D3) 2,000 unit tab Take 2,000 Units by mouth every evening.  senna (SENOKOT) 8.6 mg tablet Take 2 Tabs by mouth every evening. Past History     Past Medical History:  Past Medical History:   Diagnosis Date    A-fib ([address]     Alzheimer's dementia     Arthritis        Past Surgical History:  Past Surgical History:   Procedure Laterality Date    HX BREAST AUGMENTATION      HX ORTHOPAEDIC      TKR--bilateral        Family History:  Family History   Problem Relation Age of Onset    Heart Attack Mother     Cancer Sister     Cancer Brother        Social History:  Social History   Substance Use Topics    Smoking status: Never Smoker    Smokeless tobacco: Never Used    Alcohol use No       Allergies:  No Known Allergies      Review of Systems   Review of Systems   Constitutional: Negative for chills and fever. HENT: Negative for congestion. Eyes: Negative for visual disturbance. Respiratory: Negative for chest tightness. Cardiovascular: Negative for chest pain and leg swelling. Gastrointestinal: Negative for abdominal pain and vomiting. Endocrine: Negative for polyuria. Genitourinary: Negative for dysuria and frequency. Musculoskeletal: Positive for arthralgias (right knee ). Negative for myalgias. Skin: Negative for color change. Allergic/Immunologic: Negative for immunocompromised state. Neurological: Negative for numbness. Physical Exam   Physical Exam   Nursing note and vitals reviewed. General appearance: non-toxic. Lying in stretcher w/ LE's held in position. Eyes: PERRL, EOMI, conjunctiva normal, anicteric sclera  HEENT: mucous membranes tacky, lips are dry, oropharynx is clear, NCAT. Pulmonary: clear to auscultation bilaterally anterior exam.   Cardiac: slightly slow and regular rhythm, no murmurs, gallops, or rubs, faintly palpable DP pulse, 2+ radial pulses  Abdomen: soft, nontender, nondistended, bowel sounds present, no abdominal bruit.    MSK:  Valgus deformity to the right knee, TTP right tibial plateau, BL TKA scars, ROM deferred secondary to right knee dislocation, probable hematoma right lower thigh. Neuro: Alert, answers questions pleasantly, suspect mild dementia,  symmetric. Skin: capillary refill brisk    Diagnostic Study Results     Labs -     Recent Results (from the past 12 hour(s))   EKG, 12 LEAD, INITIAL    Collection Time: 08/11/18  7:29 PM   Result Value Ref Range    Ventricular Rate 51 BPM    Atrial Rate 51 BPM    P-R Interval 150 ms    QRS Duration 90 ms    Q-T Interval 546 ms    QTC Calculation (Bezet) 503 ms    Calculated P Axis 50 degrees    Calculated R Axis 65 degrees    Calculated T Axis 70 degrees    Diagnosis       Sinus bradycardia  Prolonged QT  When compared with ECG of 03-AUG-2018 01:44,  Sinus rhythm has replaced Atrial flutter  Vent. rate has decreased BY  79 BPM  ST no longer depressed in Inferior leads  ST no longer depressed in Anterior leads  T wave amplitude has increased in Anterior leads     CBC WITH AUTOMATED DIFF    Collection Time: 08/11/18  7:39 PM   Result Value Ref Range    WBC 10.1 3.6 - 11.0 K/uL    RBC 3.68 (L) 3.80 - 5.20 M/uL    HGB 11.8 11.5 - 16.0 g/dL    HCT 35.3 35.0 - 47.0 %    MCV 95.9 80.0 - 99.0 FL    MCH 32.1 26.0 - 34.0 PG    MCHC 33.4 30.0 - 36.5 g/dL    RDW 13.9 11.5 - 14.5 %    PLATELET 852 (H) 011 - 400 K/uL    MPV 9.8 8.9 - 12.9 FL    NRBC 0.0 0  WBC    ABSOLUTE NRBC 0.00 0.00 - 0.01 K/uL    NEUTROPHILS 67 32 - 75 %    LYMPHOCYTES 19 12 - 49 %    MONOCYTES 9 5 - 13 %    EOSINOPHILS 4 0 - 7 %    BASOPHILS 1 0 - 1 %    IMMATURE GRANULOCYTES 0 0.0 - 0.5 %    ABS. NEUTROPHILS 6.7 1.8 - 8.0 K/UL    ABS. LYMPHOCYTES 1.9 0.8 - 3.5 K/UL    ABS. MONOCYTES 0.9 0.0 - 1.0 K/UL    ABS. EOSINOPHILS 0.4 0.0 - 0.4 K/UL    ABS. BASOPHILS 0.1 0.0 - 0.1 K/UL    ABS. IMM.  GRANS. 0.0 0.00 - 0.04 K/UL    DF AUTOMATED     METABOLIC PANEL, COMPREHENSIVE    Collection Time: 08/11/18  7:39 PM   Result Value Ref Range    Sodium 138 136 - 145 mmol/L    Potassium 4.7 3.5 - 5.1 mmol/L    Chloride 102 97 - 108 mmol/L    CO2 30 21 - 32 mmol/L    Anion gap 6 5 - 15 mmol/L    Glucose 117 (H) 65 - 100 mg/dL    BUN 29 (H) 6 - 20 MG/DL    Creatinine 1.00 0.55 - 1.02 MG/DL    BUN/Creatinine ratio 29 (H) 12 - 20      GFR est AA >60 >60 ml/min/1.73m2    GFR est non-AA 52 (L) >60 ml/min/1.73m2    Calcium 8.7 8.5 - 10.1 MG/DL    Bilirubin, total 0.6 0.2 - 1.0 MG/DL    ALT (SGPT) 26 12 - 78 U/L    AST (SGOT) 17 15 - 37 U/L    Alk. phosphatase 66 45 - 117 U/L    Protein, total 6.7 6.4 - 8.2 g/dL    Albumin 3.5 3.5 - 5.0 g/dL    Globulin 3.2 2.0 - 4.0 g/dL    A-G Ratio 1.1 1.1 - 2.2     CK    Collection Time: 08/11/18  7:39 PM   Result Value Ref Range    CK 46 26 - 192 U/L   TROPONIN I    Collection Time: 08/11/18  7:39 PM   Result Value Ref Range    Troponin-I, Qt. <0.05 <0.05 ng/mL   PROTHROMBIN TIME + INR    Collection Time: 08/11/18  7:39 PM   Result Value Ref Range    INR 1.1 0.9 - 1.1      Prothrombin time 11.2 (H) 9.0 - 11.1 sec       Radiologic Studies -   XR KNEE RT 3 V   Final Result     Final result by Richard Rodriguez Results In (08/11/18 19:30:10)     Initial Result:     Impression:     IMPRESSION:  Acute displaced distal femur fracture above the knee arthroscopy  prosthesis.           Narrative:     EXAM:  XR KNEE RT 3 V    INDICATION:   Right knee deformity after fall. COMPARISON: None. FINDINGS: Three views of the right knee demonstrate an acute fracture of the  femoral metadiaphysis above the level of unconstrained knee arthroplasty  prosthesis component. The fracture is displaced with the distal fragment medial  to the proximal right. The knee prosthesis appears normally aligned and intact. There is no effusion.  Prescribed calcination is noted. Medical Decision Making   I am the first provider for this patient.     I reviewed the vital signs, available nursing notes, past medical history, past surgical history, family history and social history. Vital Signs-Reviewed the patient's vital signs. Patient Vitals for the past 12 hrs:   Temp Pulse Resp BP SpO2   08/11/18 1945 - (!) 54 18 159/69 100 %   08/11/18 1930 - (!) 53 17 159/67 100 %   08/11/18 1845 96.8 °F (36 °C) (!) 57 10 142/75 94 %     EKG interpretation: (Preliminary) 1929  Rhythm: sinus lisa; and regular . Rate (approx.): 51; Axis: normal; QT: prolonged QT wave ; ST/T wave: no ST elevation, no ST depression;   AL interval 150 ms, QRS 90 ms,  ms, QTc 503 ms. Written by Jolene Bertrand ED Scribe, as dictated by Denise Carvajal MD.    Records Reviewed: Nursing Notes, Old Medical Records, Previous Radiology Studies and Previous Laboratory Studies    Provider Notes (Medical Decision Making):   Mechanical fall with high suspicion for right knee dislocation vs femur fx. Low suspicion for other injuries, will consider vascular injury of LE.     ED Course:   Initial assessment performed. The patients presenting problems have been discussed, and they are in agreement with the care plan formulated and outlined with them. I have encouraged them to ask questions as they arise throughout their visit. CONSULT NOTE:   8:33 PM  Jonathon Pérez. Briana Mack MD spoke with Dr. Sujey Mcmahon,   Specialty: Orthopedics  Discussed pt's hx, disposition, and available diagnostic and imaging results. Reviewed care plans. Consultant agrees with plans as outlined. He will admit the pt for OR Monday. Written by Jolene Bertrand ED Scribe, as dictated by Jonathon Pérez. Briana Mack MD.    Critical Care Time:   None. Disposition:  ADMIT NOTE:  8:33 PM  Patient is being admitted to the hospital by Dr. Sujey Mcmahon. The results of their tests and reasons for their admission have been discussed with them and/or available family. They convey agreement and understanding for the need to be admitted and for their admission diagnosis.   Consultation has been made with the inpatient physician specialist for hospitalization. PLAN: Admit the pt to the hospital.  Diagnosis     Clinical Impression:   1. Closed fracture of distal end of right femur, unspecified fracture morphology, initial encounter [facility])        Attestations: This note is prepared by Mehdi Dailey acting as Scribe for [employer]. MD Tiffanie Gannon MD : The scribe's documentation has been prepared under my direction and personally reviewed by me in its entirety. I confirm that the note above accurately reflects all work, treatment, procedures, and medical decision making performed by me.

## 2018-08-11 NOTE — ED NOTES
Assumed care of pt from Kurtis Leiva RN at bedside with verbal report consisting of Situation, Background, Assessment, and Recommendations (SBAR). Pt is A&O x 4. Pt resting comfortably on the stretcher in a position of comfort. Call bell within reach. Side rails x 2. Cardiac monitor x 3. Stretcher locked in the lowest position. Concerns and questions addressed at this time. Pt in no acute distress at this the time. Will continue to monitor.

## 2018-08-11 NOTE — IP AVS SNAPSHOT
[address] 
[phone] Patient: Adelita Schmid MRN: [id number] Gracia Manning A check alis indicates which time of day the medication should be taken. My Medications START taking these medications Instructions Each Dose to Equal  
 Morning Noon Evening Bedtime  
 acetaminophen 325 mg tablet Commonly known as:  TYLENOL Your last dose was: Your next dose is: Take 2 Tabs by mouth every six (6) hours as needed for Pain. 650 mg  
    
   
   
   
  
 dilTIAZem 30 mg tablet Commonly known as:  CARDIZEM Your last dose was: Your next dose is: Take 1 Tab by mouth Before breakfast, lunch, and dinner. 30 mg  
    
   
   
   
  
 enoxaparin 30 mg/0.3 mL injection Commonly known as:  LOVENOX Start taking on:  8/18/2018 Your last dose was: Your next dose is: 0.3 mL by SubCUTAneous route every twenty-four (24) hours. 30 mg CHANGE how you take these medications Instructions Each Dose to Equal  
 Morning Noon Evening Bedtime  
 amiodarone 200 mg tablet Commonly known as:  CORDARONE What changed:  Another medication with the same name was removed. Continue taking this medication, and follow the directions you see here. Your last dose was: Your next dose is: Take 1 Tab by mouth daily. Indications: VENTRICULAR RATE CONTROL IN ATRIAL FIBRILLATION  
 200 mg CONTINUE taking these medications Instructions Each Dose to Equal  
 Morning Noon Evening Bedtime  
 aspirin 81 mg chewable tablet Your last dose was: Your next dose is: Take 1 Tab by mouth daily. Indications: prevention of cerebrovascular accident 81 mg  
    
   
   
   
  
 ferrous sulfate 325 mg (65 mg iron) EC tablet Commonly known as:  IRON  
   
 Your last dose was: Your next dose is: Take 325 mg by mouth every evening. 325 mg  
    
   
   
   
  
 lutein 6 mg Tab Your last dose was: Your next dose is: Take 1 Tab by mouth daily. 1 Tab  
    
   
   
   
  
 oxybutynin 5 mg tablet Commonly known as:  ZIBZBZJD Your last dose was: Your next dose is: Take 5 mg by mouth two (2) times a day. 5 mg SENOKOT 8.6 mg tablet Generic drug:  senna Your last dose was: Your next dose is: Take 2 Tabs by mouth every evening. 2 Tab  
    
   
   
   
  
 VITAMIN D3 2,000 unit Tab Generic drug:  cholecalciferol (vitamin D3) Your last dose was: Your next dose is: Take 2,000 Units by mouth every evening. 2000 Units STOP taking these medications   
 metoprolol succinate 100 mg tablet Commonly known as:  TOPROL-XL Where to Get Your Medications These medications were [facility] [address] [facility] [address], [place] [address] Phone:  [phone]  
  acetaminophen 325 mg tablet  
 dilTIAZem 30 mg tablet  
 enoxaparin 30 mg/0.3 mL injection

## 2018-08-12 ENCOUNTER — ANESTHESIA EVENT (OUTPATIENT)
Dept: SURGERY | Age: 83
DRG: 481 | End: 2018-08-12
Payer: MEDICARE

## 2018-08-12 LAB
ATRIAL RATE: 51 BPM
CALCULATED P AXIS, ECG09: 50 DEGREES
CALCULATED R AXIS, ECG10: 65 DEGREES
CALCULATED T AXIS, ECG11: 70 DEGREES
DIAGNOSIS, 93000: NORMAL
P-R INTERVAL, ECG05: 150 MS
Q-T INTERVAL, ECG07: 546 MS
QRS DURATION, ECG06: 90 MS
QTC CALCULATION (BEZET), ECG08: 503 MS
VENTRICULAR RATE, ECG03: 51 BPM

## 2018-08-12 PROCEDURE — 94760 N-INVAS EAR/PLS OXIMETRY 1: CPT

## 2018-08-12 PROCEDURE — 74011250637 HC RX REV CODE- 250/637: Performed by: NURSE PRACTITIONER

## 2018-08-12 PROCEDURE — 74011250636 HC RX REV CODE- 250/636: Performed by: EMERGENCY MEDICINE

## 2018-08-12 PROCEDURE — 65270000029 HC RM PRIVATE

## 2018-08-12 RX ORDER — METOPROLOL SUCCINATE 50 MG/1
50 TABLET, EXTENDED RELEASE ORAL DAILY
Status: DISCONTINUED | OUTPATIENT
Start: 2018-08-13 | End: 2018-08-13

## 2018-08-12 RX ADMIN — FENTANYL CITRATE 25 MCG: 50 INJECTION, SOLUTION INTRAMUSCULAR; INTRAVENOUS at 00:11

## 2018-08-12 RX ADMIN — Medication 10 ML: at 13:26

## 2018-08-12 RX ADMIN — Medication 10 ML: at 06:17

## 2018-08-12 RX ADMIN — DOCUSATE SODIUM AND SENNOSIDES 2 TABLET: 8.6; 5 TABLET, FILM COATED ORAL at 08:11

## 2018-08-12 RX ADMIN — Medication 10 ML: at 13:27

## 2018-08-12 RX ADMIN — SENNOSIDES 17.2 MG: 8.6 TABLET, FILM COATED ORAL at 17:22

## 2018-08-12 RX ADMIN — ACETAMINOPHEN 650 MG: 325 TABLET ORAL at 12:33

## 2018-08-12 RX ADMIN — AMIODARONE HYDROCHLORIDE 200 MG: 200 TABLET ORAL at 08:11

## 2018-08-12 RX ADMIN — ACETAMINOPHEN 650 MG: 325 TABLET ORAL at 06:16

## 2018-08-12 RX ADMIN — OXYCODONE HYDROCHLORIDE 5 MG: 5 TABLET ORAL at 12:33

## 2018-08-12 RX ADMIN — DOCUSATE SODIUM AND SENNOSIDES 2 TABLET: 8.6; 5 TABLET, FILM COATED ORAL at 17:22

## 2018-08-12 RX ADMIN — ASPIRIN 81 MG 81 MG: 81 TABLET ORAL at 08:11

## 2018-08-12 RX ADMIN — OXYCODONE HYDROCHLORIDE 5 MG: 5 TABLET ORAL at 17:22

## 2018-08-12 RX ADMIN — OXYCODONE HYDROCHLORIDE 5 MG: 5 TABLET ORAL at 08:11

## 2018-08-12 RX ADMIN — ACETAMINOPHEN 650 MG: 325 TABLET ORAL at 20:57

## 2018-08-12 RX ADMIN — METOPROLOL SUCCINATE 100 MG: 50 TABLET, EXTENDED RELEASE ORAL at 08:11

## 2018-08-12 RX ADMIN — OXYBUTYNIN CHLORIDE 10 MG: 5 TABLET, EXTENDED RELEASE ORAL at 08:11

## 2018-08-12 NOTE — CONSULTS
Consult    NAME: Vielka Lindsey   :  1929   MRN:  [id number]     Date/Time:  2018 12:21 PM    Patient PCP: Sarah Banks MD  ________________________________________________________________________     Assessment:     1. S/p mechanical fall, preop for ortho surgery, noted to be bradycardic  2. No hx of CAD  3. Echo 2018 EF 55%  4. Prox afib currently in sinus on amiodarone, felt to be poor anticoagulation candidate due to falls  5. Dementia  6. Resident of nursing home  7. DNR  8. Cardiologist:   Carlos Velez        Plan:     S/p fall, no clear syncope, poor historian. Agree to proceed with surgery with moderate risk. Bradycardia, will start with decreasing beta blocker dosage. 1. Cont ASA when ok with surgery  2. Cont amiodarone 200mg daily, may decrease dosage if still lisa  3. Decrease toprol xl from 100mg to 50mg, follow bp          [x]        High complexity decision making was performed        Subjective:   CHIEF COMPLAINT: s/p fall    HISTORY OF PRESENT ILLNESS:       Vielka Lindsey, 80 y.o. female with PMHx significant for A-fib, Arthritis, Alzheimer's, presents via EMS to the ED with cc of new onset right knee pain with associated deformity today after mechanical GLF. Pt reports walking onto a slippery floor when she slipped and fell. Pt fell onto her right knee and experienced immediate pain with notable deformity. Pt denies any CP, dizziness, or lightheadedness prior to her fall. Pt was given some fentanyl for her pain by EMS with some relief. Pt denies any hip pain, ankle pain, foot pain, SOB, cough, abdominal pain, nausea, vomiting, diarrhea, fever, dysuria, hematuria, urinary frequency. Currently in bed, no chest pain, no dyspnea, no edema, no palps, no syncope. We were asked to consult for work up and evaluation of the above problems.      Past Medical History:   Diagnosis Date    A-fib [facility])     Alzheimer disease     Alzheimer's dementia     Arthritis       Past Surgical History:   Procedure Laterality Date    HX BREAST AUGMENTATION      HX ORTHOPAEDIC      TKR--bilateral      No Known Allergies   Meds:  See below  Social History   Substance Use Topics    Smoking status: Never Smoker    Smokeless tobacco: Never Used    Alcohol use No      Family History   Problem Relation Age of Onset    Heart Attack Mother     Cancer Sister     Cancer Brother        REVIEW OF SYSTEMS:     []         Unable to obtain  ROS due to ---   [x]         Total of 12 systems reviewed as follows: Total of 12 systems reviewed as follows:       POSITIVE= Bold text  Negative = normal text  General:  fever, chills, sweats, generalized weakness, weight loss/gain,      loss of appetite   Eyes:    blurred vision, eye pain, loss of vision, double vision  ENT:    rhinorrhea, pharyngitis   Respiratory:   cough, sputum production, SOB, ADAME, wheezing, pleuritic pain   Cardiology:   chest pain, palpitations, orthopnea, PND, edema, syncope   Gastrointestinal:  abdominal pain , N/V, diarrhea, dysphagia, constipation, bleeding   Genitourinary:  frequency, urgency, dysuria, hematuria, incontinence   Muskuloskeletal :  arthralgia, myalgia, back pain  Hematology:  easy bruising, nose or gum bleeding, lymphadenopathy   Dermatological: rash, ulceration, pruritis, color change / jaundice  Endocrine:   hot flashes or polydipsia   Neurological:  headache, dizziness, confusion, focal weakness, paresthesia,     Speech difficulties, memory loss, gait difficulty  Psychological: Feelings of anxiety, depression, agitation    Objective:      Physical Exam:    Last 24hrs VS reviewed since prior progress note.  Most recent are:    Visit Vitals    /72    Pulse (!) 53    Temp 97.9 °F (36.6 °C)    Resp 16    Wt 52.6 kg (116 lb)    SpO2 98%    BMI 21.92 kg/m2       Intake/Output Summary (Last 24 hours) at 08/12/18 1221  Last data filed at 08/12/18 0600   Gross per 24 hour   Intake              120 ml   Output 300 ml   Net             -180 ml        General Appearance: Well developed, elderly, alert & oriented x to self and hospital,    no acute distress. Ears/Nose/Mouth/Throat: Pupils equal and round, Hearing grossly normal.  Neck: Supple. JVP within normal limits. Carotids good upstrokes, with no bruit. Chest: Lungs clear to auscultation bilaterally. Cardiovascular: Regular rate and rhythm, S1S2 normal, no murmur, rubs, gallops. Abdomen: Soft, non-tender, bowel sounds are active. No organomegaly. Extremities: No edema bilaterally. Femoral pulses +2, Distal Pulses +1. Skin: Warm and dry. Neuro: CN II-XII grossly intact, Strength and sensation grossly intact. Data:      Prior to Admission medications    Medication Sig Start Date End Date Taking? Authorizing Provider   amiodarone (CORDARONE) 200 mg tablet Take 1 Tab by mouth daily. Indications: VENTRICULAR RATE CONTROL IN ATRIAL FIBRILLATION 8/12/18  Yes Victory Nissen, MD   amiodarone (PACERONE) 400 mg tablet Take 1 Tab by mouth two (2) times a day for 3 days. Indications: VENTRICULAR RATE CONTROL IN ATRIAL FIBRILLATION 8/8/18 8/11/18 Yes Victory Nissen, MD   aspirin 81 mg chewable tablet Take 1 Tab by mouth daily. Indications: prevention of cerebrovascular accident 8/9/18  Yes Victory Nissen, MD   metoprolol succinate (TOPROL-XL) 100 mg tablet Take 1 Tab by mouth daily. 8/9/18  Yes Victory Nissen, MD   ferrous sulfate (IRON) 325 mg (65 mg iron) EC tablet Take 325 mg by mouth every evening. Yes Historical Provider   oxybutynin (DITROPAN) 5 mg tablet Take 5 mg by mouth two (2) times a day. Yes Historical Provider   lutein 6 mg tab Take 1 Tab by mouth daily. Yes Historical Provider   cholecalciferol, vitamin D3, (VITAMIN D3) 2,000 unit tab Take 2,000 Units by mouth every evening. Yes Historical Provider   senna (SENOKOT) 8.6 mg tablet Take 2 Tabs by mouth every evening.    Yes Historical Provider       Recent Results (from the past 24 hour(s))   EKG, 12 LEAD, INITIAL    Collection Time: 08/11/18  7:29 PM   Result Value Ref Range    Ventricular Rate 51 BPM    Atrial Rate 51 BPM    P-R Interval 150 ms    QRS Duration 90 ms    Q-T Interval 546 ms    QTC Calculation (Bezet) 503 ms    Calculated P Axis 50 degrees    Calculated R Axis 65 degrees    Calculated T Axis 70 degrees    Diagnosis       Sinus bradycardia  Prolonged QT  When compared with ECG of 03-AUG-2018 01:44,  Sinus rhythm has replaced Atrial flutter  Vent. rate has decreased BY  79 BPM  Confirmed by Giana Frankel ([id number]) on 8/12/2018 11:30:46 AM     CBC WITH AUTOMATED DIFF    Collection Time: 08/11/18  7:39 PM   Result Value Ref Range    WBC 10.1 3.6 - 11.0 K/uL    RBC 3.68 (L) 3.80 - 5.20 M/uL    HGB 11.8 11.5 - 16.0 g/dL    HCT 35.3 35.0 - 47.0 %    MCV 95.9 80.0 - 99.0 FL    MCH 32.1 26.0 - 34.0 PG    MCHC 33.4 30.0 - 36.5 g/dL    RDW 13.9 11.5 - 14.5 %    PLATELET 172 (H) 533 - 400 K/uL    MPV 9.8 8.9 - 12.9 FL    NRBC 0.0 0  WBC    ABSOLUTE NRBC 0.00 0.00 - 0.01 K/uL    NEUTROPHILS 67 32 - 75 %    LYMPHOCYTES 19 12 - 49 %    MONOCYTES 9 5 - 13 %    EOSINOPHILS 4 0 - 7 %    BASOPHILS 1 0 - 1 %    IMMATURE GRANULOCYTES 0 0.0 - 0.5 %    ABS. NEUTROPHILS 6.7 1.8 - 8.0 K/UL    ABS. LYMPHOCYTES 1.9 0.8 - 3.5 K/UL    ABS. MONOCYTES 0.9 0.0 - 1.0 K/UL    ABS. EOSINOPHILS 0.4 0.0 - 0.4 K/UL    ABS. BASOPHILS 0.1 0.0 - 0.1 K/UL    ABS. IMM.  GRANS. 0.0 0.00 - 0.04 K/UL    DF AUTOMATED     METABOLIC PANEL, COMPREHENSIVE    Collection Time: 08/11/18  7:39 PM   Result Value Ref Range    Sodium 138 136 - 145 mmol/L    Potassium 4.7 3.5 - 5.1 mmol/L    Chloride 102 97 - 108 mmol/L    CO2 30 21 - 32 mmol/L    Anion gap 6 5 - 15 mmol/L    Glucose 117 (H) 65 - 100 mg/dL    BUN 29 (H) 6 - 20 MG/DL    Creatinine 1.00 0.55 - 1.02 MG/DL    BUN/Creatinine ratio 29 (H) 12 - 20      GFR est AA >60 >60 ml/min/1.73m2    GFR est non-AA 52 (L) >60 ml/min/1.73m2    Calcium 8.7 8.5 - 10.1 MG/DL Bilirubin, total 0.6 0.2 - 1.0 MG/DL    ALT (SGPT) 26 12 - 78 U/L    AST (SGOT) 17 15 - 37 U/L    Alk.  phosphatase 66 45 - 117 U/L    Protein, total 6.7 6.4 - 8.2 g/dL    Albumin 3.5 3.5 - 5.0 g/dL    Globulin 3.2 2.0 - 4.0 g/dL    A-G Ratio 1.1 1.1 - 2.2     CK    Collection Time: 08/11/18  7:39 PM   Result Value Ref Range    CK 46 26 - 192 U/L   TROPONIN I    Collection Time: 08/11/18  7:39 PM   Result Value Ref Range    Troponin-I, Qt. <0.05 <0.05 ng/mL   PROTHROMBIN TIME + INR    Collection Time: 08/11/18  7:39 PM   Result Value Ref Range    INR 1.1 0.9 - 1.1      Prothrombin time 11.2 (H) 9.0 - 11.1 sec   TYPE & SCREEN    Collection Time: 08/11/18  7:39 PM   Result Value Ref Range    Crossmatch Expiration 08/14/2018     ABO/Rh(D) A POSITIVE     Antibody screen NEG    URINALYSIS W/ REFLEX CULTURE    Collection Time: 08/11/18 10:14 PM   Result Value Ref Range    Color YELLOW/STRAW      Appearance CLEAR CLEAR      Specific gravity 1.020 1.003 - 1.030      pH (UA) 5.5 5.0 - 8.0      Protein NEGATIVE  NEG mg/dL    Glucose NEGATIVE  NEG mg/dL    Ketone TRACE (A) NEG mg/dL    Bilirubin NEGATIVE  NEG      Blood NEGATIVE  NEG      Urobilinogen 0.2 0.2 - 1.0 EU/dL    Nitrites NEGATIVE  NEG      Leukocyte Esterase NEGATIVE  NEG      WBC 0-4 0 - 4 /hpf    RBC 0-5 0 - 5 /hpf    Epithelial cells FEW FEW /lpf    Bacteria NEGATIVE  NEG /hpf    UA:UC IF INDICATED CULTURE NOT INDICATED BY UA RESULT CNI      Mucus TRACE (A) NEG /lpf    Hyaline cast 2-5 0 - 5 /lpf

## 2018-08-12 NOTE — ED NOTES
TRANSFER - OUT REPORT:    Verbal report given to Nikos Wolfe RN on [facility]  being transferred to [facility](unit) for routine progression of care       Report consisted of patients Situation, Background, Assessment and   Recommendations(SBAR). Information from the following report(s) SBAR, Kardex, ED Summary, [facility] and Recent Results was reviewed with the receiving nurse. Lines:   Peripheral IV 08/11/18 Left Antecubital (Active)   Site Assessment Clean, dry, & intact 8/11/2018  6:53 PM   Phlebitis Assessment 0 8/11/2018  6:53 PM   Infiltration Assessment 0 8/11/2018  6:53 PM   Dressing Status Clean, dry, & intact 8/11/2018  6:53 PM        Opportunity for questions and clarification was provided.       Patient transported with:   Streak

## 2018-08-12 NOTE — CONSULTS
Hospitalist Admission Note    NAME:  Andreia Brooke   :   1929   MRN:   [id number]     Date of admit: 2018    PCP: Junior Salomon MD     Requesting MD: Yamila Fortune NP    Reason for consult: Preoperative evaluation    Assessment/Plan:     Periprosthetic fracture around internal prosthetic right knee joint (2018) POA  [facility] at [facility], slipped on floor  Orthopedics following  Plan for OR Monday  Pain control and DVT prophylaxis    Pre-op cardiac risk assessment POA  Pt evaluated using revised cardiac risk index and is felt to be low cardiovascular risk for intermediate risk surgery   with a 0.4 to 1.0 % risk for major complications based on these criteria. This risk has been discussed with the patient   and pt wishes to proceed. Plan for surgery without further cardiac testing if this risk is acceptable per surgery and anesthesia. Further risk reduction will involve medical management of other comorbid conditions in the perioperative period. Recent  Echo TTE LVEF 55 to 60%, normal RV function, No AS or MR    Atrial flutter with RVR recurrent problem, currently in SB  Bradycardiac HR in 46s  Just discharged 2018 for admit for atrial fib with RVR, followed by Dr Natalia Li  Last admit loaded on amiodarone  Could not tolerate cardizem and metoprolol due to hypotension, was D/C on toprol XL  Due to recurrent falls, patient not felt to be a candidate for oral anticoagulant. Will be on aspirin. Tele  HR low, will continue current meds and ask cards to follow, ?  May need reduction in toprol XL      Hypovolemia POA  Creatinine above baseline  IVF      Frequent falls  Alzheimer's dementia POA     Watch for agitation  Last admit required a sitter      E coli UTI POA  Overactive bladder  S/p 5 days ceftriaxone  Resume oxybutynin.      Code Status:   DNR, has signed DDNR    Surrogate Decision Maker: Jayant Canela) is mPOA      DVT Prophylaxis:  per orthopedics      History CHIEF COMPLAINT: \"my right leg hurts, can you move it\"    HISTORY OF PRESENT ILLNESS:  80 Y. O. W[place]  [facility]red [facility] after slipping on floor, severe pain in right leg  She denies LOC and head trauma  Just discharged from ED [facility] for atrial fib with RVR  Severe pain in right distal femur since fall, worse with any movement  X-ray right knee Acute displaced distal femur fracture above the knee arthroscopy prosthesis. Seen by orthopedics, admitted to their service for surgical repair Monday  Right leg placed in brace  Alert in ED, answers orientation questions, but very confused about recent events and why she is her         Keeps asking why her leg hurts  Denies HA, CP, SOB  Recently discharged from ED [facility] for atrial fib with RVR, followed by Dr Sabra Gutierrez         Past Medical History:   Diagnosis Date    A-fib [facility])     Alzheimer disease     Alzheimer's dementia     Arthritis         Past Surgical History:   Procedure Laterality Date    HX BREAST AUGMENTATION      HX ORTHOPAEDIC      TKR--bilateral        Social History   Substance Use Topics    Smoking status: Never Smoker    Smokeless tobacco: Never Used    Alcohol use No        Family History   Problem Relation Age of Onset    Heart Attack Mother     Cancer Sister     Cancer Brother         No Known Allergies     Prior to Admission medications    Medication Sig Start Date End Date Taking? Authorizing Provider   amiodarone (CORDARONE) 200 mg tablet Take 1 Tab by mouth daily. Indications: VENTRICULAR RATE CONTROL IN ATRIAL FIBRILLATION 8/12/18  Yes Trace Fung MD   amiodarone (PACERONE) 400 mg tablet Take 1 Tab by mouth two (2) times a day for 3 days. Indications: VENTRICULAR RATE CONTROL IN ATRIAL FIBRILLATION 8/8/18 8/11/18 Yes Trace Fung MD   aspirin 81 mg chewable tablet Take 1 Tab by mouth daily.  Indications: prevention of cerebrovascular accident 8/9/18  Yes Trace Fung MD   metoprolol succinate (TOPROL-XL) 100 mg tablet Take 1 Tab by mouth daily. 8/9/18  Yes Evalina Castleman, MD   ferrous sulfate (IRON) 325 mg (65 mg iron) EC tablet Take 325 mg by mouth every evening. Yes Historical Provider   oxybutynin (DITROPAN) 5 mg tablet Take 5 mg by mouth two (2) times a day. Yes Historical Provider   lutein 6 mg tab Take 1 Tab by mouth daily. Yes Historical Provider   cholecalciferol, vitamin D3, (VITAMIN D3) 2,000 unit tab Take 2,000 Units by mouth every evening. Yes Historical Provider   senna (SENOKOT) 8.6 mg tablet Take 2 Tabs by mouth every evening.    Yes Historical Provider       Review of symptoms:     POSITIVE= Bold  Negative = not bold  General:  fever, chills, sweats, generalized weakness  Eyes:    blurred vision, eye pain, double vision  ENT:    Coryza, sore throat, trouble swallowing  Respiratory:   cough, sputum, SOB  Cardiology:   chest pain, orthopnea, PND, edema  Gastrointestinal:  abdominal pain , N/V, diarrhea, constipation, melena or BRBPR  Genitourinary:  Urgency, dysuria, hematuria  Muskuloskeletal :  Joint redness, swelling, right leg pain myalgias  Hematology:  easy bruising, nose or gum bleeding  Dermatological: rash, ulceration  Endocrine:   Polyuria or polydipsia, heat or hold intolerance  Neurological:  Headache, focal motor or sensory changes     Speech difficulties, memory loss  Psychological: depression, agitation      Objective:   VITALS:    Patient Vitals for the past 24 hrs:   Temp Pulse Resp BP SpO2   08/11/18 2200 97.6 °F (36.4 °C) (!) 56 15 136/56 100 %   08/11/18 2145 - (!) 52 14 134/59 99 %   08/11/18 2130 - (!) 54 16 127/51 100 %   08/11/18 2115 - (!) 57 16 155/55 100 %   08/11/18 2100 - (!) 59 12 144/84 100 %   08/11/18 2045 - (!) 57 21 124/76 99 %   08/11/18 2030 - (!) 57 18 151/67 99 %   08/11/18 2015 - (!) 55 17 146/64 100 %   08/11/18 2000 - (!) 56 19 158/66 100 %   08/11/18 1945 - (!) 54 18 159/69 100 %   08/11/18 1930 - (!) 53 17 159/67 100 %   08/11/18 1915 - (!) 53 16 150/68 99 %   18 1845 96.8 °F (36 °C) (!) 57 10 142/75 94 %     Temp (24hrs), Av.2 °F (36.2 °C), Min:96.8 °F (36 °C), Max:97.6 °F (36.4 °C)    O2 Flow Rate (L/min): 2 l/min O2 Device: Nasal cannula    Wt Readings from Last 12 Encounters:   18 52.6 kg (116 lb)   18 53 kg (116 lb 13.5 oz)   18 53 kg (116 lb 13.5 oz)   18 52.3 kg (115 lb 3.2 oz)   18 54.4 kg (120 lb)   18 54.4 kg (120 lb)   17 52.2 kg (115 lb)   17 52.2 kg (115 lb)         PHYSICAL EXAM:   General:    Alert, cooperative in no distress     HEENT: Normocephalic, atraumatic    PERRL, EOMI  Sclera no icterus    Nasal mucosa without masses or discharge  Hearing intact to voice    Oropharynx without erythema or exudate   Neck:  No meningismus, trachea midline, no carotid bruits     Thyroid not enlarged, no nodules or tenderness  Lungs:   Few crackles at bases bilaterally. No wheezing    No accessory muscle use or retractions. Heart:   Regular rate and rhythm,  no murmur or gallop. No LE edema  Abdomen:   Soft, non-tender. Not distended. Bowel sounds normal.     No masses, No Hepatosplenomegaly, No Rebound or guarding  Lymph nodes: No cervical or inguinal MITUL  Musculoskeletal:  No Joint swelling, erythema, warmth.  No Cyanosis or clubbing     Right leg in brace  Skin:      No rashes    Not Jaundiced   No nodules or thickening  Neurologic: Alert and oriented X 3, able to answer orientation questions,     Confused about why she is her and keeps forgeting her leg is broken , follows commands     Cranial nerves 2 to 12 intact    Symmetric motor strength bilaterally       LAB DATA REVIEWED:    Recent Results (from the past 12 hour(s))   EKG, 12 LEAD, INITIAL    Collection Time: 18  7:29 PM   Result Value Ref Range    Ventricular Rate 51 BPM    Atrial Rate 51 BPM    P-R Interval 150 ms    QRS Duration 90 ms    Q-T Interval 546 ms    QTC Calculation (Bezet) 503 ms    Calculated P Axis 50 degrees    Calculated R Axis 65 degrees    Calculated T Axis 70 degrees    Diagnosis       Sinus bradycardia  Prolonged QT  When compared with ECG of 03-AUG-2018 01:44,  Sinus rhythm has replaced Atrial flutter  Vent. rate has decreased BY  79 BPM  ST no longer depressed in Inferior leads  ST no longer depressed in Anterior leads  T wave amplitude has increased in Anterior leads     CBC WITH AUTOMATED DIFF    Collection Time: 08/11/18  7:39 PM   Result Value Ref Range    WBC 10.1 3.6 - 11.0 K/uL    RBC 3.68 (L) 3.80 - 5.20 M/uL    HGB 11.8 11.5 - 16.0 g/dL    HCT 35.3 35.0 - 47.0 %    MCV 95.9 80.0 - 99.0 FL    MCH 32.1 26.0 - 34.0 PG    MCHC 33.4 30.0 - 36.5 g/dL    RDW 13.9 11.5 - 14.5 %    PLATELET 622 (H) 614 - 400 K/uL    MPV 9.8 8.9 - 12.9 FL    NRBC 0.0 0  WBC    ABSOLUTE NRBC 0.00 0.00 - 0.01 K/uL    NEUTROPHILS 67 32 - 75 %    LYMPHOCYTES 19 12 - 49 %    MONOCYTES 9 5 - 13 %    EOSINOPHILS 4 0 - 7 %    BASOPHILS 1 0 - 1 %    IMMATURE GRANULOCYTES 0 0.0 - 0.5 %    ABS. NEUTROPHILS 6.7 1.8 - 8.0 K/UL    ABS. LYMPHOCYTES 1.9 0.8 - 3.5 K/UL    ABS. MONOCYTES 0.9 0.0 - 1.0 K/UL    ABS. EOSINOPHILS 0.4 0.0 - 0.4 K/UL    ABS. BASOPHILS 0.1 0.0 - 0.1 K/UL    ABS. IMM. GRANS. 0.0 0.00 - 0.04 K/UL    DF AUTOMATED     METABOLIC PANEL, COMPREHENSIVE    Collection Time: 08/11/18  7:39 PM   Result Value Ref Range    Sodium 138 136 - 145 mmol/L    Potassium 4.7 3.5 - 5.1 mmol/L    Chloride 102 97 - 108 mmol/L    CO2 30 21 - 32 mmol/L    Anion gap 6 5 - 15 mmol/L    Glucose 117 (H) 65 - 100 mg/dL    BUN 29 (H) 6 - 20 MG/DL    Creatinine 1.00 0.55 - 1.02 MG/DL    BUN/Creatinine ratio 29 (H) 12 - 20      GFR est AA >60 >60 ml/min/1.73m2    GFR est non-AA 52 (L) >60 ml/min/1.73m2    Calcium 8.7 8.5 - 10.1 MG/DL    Bilirubin, total 0.6 0.2 - 1.0 MG/DL    ALT (SGPT) 26 12 - 78 U/L    AST (SGOT) 17 15 - 37 U/L    Alk.  phosphatase 66 45 - 117 U/L    Protein, total 6.7 6.4 - 8.2 g/dL    Albumin 3.5 3.5 - 5.0 g/dL    Globulin 3.2 2.0 - 4.0 g/dL A-G Ratio 1.1 1.1 - 2.2     CK    Collection Time: 08/11/18  7:39 PM   Result Value Ref Range    CK 46 26 - 192 U/L   TROPONIN I    Collection Time: 08/11/18  7:39 PM   Result Value Ref Range    Troponin-I, Qt. <0.05 <0.05 ng/mL   PROTHROMBIN TIME + INR    Collection Time: 08/11/18  7:39 PM   Result Value Ref Range    INR 1.1 0.9 - 1.1      Prothrombin time 11.2 (H) 9.0 - 11.1 sec   TYPE & SCREEN    Collection Time: 08/11/18  7:39 PM   Result Value Ref Range    Crossmatch Expiration 08/14/2018     ABO/Rh(D) A POSITIVE     Antibody screen NEG    URINALYSIS W/ REFLEX CULTURE    Collection Time: 08/11/18 10:14 PM   Result Value Ref Range    Color YELLOW/STRAW      Appearance CLEAR CLEAR      Specific gravity 1.020 1.003 - 1.030      pH (UA) 5.5 5.0 - 8.0      Protein NEGATIVE  NEG mg/dL    Glucose NEGATIVE  NEG mg/dL    Ketone TRACE (A) NEG mg/dL    Bilirubin NEGATIVE  NEG      Blood NEGATIVE  NEG      Urobilinogen 0.2 0.2 - 1.0 EU/dL    Nitrites NEGATIVE  NEG      Leukocyte Esterase NEGATIVE  NEG      WBC PENDING /hpf    RBC PENDING /hpf    Epithelial cells PENDING /lpf    Bacteria PENDING /hpf    UA:UC IF INDICATED PENDING        EKG as read by me shows Sinus bradycardia rate 51, normal axis, no LVH  No ischemic ST-T changes    CXR read by radiology and reviewed by myself shows FINDINGS:   A portable AP radiograph of the chest was obtained at 21:56 hours. The  patient is on a cardiac monitor. The lungs are clear. The cardiac and  mediastinal contours and pulmonary vascularity are normal.  Atherosclerotic  calcifications affect the aortic arch and the thoracic aorta is tortuous. The  chest wall structures and visualized upper abdomen show no acute findings with  incidental note of degenerative spine and chronic shoulder changes as well as  diffuse osteopenia. IMPRESSION: No acute findings. Chronic changes as above.       Right knee X-rays FINDINGS:   Three views of the right knee demonstrate an acute fracture of the  femoral metadiaphysis above the level of unconstrained knee arthroplasty  prosthesis component. The fracture is displaced with the distal fragment medial  to the proximal right. The knee prosthesis appears normally aligned and intact. There is no effusion. Prescribed calcination is noted. IMPRESSION:  Acute displaced distal femur fracture above the knee arthroscopy prosthesis. I saw the patient personally, took a history and did a complete physical exam at the bedside. I performed complex decision making in coming up with a diagnostic and treatment plan for the patient. I reviewed the patient's past medical records, current laboratory and radiology results, and actual Xray films/EKG. I have also discussed this case with the involved ED physician.     Care Plan discussed with:    Patient, ED Doc    Risk of deterioration:  High    Total Time Coordinating Admission:  60   minutes    Total Critical Care Time:         Jessi Forrest MD

## 2018-08-12 NOTE — PROGRESS NOTES
Jazmin Mack , NN notified of patient's readmission to [facility]/ Yessi Heaton of  Care Management

## 2018-08-12 NOTE — PROGRESS NOTES
TRANSFER - IN REPORT:    Verbal report received from [address] [place] [address] Junior RN(name) on Herminia Baltazar  being received from ED(unit) for routine progression of care      Report consisted of patients Situation, Background, Assessment and   Recommendations(SBAR). Information from the following report(s) SBAR, Kardex, ED Summary, Procedure Summary, Intake/Output, MAR, Accordion, Recent Results and Med Rec Status was reviewed with the receiving nurse. Opportunity for questions and clarification was provided. Assessment completed upon patients arrival to unit and care assumed.

## 2018-08-12 NOTE — PROGRESS NOTES
Bedside and Verbal shift change report given to [facility] (oncoming nurse) by Kamran Enamorado (offgoing nurse). Report included the following information SBAR, Kardex, MAR and Recent Results.

## 2018-08-12 NOTE — PROGRESS NOTES
Bedside and Verbal shift change report given to Mimi GARSIA (oncoming nurse) by Erik Espinoza RN (offgoing nurse). Report included the following information SBAR, Kardex, Procedure Summary, Intake/Output, MAR, Accordion, Recent Results and Med Rec Status.

## 2018-08-12 NOTE — PROGRESS NOTES
Orthopedic NP Progress Note  Post Op day: * No surgery date entered *    August 12, 2018 3:26 PM     Nato Valdes    Attending Physician: Treatment Team: Attending Provider: Geremias Flood MD; Consulting Provider: Geremias Flood MD; Hospitalist: Huang Reynaga MD; Utilization Review: Betzy Hannah RN; Consulting Provider: Shania Jo MD     Vital Signs:    Patient Vitals for the past 8 hrs:   BP Temp Pulse Resp SpO2   08/12/18 1502 110/52 97.8 °F (36.6 °C) (!) 56 16 100 %   08/12/18 1241 126/71 97.1 °F (36.2 °C) (!) 58 16 100 %          Intake/Output:  08/12 0701 - 08/12 1900  In: -   Out: 650 [Urine:650]  08/10 1901 - 08/12 0700  In: 120 [P.O.:120]  Out: 300 [Urine:300]    Pain Control:   Pain Assessment  Pain Scale 1: Visual  Pain Intensity 1: 9  Pain Location 1: Leg  Pain Orientation 1: Right  Pain Description 1: Aching  Pain Intervention(s) 1: Medication (see MAR)    LAB:    Recent Labs      08/11/18   1939   HCT  35.3   HGB  11.8     Lab Results   Component Value Date/Time    Sodium 138 08/11/2018 07:39 PM    Potassium 4.7 08/11/2018 07:39 PM    Chloride 102 08/11/2018 07:39 PM    CO2 30 08/11/2018 07:39 PM    Glucose 117 (H) 08/11/2018 07:39 PM    BUN 29 (H) 08/11/2018 07:39 PM    Creatinine 1.00 08/11/2018 07:39 PM    Calcium 8.7 08/11/2018 07:39 PM       Subjective:  Nato Valdes is a 80 y.o. female s/p a  Procedure(s):  DISTAL RIGHT FEMUR, OPEN REDUCTION INTERNAL FIXATION   Procedure(s):  DISTAL RIGHT FEMUR, OPEN REDUCTION INTERNAL FIXATION. Tolerating diet when she eats      Objective: General: alert, cooperative, no distress. Neuro/Vascular: CNS Intact. Sensation stable. Brisk cap refill, 2+ pulses UE/LE  Musculoskeletal:  +ROM UE/LE, knee immobilizer to RLE. America's sign negative in bilateral lower extremities. Calves soft, supple, non-tender upon palpation or with passive stretch. Skin: Incision - clean, dry and intact. No significant erythema or swelling.     Dressing: clean, dry, and intact    [place] - y  Drain - n       PT/OT:   Gait:                      Assessment:    s/p Procedure(s):  DISTAL RIGHT FEMUR, OPEN REDUCTION INTERNAL FIXATION    Active Problems:    Periprosthetic fracture around internal prosthetic right knee joint (8/11/2018)         Plan:     -  Continue to encourage PO intake, with plan for NPO after midnight   -  surgery with [facility] Monday afternoon, cleared by medicine   -  Continue established methods of pain control  -  VTE Prophylaxes - TEDS &/or SCDs     Dr. Praveen Sprague aware and agree with plan.      Signed By: Edel Cramer NP    Orthopedic Nurse Practitioner

## 2018-08-12 NOTE — H&P
ORTHOPAEDIC CONSULT NOTE    Subjective:     Date of Consultation:  August 11, 2018      Chris Rapp is a 80 y.o. female who is being seen for R periprosthetic distal femur fracture s/p GLF this evening. Pt is currently living at an assisted living facility and has a hx of dementia. Pt has left for night, I was able to reach by phone and discuss plan of care. + Pain    Patient Active Problem List    Diagnosis Date Noted    Periprosthetic fracture around internal prosthetic right knee joint 08/11/2018    Atrial flutter with rapid ventricular response ([address] 07/28/2018    Malnutrition ([address] 05/30/2018    Hypertension 05/30/2018    Atrial flutter ([address] 05/24/2018    Late onset Alzheimer's disease without behavioral disturbance 05/22/2018    Advance care planning 12/05/2017    Scoliosis 11/21/2017    Atrial fibrillation ([address] 08/28/2017    Adhesive capsulitis of both shoulders 08/28/2017    Osteoarthritis 08/28/2017    OAB (overactive bladder) 08/28/2017     Family History   Problem Relation Age of Onset    Heart Attack Mother     Cancer Sister     Cancer Brother       Social History   Substance Use Topics    Smoking status: Never Smoker    Smokeless tobacco: Never Used    Alcohol use No     Past Medical History:   Diagnosis Date    A-fib ([address]     Alzheimer's dementia     Arthritis       Past Surgical History:   Procedure Laterality Date    HX BREAST AUGMENTATION      HX ORTHOPAEDIC      TKR--bilateral       Prior to Admission medications    Medication Sig Start Date End Date Taking? Authorizing Provider   amiodarone (CORDARONE) 200 mg tablet Take 1 Tab by mouth daily. Indications: VENTRICULAR RATE CONTROL IN ATRIAL FIBRILLATION 8/12/18   Cristian Hernandez MD   amiodarone (PACERONE) 400 mg tablet Take 1 Tab by mouth two (2) times a day for 3 days.  Indications: VENTRICULAR RATE CONTROL IN ATRIAL FIBRILLATION 8/8/18 8/11/18  Cristian Hernandez MD   aspirin 81 mg chewable tablet Take 1 Tab by mouth daily. Indications: prevention of cerebrovascular accident 8/9/18   Alexey Alfaro MD   metoprolol succinate (TOPROL-XL) 100 mg tablet Take 1 Tab by mouth daily. 8/9/18   Alexey Alfaro MD   ferrous sulfate (IRON) 325 mg (65 mg iron) EC tablet Take 325 mg by mouth every evening. Historical Provider   oxybutynin (DITROPAN) 5 mg tablet Take 5 mg by mouth two (2) times a day. Historical Provider   lutein 6 mg tab Take 1 Tab by mouth daily. Historical Provider   cholecalciferol, vitamin D3, (VITAMIN D3) 2,000 unit tab Take 2,000 Units by mouth every evening. Historical Provider   senna (SENOKOT) 8.6 mg tablet Take 2 Tabs by mouth every evening. Historical Provider     Current Facility-Administered Medications   Medication Dose Route Frequency    fentaNYL citrate (PF) injection 25 mcg  25 mcg IntraVENous ONCE PRN     Current Outpatient Prescriptions   Medication Sig    [START ON 8/12/2018] amiodarone (CORDARONE) 200 mg tablet Take 1 Tab by mouth daily. Indications: VENTRICULAR RATE CONTROL IN ATRIAL FIBRILLATION    amiodarone (PACERONE) 400 mg tablet Take 1 Tab by mouth two (2) times a day for 3 days. Indications: VENTRICULAR RATE CONTROL IN ATRIAL FIBRILLATION    aspirin 81 mg chewable tablet Take 1 Tab by mouth daily. Indications: prevention of cerebrovascular accident    metoprolol succinate (TOPROL-XL) 100 mg tablet Take 1 Tab by mouth daily.  ferrous sulfate (IRON) 325 mg (65 mg iron) EC tablet Take 325 mg by mouth every evening.  oxybutynin (DITROPAN) 5 mg tablet Take 5 mg by mouth two (2) times a day.  lutein 6 mg tab Take 1 Tab by mouth daily.  cholecalciferol, vitamin D3, (VITAMIN D3) 2,000 unit tab Take 2,000 Units by mouth every evening.  senna (SENOKOT) 8.6 mg tablet Take 2 Tabs by mouth every evening. No Known Allergies     Review of Systems:  A comprehensive review of systems was negative except for that written in the HPI.     Mental Status: severe dementia    Objective:     Patient Vitals for the past 8 hrs:   BP Temp Pulse Resp SpO2 Weight   18 1945 159/69 - (!) 54 18 100 % -   18 1930 159/67 - (!) 53 17 100 % -   18 1845 142/75 96.8 °F (36 °C) (!) 57 10 94 % 52.6 kg (116 lb)     Temp (24hrs), Av.8 °F (36 °C), Min:96.8 °F (36 °C), Max:96.8 °F (36 °C)      Gen: Well-developed,  in no acute distress    Musc: RLE - in position of comfort, NVI has ankle alert brace    Skin: No skin breakdown noted. Skin warm, pink, dry  Neuro: Cranial nerves are grossly intact, no focal motor weakness, follows commands appropriately   Psych: Good insight, oriented to person, place and time, alert    Imaging Review: FINDINGS: Three views of the right knee demonstrate an acute fracture of the  femoral metadiaphysis above the level of unconstrained knee arthroplasty  prosthesis component. The fracture is displaced with the distal fragment medial  to the proximal right. The knee prosthesis appears normally aligned and intact. There is no effusion. Prescribed calcination is noted.     IMPRESSION  IMPRESSION:  Acute displaced distal femur fracture above the knee arthroscopy  prosthesis.       Labs:   Recent Results (from the past 24 hour(s))   EKG, 12 LEAD, INITIAL    Collection Time: 18  7:29 PM   Result Value Ref Range    Ventricular Rate 51 BPM    Atrial Rate 51 BPM    P-R Interval 150 ms    QRS Duration 90 ms    Q-T Interval 546 ms    QTC Calculation (Bezet) 503 ms    Calculated P Axis 50 degrees    Calculated R Axis 65 degrees    Calculated T Axis 70 degrees    Diagnosis       Sinus bradycardia  Prolonged QT  When compared with ECG of 03-AUG-2018 01:44,  Sinus rhythm has replaced Atrial flutter  Vent.  rate has decreased BY  79 BPM  ST no longer depressed in Inferior leads  ST no longer depressed in Anterior leads  T wave amplitude has increased in Anterior leads     CBC WITH AUTOMATED DIFF    Collection Time: 18  7:39 PM   Result Value Ref Range    WBC 10.1 3.6 - 11.0 K/uL    RBC 3.68 (L) 3.80 - 5.20 M/uL    HGB 11.8 11.5 - 16.0 g/dL    HCT 35.3 35.0 - 47.0 %    MCV 95.9 80.0 - 99.0 FL    MCH 32.1 26.0 - 34.0 PG    MCHC 33.4 30.0 - 36.5 g/dL    RDW 13.9 11.5 - 14.5 %    PLATELET 617 (H) 893 - 400 K/uL    MPV 9.8 8.9 - 12.9 FL    NRBC 0.0 0  WBC    ABSOLUTE NRBC 0.00 0.00 - 0.01 K/uL    NEUTROPHILS 67 32 - 75 %    LYMPHOCYTES 19 12 - 49 %    MONOCYTES 9 5 - 13 %    EOSINOPHILS 4 0 - 7 %    BASOPHILS 1 0 - 1 %    IMMATURE GRANULOCYTES 0 0.0 - 0.5 %    ABS. NEUTROPHILS 6.7 1.8 - 8.0 K/UL    ABS. LYMPHOCYTES 1.9 0.8 - 3.5 K/UL    ABS. MONOCYTES 0.9 0.0 - 1.0 K/UL    ABS. EOSINOPHILS 0.4 0.0 - 0.4 K/UL    ABS. BASOPHILS 0.1 0.0 - 0.1 K/UL    ABS. IMM. GRANS. 0.0 0.00 - 0.04 K/UL    DF AUTOMATED     METABOLIC PANEL, COMPREHENSIVE    Collection Time: 08/11/18  7:39 PM   Result Value Ref Range    Sodium 138 136 - 145 mmol/L    Potassium 4.7 3.5 - 5.1 mmol/L    Chloride 102 97 - 108 mmol/L    CO2 30 21 - 32 mmol/L    Anion gap 6 5 - 15 mmol/L    Glucose 117 (H) 65 - 100 mg/dL    BUN 29 (H) 6 - 20 MG/DL    Creatinine 1.00 0.55 - 1.02 MG/DL    BUN/Creatinine ratio 29 (H) 12 - 20      GFR est AA >60 >60 ml/min/1.73m2    GFR est non-AA 52 (L) >60 ml/min/1.73m2    Calcium 8.7 8.5 - 10.1 MG/DL    Bilirubin, total 0.6 0.2 - 1.0 MG/DL    ALT (SGPT) 26 12 - 78 U/L    AST (SGOT) 17 15 - 37 U/L    Alk.  phosphatase 66 45 - 117 U/L    Protein, total 6.7 6.4 - 8.2 g/dL    Albumin 3.5 3.5 - 5.0 g/dL    Globulin 3.2 2.0 - 4.0 g/dL    A-G Ratio 1.1 1.1 - 2.2     CK    Collection Time: 08/11/18  7:39 PM   Result Value Ref Range    CK 46 26 - 192 U/L   TROPONIN I    Collection Time: 08/11/18  7:39 PM   Result Value Ref Range    Troponin-I, Qt. <0.05 <0.05 ng/mL   PROTHROMBIN TIME + INR    Collection Time: 08/11/18  7:39 PM   Result Value Ref Range    INR 1.1 0.9 - 1.1      Prothrombin time 11.2 (H) 9.0 - 11.1 sec         Impression:     Patient Active Problem List Diagnosis Date Noted    Periprosthetic fracture around internal prosthetic right knee joint 08/11/2018    Atrial flutter with rapid ventricular response ([address] 07/28/2018    Malnutrition ([address] 05/30/2018    Hypertension 05/30/2018    Atrial flutter ([address] 05/24/2018    Late onset Alzheimer's disease without behavioral disturbance 05/22/2018    Advance care planning 12/05/2017    Scoliosis 11/21/2017    Atrial fibrillation ([facility]) 08/28/2017    Adhesive capsulitis of both shoulders 08/28/2017    Osteoarthritis 08/28/2017    OAB (overactive bladder) 08/28/2017     Active Problems:    Periprosthetic fracture around internal prosthetic right knee joint (8/11/2018)        Plan:   -  Plan for ORIF with Dr. Cherry Mccormick on Monday afternoon  -  NPO after midnight Sunday, bedrest, power, IVF, pain management  -  Knee immobilizer placed in ED, NVI pre and post placement   -  Consult to medicine for clearance and medical management   -  DVT Prophylaxis - SCDs pre op      Dr. Mercedez Cole aware and agrees with plan as above.         Sebas Craig NP  Orthopedic Nurse Practitioner   [place]

## 2018-08-12 NOTE — PROGRESS NOTES
Hospitalist Progress Note    NAME: Hira Bell   :  1929   MRN:  [id number]       Assessment / Plan:  Periprosthetic fracture around internal prosthetic right knee joint POA: will need Surgical approach, noted plans for Surgery tomorrow. Under Ortho care. Pre-op cardiac risk assessment POA  Pt evaluated using revised cardiac risk index and is felt to be low cardiovascular risk for intermediate risk surgery   with a 0.4 to 1.0 % risk for major complications based on these criteria. This risk has been discussed with the patient   and pt wishes to proceed. Plan for surgery without further cardiac testing if this risk is acceptable per surgery and anesthesia. Further risk reduction will involve medical management of other comorbid conditions in the perioperative period. Recent  Echo TTE LVEF 55 to 60%, normal RV function, No AS or MR  No signs of CHF no chest pain at this time. Atrial flutter with RVR recurrent problem, currently in Sinus Bradycardia  Bradycardiac HR in 50s: c/w Amiodarone and Toprol  Hypovolemia POA Creatinine above baseline, c/w IVF. Frequent falls  Alzheimer's dementia POA  Sundowning  Watch for agitation, Last admit required a sitter  E coli UTI POA/Overactive bladder: UTI resolved, completed CTX last admission  Normal Weight: BMI 21.92  Surrogate Decision Maker: King Zakia Cadet) is mPOA  Code status: DNR  DDNR signed 18  Prophylaxis: as per Ortho  Recommended Disposition: SNF/LTC     Subjective:     Chief Complaint / Reason for Physician Visit  \"I want to pee\"  Demented patient can not provide a meaningful hisotry. Discussed with RN events overnight.      Review of Systems:  Symptom Y/N Comments  Symptom Y/N Comments   Fever/Chills    Chest Pain     Poor Appetite    Edema     Cough    Abdominal Pain     Sputum    Joint Pain     SOB/ADAME    Pruritis/Rash     Nausea/vomit    Tolerating PT/OT     Diarrhea    Tolerating Diet     Constipation    Other       Could NOT obtain due to: Dementia     Objective:     VITALS:   Last 24hrs VS reviewed since prior progress note. Most recent are:  Patient Vitals for the past 24 hrs:   Temp Pulse Resp BP SpO2   08/12/18 0716 97.9 °F (36.6 °C) (!) 53 16 137/72 98 %   08/12/18 0400 98.9 °F (37.2 °C) (!) 54 16 144/70 100 %   08/11/18 2355 97.8 °F (36.6 °C) (!) 57 16 147/69 100 %   08/11/18 2200 97.6 °F (36.4 °C) (!) 56 15 136/56 100 %   08/11/18 2145 - (!) 52 14 134/59 99 %   08/11/18 2130 - (!) 54 16 127/51 100 %   08/11/18 2115 - (!) 57 16 155/55 100 %   08/11/18 2100 - (!) 59 12 144/84 100 %   08/11/18 2045 - (!) 57 21 124/76 99 %   08/11/18 2030 - (!) 57 18 151/67 99 %   08/11/18 2015 - (!) 55 17 146/64 100 %   08/11/18 2000 - (!) 56 19 158/66 100 %   08/11/18 1945 - (!) 54 18 159/69 100 %   08/11/18 1930 - (!) 53 17 159/67 100 %   08/11/18 1915 - (!) 53 16 150/68 99 %   08/11/18 1845 96.8 °F (36 °C) (!) 57 10 142/75 94 %       Intake/Output Summary (Last 24 hours) at 08/12/18 0941  Last data filed at 08/12/18 0600   Gross per 24 hour   Intake              120 ml   Output              300 ml   Net             -180 ml        PHYSICAL EXAM:  General: WD, WN. Alert, cooperative, confused  EENT:  EOMI. Anicteric sclerae. MMM  Resp:  Coarse BS,  CV:  Regular  rhythm,  No edema  GI:  Soft, Non distended, Non tender.  +Bowel sounds  Neurologic:  Alert and oriented X 2, normal speech,   Psych:   Poor  insight. Not anxious nor agitated  Skin:  No rashes.   No jaundice    Reviewed most current lab test results and cultures  YES  Reviewed most current radiology test results   YES  Review and summation of old records today    NO  Reviewed patient's current orders and MAR    YES  PMH/SH reviewed - no change compared to H&P  ________________________________________________________________________  Care Plan discussed with:    Comments   Patient y    Family      RN y    Care Manager     Consultant                        Multidiciplinary team rounds were held today with , nursing, pharmacist and clinical coordinator. Patient's plan of care was discussed; medications were reviewed and discharge planning was addressed. ________________________________________________________________________  Total NON critical care TIME:  35  Minutes    Total CRITICAL CARE TIME Spent:   Minutes non procedure based      Comments   >50% of visit spent in counseling and coordination of care y    ________________________________________________________________________  Krzysztof Mena MD     Procedures: see electronic medical records for all procedures/Xrays and details which were not copied into this note but were reviewed prior to creation of Plan. LABS:  I reviewed today's most current labs and imaging studies.   Pertinent labs include:  Recent Labs      08/11/18 1939   WBC  10.1   HGB  11.8   HCT  35.3   PLT  450*     Recent Labs      08/11/18 1939   NA  138   K  4.7   CL  102   CO2  30   GLU  117*   BUN  29*   CREA  1.00   CA  8.7   ALB  3.5   TBILI  0.6   SGOT  17   ALT  26   INR  1.1       Signed: Krzysztof Mena MD

## 2018-08-12 NOTE — PROGRESS NOTES
OK to proceed with scheduled surgical procedure tomorrow, assuming no acute changes in clinical status or lab derangements overnight and patient remains NPO overnight. Hospitalist and Cardiology notes reviewed.

## 2018-08-12 NOTE — PROGRESS NOTES
Received pt awake alert confused oriented to self only ,pt c/o pain in her right immobilizer in place the patient skin intact call bell at bedside.

## 2018-08-13 ENCOUNTER — PATIENT OUTREACH (OUTPATIENT)
Dept: INTERNAL MEDICINE CLINIC | Age: 83
End: 2018-08-13

## 2018-08-13 ENCOUNTER — ANESTHESIA (OUTPATIENT)
Dept: SURGERY | Age: 83
DRG: 481 | End: 2018-08-13
Payer: MEDICARE

## 2018-08-13 ENCOUNTER — APPOINTMENT (OUTPATIENT)
Dept: GENERAL RADIOLOGY | Age: 83
DRG: 481 | End: 2018-08-13
Attending: ORTHOPAEDIC SURGERY
Payer: MEDICARE

## 2018-08-13 LAB
ALBUMIN SERPL-MCNC: 3.1 G/DL (ref 3.5–5)
ALBUMIN/GLOB SERPL: 1 {RATIO} (ref 1.1–2.2)
ALP SERPL-CCNC: 59 U/L (ref 45–117)
ALT SERPL-CCNC: 19 U/L (ref 12–78)
ANION GAP SERPL CALC-SCNC: 6 MMOL/L (ref 5–15)
AST SERPL-CCNC: 13 U/L (ref 15–37)
BASOPHILS # BLD: 0.1 K/UL (ref 0–0.1)
BASOPHILS NFR BLD: 1 % (ref 0–1)
BILIRUB SERPL-MCNC: 0.6 MG/DL (ref 0.2–1)
BUN SERPL-MCNC: 14 MG/DL (ref 6–20)
BUN/CREAT SERPL: 21 (ref 12–20)
CALCIUM SERPL-MCNC: 8.2 MG/DL (ref 8.5–10.1)
CHLORIDE SERPL-SCNC: 105 MMOL/L (ref 97–108)
CO2 SERPL-SCNC: 27 MMOL/L (ref 21–32)
CREAT SERPL-MCNC: 0.68 MG/DL (ref 0.55–1.02)
DIFFERENTIAL METHOD BLD: ABNORMAL
EOSINOPHIL # BLD: 0.5 K/UL (ref 0–0.4)
EOSINOPHIL NFR BLD: 4 % (ref 0–7)
ERYTHROCYTE [DISTWIDTH] IN BLOOD BY AUTOMATED COUNT: 13.9 % (ref 11.5–14.5)
GLOBULIN SER CALC-MCNC: 3.1 G/DL (ref 2–4)
GLUCOSE SERPL-MCNC: 95 MG/DL (ref 65–100)
HCT VFR BLD AUTO: 32.9 % (ref 35–47)
HGB BLD-MCNC: 10.8 G/DL (ref 11.5–16)
IMM GRANULOCYTES # BLD: 0 K/UL (ref 0–0.04)
IMM GRANULOCYTES NFR BLD AUTO: 0 % (ref 0–0.5)
LYMPHOCYTES # BLD: 2 K/UL (ref 0.8–3.5)
LYMPHOCYTES NFR BLD: 18 % (ref 12–49)
MAGNESIUM SERPL-MCNC: 2.3 MG/DL (ref 1.6–2.4)
MCH RBC QN AUTO: 31.9 PG (ref 26–34)
MCHC RBC AUTO-ENTMCNC: 32.8 G/DL (ref 30–36.5)
MCV RBC AUTO: 97.1 FL (ref 80–99)
MONOCYTES # BLD: 1.2 K/UL (ref 0–1)
MONOCYTES NFR BLD: 11 % (ref 5–13)
NEUTS SEG # BLD: 7.2 K/UL (ref 1.8–8)
NEUTS SEG NFR BLD: 66 % (ref 32–75)
NRBC # BLD: 0 K/UL (ref 0–0.01)
NRBC BLD-RTO: 0 PER 100 WBC
PLATELET # BLD AUTO: 382 K/UL (ref 150–400)
PMV BLD AUTO: 10 FL (ref 8.9–12.9)
POTASSIUM SERPL-SCNC: 3.9 MMOL/L (ref 3.5–5.1)
PROT SERPL-MCNC: 6.2 G/DL (ref 6.4–8.2)
RBC # BLD AUTO: 3.39 M/UL (ref 3.8–5.2)
SODIUM SERPL-SCNC: 138 MMOL/L (ref 136–145)
WBC # BLD AUTO: 11 K/UL (ref 3.6–11)

## 2018-08-13 PROCEDURE — 74011250636 HC RX REV CODE- 250/636

## 2018-08-13 PROCEDURE — 80053 COMPREHEN METABOLIC PANEL: CPT | Performed by: INTERNAL MEDICINE

## 2018-08-13 PROCEDURE — 36415 COLL VENOUS BLD VENIPUNCTURE: CPT | Performed by: INTERNAL MEDICINE

## 2018-08-13 PROCEDURE — 77030031139 HC SUT VCRL2 J&J -A: Performed by: ORTHOPAEDIC SURGERY

## 2018-08-13 PROCEDURE — 77030003862 HC BIT DRL SYNT -B: Performed by: ORTHOPAEDIC SURGERY

## 2018-08-13 PROCEDURE — 74011000250 HC RX REV CODE- 250

## 2018-08-13 PROCEDURE — 77030026352 HC BIT DRL QC PERC SYNT -C: Performed by: ORTHOPAEDIC SURGERY

## 2018-08-13 PROCEDURE — 77030019908 HC STETH ESOPH SIMS -A: Performed by: ANESTHESIOLOGY

## 2018-08-13 PROCEDURE — C1713 ANCHOR/SCREW BN/BN,TIS/BN: HCPCS | Performed by: ORTHOPAEDIC SURGERY

## 2018-08-13 PROCEDURE — 85025 COMPLETE CBC W/AUTO DIFF WBC: CPT | Performed by: INTERNAL MEDICINE

## 2018-08-13 PROCEDURE — 74011000250 HC RX REV CODE- 250: Performed by: ORTHOPAEDIC SURGERY

## 2018-08-13 PROCEDURE — P9045 ALBUMIN (HUMAN), 5%, 250 ML: HCPCS

## 2018-08-13 PROCEDURE — 77030000018: Performed by: ORTHOPAEDIC SURGERY

## 2018-08-13 PROCEDURE — 77030020061 HC IV BLD WRMR ADMIN SET 3M -B: Performed by: ANESTHESIOLOGY

## 2018-08-13 PROCEDURE — 83735 ASSAY OF MAGNESIUM: CPT | Performed by: INTERNAL MEDICINE

## 2018-08-13 PROCEDURE — 74011000272 HC RX REV CODE- 272: Performed by: ORTHOPAEDIC SURGERY

## 2018-08-13 PROCEDURE — 76010000133 HC OR TIME 3 TO 3.5 HR: Performed by: ORTHOPAEDIC SURGERY

## 2018-08-13 PROCEDURE — 74011250637 HC RX REV CODE- 250/637: Performed by: ORTHOPAEDIC SURGERY

## 2018-08-13 PROCEDURE — 77030020788: Performed by: ORTHOPAEDIC SURGERY

## 2018-08-13 PROCEDURE — 77030008467 HC STPLR SKN COVD -B: Performed by: ORTHOPAEDIC SURGERY

## 2018-08-13 PROCEDURE — 0QSB04Z REPOSITION RIGHT LOWER FEMUR WITH INTERNAL FIXATION DEVICE, OPEN APPROACH: ICD-10-PCS | Performed by: ORTHOPAEDIC SURGERY

## 2018-08-13 PROCEDURE — 73552 X-RAY EXAM OF FEMUR 2/>: CPT

## 2018-08-13 PROCEDURE — 65270000029 HC RM PRIVATE

## 2018-08-13 PROCEDURE — 76210000016 HC OR PH I REC 1 TO 1.5 HR: Performed by: ORTHOPAEDIC SURGERY

## 2018-08-13 PROCEDURE — 77030011640 HC PAD GRND REM COVD -A: Performed by: ORTHOPAEDIC SURGERY

## 2018-08-13 PROCEDURE — C1769 GUIDE WIRE: HCPCS | Performed by: ORTHOPAEDIC SURGERY

## 2018-08-13 PROCEDURE — 74011250636 HC RX REV CODE- 250/636: Performed by: ORTHOPAEDIC SURGERY

## 2018-08-13 PROCEDURE — 77030000032 HC CUF TRNQT ZIMM -B: Performed by: ORTHOPAEDIC SURGERY

## 2018-08-13 PROCEDURE — 74011250637 HC RX REV CODE- 250/637: Performed by: NURSE PRACTITIONER

## 2018-08-13 PROCEDURE — 76001 XR FLUOROSCOPY OVER 60 MINUTES: CPT

## 2018-08-13 PROCEDURE — 77030008771 HC TU NG SALEM SUMP -A: Performed by: ANESTHESIOLOGY

## 2018-08-13 PROCEDURE — 77030026438 HC STYL ET INTUB CARD -A: Performed by: ANESTHESIOLOGY

## 2018-08-13 PROCEDURE — 77030036660

## 2018-08-13 PROCEDURE — 77030008684 HC TU ET CUF COVD -B: Performed by: ANESTHESIOLOGY

## 2018-08-13 PROCEDURE — 76060000037 HC ANESTHESIA 3 TO 3.5 HR: Performed by: ORTHOPAEDIC SURGERY

## 2018-08-13 PROCEDURE — 77030020782 HC GWN BAIR PAWS FLX 3M -B

## 2018-08-13 PROCEDURE — 77030018836 HC SOL IRR NACL ICUM -A: Performed by: ORTHOPAEDIC SURGERY

## 2018-08-13 PROCEDURE — 77030018846 HC SOL IRR STRL H20 ICUM -A: Performed by: ORTHOPAEDIC SURGERY

## 2018-08-13 DEVICE — SCREW BNE L34MM DIA5MM CNDYL S STL ST VAR ANG LOK FULL THRD: Type: IMPLANTABLE DEVICE | Site: LEG | Status: FUNCTIONAL

## 2018-08-13 DEVICE — SCREW BNE L65MM DIA5MM CNDYL S STL ST VAR ANG LOK T25: Type: IMPLANTABLE DEVICE | Site: LEG | Status: FUNCTIONAL

## 2018-08-13 DEVICE — SCREW BNE L75MM DIA5MM CNDYL S STL ST VAR ANG LOK T25: Type: IMPLANTABLE DEVICE | Site: LEG | Status: FUNCTIONAL

## 2018-08-13 DEVICE — SCREW BNE L40MM DIA5MM CNDYL S STL ST VAR ANG LOK T25: Type: IMPLANTABLE DEVICE | Site: LEG | Status: FUNCTIONAL

## 2018-08-13 DEVICE — SCREW BNE L36MM DIA5MM CNDYL S STL ST VAR ANG LOK COMPR T25: Type: IMPLANTABLE DEVICE | Site: LEG | Status: FUNCTIONAL

## 2018-08-13 DEVICE — SCREW BNE L36MM DIA4.5MM PROX CORT TIB S STL ST LOK FULL: Type: IMPLANTABLE DEVICE | Site: LEG | Status: FUNCTIONAL

## 2018-08-13 DEVICE — SCREW BNE L34MM DIA4.5MM PROX CORT TIB S STL ST LOK FULL: Type: IMPLANTABLE DEVICE | Site: LEG | Status: FUNCTIONAL

## 2018-08-13 DEVICE — SCREW BNE L32MM DIA5MM CNDYL S STL ST VAR ANG LOK T25: Type: IMPLANTABLE DEVICE | Site: LEG | Status: FUNCTIONAL

## 2018-08-13 DEVICE — SCREW BNE L80MM DIA5MM CNDYL S STL ST VAR ANG LOK T25: Type: IMPLANTABLE DEVICE | Site: LEG | Status: FUNCTIONAL

## 2018-08-13 DEVICE — SCREW BNE L70MM DIA5MM CNDYL S STL ST VAR ANG LOK FULL THRD: Type: IMPLANTABLE DEVICE | Site: LEG | Status: FUNCTIONAL

## 2018-08-13 RX ORDER — ROCURONIUM BROMIDE 10 MG/ML
INJECTION, SOLUTION INTRAVENOUS AS NEEDED
Status: DISCONTINUED | OUTPATIENT
Start: 2018-08-13 | End: 2018-08-13 | Stop reason: HOSPADM

## 2018-08-13 RX ORDER — SODIUM CHLORIDE 0.9 % (FLUSH) 0.9 %
5-10 SYRINGE (ML) INJECTION EVERY 8 HOURS
Status: DISCONTINUED | OUTPATIENT
Start: 2018-08-13 | End: 2018-08-13

## 2018-08-13 RX ORDER — FACIAL-BODY WIPES
10 EACH TOPICAL DAILY PRN
Status: DISCONTINUED | OUTPATIENT
Start: 2018-08-15 | End: 2018-08-17 | Stop reason: HOSPADM

## 2018-08-13 RX ORDER — SODIUM CHLORIDE 0.9 % (FLUSH) 0.9 %
5-10 SYRINGE (ML) INJECTION AS NEEDED
Status: DISCONTINUED | OUTPATIENT
Start: 2018-08-13 | End: 2018-08-13 | Stop reason: HOSPADM

## 2018-08-13 RX ORDER — SODIUM CHLORIDE 0.9 % (FLUSH) 0.9 %
5-10 SYRINGE (ML) INJECTION AS NEEDED
Status: DISCONTINUED | OUTPATIENT
Start: 2018-08-13 | End: 2018-08-17 | Stop reason: HOSPADM

## 2018-08-13 RX ORDER — HYDROMORPHONE HYDROCHLORIDE 2 MG/ML
INJECTION, SOLUTION INTRAMUSCULAR; INTRAVENOUS; SUBCUTANEOUS AS NEEDED
Status: DISCONTINUED | OUTPATIENT
Start: 2018-08-13 | End: 2018-08-13 | Stop reason: HOSPADM

## 2018-08-13 RX ORDER — PHENYLEPHRINE HCL IN 0.9% NACL 0.4MG/10ML
SYRINGE (ML) INTRAVENOUS AS NEEDED
Status: DISCONTINUED | OUTPATIENT
Start: 2018-08-13 | End: 2018-08-13 | Stop reason: HOSPADM

## 2018-08-13 RX ORDER — ONDANSETRON 2 MG/ML
4 INJECTION INTRAMUSCULAR; INTRAVENOUS AS NEEDED
Status: DISCONTINUED | OUTPATIENT
Start: 2018-08-13 | End: 2018-08-13 | Stop reason: HOSPADM

## 2018-08-13 RX ORDER — AMOXICILLIN 250 MG
1 CAPSULE ORAL 2 TIMES DAILY
Status: DISCONTINUED | OUTPATIENT
Start: 2018-08-13 | End: 2018-08-17 | Stop reason: HOSPADM

## 2018-08-13 RX ORDER — SODIUM CHLORIDE 0.9 % (FLUSH) 0.9 %
5-10 SYRINGE (ML) INJECTION AS NEEDED
Status: DISCONTINUED | OUTPATIENT
Start: 2018-08-13 | End: 2018-08-13

## 2018-08-13 RX ORDER — DIPHENHYDRAMINE HYDROCHLORIDE 50 MG/ML
12.5 INJECTION, SOLUTION INTRAMUSCULAR; INTRAVENOUS AS NEEDED
Status: ACTIVE | OUTPATIENT
Start: 2018-08-13 | End: 2018-08-13

## 2018-08-13 RX ORDER — ACETAMINOPHEN 325 MG/1
650 TABLET ORAL EVERY 6 HOURS
Status: DISCONTINUED | OUTPATIENT
Start: 2018-08-13 | End: 2018-08-17 | Stop reason: HOSPADM

## 2018-08-13 RX ORDER — FERROUS SULFATE, DRIED 160(50) MG
1 TABLET, EXTENDED RELEASE ORAL
Status: DISCONTINUED | OUTPATIENT
Start: 2018-08-14 | End: 2018-08-17 | Stop reason: HOSPADM

## 2018-08-13 RX ORDER — LIDOCAINE HYDROCHLORIDE 20 MG/ML
INJECTION, SOLUTION EPIDURAL; INFILTRATION; INTRACAUDAL; PERINEURAL AS NEEDED
Status: DISCONTINUED | OUTPATIENT
Start: 2018-08-13 | End: 2018-08-13 | Stop reason: HOSPADM

## 2018-08-13 RX ORDER — OXYCODONE HYDROCHLORIDE 5 MG/1
2.5 TABLET ORAL
Status: DISCONTINUED | OUTPATIENT
Start: 2018-08-13 | End: 2018-08-17 | Stop reason: HOSPADM

## 2018-08-13 RX ORDER — POLYETHYLENE GLYCOL 3350 17 G/17G
17 POWDER, FOR SOLUTION ORAL DAILY
Status: DISCONTINUED | OUTPATIENT
Start: 2018-08-14 | End: 2018-08-17 | Stop reason: HOSPADM

## 2018-08-13 RX ORDER — OXYCODONE HYDROCHLORIDE 5 MG/1
5 TABLET ORAL
Status: DISCONTINUED | OUTPATIENT
Start: 2018-08-13 | End: 2018-08-17 | Stop reason: HOSPADM

## 2018-08-13 RX ORDER — SODIUM CHLORIDE 9 MG/ML
75 INJECTION, SOLUTION INTRAVENOUS CONTINUOUS
Status: DISPENSED | OUTPATIENT
Start: 2018-08-13 | End: 2018-08-14

## 2018-08-13 RX ORDER — SUCCINYLCHOLINE CHLORIDE 20 MG/ML
INJECTION INTRAMUSCULAR; INTRAVENOUS AS NEEDED
Status: DISCONTINUED | OUTPATIENT
Start: 2018-08-13 | End: 2018-08-13 | Stop reason: HOSPADM

## 2018-08-13 RX ORDER — ONDANSETRON 2 MG/ML
4 INJECTION INTRAMUSCULAR; INTRAVENOUS
Status: ACTIVE | OUTPATIENT
Start: 2018-08-13 | End: 2018-08-14

## 2018-08-13 RX ORDER — SODIUM CHLORIDE, SODIUM LACTATE, POTASSIUM CHLORIDE, CALCIUM CHLORIDE 600; 310; 30; 20 MG/100ML; MG/100ML; MG/100ML; MG/100ML
25 INJECTION, SOLUTION INTRAVENOUS CONTINUOUS
Status: DISCONTINUED | OUTPATIENT
Start: 2018-08-13 | End: 2018-08-13 | Stop reason: HOSPADM

## 2018-08-13 RX ORDER — NEOSTIGMINE METHYLSULFATE 1 MG/ML
INJECTION INTRAVENOUS AS NEEDED
Status: DISCONTINUED | OUTPATIENT
Start: 2018-08-13 | End: 2018-08-13 | Stop reason: HOSPADM

## 2018-08-13 RX ORDER — FENTANYL CITRATE 50 UG/ML
25 INJECTION, SOLUTION INTRAMUSCULAR; INTRAVENOUS
Status: DISCONTINUED | OUTPATIENT
Start: 2018-08-13 | End: 2018-08-13 | Stop reason: HOSPADM

## 2018-08-13 RX ORDER — ENOXAPARIN SODIUM 100 MG/ML
30 INJECTION SUBCUTANEOUS EVERY 12 HOURS
Status: DISCONTINUED | OUTPATIENT
Start: 2018-08-13 | End: 2018-08-14

## 2018-08-13 RX ORDER — MORPHINE SULFATE 10 MG/ML
2 INJECTION, SOLUTION INTRAMUSCULAR; INTRAVENOUS
Status: DISCONTINUED | OUTPATIENT
Start: 2018-08-13 | End: 2018-08-13 | Stop reason: HOSPADM

## 2018-08-13 RX ORDER — HYDROMORPHONE HYDROCHLORIDE 2 MG/ML
0.5 INJECTION, SOLUTION INTRAMUSCULAR; INTRAVENOUS; SUBCUTANEOUS
Status: ACTIVE | OUTPATIENT
Start: 2018-08-13 | End: 2018-08-14

## 2018-08-13 RX ORDER — CEFAZOLIN SODIUM 1 G/3ML
INJECTION, POWDER, FOR SOLUTION INTRAMUSCULAR; INTRAVENOUS AS NEEDED
Status: DISCONTINUED | OUTPATIENT
Start: 2018-08-13 | End: 2018-08-13 | Stop reason: HOSPADM

## 2018-08-13 RX ORDER — CEFAZOLIN SODIUM/WATER 2 G/20 ML
2 SYRINGE (ML) INTRAVENOUS EVERY 8 HOURS
Status: COMPLETED | OUTPATIENT
Start: 2018-08-13 | End: 2018-08-14

## 2018-08-13 RX ORDER — ALBUMIN HUMAN 50 G/1000ML
SOLUTION INTRAVENOUS AS NEEDED
Status: DISCONTINUED | OUTPATIENT
Start: 2018-08-13 | End: 2018-08-13 | Stop reason: HOSPADM

## 2018-08-13 RX ORDER — HYDROMORPHONE HYDROCHLORIDE 1 MG/ML
.2-.5 INJECTION, SOLUTION INTRAMUSCULAR; INTRAVENOUS; SUBCUTANEOUS
Status: DISCONTINUED | OUTPATIENT
Start: 2018-08-13 | End: 2018-08-13 | Stop reason: HOSPADM

## 2018-08-13 RX ORDER — DEXAMETHASONE SODIUM PHOSPHATE 4 MG/ML
INJECTION, SOLUTION INTRA-ARTICULAR; INTRALESIONAL; INTRAMUSCULAR; INTRAVENOUS; SOFT TISSUE AS NEEDED
Status: DISCONTINUED | OUTPATIENT
Start: 2018-08-13 | End: 2018-08-13 | Stop reason: HOSPADM

## 2018-08-13 RX ORDER — SODIUM CHLORIDE, SODIUM LACTATE, POTASSIUM CHLORIDE, CALCIUM CHLORIDE 600; 310; 30; 20 MG/100ML; MG/100ML; MG/100ML; MG/100ML
25 INJECTION, SOLUTION INTRAVENOUS CONTINUOUS
Status: DISCONTINUED | OUTPATIENT
Start: 2018-08-13 | End: 2018-08-13

## 2018-08-13 RX ORDER — GLYCOPYRROLATE 0.2 MG/ML
INJECTION INTRAMUSCULAR; INTRAVENOUS AS NEEDED
Status: DISCONTINUED | OUTPATIENT
Start: 2018-08-13 | End: 2018-08-13 | Stop reason: HOSPADM

## 2018-08-13 RX ORDER — ONDANSETRON 4 MG/1
4 TABLET, ORALLY DISINTEGRATING ORAL
Status: DISCONTINUED | OUTPATIENT
Start: 2018-08-13 | End: 2018-08-17 | Stop reason: HOSPADM

## 2018-08-13 RX ORDER — FENTANYL CITRATE 50 UG/ML
INJECTION, SOLUTION INTRAMUSCULAR; INTRAVENOUS AS NEEDED
Status: DISCONTINUED | OUTPATIENT
Start: 2018-08-13 | End: 2018-08-13 | Stop reason: HOSPADM

## 2018-08-13 RX ORDER — EPHEDRINE SULFATE 50 MG/ML
INJECTION, SOLUTION INTRAVENOUS AS NEEDED
Status: DISCONTINUED | OUTPATIENT
Start: 2018-08-13 | End: 2018-08-13 | Stop reason: HOSPADM

## 2018-08-13 RX ORDER — NALOXONE HYDROCHLORIDE 0.4 MG/ML
0.4 INJECTION, SOLUTION INTRAMUSCULAR; INTRAVENOUS; SUBCUTANEOUS AS NEEDED
Status: DISCONTINUED | OUTPATIENT
Start: 2018-08-13 | End: 2018-08-17 | Stop reason: HOSPADM

## 2018-08-13 RX ORDER — SODIUM CHLORIDE 0.9 % (FLUSH) 0.9 %
5-10 SYRINGE (ML) INJECTION EVERY 8 HOURS
Status: DISCONTINUED | OUTPATIENT
Start: 2018-08-14 | End: 2018-08-17 | Stop reason: HOSPADM

## 2018-08-13 RX ORDER — ACETAMINOPHEN 10 MG/ML
INJECTION, SOLUTION INTRAVENOUS AS NEEDED
Status: DISCONTINUED | OUTPATIENT
Start: 2018-08-13 | End: 2018-08-13 | Stop reason: HOSPADM

## 2018-08-13 RX ORDER — SODIUM CHLORIDE, SODIUM LACTATE, POTASSIUM CHLORIDE, CALCIUM CHLORIDE 600; 310; 30; 20 MG/100ML; MG/100ML; MG/100ML; MG/100ML
INJECTION, SOLUTION INTRAVENOUS
Status: DISCONTINUED | OUTPATIENT
Start: 2018-08-13 | End: 2018-08-13 | Stop reason: HOSPADM

## 2018-08-13 RX ORDER — ONDANSETRON 2 MG/ML
INJECTION INTRAMUSCULAR; INTRAVENOUS AS NEEDED
Status: DISCONTINUED | OUTPATIENT
Start: 2018-08-13 | End: 2018-08-13 | Stop reason: HOSPADM

## 2018-08-13 RX ORDER — PROPOFOL 10 MG/ML
INJECTION, EMULSION INTRAVENOUS AS NEEDED
Status: DISCONTINUED | OUTPATIENT
Start: 2018-08-13 | End: 2018-08-13 | Stop reason: HOSPADM

## 2018-08-13 RX ADMIN — ROCURONIUM BROMIDE 20 MG: 10 INJECTION, SOLUTION INTRAVENOUS at 09:08

## 2018-08-13 RX ADMIN — AMIODARONE HYDROCHLORIDE 200 MG: 200 TABLET ORAL at 13:32

## 2018-08-13 RX ADMIN — Medication 200 MCG: at 07:37

## 2018-08-13 RX ADMIN — ACETAMINOPHEN 650 MG: 325 TABLET ORAL at 13:33

## 2018-08-13 RX ADMIN — SODIUM CHLORIDE 125 ML/HR: 900 INJECTION, SOLUTION INTRAVENOUS at 11:43

## 2018-08-13 RX ADMIN — CEFAZOLIN SODIUM 2 G: 1 INJECTION, POWDER, FOR SOLUTION INTRAMUSCULAR; INTRAVENOUS at 08:50

## 2018-08-13 RX ADMIN — FENTANYL CITRATE 25 MCG: 50 INJECTION, SOLUTION INTRAMUSCULAR; INTRAVENOUS at 10:00

## 2018-08-13 RX ADMIN — ALBUMIN HUMAN 250 ML: 50 SOLUTION INTRAVENOUS at 09:00

## 2018-08-13 RX ADMIN — SENNOSIDES AND DOCUSATE SODIUM 1 TABLET: 8.6; 5 TABLET ORAL at 13:33

## 2018-08-13 RX ADMIN — SODIUM CHLORIDE, SODIUM LACTATE, POTASSIUM CHLORIDE, CALCIUM CHLORIDE: 600; 310; 30; 20 INJECTION, SOLUTION INTRAVENOUS at 06:23

## 2018-08-13 RX ADMIN — DEXAMETHASONE SODIUM PHOSPHATE 8 MG: 4 INJECTION, SOLUTION INTRA-ARTICULAR; INTRALESIONAL; INTRAMUSCULAR; INTRAVENOUS; SOFT TISSUE at 07:28

## 2018-08-13 RX ADMIN — GLYCOPYRROLATE 0.1 MG: 0.2 INJECTION INTRAMUSCULAR; INTRAVENOUS at 10:20

## 2018-08-13 RX ADMIN — ACETAMINOPHEN 1000 MG: 10 INJECTION, SOLUTION INTRAVENOUS at 07:45

## 2018-08-13 RX ADMIN — OXYCODONE HYDROCHLORIDE 2.5 MG: 5 TABLET ORAL at 17:21

## 2018-08-13 RX ADMIN — SUCCINYLCHOLINE CHLORIDE 100 MG: 20 INJECTION INTRAMUSCULAR; INTRAVENOUS at 07:28

## 2018-08-13 RX ADMIN — SENNOSIDES 17.2 MG: 8.6 TABLET, FILM COATED ORAL at 17:20

## 2018-08-13 RX ADMIN — SODIUM CHLORIDE 125 ML/HR: 900 INJECTION, SOLUTION INTRAVENOUS at 18:49

## 2018-08-13 RX ADMIN — Medication 200 MCG: at 07:30

## 2018-08-13 RX ADMIN — ALBUMIN HUMAN 250 ML: 50 SOLUTION INTRAVENOUS at 07:54

## 2018-08-13 RX ADMIN — EPHEDRINE SULFATE 15 MG: 50 INJECTION, SOLUTION INTRAVENOUS at 07:37

## 2018-08-13 RX ADMIN — ONDANSETRON 4 MG: 2 INJECTION INTRAMUSCULAR; INTRAVENOUS at 07:34

## 2018-08-13 RX ADMIN — OXYCODONE HYDROCHLORIDE 5 MG: 5 TABLET ORAL at 13:31

## 2018-08-13 RX ADMIN — ROCURONIUM BROMIDE 20 MG: 10 INJECTION, SOLUTION INTRAVENOUS at 08:49

## 2018-08-13 RX ADMIN — PROPOFOL 50 MG: 10 INJECTION, EMULSION INTRAVENOUS at 07:28

## 2018-08-13 RX ADMIN — EPHEDRINE SULFATE 10 MG: 50 INJECTION, SOLUTION INTRAVENOUS at 07:30

## 2018-08-13 RX ADMIN — GLYCOPYRROLATE 0.2 MG: 0.2 INJECTION INTRAMUSCULAR; INTRAVENOUS at 10:17

## 2018-08-13 RX ADMIN — FENTANYL CITRATE 25 MCG: 50 INJECTION, SOLUTION INTRAMUSCULAR; INTRAVENOUS at 08:49

## 2018-08-13 RX ADMIN — ROCURONIUM BROMIDE 10 MG: 10 INJECTION, SOLUTION INTRAVENOUS at 08:56

## 2018-08-13 RX ADMIN — NEOSTIGMINE METHYLSULFATE 2 MG: 1 INJECTION INTRAVENOUS at 10:20

## 2018-08-13 RX ADMIN — Medication 120 MCG: at 07:32

## 2018-08-13 RX ADMIN — LIDOCAINE HYDROCHLORIDE 40 MG: 20 INJECTION, SOLUTION EPIDURAL; INFILTRATION; INTRACAUDAL; PERINEURAL at 07:28

## 2018-08-13 RX ADMIN — HYDROMORPHONE HYDROCHLORIDE 0.1 MG: 2 INJECTION, SOLUTION INTRAMUSCULAR; INTRAVENOUS; SUBCUTANEOUS at 10:24

## 2018-08-13 RX ADMIN — FENTANYL CITRATE 50 MCG: 50 INJECTION, SOLUTION INTRAMUSCULAR; INTRAVENOUS at 07:28

## 2018-08-13 RX ADMIN — Medication 2 G: at 17:20

## 2018-08-13 RX ADMIN — SUCCINYLCHOLINE CHLORIDE 100 MG: 20 INJECTION INTRAMUSCULAR; INTRAVENOUS at 09:11

## 2018-08-13 RX ADMIN — EPHEDRINE SULFATE 15 MG: 50 INJECTION, SOLUTION INTRAVENOUS at 07:32

## 2018-08-13 RX ADMIN — SODIUM CHLORIDE, SODIUM LACTATE, POTASSIUM CHLORIDE, CALCIUM CHLORIDE: 600; 310; 30; 20 INJECTION, SOLUTION INTRAVENOUS at 09:47

## 2018-08-13 RX ADMIN — GLYCOPYRROLATE 0.2 MG: 0.2 INJECTION INTRAMUSCULAR; INTRAVENOUS at 09:55

## 2018-08-13 RX ADMIN — OXYBUTYNIN CHLORIDE 10 MG: 5 TABLET, EXTENDED RELEASE ORAL at 13:32

## 2018-08-13 NOTE — PERIOP NOTES
Handoff Report from Operating Room to PACU    Report received from Callie Villafuerte and [facility] [employer] [facility] CRNA regarding Herminia Chol. Surgeon(s):  Teja Adames MD  And Procedure(s) (LRB):  DISTAL RIGHT FEMUR, OPEN REDUCTION INTERNAL FIXATION (Right)  confirmed   with allergies and dressings discussed. Anesthesia type, drugs, patient history, complications, estimated blood loss, vital signs, intake and output, and last pain medication and reversal medications were reviewed.

## 2018-08-13 NOTE — BRIEF OP NOTE
BRIEF OPERATIVE NOTE    Date of Procedure: 8/13/2018   Preoperative Diagnosis: right femur fracture  Postoperative Diagnosis: right femur fracture    Procedure(s):  DISTAL RIGHT FEMUR, OPEN REDUCTION INTERNAL FIXATION  Surgeon(s) and Role:     * Edelmira Ricci MD - Primary         Surgical Assistant:      Surgical Staff:  Circ-1: Renee Nazario  Circ-Intern: David Hernandez RN  Radiology Technician: Dominga Gregg, RT  Scrub Tech-1: Milta Elrenetta Deal  Surg Asst-1: Diamond Cruz  Event Time In   Incision Start 5538   Incision Close 1032     Anesthesia: Other   Estimated Blood Loss: 300cc  Specimens: * No specimens in log *   Findings: as above   Complications: none  Implants:   Implant Name Type Inv.  Item Serial No.  Lot No. LRB No. Used Action   SCR BNE CRTX ST 4.5X34MM SS --  - SNA  SCR BNE CRTX ST 4.5X34MM SS --  NA SYNTHES [place] NA Right 2 Implanted   SCR BNE CRTX ST 4.5X32MM SS --  - SNA  SCR BNE CRTX ST 4.5X32MM SS --  NA SYNTHES [place] NA Right 1 Explanted   SCR BNE CRTX ST 4.5X36MM SS --  - SNA  SCR BNE CRTX ST 4.5X36MM SS --  NA SYNTHES [place] NA Right 1 Implanted   SCR LCK VA ST STARDRV 5X80MM --  - SNA  SCR LCK VA ST STARDRV 5X80MM --  NA SYNTHES [place] NA Right 2 Implanted   SCR LCK VA ST STARDRV 5X70MM --  - SNA  SCR LCK VA ST STARDRV 5X70MM --  NA SYNTHES [place] NA Right 1 Implanted   SCR LCK VA ST STARDRV 5X40MM --  - SNA  SCR LCK VA ST STARDRV 5X40MM --  NA SYNTHES [place] NA Right 1 Implanted   4.5 mm VA curved condyler plate 10 mm   NA SYNTHES [place] NA Right 1 Implanted   SCR LCK VA ST STARDRV 5X36MM --  - SNA  SCR LCK VA ST STARDRV 5X36MM --  NA SYNTHES [place] NA Right 1 Implanted   SCR LCK VA ST STARDRV 5X34MM --  - SNA  SCR LCK VA ST STARDRV 5X34MM --  NA SYNTHES [place] NA Right 1 Implanted   SCR LCK VA ST STARDRV 5X32MM --  - SNA  SCR LCK VA ST STARDRV 5X32MM --  NA SYNTHES [place] NA Right 2 Implanted   SCR LCK VA ST STARDRV 5X65MM --  - SNA  SCR LCK VA ST STARDRV 5X65MM --  NA SYNTHES [place] Right 1 Implanted   SCR LCK VA ST STARDRV 5X75MM --  - SNA  SCR LCK VA ST STARDRV 5X75MM --  NA SYNTHES [place] Right 1 Implanted   SCR LCK VA ST STARDRV 5X75MM --  - SNA   SCR LCK VA ST STARDRV 5X75MM --  NA SYNTHES [place] NA Right 1 Implanted

## 2018-08-13 NOTE — ANESTHESIA POSTPROCEDURE EVALUATION
Post-Anesthesia Evaluation and Assessment    Patient: Nikki Nunez MRN: [id number]  SSN: [id number]    YOB: 1929  Age: 80 y.o. Sex: female       Cardiovascular Function/Vital Signs  Visit Vitals    BP 97/58    Pulse 61    Temp 36.7 °C (98.1 °F)    Resp 13    Wt 52.6 kg (116 lb)    SpO2 96%    BMI 21.92 kg/m2       Patient is status post general anesthesia for Procedure(s):  DISTAL RIGHT FEMUR, OPEN REDUCTION INTERNAL FIXATION. Nausea/Vomiting: None    Postoperative hydration reviewed and adequate. Pain:  Pain Scale 1: Numeric (0 - 10) (08/13/18 1145)  Pain Intensity 1: 0 (08/13/18 1145)   Managed    Neurological Status:   Neuro (WDL): Exceptions to WDL (08/13/18 6514)  Neuro  Neurologic State: Drowsy; Eyes open to stimulus; Eyes open spontaneously (08/13/18 1145)  Orientation Level: Oriented to person (08/13/18 1145)  Cognition: Follows commands (08/13/18 1145)  Speech: Clear (08/13/18 1145)  LUE Motor Response: Purposeful (08/13/18 1145)  LLE Motor Response: Weak;Purposeful (08/13/18 1145)  RUE Motor Response: Purposeful (08/13/18 1145)  RLE Motor Response: Weak;Purposeful (08/13/18 1145)   At baseline    Mental Status and Level of Consciousness: Arousable    Pulmonary Status:   O2 Device: Nasal cannula (08/13/18 1145)   Adequate oxygenation and airway patent    Complications related to anesthesia: None    Post-anesthesia assessment completed.  No concerns    Signed By: rEnie Chiu MD     August 13, 2018

## 2018-08-13 NOTE — PERIOP NOTES
TRANSFER - OUT REPORT:    Verbal report given to Erasto Rivera RN on Vielka César  being transferred to 3214(unit) for routine post - op       Report consisted of patients Situation, Background, Assessment and   Recommendations(SBAR). Information from the following report(s) SBAR, OR Summary, Procedure Summary, Intake/Output, MAR and Cardiac Rhythm NSR was reviewed with the receiving nurse. Opportunity for questions and clarification was provided.       Patient transported with:   Monitor  O2 @ 2 liters  Registered Nurse  [facility]

## 2018-08-13 NOTE — PROGRESS NOTES
Pt recently readmitted to ED [facility] with fall at rehab [facility]) with knee deformity and pain. Scans showing R femur fracture requiring ORIF Monday afternoon with Dr. Meir Reynaga as per noted by Mirlande Luna NP in consultation 8/11. Previously, pt was in [facility] from 7/28/18-8/8/18 for A-flutter w/RVR, frequent falls, and UTI. Pt was transferred to Rehab for therapy on date of discharge because she was not safe to return to her home setting in her current condition. Pt was followed by the CCT while in the Rehab facility with communications to the navigators of the PCP office, Chayito Mcconnell and Rui Parks. Chayito and Rell Thorne will continue to follow the patient's progress in the hospital during the stay and keep updated with the discharge plan with the inpatient SW.

## 2018-08-13 NOTE — PROGRESS NOTES
CM was advised by pt niece she prefers for pt to return to [facility] for skilled to LTC. CM advised niece that once pt is out of surgery and on floor the CM will send referral to SNF as requested.      Blessing Bauer, MSW, [address] [place], [place] [phone]

## 2018-08-13 NOTE — ROUTINE PROCESS
Patient: Talita Luna MRN: [id number]  SSN: [id number]   YOB: 1929  Age: 80 y.o. Sex: female     Patient is status post Procedure(s):  DISTAL RIGHT FEMUR, OPEN REDUCTION INTERNAL FIXATION. Surgeon(s) and Role:     * Verenice Guzman MD - Primary                Peripheral IV 08/13/18 Left Arm (Active)   Site Assessment Clean, dry, & intact 8/13/2018  7:05 AM   Phlebitis Assessment 0 8/13/2018  7:05 AM   Infiltration Assessment 0 8/13/2018  7:05 AM   Dressing Status Clean, dry, & intact 8/13/2018  7:05 AM   Dressing Type Tape;Transparent 8/13/2018  7:05 AM   Hub Color/Line Status Pink; Infusing 8/13/2018  7:05 AM            Airway - Endotracheal Tube 08/13/18 Oral (Active)   Line Jose Alejandro Lips 8/13/2018 12:00 AM                   Dressing/Packing:  Wound Leg Right-DRESSING TYPE: Staples;Transparent film (staples and honeycomb dressing) (08/13/18 1018)

## 2018-08-13 NOTE — PERIOP NOTES
Patient admitted to pre op holding area with ankle bracelet in place from nursing facility. Per [facility], OR nurse, ankle bracelet need to be removed for surgery. Right ankle bracelet removed and taken to patients room 3214 with her label on the bag.

## 2018-08-13 NOTE — PROGRESS NOTES
CM sent referral to [facility] as per pt niece request.     Care Management Interventions  PCP Verified by CM:  Yes  Mode of Transport at Discharge: BLS ([facility] )  Transition of Care Consult (CM Consult): SNF  Partner SNF: Yes  Discharge Durable Medical Equipment: No  Health Maintenance Reviewed: Yes  Physical Therapy Consult: Yes  Occupational Therapy Consult: Yes  Speech Therapy Consult: No  Current Support Network: [address] [place] [address]  Confirm Follow Up Transport: Other (see comment)  Plan discussed with Pt/Family/Caregiver: Yes  Freedom of Choice Offered: Yes  Discharge Location  Discharge Placement: Skilled nursing facility    BRENDAN Self, [address] [place] [address]   [phone]

## 2018-08-13 NOTE — ANESTHESIA PREPROCEDURE EVALUATION
Anesthetic History   No history of anesthetic complications            Review of Systems / Medical History  Patient summary reviewed, nursing notes reviewed and pertinent labs reviewed    Pulmonary  Within defined limits                 Neuro/Psych   Within defined limits           Cardiovascular    Hypertension        Dysrhythmias : atrial fibrillation      Exercise tolerance: >4 METS     GI/Hepatic/Renal  Within defined limits              Endo/Other        Arthritis     Other Findings   Comments:   Late onset Alzheimer's disease without behavioral disturbance          Physical Exam    Airway  Mallampati: II  TM Distance: 4 - 6 cm  Neck ROM: normal range of motion   Mouth opening: Normal     Cardiovascular  Regular rate and rhythm,  S1 and S2 normal,  no murmur, click, rub, or gallop             Dental  No notable dental hx       Pulmonary  Breath sounds clear to auscultation               Abdominal  GI exam deferred       Other Findings            Anesthetic Plan    ASA: 3  Anesthesia type: general            Anesthetic plan and risks discussed with: Patient      Will give BB if clinically indicated/stable after induction.   Pt disoriented this am.

## 2018-08-13 NOTE — PROGRESS NOTES
Hospitalist Progress Note    NAME: Vic Heck   :  1929   MRN:  [id number]       Assessment / Plan:  Periprosthetic fracture around internal prosthetic right knee joint POA: will need Surgical approach, Under Ortho care. S/p ORIF done 18  Atrial flutter with RVR recurrent problem, currently in Sinus Bradycardia  Bradycardiac : c/w Amiodarone, D/c BB, monitor, Cardiology in the case   Hypovolemia POA Creatinine above baseline, c/w IVF. So far improved, monitor  Frequent falls  Alzheimer's dementia POA  Sundowning  Watch for agitation, Last admit required a sitter  E coli UTI POA/Overactive bladder: UTI resolved, completed CTX last admission  Normal Weight: BMI 21.92  Surrogate Decision Maker: Jaqueline Ryan) is mPOA  Code status: DNR  DDNR signed 18  Prophylaxis: as per Ortho  Recommended Disposition: SNF/LTC     Ortho is primary in the case, check labs on AM if no other issues may sign off     Subjective:     Chief Complaint / Reason for Physician Visit  \"I feel OK\"  Demented patient can not provide a meaningful hisotry. Discussed with RN events overnight. Review of Systems:  Symptom Y/N Comments  Symptom Y/N Comments   Fever/Chills    Chest Pain     Poor Appetite    Edema     Cough    Abdominal Pain     Sputum    Joint Pain     SOB/ADAME    Pruritis/Rash     Nausea/vomit    Tolerating PT/OT     Diarrhea    Tolerating Diet y    Constipation    Other       Could NOT obtain due to: Dementia     Objective:     VITALS:   Last 24hrs VS reviewed since prior progress note.  Most recent are:  Patient Vitals for the past 24 hrs:   Temp Pulse Resp BP SpO2   18 1536 96.4 °F (35.8 °C) (!) 55 16 94/48 95 %   18 1336 97.8 °F (36.6 °C) 67 16 110/51 96 %   18 1315 97.4 °F (36.3 °C) 66 16 (!) 112/35 98 %   18 1245 97.3 °F (36.3 °C) 64 16 95/47 95 %   18 1217 96.7 °F (35.9 °C) (!) 59 16 103/52 99 %   18 1204 - 61 17 - 96 %   18 1200 - 61 13 100/45 96 %   18 1148 - 62 18 97/58 97 %   08/13/18 1147 - (!) 59 10 (!) 101/34 94 %   08/13/18 1145 - 60 13 97/43 93 %   08/13/18 1143 - 63 15 - 93 %   08/13/18 1137 - 62 16 105/48 94 %   08/13/18 1115 98.1 °F (36.7 °C) 68 19 109/44 91 %   08/13/18 1101 - 69 13 108/46 96 %   08/13/18 1055 - 69 15 (!) 100/33 97 %   08/13/18 1050 - 67 15 93/45 94 %   08/13/18 1045 - 68 16 98/47 94 %   08/13/18 1044 98.8 °F (37.1 °C) 69 17 98/46 95 %   08/13/18 1043 - - - 98/46 -   08/13/18 0658 98.7 °F (37.1 °C) 66 16 167/68 97 %   08/13/18 0400 98.1 °F (36.7 °C) 67 16 127/53 99 %   08/13/18 0014 98 °F (36.7 °C) (!) 58 17 146/69 96 %   08/12/18 2000 98.4 °F (36.9 °C) (!) 59 16 130/60 98 %       Intake/Output Summary (Last 24 hours) at 08/13/18 1606  Last data filed at 08/13/18 1143   Gross per 24 hour   Intake             1250 ml   Output              525 ml   Net              725 ml        PHYSICAL EXAM:  General: WD, WN. Alert, cooperative, confused  EENT:  EOMI. Anicteric sclerae. MMM  Resp:  Coarse BS,  CV:  Regular  rhythm,  No edema  GI:  Soft, Non distended, Non tender.  +Bowel sounds  Neurologic:  Alert and oriented X 2, normal speech,   Psych:   Poor  insight. Not anxious nor agitated  Skin:  No rashes. No jaundice    Reviewed most current lab test results and cultures  YES  Reviewed most current radiology test results   YES  Review and summation of old records today    NO  Reviewed patient's current orders and MAR    YES  PMH/SH reviewed - no change compared to H&P  ________________________________________________________________________  Care Plan discussed with:    Comments   Patient y    Family  y DTR   RN y    Care Manager     Consultant                        Multidiciplinary team rounds were held today with , nursing, pharmacist and clinical coordinator. Patient's plan of care was discussed; medications were reviewed and discharge planning was addressed. ________________________________________________________________________  Total NON critical care TIME:  25  Minutes    Total CRITICAL CARE TIME Spent:   Minutes non procedure based      Comments   >50% of visit spent in counseling and coordination of care y    ________________________________________________________________________  Warden Lewis MD     Procedures: see electronic medical records for all procedures/Xrays and details which were not copied into this note but were reviewed prior to creation of Plan. LABS:  I reviewed today's most current labs and imaging studies.   Pertinent labs include:  Recent Labs      08/13/18   0605 08/11/18 1939   WBC  11.0  10.1   HGB  10.8*  11.8   HCT  32.9*  35.3   PLT  382  450*     Recent Labs      08/13/18   0605 08/11/18 1939   NA  138  138   K  3.9  4.7   CL  105  102   CO2  27  30   GLU  95  117*   BUN  14  29*   CREA  0.68  1.00   CA  8.2*  8.7   MG  2.3   --    ALB  3.1*  3.5   TBILI  0.6  0.6   SGOT  13*  17   ALT  19  26   INR   --   1.1       Signed: Warden Lewis MD

## 2018-08-14 PROBLEM — R00.1 BRADYCARDIA: Status: ACTIVE | Noted: 2018-08-14

## 2018-08-14 PROBLEM — I48.0 PAROXYSMAL ATRIAL FIBRILLATION (HCC): Chronic | Status: ACTIVE | Noted: 2017-08-28

## 2018-08-14 LAB
BASOPHILS # BLD: 0 K/UL (ref 0–0.1)
BASOPHILS NFR BLD: 0 % (ref 0–1)
DIFFERENTIAL METHOD BLD: ABNORMAL
EOSINOPHIL # BLD: 0 K/UL (ref 0–0.4)
EOSINOPHIL NFR BLD: 0 % (ref 0–7)
ERYTHROCYTE [DISTWIDTH] IN BLOOD BY AUTOMATED COUNT: 14.1 % (ref 11.5–14.5)
HCT VFR BLD AUTO: 22.9 % (ref 35–47)
HGB BLD-MCNC: 7.4 G/DL (ref 11.5–16)
IMM GRANULOCYTES # BLD: 0.1 K/UL (ref 0–0.04)
IMM GRANULOCYTES NFR BLD AUTO: 1 % (ref 0–0.5)
LYMPHOCYTES # BLD: 1.9 K/UL (ref 0.8–3.5)
LYMPHOCYTES NFR BLD: 15 % (ref 12–49)
MCH RBC QN AUTO: 31.5 PG (ref 26–34)
MCHC RBC AUTO-ENTMCNC: 32.3 G/DL (ref 30–36.5)
MCV RBC AUTO: 97.4 FL (ref 80–99)
MONOCYTES # BLD: 1.7 K/UL (ref 0–1)
MONOCYTES NFR BLD: 13 % (ref 5–13)
NEUTS SEG # BLD: 9.4 K/UL (ref 1.8–8)
NEUTS SEG NFR BLD: 71 % (ref 32–75)
NRBC # BLD: 0 K/UL (ref 0–0.01)
NRBC BLD-RTO: 0 PER 100 WBC
PLATELET # BLD AUTO: 254 K/UL (ref 150–400)
PMV BLD AUTO: 10.6 FL (ref 8.9–12.9)
RBC # BLD AUTO: 2.35 M/UL (ref 3.8–5.2)
WBC # BLD AUTO: 13.2 K/UL (ref 3.6–11)

## 2018-08-14 PROCEDURE — 74011250637 HC RX REV CODE- 250/637: Performed by: NURSE PRACTITIONER

## 2018-08-14 PROCEDURE — 85025 COMPLETE CBC W/AUTO DIFF WBC: CPT | Performed by: INTERNAL MEDICINE

## 2018-08-14 PROCEDURE — G8978 MOBILITY CURRENT STATUS: HCPCS

## 2018-08-14 PROCEDURE — 94760 N-INVAS EAR/PLS OXIMETRY 1: CPT

## 2018-08-14 PROCEDURE — 97165 OT EVAL LOW COMPLEX 30 MIN: CPT | Performed by: OCCUPATIONAL THERAPIST

## 2018-08-14 PROCEDURE — P9016 RBC LEUKOCYTES REDUCED: HCPCS | Performed by: EMERGENCY MEDICINE

## 2018-08-14 PROCEDURE — 30233N1 TRANSFUSION OF NONAUTOLOGOUS RED BLOOD CELLS INTO PERIPHERAL VEIN, PERCUTANEOUS APPROACH: ICD-10-PCS | Performed by: ORTHOPAEDIC SURGERY

## 2018-08-14 PROCEDURE — 74011250636 HC RX REV CODE- 250/636: Performed by: ORTHOPAEDIC SURGERY

## 2018-08-14 PROCEDURE — 97530 THERAPEUTIC ACTIVITIES: CPT | Performed by: OCCUPATIONAL THERAPIST

## 2018-08-14 PROCEDURE — G8988 SELF CARE GOAL STATUS: HCPCS | Performed by: OCCUPATIONAL THERAPIST

## 2018-08-14 PROCEDURE — G8987 SELF CARE CURRENT STATUS: HCPCS | Performed by: OCCUPATIONAL THERAPIST

## 2018-08-14 PROCEDURE — 36415 COLL VENOUS BLD VENIPUNCTURE: CPT | Performed by: INTERNAL MEDICINE

## 2018-08-14 PROCEDURE — 97161 PT EVAL LOW COMPLEX 20 MIN: CPT

## 2018-08-14 PROCEDURE — 97530 THERAPEUTIC ACTIVITIES: CPT

## 2018-08-14 PROCEDURE — 36430 TRANSFUSION BLD/BLD COMPNT: CPT

## 2018-08-14 PROCEDURE — 74011250637 HC RX REV CODE- 250/637: Performed by: ORTHOPAEDIC SURGERY

## 2018-08-14 PROCEDURE — 74011250636 HC RX REV CODE- 250/636: Performed by: INTERNAL MEDICINE

## 2018-08-14 PROCEDURE — 65270000029 HC RM PRIVATE

## 2018-08-14 PROCEDURE — G8979 MOBILITY GOAL STATUS: HCPCS

## 2018-08-14 RX ORDER — SODIUM CHLORIDE 9 MG/ML
250 INJECTION, SOLUTION INTRAVENOUS AS NEEDED
Status: DISCONTINUED | OUTPATIENT
Start: 2018-08-14 | End: 2018-08-17 | Stop reason: HOSPADM

## 2018-08-14 RX ORDER — ENOXAPARIN SODIUM 100 MG/ML
30 INJECTION SUBCUTANEOUS EVERY 24 HOURS
Status: DISCONTINUED | OUTPATIENT
Start: 2018-08-15 | End: 2018-08-17 | Stop reason: HOSPADM

## 2018-08-14 RX ADMIN — ACETAMINOPHEN 650 MG: 325 TABLET ORAL at 17:28

## 2018-08-14 RX ADMIN — CALCIUM CARBONATE 500 MG (1,250 MG)-VITAMIN D3 200 UNIT TABLET 1 TABLET: at 17:27

## 2018-08-14 RX ADMIN — ACETAMINOPHEN 650 MG: 325 TABLET ORAL at 01:51

## 2018-08-14 RX ADMIN — SODIUM CHLORIDE 75 ML/HR: 900 INJECTION, SOLUTION INTRAVENOUS at 11:25

## 2018-08-14 RX ADMIN — Medication 2 G: at 01:51

## 2018-08-14 RX ADMIN — AMIODARONE HYDROCHLORIDE 200 MG: 200 TABLET ORAL at 09:26

## 2018-08-14 RX ADMIN — CALCIUM CARBONATE 500 MG (1,250 MG)-VITAMIN D3 200 UNIT TABLET 1 TABLET: at 13:59

## 2018-08-14 RX ADMIN — Medication 10 ML: at 21:52

## 2018-08-14 RX ADMIN — ACETAMINOPHEN 650 MG: 325 TABLET ORAL at 13:59

## 2018-08-14 RX ADMIN — ACETAMINOPHEN 650 MG: 325 TABLET ORAL at 06:29

## 2018-08-14 RX ADMIN — OXYBUTYNIN CHLORIDE 10 MG: 5 TABLET, EXTENDED RELEASE ORAL at 09:26

## 2018-08-14 RX ADMIN — Medication 10 ML: at 06:30

## 2018-08-14 RX ADMIN — ENOXAPARIN SODIUM 30 MG: 30 INJECTION, SOLUTION INTRAVENOUS; SUBCUTANEOUS at 10:08

## 2018-08-14 RX ADMIN — SENNOSIDES AND DOCUSATE SODIUM 1 TABLET: 8.6; 5 TABLET ORAL at 09:26

## 2018-08-14 RX ADMIN — CALCIUM CARBONATE 500 MG (1,250 MG)-VITAMIN D3 200 UNIT TABLET 1 TABLET: at 09:26

## 2018-08-14 RX ADMIN — POLYETHYLENE GLYCOL 3350 17 G: 17 POWDER, FOR SOLUTION ORAL at 09:26

## 2018-08-14 RX ADMIN — ENOXAPARIN SODIUM 30 MG: 30 INJECTION, SOLUTION INTRAVENOUS; SUBCUTANEOUS at 01:52

## 2018-08-14 RX ADMIN — SENNOSIDES AND DOCUSATE SODIUM 1 TABLET: 8.6; 5 TABLET ORAL at 17:28

## 2018-08-14 RX ADMIN — SENNOSIDES 17.2 MG: 8.6 TABLET, FILM COATED ORAL at 17:27

## 2018-08-14 RX ADMIN — ACETAMINOPHEN 650 MG: 325 TABLET ORAL at 23:43

## 2018-08-14 NOTE — PROGRESS NOTES
Hospitalist Consult Service Progress Note    NAME: Jovanna Ang   :  1929   MRN:  [id number]       Assessment / Plan:  Periprosthetic fracture around internal prosthetic right knee joint POA- s/p ORIF   Post op Anemia- Hb dropped to 7.4 from 10.8    Cont post op care as per primary Ortho team  Recommend 1 unit PRBC post op today - deferred to Ortho team  Will decrease post op IVF to 75ml/hr for 6 more hrs then STOP-to prevent hemodilution & volume overload    Will Sign off today as not much to add to the care plan-- recall us if needed    Paroxsymal Atrial Fib/flutter with RVR recurrent problem,  Sinus Bradycardia POA- now resolved on holding BB    Cont Amiodarone  DCd BB  Cont to monitor post op  Cardiology Dr Suhail Blake following    Hypovolemia POA Creatinine above baseline, c/w IVF. So far improved, monitor  Frequent falls  Alzheimer's dementia POA  Sundowning  Watch for agitation, Last admit required a sitter    E coli UTI POA/Overactive bladder: UTI resolved, completed CTX last admission      Normal Weight: BMI 21.92  Surrogate Decision Maker: Charla Serrae Sport) is mPOA    Code status: DNR  DDNR signed 18 - scanned in EMR/[facility]  Prophylaxis: as per Ortho  Recommended Disposition: SNF/LTC        Subjective:     Chief Complaint / Reason for Physician Visit: F/U Dementia, bradycardia/pAfib, Post op anemia  \"I am fine\"  Demented patient can not provide a meaningful hisotry. Discussed with RN events overnight. Review of Systems:  Symptom Y/N Comments  Symptom Y/N Comments   Fever/Chills    Chest Pain     Poor Appetite    Edema     Cough    Abdominal Pain     Sputum    Joint Pain     SOB/ADAME    Pruritis/Rash     Nausea/vomit    Tolerating PT/OT     Diarrhea    Tolerating Diet y    Constipation    Other       Could NOT obtain due to: Dementia     Objective:     VITALS:   Last 24hrs VS reviewed since prior progress note.  Most recent are:  Patient Vitals for the past 24 hrs:   Temp Pulse Resp BP SpO2   08/14/18 0924 98.2 °F (36.8 °C) 82 16 102/56 100 %   08/14/18 0400 98 °F (36.7 °C) 76 16 113/58 95 %   08/14/18 0028 98.3 °F (36.8 °C) 70 16 104/54 98 %   08/13/18 2106 98.1 °F (36.7 °C) (!) 58 17 109/54 97 %   08/13/18 2000 98 °F (36.7 °C) 64 16 (!) 86/48 96 %   08/13/18 1708 97.9 °F (36.6 °C) 66 16 (!) 82/45 95 %   08/13/18 1536 96.4 °F (35.8 °C) (!) 55 16 94/48 95 %   08/13/18 1336 97.8 °F (36.6 °C) 67 16 110/51 96 %   08/13/18 1315 97.4 °F (36.3 °C) 66 16 (!) 112/35 98 %   08/13/18 1245 97.3 °F (36.3 °C) 64 16 95/47 95 %   08/13/18 1217 96.7 °F (35.9 °C) (!) 59 16 103/52 99 %   08/13/18 1204 - 61 17 - 96 %   08/13/18 1200 - 61 13 100/45 96 %   08/13/18 1148 - 62 18 97/58 97 %   08/13/18 1147 - (!) 59 10 (!) 101/34 94 %   08/13/18 1145 - 60 13 97/43 93 %   08/13/18 1143 - 63 15 - 93 %   08/13/18 1137 - 62 16 105/48 94 %   08/13/18 1115 98.1 °F (36.7 °C) 68 19 109/44 91 %   08/13/18 1101 - 69 13 108/46 96 %   08/13/18 1055 - 69 15 (!) 100/33 97 %   08/13/18 1050 - 67 15 93/45 94 %   08/13/18 1045 - 68 16 98/47 94 %   08/13/18 1044 98.8 °F (37.1 °C) 69 17 98/46 95 %   08/13/18 1043 - - - 98/46 -       Intake/Output Summary (Last 24 hours) at 08/14/18 1008  Last data filed at 08/14/18 0400   Gross per 24 hour   Intake              490 ml   Output             1075 ml   Net             -585 ml        PHYSICAL EXAM:  General: WD, WN. Alert, cooperative, confused  EENT:  EOMI. Anicteric sclerae. MMM  Resp:  Coarse BS,  CV:  Regular  rhythm,  No edema  GI:  Soft, Non distended, Non tender.  +Bowel sounds  Neurologic:  Alert and oriented X 2, normal speech,   Psych:   Poor  insight. Not anxious nor agitated, dementia noted +  Skin:  No rashes.   No jaundice    Reviewed most current lab test results and cultures  YES  Reviewed most current radiology test results   YES  Review and summation of old records today    NO  Reviewed patient's current orders and MAR    YES  PMH/SH reviewed - no change compared to H&P  ________________________________________________________________________  Care Plan discussed with:    Comments   Patient x    Family      RN x    Care Manager     Consultant                        Multidiciplinary team rounds were held today with , nursing, pharmacist and clinical coordinator. Patient's plan of care was discussed; medications were reviewed and discharge planning was addressed. ________________________________________________________________________  Total NON critical care TIME:  16  Minutes    Total CRITICAL CARE TIME Spent:   Minutes non procedure based      Comments   >50% of visit spent in counseling and coordination of care     ________________________________________________________________________  Aidan Cochran MD     Procedures: see electronic medical records for all procedures/Xrays and details which were not copied into this note but were reviewed prior to creation of Plan. LABS:  I reviewed today's most current labs and imaging studies.   Pertinent labs include:  Recent Labs      08/14/18 0627 08/13/18 0605 08/11/18 1939   WBC  13.2*  11.0  10.1   HGB  7.4*  10.8*  11.8   HCT  22.9*  32.9*  35.3   PLT  254  382  450*     Recent Labs      08/13/18   0605 08/11/18 1939   NA  138  138   K  3.9  4.7   CL  105  102   CO2  27  30   GLU  95  117*   BUN  14  29*   CREA  0.68  1.00   CA  8.2*  8.7   MG  2.3   --    ALB  3.1*  3.5   TBILI  0.6  0.6   SGOT  13*  17   ALT  19  26   INR   --   1.1       Signed: Aidan Cochran MD

## 2018-08-14 NOTE — PROGRESS NOTES
Occupational Therapy Goals  Initiated 8/14/2018  1. Patient will perform grooming seated EOB with moderate assistance  within 7 day(s). 2.  Patient will perform upper body dressing with moderate assistance  within 7 day(s). 3.  Patient will perform sponge bathing with moderate assistance  within 7 day(s). 4.  Patient will perform BSC transfers via stand pivot NWB on R LE with maximal assistance within 7 day(s). 5.  Patient will perform all aspects of toileting with maximal assistance within 7 day(s). Occupational Therapy EVALUATION  Patient: Tyree Powers (60 y.o. female)  Date: 8/14/2018  Primary Diagnosis: Periprosthetic fracture around internal prosthetic right knee joint  right femur fracture  Procedure(s) (LRB):  DISTAL RIGHT FEMUR, OPEN REDUCTION INTERNAL FIXATION (Right) 1 Day Post-Op   Precautions: RLE NWB       ASSESSMENT :  Based on the objective data described below, the patient presents with RLE pain s/p fx and ORIF, RLE NWB precautions. confusion, nonfunctional B shoulder ROM, GW, decreased activity tolerance, decreased safety awareness and decreased balance which is impairing her functional independence. She is functioning well below her what sounds like a fairly independent baseline prior to her previous admit in July. Clearly patient had been having falls and was found to be unable to maintain the cleanliness of her home during her July admit, which obviously indicates an inability to truly care for herself. At this time she is total A for bed mobility, was unable to stand or transfer to chair and is min A to total A for ADLs. Patient will benefit from skilled intervention to address the above impairments and will need SNF rehab at discharge.    Patients rehabilitation potential is considered to be Fair  Factors which may influence rehabilitation potential include:   []             None noted  [x]             Mental ability/status  []             Medical condition  [] Home/family situation and support systems  [x]             Safety awareness  []             Pain tolerance/management  []             Other:      PLAN :  Recommendations and Planned Interventions:  [x]               Self Care Training                  []        Therapeutic Activities  [x]               Functional Mobility Training    [x]        Cognitive Retraining  [x]               Therapeutic Exercises           [x]        Endurance Activities  [x]               Balance Training                   []        Neuromuscular Re-Education  []               Visual/Perceptual Training     [x]   Home Safety Training  [x]               Patient Education                 [x]        Family Training/Education  []               Other (comment):    Frequency/Duration: Patient will be followed by occupational therapy 3 times a week to address goals. Discharge Recommendations: Justin Avila  Further Equipment Recommendations for Discharge: TBD         OBJECTIVE DATA SUMMARY:     Past Medical History:   Diagnosis Date    A-fib [facility])     Alzheimer disease     Alzheimer's dementia     Arthritis      Past Surgical History:   Procedure Laterality Date    HX BREAST AUGMENTATION      HX ORTHOPAEDIC      TKR--bilateral      Prior Level of Function/Home Situation: Patient's functional baseline is unclear due to patient being a poor historian. As per 7/30/18 OT evaluation during previous admit the pt lives at [facility] in [address] living and family calling to check on her daily. As of her admit in July the pts home was found to be unclean and urine and stool on furniture and furniture was scattered around the room.   Pt has had frequent falls  Expanded or extensive additional review of patient history:     Home Situation  Home Environment: [address] [place] [address] [facility] [employer]  One/Two Story Residence: One story  Living Alone: No  Support Systems: Family member(s), Skilled nursing facility  Patient Expects to be Discharged to[de-identified] Skilled nursing facility  Current DME Used/Available at Home: None  [x]  Right hand dominant   []  Left hand dominant  Cognitive/Behavioral Status:  Neurologic State: Alert;Confused  Orientation Level: Disoriented to place; Disoriented to situation;Oriented to person (oriented to month and year)  Cognition: Impaired decision making;Decreased attention/concentration; Follows commands;Memory loss;Poor safety awareness  Perception: Appears intact     Safety/Judgement: Lack of insight into deficits    Vision/Perceptual:      Acuity: Within Defined Limits    Corrective Lenses: Glasses  Range of Motion:  AROM: Grossly decreased, non-functional (B shoulders and RLE. Decreased/functional B elbows-hands,LLE)     Strength:  Strength: Grossly decreased, non-functional (B shoulders and RLE. Decreased/functional B elbows-hands,LLE)  Coordination:     Fine Motor Skills-Upper: Left Intact; Right Intact       Tone & Sensation:  Tone: Normal  Balance:  Sitting: Impaired  Sitting - Static: Good (unsupported)  Sitting - Dynamic: Fair (occasional)  Functional Mobility and Transfers for ADLs:  Bed Mobility:  Rolling: Total assistance  Supine to Sit: Total assistance  Sit to Supine: Total assistance  Scooting: Total assistance  Transfers:  Sit to Stand:  (attempted, but patient unable)                               ADL Assessment:  Feeding: Minimum assistance    Oral Facial Hygiene/Grooming: Maximum assistance    Bathing: Maximum assistance    Upper Body Dressing: Maximum assistance    Lower Body Dressing: Total assistance    Toileting:  Total assistance                Functional Measure:  Barthel Index:    Bathin  Bladder: 0  Bowels: 10  Groomin  Dressin  Feedin  Mobility: 0  Stairs: 0  Toilet Use: 0  Transfer (Bed to Chair and Back): 5  Total: 15       Barthel and G-code impairment scale:  Percentage of impairment CH  0% CI  1-19% CJ  20-39% CK  40-59% CL  60-79% CM  80-99% CN  100%   Barthel Score 0-100 100 99-80 79-60 59-40 20-39 1-19   0   Barthel Score 0-20 20 17-19 13-16 9-12 5-8 1-4 0      The Barthel ADL Index: Guidelines  1. The index should be used as a record of what a patient does, not as a record of what a patient could do. 2. The main aim is to establish degree of independence from any help, physical or verbal, however minor and for whatever reason. 3. The need for supervision renders the patient not independent. 4. A patient's performance should be established using the best available evidence. Asking the patient, friends/relatives and nurses are the usual sources, but direct observation and common sense are also important. However direct testing is not needed. 5. Usually the patient's performance over the preceding 24-48 hours is important, but occasionally longer periods will be relevant. 6. Middle categories imply that the patient supplies over 50 per cent of the effort. 7. Use of aids to be independent is allowed. Deja Pitt., Barthel, D.W. (8383). Functional evaluation: the Barthel Index. [facility] (14)2. Ariadna Muñiz evens NAVEED DiehlF, Elham Chowdhury., Tiara Rojas., [place], [facility] (1999). Measuring the change indisability after inpatient rehabilitation; comparison of the responsiveness of the Barthel Index and Functional [place] Measure. Journal of Neurology, Neurosurgery, and Psychiatry, 66(4), 801-052. Chacha Dejesus, N.J.A, JOSÉ ANTONIO Farrell, & Nando Bowie MJoshuaA. (2004.) Assessment of post-stroke quality of life in cost-effectiveness studies: The usefulness of the Barthel Index and the EuroQoL-5D. Quality of Life Research, 13, 427-13       In compliance with CMSs Claims Based Outcome Reporting, the following G-code set was chosen for this patient based on their primary functional limitation being treated:     The outcome measure chosen to determine the severity of the functional limitation was the Barthel Index with a score of 15/100 which was correlated with the impairment scale. ? Self Care:     - CURRENT STATUS: CM - 80%-99% impaired, limited or restricted    - GOAL STATUS:  CL - 60%-79% impaired, limited or restricted    - D/C STATUS:  ---------------To be determined---------------         Intervention and task modifications:  Initiated bed mobility and self feeding training    Pain:  Pain Scale 1: Numeric (0 - 10)  Pain Intensity 1: 0              Activity Tolerance:   VSS. Supine BP:100/52, HR: 73  Seated BP: 110/60, HR:83  Please refer to the flowsheet for vital signs taken during this treatment. After treatment:   [] Patient left in no apparent distress sitting up in chair  [x] Patient left in no apparent distress in bed  [x] Call bell left within reach  [x] Nursing notified  [] Caregiver present  [] Bed alarm activated    COMMUNICATION/EDUCATION:   The patients plan of care was discussed with: Physical Therapist and Registered Nurse.  [] Home safety education was provided and the patient/caregiver indicated understanding. [] Patient/family have participated as able in goal setting and plan of care. [] Patient/family agree to work toward stated goals and plan of care. [] Patient understands intent and goals of therapy, but is neutral about his/her participation. [x] Patient is unable to participate in goal setting and plan of care. This patients plan of care is appropriate for delegation to [facility].     Thank you for this referral.  Bam Smith, OTR/L

## 2018-08-14 NOTE — PROGRESS NOTES
Initial Nutrition Assessment:    INTERVENTIONS/RECOMMENDATIONS:   · Continue current diet  · Continue supplement order    ASSESSMENT:   Chart reviewed, medically noted for s/p distal right femur ORIF and PMH shown below. Pt well known to me from recent admission, at which time she was eating fairly well and receiving ensure. She would only consume ~2 ensure per day. Ensure enlive and ensure pudding added QID yesterday. Will decrease to TID. Past Medical History:   Diagnosis Date    A-fib [facility])     Alzheimer disease     Alzheimer's dementia     Arthritis        Diet Order: Regular  % Eaten:  Patient Vitals for the past 72 hrs:   % Diet Eaten   08/13/18 1810 70 %     Pertinent Medications: [x]Reviewed: os-praveen, miralax, senna, pericolace  Pertinent Labs: [x]Reviewed:   Food Allergies: [x]NKFA  []Other   Last BM: 8/12  Edema:        []RUE   []LUE   []RLE   []LLE      Pressure Injury:      [] Stage I   [] Stage II   [] Stage III   [] Stage IV      Wt Readings from Last 30 Encounters:   08/11/18 52.6 kg (116 lb)   07/28/18 53 kg (116 lb 13.5 oz)   05/24/18 53 kg (116 lb 13.5 oz)   05/22/18 52.3 kg (115 lb 3.2 oz)   03/09/18 54.4 kg (120 lb)   02/20/18 54.4 kg (120 lb)   12/05/17 52.2 kg (115 lb)   08/28/17 52.2 kg (115 lb)       Anthropometrics:   Height:   Weight: 52.6 kg (116 lb)   IBW (%IBW):   ( ) UBW (%UBW):   (  %)   Last Weight Metrics:  Weight Loss Metrics 8/11/2018 7/28/2018 5/24/2018 5/22/2018 3/9/2018 2/20/2018 12/5/2017   Today's Wt 116 lb 116 lb 13.5 oz 116 lb 13.5 oz 115 lb 3.2 oz 120 lb 120 lb 115 lb   BMI 21.92 kg/m2 22.08 kg/m2 22.08 kg/m2 21.77 kg/m2 22.67 kg/m2 22.67 kg/m2 21.73 kg/m2       BMI: Body mass index is 21.92 kg/(m^2). This BMI is indicative of:   []Underweight    [x]Normal    []Overweight    [] Obesity   [] Extreme Obesity (BMI>40)     Estimated Nutrition Needs (Based on):   1175 Kcals/day (BMR: 900 x 1.3) , 65 g (1.2 g/kg) Protein  Carbohydrate:  At Least 130 g/day  Fluids: 1175 mL/day (1ml/kcal) or per primary team    NUTRITION DIAGNOSES:   Problem:  Increased nutrient needs      Etiology: related to fracture healing     Signs/Symptoms: as evidenced by s/p DISTAL RIGHT FEMUR, OPEN REDUCTION INTERNAL FIXATION      NUTRITION INTERVENTIONS:  Meals/Snacks: General/healthful diet   Supplements: Commercial supplement              GOAL:   consume >50% of meals and ONS in 3-5 days    LEARNING NEEDS (Diet, Food/Nutrient-Drug Interaction):    [x] None Identified   [] Identified and Education Provided/Documented   [] Identified and Pt declined/was not appropriate     Cultureal, Sikh, OR Ethnic Dietary Needs:    [x] None Identified   [] Identified and Addressed     [x] Interdisciplinary Care Plan Reviewed/Documented    [x] Discharge Planning:   General healthy diet with ONS prn to meet nutrition needs    MONITORING /EVALUATION:      Food/Nutrient Intake Outcomes:  Total energy intake  Physical Signs/Symptoms Outcomes: Weight/weight change, Electrolyte and renal profile, GI    NUTRITION RISK:    [] High              [x] Moderate           []  Low  []  Minimal/Uncompromised    PT SEEN FOR:    [x]  MD Consult: []Calorie Count      []Diabetic Diet Education        []Diet Education     []Electrolyte Management     [x]General Nutrition Management and Supplements     []Management of Tube Feeding     []TPN Recommendations    []  RN Referral:  []MST score >=2     []Enteral/Parenteral Nutrition PTA     []Pregnant: Gestational DM or Multigestation     []Pressure Ulcer/Wound Care needs        []  Low BMI  []  LOS Referral       Shirley Donahue RDN  Pager [phone]  Weekend Pager [phone]

## 2018-08-14 NOTE — PROGRESS NOTES
Pod 1 periprosthetic femur fracture. Pain managed. Getting up the the chair now.     Patient Vitals for the past 24 hrs:   Temp Pulse Resp BP SpO2   08/14/18 0924 98.2 °F (36.8 °C) 82 16 102/56 100 %   08/14/18 0400 98 °F (36.7 °C) 76 16 113/58 95 %   08/14/18 0028 98.3 °F (36.8 °C) 70 16 104/54 98 %   08/13/18 2106 98.1 °F (36.7 °C) (!) 58 17 109/54 97 %   08/13/18 2000 98 °F (36.7 °C) 64 16 (!) 86/48 96 %   08/13/18 1708 97.9 °F (36.6 °C) 66 16 (!) 82/45 95 %   08/13/18 1536 96.4 °F (35.8 °C) (!) 55 16 94/48 95 %   08/13/18 1336 97.8 °F (36.6 °C) 67 16 110/51 96 %   08/13/18 1315 97.4 °F (36.3 °C) 66 16 (!) 112/35 98 %   08/13/18 1245 97.3 °F (36.3 °C) 64 16 95/47 95 %   08/13/18 1217 96.7 °F (35.9 °C) (!) 59 16 103/52 99 %   08/13/18 1204 - 61 17 - 96 %     Dressing clean and dry      nwb  lovenox for dvt  Dc planning  hgb 7.4 medicine recommends 1 uprbc will orger  Dc plan back to assisted living if they can manage with nwb status

## 2018-08-14 NOTE — INTERDISCIPLINARY ROUNDS
Bedside interdisciplinary rounds were held today to discuss patient plan of care and outcomes. The following members were present: Nurse, Clinical Care Leader, Pharmacy, Physical Therapy, and Case Management. Plan:  Lovenox. PT/OT. Cards following.   [facility]

## 2018-08-14 NOTE — PROGRESS NOTES
Physical Therapy Goals  Initiated 8/14/2018  1. Patient will move from supine to sit and sit to supine , scoot up and down and roll side to side in bed with maximal assistance within 7 day(s). 2. Patient will transfer from bed to chair and chair to bed with maximal assistance using the least restrictive device within 7 day(s). 3. Patient will perform sit to stand with maximal assistance within 7 day(s). 4. Patient will ambulate with maximal assistance for 3 feet with the least restrictive device within 7 day(s). physical Therapy EVALUATION  Patient: Scout Cooley (14 y.o. female)  Date: 8/14/2018  Primary Diagnosis: Periprosthetic fracture around internal prosthetic right knee joint  right femur fracture  Procedure(s) (LRB):  DISTAL RIGHT FEMUR, OPEN REDUCTION INTERNAL FIXATION (Right) 1 Day Post-Op   Precautions:  NWB, Fall ? Use of immobilizer     ASSESSMENT :  Based on the objective data described below, the patient presents with orthostatic hypotension, baseline alzheimer's dementia, alongside post operative pain, weakness, impaired balance impacting function. Patient's PLOF largely unknown; admitted from SNF rehab s/p fall. Received in supine and agreeable, pleasantly confused. Donned knee immobilizer total A. She was unable to follow commands for bed mobility, thus total A required to come to EOB. She immediately noted dizziness, although BP stable from supine>sit. Patient unable to stand fully with total A of 2 required (to include lifting of RLE to avoid weight bearing) to marginally extend BUE and push to stand; crepitus in R shoulder felt throughout. Given difficulty, assisted via total A of 2 to squat-pivot to chair where began to conclude session. BP retrieved with significant drop, combined with 'moderate' report of dizziness. Given drop, assisted via similar pivot back to bed, returned to supine where session concluded and BP improved with dizziness resolved.          Patient will need return to SNF at [place]. BP:   120/55 supine  110/54 sitting  Unable to obtain standing due to functional difficulty   81/42 sitting in bedside chair   102/54 supine back in bed    Ortho PA asked to clarify whether patient needs to have knee immobilizer present donned for activity; he asked that we utilize it until clarified. Patient will benefit from skilled intervention to address the above impairments. Patients rehabilitation potential is considered to be Guarded  Factors which may influence rehabilitation potential include:   []         None noted  [x]         Mental ability/status  []         Medical condition  []         Home/family situation and support systems  []         Safety awareness  [x]         Pain tolerance/management  []         Other:      PLAN :  Recommendations and Planned Interventions:  [x]           Bed Mobility Training             []    Neuromuscular Re-Education  [x]           Transfer Training                   []    Orthotic/Prosthetic Training  [x]           Gait Training                         []    Modalities  [x]           Therapeutic Exercises           []    Edema Management/Control  [x]           Therapeutic Activities            [x]    Patient and Family Training/Education  []           Other (comment):    Frequency/Duration: Patient will be followed by physical therapy  daily to address goals. Discharge Recommendations: Justin Avila  Further Equipment Recommendations for Discharge: defer     SUBJECTIVE:   Patient stated I'm dizzy.     OBJECTIVE DATA SUMMARY:   HISTORY:    Past Medical History:   Diagnosis Date    A-fib ([facility] [address]     Alzheimer disease     Alzheimer's dementia     Arthritis      Past Surgical History:   Procedure Laterality Date    HX BREAST AUGMENTATION      HX ORTHOPAEDIC      TKR--bilateral      Prior Level of Function/Home Situation: Patient poor historian and unable to clarify.  Per prior notes on 7/30/2018, patient \"Pt is a poor historian, however per chart, pt lives in [place] at [facility]. Pt reports that she uses rollator for ambulation. H/o GLFs. Lives alone in [place]. Family in the area. \"  Personal factors and/or comorbidities impacting plan of care: alzheimer's disease     Home Situation  Home Environment: [address] [place] [address] Name: [facility]  One/Two Story Residence: One story  Living Alone: No  Support Systems: Family member(s), Skilled nursing facility  Patient Expects to be Discharged to[de-identified] Skilled nursing facility  Current DME Used/Available at Home: None    EXAMINATION/PRESENTATION/DECISION MAKING:   Critical Behavior:  Neurologic State: Alert, Confused  Orientation Level: Oriented to person, Oriented to place, Disoriented to time, Disoriented to situation  Cognition: Memory loss, Impaired decision making  Safety/Judgement: Lack of insight into deficits  Hearing: Auditory  Auditory Impairment: None  Range Of Motion:  AROM: Grossly decreased, non-functional (B shoulders and RLE. Decreased/functional B elbows-hands,LLE)                       Strength:    Strength: Grossly decreased, non-functional (B shoulders and RLE. Decreased/functional B elbows-hands,LLE)                    Tone & Sensation:   Tone: Normal              Sensation: Intact                    Vision:   Acuity: Within Defined Limits  Corrective Lenses: Glasses  Functional Mobility:  Bed Mobility:  Rolling: Total assistance  Supine to Sit: Total assistance  Sit to Supine: Total assistance  Scooting: Total assistance  Transfers:  Sit to Stand: Total assistance;Assist x2  Stand to Sit: Total assistance;Assist x2  Stand Pivot Transfers:  Total assistance (x2, bed<>chair)                    Balance:   Sitting: Impaired  Sitting - Static: Good (unsupported)  Sitting - Dynamic: Fair (occasional)  Standing: Impaired  Standing - Static: Poor;Constant support  Standing - Dynamic : Poor  Functional Measure:  Barthel Index:    Bathin  Bladder: 0  Bowels: 10  Groomin  Dressin  Feedin  Mobility: 0  Stairs: 0  Toilet Use: 0  Transfer (Bed to Chair and Back): 5  Total: 15       Barthel and G-code impairment scale:  Percentage of impairment CH  0% CI  1-19% CJ  20-39% CK  40-59% CL  60-79% CM  80-99% CN  100%   Barthel Score 0-100 100 99-80 79-60 59-40 20-39 1-19   0   Barthel Score 0-20 20 17-19 13-16 9-12 5-8 1-4 0      The Barthel ADL Index: Guidelines  1. The index should be used as a record of what a patient does, not as a record of what a patient could do. 2. The main aim is to establish degree of independence from any help, physical or verbal, however minor and for whatever reason. 3. The need for supervision renders the patient not independent. 4. A patient's performance should be established using the best available evidence. Asking the patient, friends/relatives and nurses are the usual sources, but direct observation and common sense are also important. However direct testing is not needed. 5. Usually the patient's performance over the preceding 24-48 hours is important, but occasionally longer periods will be relevant. 6. Middle categories imply that the patient supplies over 50 per cent of the effort. 7. Use of aids to be independent is allowed. Thea Meade., Barthel, D.W. (8787). Functional evaluation: the Barthel Index. [address] [facility] (14)2. ADALID Day, [facility]., [facility]., [place], [address] [place] [address] (). Measuring the change indisability after inpatient rehabilitation; comparison of the responsiveness of the Barthel Index and Functional [place] Measure. Journal of Neurology, Neurosurgery, and Psychiatry, 66(4), 452-444. Hannah Velez, N.J.A, Daly Kat  W.J.M, & Simeon Morris, M.A. (2004.) Assessment of post-stroke quality of life in cost-effectiveness studies: The usefulness of the Barthel Index and the EuroQoL-5D. Quality of Life Research, 13, 233-40         G codes:   In compliance with CMSs Claims Based Outcome Reporting, the following G-code set was chosen for this patient based on their primary functional limitation being treated: The outcome measure chosen to determine the severity of the functional limitation was the barthel with a score of 15/100 which was correlated with the impairment scale. ? Mobility - Walking and Moving Around:     - CURRENT STATUS: CM - 80%-99% impaired, limited or restricted    - GOAL STATUS: CL - 60%-79% impaired, limited or restricted    - D/C STATUS:  ---------------To be determined---------------     Pain:  Pain Scale 1: Numeric (0 - 10)  Pain Intensity 1: 0              Activity Tolerance:   BP limiting     Please refer to the flowsheet for vital signs taken during this treatment. After treatment:   []         Patient left in no apparent distress sitting up in chair  [x]         Patient left in no apparent distress in bed  [x]         Call bell left within reach  [x]         Nursing notified  []         Caregiver present  []         Bed alarm activated    COMMUNICATION/EDUCATION:   The patients plan of care was discussed with: Occupational Therapist and Registered Nurse.  []         Fall prevention education was provided and the patient/caregiver indicated understanding. []         Patient/family have participated as able in goal setting and plan of care. []         Patient/family agree to work toward stated goals and plan of care. []         Patient understands intent and goals of therapy, but is neutral about his/her participation. [x]         Patient is unable to participate in goal setting and plan of care.     Thank you for this referral.  Patricia Carter, PT, DPT   Board-Certified Geriatric Clinical Specialist   Certified Exercise Expert for Aging Adults      Time Calculation: 19 mins

## 2018-08-15 LAB
ABO + RH BLD: NORMAL
BLD PROD TYP BPU: NORMAL
BLOOD GROUP ANTIBODIES SERPL: NORMAL
BPU ID: NORMAL
CROSSMATCH RESULT,%XM: NORMAL
HGB BLD-MCNC: 8 G/DL (ref 11.5–16)
SPECIMEN EXP DATE BLD: NORMAL
STATUS OF UNIT,%ST: NORMAL
UNIT DIVISION, %UDIV: 0

## 2018-08-15 PROCEDURE — 65270000029 HC RM PRIVATE

## 2018-08-15 PROCEDURE — 36415 COLL VENOUS BLD VENIPUNCTURE: CPT | Performed by: ORTHOPAEDIC SURGERY

## 2018-08-15 PROCEDURE — 85018 HEMOGLOBIN: CPT | Performed by: ORTHOPAEDIC SURGERY

## 2018-08-15 PROCEDURE — 74011250636 HC RX REV CODE- 250/636: Performed by: ORTHOPAEDIC SURGERY

## 2018-08-15 PROCEDURE — 74011000250 HC RX REV CODE- 250: Performed by: INTERNAL MEDICINE

## 2018-08-15 PROCEDURE — 74011250637 HC RX REV CODE- 250/637: Performed by: INTERNAL MEDICINE

## 2018-08-15 PROCEDURE — 74011250637 HC RX REV CODE- 250/637: Performed by: NURSE PRACTITIONER

## 2018-08-15 PROCEDURE — 74011250637 HC RX REV CODE- 250/637: Performed by: ORTHOPAEDIC SURGERY

## 2018-08-15 RX ORDER — DILTIAZEM HYDROCHLORIDE 5 MG/ML
5 INJECTION INTRAVENOUS
Status: DISCONTINUED | OUTPATIENT
Start: 2018-08-15 | End: 2018-08-16

## 2018-08-15 RX ORDER — DILTIAZEM HYDROCHLORIDE 30 MG/1
30 TABLET, FILM COATED ORAL
Status: DISCONTINUED | OUTPATIENT
Start: 2018-08-15 | End: 2018-08-17 | Stop reason: HOSPADM

## 2018-08-15 RX ORDER — DILTIAZEM HYDROCHLORIDE 30 MG/1
30 TABLET, FILM COATED ORAL
Status: DISCONTINUED | OUTPATIENT
Start: 2018-08-16 | End: 2018-08-15

## 2018-08-15 RX ADMIN — ACETAMINOPHEN 650 MG: 325 TABLET ORAL at 12:38

## 2018-08-15 RX ADMIN — ENOXAPARIN SODIUM 30 MG: 30 INJECTION, SOLUTION INTRAVENOUS; SUBCUTANEOUS at 09:31

## 2018-08-15 RX ADMIN — Medication 10 ML: at 21:10

## 2018-08-15 RX ADMIN — CALCIUM CARBONATE 500 MG (1,250 MG)-VITAMIN D3 200 UNIT TABLET 1 TABLET: at 12:38

## 2018-08-15 RX ADMIN — ACETAMINOPHEN 650 MG: 325 TABLET ORAL at 06:21

## 2018-08-15 RX ADMIN — DILTIAZEM HYDROCHLORIDE 5 MG: 5 INJECTION INTRAVENOUS at 21:11

## 2018-08-15 RX ADMIN — SENNOSIDES AND DOCUSATE SODIUM 1 TABLET: 8.6; 5 TABLET ORAL at 17:40

## 2018-08-15 RX ADMIN — ACETAMINOPHEN 650 MG: 325 TABLET ORAL at 23:40

## 2018-08-15 RX ADMIN — OXYBUTYNIN CHLORIDE 10 MG: 5 TABLET, EXTENDED RELEASE ORAL at 09:30

## 2018-08-15 RX ADMIN — CALCIUM CARBONATE 500 MG (1,250 MG)-VITAMIN D3 200 UNIT TABLET 1 TABLET: at 09:31

## 2018-08-15 RX ADMIN — AMIODARONE HYDROCHLORIDE 200 MG: 200 TABLET ORAL at 09:30

## 2018-08-15 RX ADMIN — DILTIAZEM HYDROCHLORIDE 30 MG: 30 TABLET, FILM COATED ORAL at 22:59

## 2018-08-15 RX ADMIN — SENNOSIDES 17.2 MG: 8.6 TABLET, FILM COATED ORAL at 17:40

## 2018-08-15 RX ADMIN — Medication 10 ML: at 17:40

## 2018-08-15 RX ADMIN — Medication 10 ML: at 06:20

## 2018-08-15 RX ADMIN — CALCIUM CARBONATE 500 MG (1,250 MG)-VITAMIN D3 200 UNIT TABLET 1 TABLET: at 17:39

## 2018-08-15 RX ADMIN — ACETAMINOPHEN 650 MG: 325 TABLET ORAL at 17:39

## 2018-08-15 NOTE — PROGRESS NOTES
CM left voicemail for Ms. Nely Munguia ([phone]) to complete initial assessment for pt. CM will complete once spoken with pt niece. Care Management Interventions  PCP Verified by CM:  Yes  Mode of Transport at Discharge: BLS ([facility] )  Transition of Care Consult (CM Consult): SNF  Partner SNF: Yes  Discharge Durable Medical Equipment: No  Health Maintenance Reviewed: Yes  Physical Therapy Consult: Yes  Occupational Therapy Consult: Yes  Speech Therapy Consult: No  Current Support Network: [address] [place] [address]  Confirm Follow Up Transport: Other (see comment)  Plan discussed with Pt/Family/Caregiver: Yes  Freedom of Choice Offered: Yes  Discharge Location  Discharge Placement: Skilled nursing facility    BRENDAN Beckman, [address] [facility]   [phone]

## 2018-08-15 NOTE — PROGRESS NOTES
Patient complained of needing to have a bowel movement and not being able to go. Rectal vault manually probed, hard formed stool encountered immediately. Gently deimpacted patient over course of 15 minutes, patient finally able to pass large formed/soft stool followed by liquid stool. Patient states feeling much better. No trauma to anus noted.

## 2018-08-15 NOTE — PROGRESS NOTES
Progress Note    NAME: Byron Lubin   :  1929   MRN:  [id number]     Date/Time:  2018 12:21 PM    Patient PCP: Óscar Rajput MD  ________________________________________________________________________     Assessment:     1. S/p mechanical fall, preop for ortho surgery, noted to be bradycardic, now resolved  2. No hx of CAD  3. Echo 2018 EF 55%  4. Parox afib currently in sinus on amiodarone, felt to be poor anticoagulation candidate due to falls  5. Dementia  6. Resident of nursing home  7. DNR        Plan:     1. Cont baby ASA when OK with surgery  2. Cont amiodarone 200mg daily, this is her maintenance dose  3. BP too low for metoprolol, not long ago HR was, too. So, I'd continue to hold beta-blocker    Might try to get by with amiodarone alone for Afib management if BP doesn't allow either beta-blocker or CCB. Will check back tomorrow. [x]        High complexity decision making was performed    Subjective:      HISTORY OF PRESENT ILLNESS:     Byron Lubin, 80 y.o. female with PMHx significant for paroxysmal A-fib presented via EMS to the ED with cc of new onset right knee pain with associated deformity after a mechanical GLF. She has had surgery. Her HR and BP have been low. Currently in sinus rhythm on amiodarone. TODAY:  She is sleeping. Per nursing, was confused earlier, which is baseline for her.       Past Medical History:   Diagnosis Date    A-fib [facility])     Alzheimer disease     Alzheimer's dementia     Arthritis       Past Surgical History:   Procedure Laterality Date    HX BREAST AUGMENTATION      HX ORTHOPAEDIC      TKR--bilateral      No Known Allergies   Meds:  See below  Social History   Substance Use Topics    Smoking status: Never Smoker    Smokeless tobacco: Never Used    Alcohol use No      Family History   Problem Relation Age of Onset    Heart Attack Mother     Cancer Sister     Cancer Brother        REVIEW OF SYSTEMS:     [x]         Unable to obtain  ROS due to ---dementia   []         Total of 12 systems reviewed as follows: Total of 12 systems reviewed as follows:       POSITIVE= Bold text  Negative = normal text  General:  fever, chills, sweats, generalized weakness, weight loss/gain,      loss of appetite   Eyes:    blurred vision, eye pain, loss of vision, double vision  ENT:    rhinorrhea, pharyngitis   Respiratory:   cough, sputum production, SOB, ADAME, wheezing, pleuritic pain   Cardiology:   chest pain, palpitations, orthopnea, PND, edema, syncope   Gastrointestinal:  abdominal pain , N/V, diarrhea, dysphagia, constipation, bleeding   Genitourinary:  frequency, urgency, dysuria, hematuria, incontinence   Muskuloskeletal :  arthralgia, myalgia, back pain  Hematology:  easy bruising, nose or gum bleeding, lymphadenopathy   Dermatological: rash, ulceration, pruritis, color change / jaundice  Endocrine:   hot flashes or polydipsia   Neurological:  headache, dizziness, confusion, focal weakness, paresthesia,     Speech difficulties, memory loss, gait difficulty  Psychological: Feelings of anxiety, depression, agitation    Objective:      Physical Exam:    Last 24hrs VS reviewed since prior progress note. Most recent are:    Visit Vitals    /48    Pulse 70    Temp 97.6 °F (36.4 °C)    Resp 16    Wt 52.6 kg (116 lb)    SpO2 95%    BMI 21.92 kg/m2       Intake/Output Summary (Last 24 hours) at 08/14/18 2300  Last data filed at 08/14/18 1759   Gross per 24 hour   Intake              310 ml   Output              600 ml   Net             -290 ml        General Appearance: Well developed, elderly, alert & oriented x to self and hospital, no acute distress. Ears/Nose/Mouth/Throat: Pupils equal and round, Hearing grossly normal.  Neck: Supple. JVP within normal limits. Carotids good upstrokes, with no bruit. Chest: Lungs clear to auscultation bilaterally, unlabored, symmetric air movement.   Cardiovascular: Regular rate and rhythm, S1S2 normal, no murmur, rubs, gallops. Abdomen: Soft, non-tender, bowel sounds are active. No organomegaly. Extremities: No edema bilaterally. Distal Pulses +1. Skin: Warm and dry. No rash, petechiae, or purpura. Neuro: CN II-XII grossly intact, Strength and sensation grossly intact. Data:      Prior to Admission medications    Medication Sig Start Date End Date Taking? Authorizing Provider   amiodarone (CORDARONE) 200 mg tablet Take 1 Tab by mouth daily. Indications: VENTRICULAR RATE CONTROL IN ATRIAL FIBRILLATION 8/12/18  Yes Georges Weston MD   aspirin 81 mg chewable tablet Take 1 Tab by mouth daily. Indications: prevention of cerebrovascular accident 8/9/18  Yes Georges Weston MD   metoprolol succinate (TOPROL-XL) 100 mg tablet Take 1 Tab by mouth daily. 8/9/18  Yes Georges Weston MD   ferrous sulfate (IRON) 325 mg (65 mg iron) EC tablet Take 325 mg by mouth every evening. Yes Historical Provider   oxybutynin (DITROPAN) 5 mg tablet Take 5 mg by mouth two (2) times a day. Yes Historical Provider   lutein 6 mg tab Take 1 Tab by mouth daily. Yes Historical Provider   cholecalciferol, vitamin D3, (VITAMIN D3) 2,000 unit tab Take 2,000 Units by mouth every evening. Yes Historical Provider   senna (SENOKOT) 8.6 mg tablet Take 2 Tabs by mouth every evening.    Yes Historical Provider       Recent Results (from the past 24 hour(s))   CBC WITH AUTOMATED DIFF    Collection Time: 08/14/18  6:27 AM   Result Value Ref Range    WBC 13.2 (H) 3.6 - 11.0 K/uL    RBC 2.35 (L) 3.80 - 5.20 M/uL    HGB 7.4 (L) 11.5 - 16.0 g/dL    HCT 22.9 (L) 35.0 - 47.0 %    MCV 97.4 80.0 - 99.0 FL    MCH 31.5 26.0 - 34.0 PG    MCHC 32.3 30.0 - 36.5 g/dL    RDW 14.1 11.5 - 14.5 %    PLATELET 838 106 - 401 K/uL    MPV 10.6 8.9 - 12.9 FL    NRBC 0.0 0  WBC    ABSOLUTE NRBC 0.00 0.00 - 0.01 K/uL    NEUTROPHILS 71 32 - 75 %    LYMPHOCYTES 15 12 - 49 %    MONOCYTES 13 5 - 13 %    EOSINOPHILS 0 0 - 7 %    BASOPHILS 0 0 - 1 %    IMMATURE GRANULOCYTES 1 (H) 0.0 - 0.5 %    ABS. NEUTROPHILS 9.4 (H) 1.8 - 8.0 K/UL    ABS. LYMPHOCYTES 1.9 0.8 - 3.5 K/UL    ABS. MONOCYTES 1.7 (H) 0.0 - 1.0 K/UL    ABS. EOSINOPHILS 0.0 0.0 - 0.4 K/UL    ABS. BASOPHILS 0.0 0.0 - 0.1 K/UL    ABS. IMM.  GRANS. 0.1 (H) 0.00 - 0.04 K/UL    DF AUTOMATED           Signed By: Lore Hickey MD     August 14, 2018

## 2018-08-15 NOTE — PROGRESS NOTES
Problem: Hip Fracture: Post-Op Day 1  Goal: *Absence of skin breakdown  Outcome: Progressing Towards Goal  Patient turned q2h. Heels elevated; skin assessed under immobilizer; and sacrum foam applied.

## 2018-08-15 NOTE — PROGRESS NOTES
Progress Note    NAME: Pilo Higgins   :  1929   MRN:  [id number]     Date/Time:  8/15/2018 12:21 PM    Patient PCP: Carla Marina MD  ________________________________________________________________________     Assessment:     1. Parox afib currently on amiodarone, felt to be poor anticoagulation candidate due to falls. 2. S/p mechanical fall, preop for ortho surgery, noted to be bradycardic, now resolved. 3. No hx of CAD. 4. Echo 2018 EF 55%. 5. Postop anemia. 6. Dementia. 7. Resident of nursing home. 8. DNR. Plan:     1. Cont baby ASA when OK with surgery. 2. Cont amiodarone 200 mg daily. 3. BP too low for metoprolol, not long ago HR was, too.  4. Will try adding very low dose diltiazem IR 30 tid to see if this keeps the Afib in the 100 bpm range or so. Hopefully, she'll stay mostly in sinus. []        High complexity decision making was performed    Subjective:      HISTORY OF PRESENT ILLNESS:     Pilo Higgins, 80 y.o. female with PMHx significant for paroxysmal A-fib presented via EMS to the ED with cc of new onset right knee pain with associated deformity after a mechanical GLF. She has had surgery. Her HR and BP have been low. Currently in sinus rhythm on amiodarone. TODAY:  She denies chest pain, dizziness, SOB. Past Medical History:   Diagnosis Date    A-fib [facility])     Alzheimer disease     Alzheimer's dementia     Arthritis       Past Surgical History:   Procedure Laterality Date    HX BREAST AUGMENTATION      HX ORTHOPAEDIC      TKR--bilateral      No Known Allergies     Social History   Substance Use Topics    Smoking status: Never Smoker    Smokeless tobacco: Never Used    Alcohol use No      Family History   Problem Relation Age of Onset    Heart Attack Mother     Cancer Sister     Cancer Brother        REVIEW OF SYSTEMS:     [x]         Unable to obtain  ROS due to ---dementia   []         Total of 12 systems reviewed as follows:     Total of 12 systems reviewed as follows:       POSITIVE= Bold text  Negative = normal text  General:  fever, chills, sweats, generalized weakness, weight loss/gain,      loss of appetite   Eyes:    blurred vision, eye pain, loss of vision, double vision  ENT:    rhinorrhea, pharyngitis   Respiratory:   cough, sputum production, SOB, ADAME, wheezing, pleuritic pain   Cardiology:   chest pain, palpitations, orthopnea, PND, edema, syncope   Gastrointestinal:  abdominal pain , N/V, diarrhea, dysphagia, constipation, bleeding   Genitourinary:  frequency, urgency, dysuria, hematuria, incontinence   Muskuloskeletal :  arthralgia, myalgia, back pain  Hematology:  easy bruising, nose or gum bleeding, lymphadenopathy   Dermatological: rash, ulceration, pruritis, color change / jaundice  Endocrine:   hot flashes or polydipsia   Neurological:  headache, dizziness, confusion, focal weakness, paresthesia,     Speech difficulties, memory loss, gait difficulty  Psychological: Feelings of anxiety, depression, agitation    Objective:      Physical Exam:    Last 24hrs VS reviewed since prior progress note. Most recent are:    Visit Vitals    /86    Pulse (!) 121    Temp 97.9 °F (36.6 °C)    Resp 18    Wt 52.6 kg (116 lb)    SpO2 96%    BMI 21.92 kg/m2       Intake/Output Summary (Last 24 hours) at 08/15/18 1804  Last data filed at 08/15/18 0315   Gross per 24 hour   Intake              120 ml   Output                0 ml   Net              120 ml        General Appearance: Well developed, elderly, alert & oriented x to self and hospital, no acute distress. Ears/Nose/Mouth/Throat: Pupils equal and round, Hearing grossly normal.  Neck: Supple. JVP within normal limits. Carotids good upstrokes, with no bruit. Chest: Lungs clear to auscultation bilaterally, unlabored, symmetric air movement. Cardiovascular: Irregular rate and rhythm, S1S2 normal, no murmur, rubs, gallops. Abdomen: Soft, non-tender, bowel sounds are active.  No organomegaly. Extremities: No edema bilaterally. Distal Pulses +1. Skin: Warm and dry. No rash, petechiae, or purpura. Neuro: CN II-XII grossly intact, Strength and sensation grossly intact.       Data:       Recent Results (from the past 24 hour(s))   HEMOGLOBIN    Collection Time: 08/15/18  3:36 AM   Result Value Ref Range    HGB 8.0 (L) 11.5 - 16.0 g/dL         Current Facility-Administered Medications:     0.9% sodium chloride infusion 250 mL, 250 mL, IntraVENous, PRN, Bernadene Dancer, PA-C    enoxaparin (LOVENOX) injection 30 mg, 30 mg, SubCUTAneous, Q24H, Tamika Trotter MD, 30 mg at 08/15/18 0931    sodium chloride (NS) flush 5-10 mL, 5-10 mL, IntraVENous, Q8H, Tamika Trotter MD, 10 mL at 08/15/18 1740    sodium chloride (NS) flush 5-10 mL, 5-10 mL, IntraVENous, PRN, Tamika Trotter MD, 10 mL at 08/14/18 0630    acetaminophen (TYLENOL) tablet 650 mg, 650 mg, Oral, Q6H, Tamika Trotter MD, 650 mg at 08/15/18 1739    oxyCODONE IR (ROXICODONE) tablet 2.5 mg, 2.5 mg, Oral, Q4H PRN, Tamika Trotter MD, 2.5 mg at 08/13/18 1721    oxyCODONE IR (ROXICODONE) tablet 5 mg, 5 mg, Oral, Q4H PRN, Tamika Trotter MD, 5 mg at 08/13/18 1331    naloxone [facility]) injection 0.4 mg, 0.4 mg, IntraVENous, PRN, Tamika Trotter MD    ondansetron (ZOFRAN ODT) tablet 4 mg, 4 mg, Oral, Q4H PRN, Tamika Trotter MD    calcium-vitamin D (OS-VALERIA) 500 mg-200 unit tablet, 1 Tab, Oral, TID WITH MEALS, Tamika Trotter MD, 1 Tab at 08/15/18 1739    senna-docusate (PERICOLACE) 8.6-50 mg per tablet 1 Tab, 1 Tab, Oral, BID, Tamika Trotter MD, 1 Tab at 08/15/18 1740    polyethylene glycol (MIRALAX) packet 17 g, 17 g, Oral, DAILY, Tamika Trotter MD, Stopped at 08/15/18 6755    bisacodyl (DULCOLAX) suppository 10 mg, 10 mg, Rectal, DAILY PRN, Tamika Trotter MD    amiodarone (CORDARONE) tablet 200 mg, 200 mg, Oral, DAILY, Esteban Villarreal NP, 200 mg at 08/15/18 0930    oxybutynin chloride XL (DITROPAN XL) tablet 10 mg, 10 mg, Oral, DAILY, Brenda Arellano NP, 10 mg at 08/15/18 0930    senna (SENOKOT) tablet 17.2 mg, 2 Tab, Oral, QPM, Brenda Arellano NP, 17.2 mg at 08/15/18 1740        Signed By: Serena Smith MD     August 15, 2018

## 2018-08-15 NOTE — PROGRESS NOTES
Comfortable in bed. bp low but asymptomatic. Patient Vitals for the past 24 hrs:   Temp Pulse Resp BP SpO2   08/15/18 0929 97.5 °F (36.4 °C) 99 18 (!) 84/64 96 %   08/15/18 0454 98.3 °F (36.8 °C) 81 16 106/59 96 %   08/14/18 2330 97.9 °F (36.6 °C) 72 16 123/59 97 %   08/14/18 2030 97.6 °F (36.4 °C) 70 16 105/48 95 %   08/14/18 1759 97.4 °F (36.3 °C) 80 20 153/62 96 %   08/14/18 1734 98 °F (36.7 °C) 79 18 103/50 96 %   08/14/18 1642 98.2 °F (36.8 °C) 87 18 96/48 97 %   08/14/18 1536 98.2 °F (36.8 °C) 81 19 93/44 95 %   08/14/18 1512 98 °F (36.7 °C) 87 16 (!) 85/39 96 %   08/14/18 1214 97.2 °F (36.2 °C) 79 16 115/48 95 %     Dressing clean and dry  Musculoskeletal: America's sign negative in bilateral lower extremities. Calves soft, supple, non-tender upon palpation or with passive stretch.      Up to chair  Dc planning  nwb  Wants to go back to assisted living

## 2018-08-15 NOTE — ROUTINE PROCESS
8/15/2018  07:30 AM    Bedside and Verbal shift change report given to Ángel English RN (oncoming nurse) by Jing Moreno RN (offgoing nurse).  Report included the following information SBAR, Kardex, ED Summary, OR Summary, Procedure Summary, Intake/Output, MAR, Accordion, Recent Results, Med Rec Status and Cardiac Rhythm SR.

## 2018-08-15 NOTE — OP NOTES
[address] REPORT    Name:DEMETRIA CRYSTAL  MR#: [id number]  : 1929  ACCOUNT #: [de-identified]   DATE OF SERVICE: 2018    PREOPERATIVE DIAGNOSIS:  Right distal femur periprosthetic fracture. POSTOPERATIVE DIAGNOSIS:  Right distal femur periprosthetic fracture. PROCEDURE PERFORMED:  Open reduction internal fixation. SURGEON:  Allan Zimmerman MD    ASSISTANT:  None. ESTIMATED BLOOD LOSS:  300 mL. SPECIMENS:  None. COMPLICATIONS:  None. IMPLANTS:  See note. DISPOSITION:  Stable to recovery room. INDICATIONS:  This is an 43-year-old female who is a minimal ambulator with a right total knee prosthesis which was a PCL substituting type from roughly 20 years ago. She fell sustaining a fracture just above the femoral component extending proximally and a long spike into the metaphyseal diaphyseal junction. She was admitted and cleared for surgery. The risks, benefits and alternatives of open reduction internal fixation were explained in detail and consent was obtained. TECHNIQUE:  The patient was taken to the operating room, laid supine on the operating room table. General anesthetic was administered. Ancef was given preoperatively per protocol. The right lower extremity was prepped and draped free in the usual sterile fashion in the groin, hip region all the way down to below the knee. A timeout was then called. Following this, a lateral incision was carried out and dissection was carried down to the fascia, which was split and the approach was made through the vastus lateralis cauterizing the perforating vessels as encountered. The fracture was explored and cleared of clot and debris. The fracture was then reduced and secured with 4.5 lag screws x2 in almost an anterior, posterior orientation to clear the lateral plate.   This stabilized the fracture well enough to place a 10-hole Synthe[employer] locking distal femoral plate, which was secured with a single lag screw proximally and 4 locking screws proximally and 5 locking screws distally. This stabilized the fracture quite well and virtually in anatomic alignment as verified on 2 plane fluoroscopy. The wound was then copiously irrigated and closed with interrupted #1 Vicryl in the fascia, the subcutaneous was closed with 2-0 Vicryl and the skin with staples. A sterile dressing was then applied. The patient tolerated the procedure well without immediate complication.       MD REGINO Harvey / DANIEL  D: 08/14/2018 22:38     T: 08/15/2018 07:56  JOB #: [id number]

## 2018-08-15 NOTE — PROGRESS NOTES
Bedside shift change report given to Giorgi Novak RN (oncoming nurse) by Arnulfo Tom RN (offgoing nurse). Report included the following information SBAR, Kardex, Procedure Summary, Intake/Output, Recent Results and Cardiac Rhythm NSR.

## 2018-08-16 PROCEDURE — 77030011943

## 2018-08-16 PROCEDURE — 94760 N-INVAS EAR/PLS OXIMETRY 1: CPT

## 2018-08-16 PROCEDURE — 65270000029 HC RM PRIVATE

## 2018-08-16 PROCEDURE — 51798 US URINE CAPACITY MEASURE: CPT

## 2018-08-16 PROCEDURE — 74011250637 HC RX REV CODE- 250/637: Performed by: INTERNAL MEDICINE

## 2018-08-16 PROCEDURE — 74011000250 HC RX REV CODE- 250: Performed by: ORTHOPAEDIC SURGERY

## 2018-08-16 PROCEDURE — 74011250637 HC RX REV CODE- 250/637: Performed by: NURSE PRACTITIONER

## 2018-08-16 PROCEDURE — 74011250636 HC RX REV CODE- 250/636: Performed by: ORTHOPAEDIC SURGERY

## 2018-08-16 PROCEDURE — 97530 THERAPEUTIC ACTIVITIES: CPT

## 2018-08-16 PROCEDURE — 97110 THERAPEUTIC EXERCISES: CPT

## 2018-08-16 PROCEDURE — 77030027138 HC INCENT SPIROMETER -A

## 2018-08-16 PROCEDURE — 97535 SELF CARE MNGMENT TRAINING: CPT | Performed by: OCCUPATIONAL THERAPIST

## 2018-08-16 PROCEDURE — 74011250637 HC RX REV CODE- 250/637: Performed by: ORTHOPAEDIC SURGERY

## 2018-08-16 PROCEDURE — 97530 THERAPEUTIC ACTIVITIES: CPT | Performed by: OCCUPATIONAL THERAPIST

## 2018-08-16 PROCEDURE — 74011000250 HC RX REV CODE- 250: Performed by: INTERNAL MEDICINE

## 2018-08-16 RX ORDER — DILTIAZEM HYDROCHLORIDE 5 MG/ML
10 INJECTION INTRAVENOUS
Status: DISCONTINUED | OUTPATIENT
Start: 2018-08-16 | End: 2018-08-17 | Stop reason: HOSPADM

## 2018-08-16 RX ADMIN — SENNOSIDES AND DOCUSATE SODIUM 1 TABLET: 8.6; 5 TABLET ORAL at 09:24

## 2018-08-16 RX ADMIN — DILTIAZEM HYDROCHLORIDE 10 MG: 5 INJECTION INTRAVENOUS at 00:58

## 2018-08-16 RX ADMIN — ACETAMINOPHEN 650 MG: 325 TABLET ORAL at 17:30

## 2018-08-16 RX ADMIN — CALCIUM CARBONATE 500 MG (1,250 MG)-VITAMIN D3 200 UNIT TABLET 1 TABLET: at 17:31

## 2018-08-16 RX ADMIN — ACETAMINOPHEN 650 MG: 325 TABLET ORAL at 05:24

## 2018-08-16 RX ADMIN — Medication 10 ML: at 05:24

## 2018-08-16 RX ADMIN — OXYBUTYNIN CHLORIDE 10 MG: 5 TABLET, EXTENDED RELEASE ORAL at 09:24

## 2018-08-16 RX ADMIN — Medication 10 ML: at 16:01

## 2018-08-16 RX ADMIN — SENNOSIDES 17.2 MG: 8.6 TABLET, FILM COATED ORAL at 17:28

## 2018-08-16 RX ADMIN — CALCIUM CARBONATE 500 MG (1,250 MG)-VITAMIN D3 200 UNIT TABLET 1 TABLET: at 09:24

## 2018-08-16 RX ADMIN — POLYETHYLENE GLYCOL 3350 17 G: 17 POWDER, FOR SOLUTION ORAL at 09:21

## 2018-08-16 RX ADMIN — Medication 10 ML: at 00:57

## 2018-08-16 RX ADMIN — DILTIAZEM HYDROCHLORIDE 30 MG: 30 TABLET, FILM COATED ORAL at 16:30

## 2018-08-16 RX ADMIN — CALCIUM CARBONATE 500 MG (1,250 MG)-VITAMIN D3 200 UNIT TABLET 1 TABLET: at 12:43

## 2018-08-16 RX ADMIN — Medication 10 ML: at 22:00

## 2018-08-16 RX ADMIN — AMIODARONE HYDROCHLORIDE 200 MG: 200 TABLET ORAL at 09:23

## 2018-08-16 RX ADMIN — ENOXAPARIN SODIUM 30 MG: 30 INJECTION, SOLUTION INTRAVENOUS; SUBCUTANEOUS at 09:28

## 2018-08-16 RX ADMIN — ACETAMINOPHEN 650 MG: 325 TABLET ORAL at 12:43

## 2018-08-16 RX ADMIN — DILTIAZEM HYDROCHLORIDE 30 MG: 30 TABLET, FILM COATED ORAL at 12:44

## 2018-08-16 RX ADMIN — DILTIAZEM HYDROCHLORIDE 30 MG: 30 TABLET, FILM COATED ORAL at 09:23

## 2018-08-16 NOTE — PROGRESS NOTES
Occupational Therapy Goals  Initiated 8/14/2018  1. Patient will perform grooming seated EOB with moderate assistance  within 7 day(s). 2.  Patient will perform upper body dressing with moderate assistance  within 7 day(s). 3.  Patient will perform sponge bathing with moderate assistance  within 7 day(s). 4.  Patient will perform BSC transfers via stand pivot NWB on R LE with maximal assistance within 7 day(s). 5.  Patient will perform all aspects of toileting with maximal assistance within 7 day(s). Occupational Therapy TREATMENT  Patient: Byron Lubin (89 y.o. female)  Date: 8/16/2018  Diagnosis: Periprosthetic fracture around internal prosthetic right knee joint  right femur fracture <principal problem not specified>  Procedure(s) (LRB):  DISTAL RIGHT FEMUR, OPEN REDUCTION INTERNAL FIXATION (Right) 3 Days Post-Op  Precautions: NWB, Fall    ASSESSMENT:  Patient continues to be confused, disoriented and is completely unaware of her RLE injury, surgery and subsequent NWB precautions. Patient also with poor memory, unable to remember this OT in anyway, which indicates that she may have difficulty retaining any training or education provided by OT and PT. Overall she was supervision for grooming, CGA for self feeding, max A for UB dressing, and was max A of 1 to total A for functional mobility. Constant assistance to maintain RLE NWB when standing, as expected. Patient will need SNF rehab at discharge in order to maximize her functional potential.   Progression toward goals:  []       Improving appropriately and progressing toward goals   [x]       Improving slowly and progressing toward goals  []       Not making progress toward goals and plan of care will be adjusted     PLAN:  Patient continues to benefit from skilled intervention to address the above impairments. Continue treatment per established plan of care.   Discharge Recommendations:  Justin Avila  Further Equipment Recommendations for Discharge:  TBD         OBJECTIVE DATA SUMMARY:   Cognitive/Behavioral Status:  Neurologic State: Alert;Confused  Orientation Level: Oriented to place;Oriented to person;Disoriented to situation;Disoriented to time  Cognition: Decreased attention/concentration;Decreased command following; Impaired decision making; Impulsive;Memory loss;Poor safety awareness        Safety/Judgement: Decreased awareness of need for safety  Functional Mobility and Transfers for ADLs:  Bed Mobility:  Rolling: Moderate assistance;Assist x2  Supine to Sit: Assist x2;Maximum assistance  Sit to Supine: Total assistance  Scooting: Maximum assistance;Assist x1  Transfers:  Sit to Stand: Moderate assistance;Assist x2, support of RW, and constant support of NWB RLE         Balance:  Sitting: Impaired  Sitting - Static: Good (unsupported)  Sitting - Dynamic: Fair (occasional)  Standing: Impaired  Standing - Static: Constant support (standing with support of RW and constant support of NWB RLE)  Standing - Dynamic :  (unable)  ADL Intervention:  Feeding  Drink to Mouth: Compensatory technique training    Grooming  Washing Face: Supervision/set-up (supine with HOB raised)    Upper Body Dressing Assistance  Shirt simulation with hospital gown: Maximum assistance; Compensatory technique training (seated EOB)  Cues: Tactile cues provided;Verbal cues provided;Visual cues provided (for sequencing and problem solving. )    Cognitive Retraining  Safety/Judgement: Decreased awareness of need for safety    Pain:  Pain Scale 1: Numeric (0 - 10)  Pain Intensity 1: 0              Activity Tolerance:   VSS, poor tolerance for activity    After treatment:   [] Patient left in no apparent distress sitting up in chair  [x] Patient left in no apparent distress in bed  [x] Call bell left within reach  [x] Nursing notified  [] Caregiver present  [x] Bed alarm activated    COMMUNICATION/COLLABORATION:   The patients plan of care was discussed with: Physical Therapy Assistant    Tone Black, OTR/L  Time Calculation: 25 mins

## 2018-08-16 NOTE — ROUTINE PROCESS
Bedside and Verbal shift change report given to Pernell Gardner RN (oncoming nurse) by Ariella Haddad RN (offgoing nurse). Report included the following information SBAR, Kardex, Intake/Output, MAR, Recent Results and Med Rec Status.

## 2018-08-16 NOTE — PROGRESS NOTES
Bedside and Verbal shift change report given to Christine (oncoming nurse) by Jamie Abdi (offgoing nurse). Report included the following information SBAR, Kardex, MAR and Recent Results.

## 2018-08-16 NOTE — PROGRESS NOTES
Dr. Sammie Ramirez paged concerning patient  to 147. Patient no c/o and asymptomatic. Order for cardizem 10 IV now and every 4 hours PRN. No need to notify MD after patient medicated.

## 2018-08-16 NOTE — PROGRESS NOTES
Progress Note    NAME: Byron Lubin   :  1929   MRN:  [id number]     Date/Time:  2018 12:21 PM    Patient PCP: Óscar Rajput MD  ________________________________________________________________________     Assessment:     1. Parox afib currently on amiodarone, felt to be poor anticoagulation candidate due to falls. 2. S/p mechanical fall, preop for ortho surgery, noted to be bradycardic, now resolved. 3. No hx of CAD. 4. Echo 2018 EF 55%. 5. Postop anemia. 6. Dementia. 7. Resident of nursing home. 8. DNR. Plan:     1. Cont baby ASA when OK with surgery. 2. Cont amiodarone 200 mg daily. 3. Will avoid metoprolol which in her will cause sinus bradycardia. Diltiazem preferred since it has AV marco a action but little to no sinus node suppression. 4. Added very low dose diltiazem IR 30 tid to take the edge off breakthrough Afib, HR's in the 100-120 bpm range or so are OK, she'll eventually convert out. She's back in sinus. As the amiodarone further gets in her system, she'll spend more time in sinus. I'd tolerate transient paroxysms of Afib as she is asymptomatic. Will sign off, please call with any questions or concerns. [x]        High complexity decision making was performed    Subjective:      HISTORY OF PRESENT ILLNESS:     Byron Lubin, 80 y.o. female with PMHx significant for paroxysmal A-fib presented via EMS to the ED with cc of new onset right knee pain with associated deformity after a mechanical GLF. She has had surgery. Her HR and BP have been low. Predominantly in sinus rhythm on amiodarone. TODAY:  She denies chest pain, dizziness, SOB. She converted back to sinus from Afib.       Past Medical History:   Diagnosis Date    [facility]fib [facility])     Alzheimer disease     Alzheimer's dementia     Arthritis       Past Surgical History:   Procedure Laterality Date    HX BREAST AUGMENTATION      HX ORTHOPAEDIC      TKR--bilateral      No Known Allergies Social History   Substance Use Topics    Smoking status: Never Smoker    Smokeless tobacco: Never Used    Alcohol use No      Family History   Problem Relation Age of Onset    Heart Attack Mother     Cancer Sister     Cancer Brother        REVIEW OF SYSTEMS:     [x]         Unable to obtain  ROS due to ---dementia   []         Total of 12 systems reviewed as follows: Total of 12 systems reviewed as follows:       POSITIVE= Bold text  Negative = normal text  General:  fever, chills, sweats, generalized weakness, weight loss/gain,      loss of appetite   Eyes:    blurred vision, eye pain, loss of vision, double vision  ENT:    rhinorrhea, pharyngitis   Respiratory:   cough, sputum production, SOB, ADAME, wheezing, pleuritic pain   Cardiology:   chest pain, palpitations, orthopnea, PND, edema, syncope   Gastrointestinal:  abdominal pain , N/V, diarrhea, dysphagia, constipation, bleeding   Genitourinary:  frequency, urgency, dysuria, hematuria, incontinence   Muskuloskeletal :  arthralgia, myalgia, back pain  Hematology:  easy bruising, nose or gum bleeding, lymphadenopathy   Dermatological: rash, ulceration, pruritis, color change / jaundice  Endocrine:   hot flashes or polydipsia   Neurological:  headache, dizziness, confusion, focal weakness, paresthesia,     Speech difficulties, memory loss, gait difficulty  Psychological: Feelings of anxiety, depression, agitation    Objective:      Physical Exam:    Last 24hrs VS reviewed since prior progress note.  Most recent are:    Visit Vitals    /63 (BP 1 Location: Left arm, BP Patient Position: At rest)    Pulse 98    Temp 97.1 °F (36.2 °C)    Resp 16    Wt 52.6 kg (116 lb)    SpO2 98%    BMI 21.92 kg/m2       Intake/Output Summary (Last 24 hours) at 08/16/18 1402  Last data filed at 08/16/18 0905   Gross per 24 hour   Intake              200 ml   Output                0 ml   Net              200 ml        General Appearance: Well developed, elderly, alert & oriented x to self and hospital, no acute distress. Ears/Nose/Mouth/Throat: Pupils equal and round, Hearing grossly normal.  Neck: Supple. JVP within normal limits. Carotids good upstrokes, with no bruit. Chest: Lungs clear to auscultation bilaterally, unlabored, symmetric air movement. Cardiovascular: Regular rate and rhythm, S1S2 normal, no murmur, rubs, gallops. Abdomen: Soft, non-tender, bowel sounds are active. No organomegaly. Extremities: No edema bilaterally. Distal Pulses +1. Skin: Warm and dry. No rash, petechiae, or purpura. Neuro: CN II-XII grossly intact, Strength and sensation grossly intact. Data:       No results found for this or any previous visit (from the past 24 hour(s)).       Current Facility-Administered Medications:     dilTIAZem (CARDIZEM) injection 10 mg, 10 mg, IntraVENous, Q4H PRN, Abner Vargas MD, 10 mg at 08/16/18 0058    dilTIAZem (CARDIZEM) IR tablet 30 mg, 30 mg, Oral, TIDAC, Abner Vargas MD, 30 mg at 08/16/18 1244    0.9% sodium chloride infusion 250 mL, 250 mL, IntraVENous, PRN, Felipe Mayen PA-C    enoxaparin (LOVENOX) injection 30 mg, 30 mg, SubCUTAneous, Q24H, Alexey Bowie MD, 30 mg at 08/16/18 7189    sodium chloride (NS) flush 5-10 mL, 5-10 mL, IntraVENous, Q8H, Alexey Bowie MD, 10 mL at 08/16/18 0524    sodium chloride (NS) flush 5-10 mL, 5-10 mL, IntraVENous, PRN, Alexey Bowie MD, 10 mL at 08/16/18 0057    acetaminophen (TYLENOL) tablet 650 mg, 650 mg, Oral, Q6H, Alexey Bowie MD, 650 mg at 08/16/18 1243    oxyCODONE IR (ROXICODONE) tablet 2.5 mg, 2.5 mg, Oral, Q4H PRN, Alexey Bowie MD, 2.5 mg at 08/13/18 1721    oxyCODONE IR (ROXICODONE) tablet 5 mg, 5 mg, Oral, Q4H PRN, Aelxey Bowie MD, 5 mg at 08/13/18 1331    naloxone [facility]) injection 0.4 mg, 0.4 mg, IntraVENous, PRN, Alexey Bowie MD    ondansetron [facility] ODT) tablet 4 mg, 4 mg, Oral, Q4H PRN, Alexey Bowie MD    calcium-vitamin D (OS-VALERIA) 500 mg-200 unit tablet, 1 Tab, Oral, TID WITH MEALS, Jobe Cockayne, MD, 1 Tab at 08/16/18 1243    senna-docusate (PERICOLACE) 8.6-50 mg per tablet 1 Tab, 1 Tab, Oral, BID, Jobe Cockayne, MD, 1 Tab at 08/16/18 2108    polyethylene glycol (MIRALAX) packet 17 g, 17 g, Oral, DAILY, Jobe Cockayne, MD, 17 g at 08/16/18 4261    bisacodyl (DULCOLAX) suppository 10 mg, 10 mg, Rectal, DAILY PRN, Jobe Cockayne, MD    amiodarone (CORDARONE) tablet 200 mg, 200 mg, Oral, DAILY, Lm Villarreal NP, 200 mg at 08/16/18 3610    oxybutynin chloride XL (DITROPAN XL) tablet 10 mg, 10 mg, Oral, DAILY, Carolina Daft, NP, 10 mg at 08/16/18 8538    senna (SENOKOT) tablet 17.2 mg, 2 Tab, Oral, QPM, Carolina Daft, NP, 17.2 mg at 08/15/18 1740        Signed By: Kitty Gutierrez MD     August 16, 2018

## 2018-08-16 NOTE — PROGRESS NOTES
Reason for Readmission:  Periprosthetic fracture around internal prosthetic right knee joint, right femur fracture;       RRAT Score and Risk Level:  MODERATE 16    Level of Readmission:    1) 07.28.2018 - 08.08.2018 2) 08.11.2018 - present   Care Conference scheduled:  Yes with attending and CM speaking to pt Ms. Lukas olivera    Resources/supports as identified by patient/family: Pt will be skilled to LTC at [place] [address] facing patient (as identified by patient/family and CM): Finances/Medication cost?  No barriers      Transportation    No barriers     Support system or lack thereof? Supportive family       Living arrangements? Skilled care at [facility] to [address] [place] [address]          Self-care/ADLs/Cognition? Requires physical assistance and is confused at baseline           Current Advanced Directive/Advance Care Plan: On file   Plan for utilizing home health: None SNF        Likelihood of additional readmission:    MODERATE as per RRAT score     Transition of Care Plan:    Based on readmission, the patient's previous Plan of Care  has been evaluated and/or modified. The current Transition of Care Plan is:      Pt is an 81 y/o  female admitted for Periprosthetic fracture around internal prosthetic right knee joint, right femur fracture; Pt was at [facility] where she fell and was re-admitted to [address] [place] [address]. Pt will return to [facility] SNF for LTC. Pt requires assistance with ADLs not to include driving. Pt is confused at baseline. Pt will require transportation via [facility] at discharge via BLS. Care Management Interventions  PCP Verified by CM:  Yes  Mode of Transport at Discharge: BLS ([facility] )  Transition of Care Consult (CM Consult): SNF  Partner SNF: Yes  Discharge Durable Medical Equipment: No  Health Maintenance Reviewed: Yes  Physical Therapy Consult: Yes  Occupational Therapy Consult: Yes  Speech Therapy Consult: No  Current Support Network: [address] [place] [address]  Confirm Follow Up Transport: Other (see comment)  Plan discussed with Pt/Family/Caregiver: Yes  Freedom of Choice Offered: Yes  Discharge Location  Discharge Placement: Skilled nursing facility    BRENDAN Beckman, [employer]   [phone]

## 2018-08-16 NOTE — PROGRESS NOTES
Spiritual Care Partner Volunteer visited patient in Ortho on 8/16/18. Documented by:  Malissa Stoner M.Div.    Paging Service [phone])

## 2018-08-16 NOTE — PROGRESS NOTES
Problem: Falls - Risk of  Goal: *Absence of Falls  Document Rose Mary Fall Risk and appropriate interventions in the flowsheet.    Outcome: Progressing Towards Goal  Fall Risk Interventions:  Mobility Interventions: Communicate number of staff needed for ambulation/transfer, OT consult for ADLs, Patient to call before getting OOB, PT Consult for assist device competence, Utilize walker, cane, or other assistive device    Mentation Interventions: Door open when patient unattended, Increase mobility, More frequent rounding, Room close to nurse's station    Medication Interventions: Assess postural VS orthostatic hypotension, Evaluate medications/consider consulting pharmacy, Patient to call before getting OOB, Teach patient to arise slowly    Elimination Interventions: Call light in reach, Patient to call for help with toileting needs, Toilet paper/wipes in reach, Toileting schedule/hourly rounds    History of Falls Interventions: Bed/chair exit alarm, Consult care management for discharge planning, Door open when patient unattended, Room close to nurse's station

## 2018-08-16 NOTE — PROGRESS NOTES
Bedside shift change report given to George Smart (oncoming nurse) by Ryan Mullins RN (offgoing nurse). Report included the following information SBAR, Kardex, ED Summary, OR Summary, Intake/Output, MAR, Recent Results and Cardiac Rhythm Sinus Tachy, A - fib.

## 2018-08-16 NOTE — PROGRESS NOTES
Orthopedic NP Progress Note  Post Op day: 3 Days Post-Op    August 16, 2018 2:22 PM     Dejuan Cordero    Attending Physician: Treatment Team: Attending Provider: Eufemia Torres MD; Utilization Review: Stella Pichardo RN; Consulting Provider: Tim Del Real MD; Care Manager: Rao French     Vital Signs:    Patient Vitals for the past 8 hrs:   BP Temp Pulse Resp SpO2   08/16/18 1215 114/63 97.1 °F (36.2 °C) 98 16 98 %   08/16/18 0920 108/73 97.8 °F (36.6 °C) 91 16 96 %          Intake/Output:     08/14 1901 - 08/16 0700  In: 320 [P.O.:320]  Out: -     Pain Control:   Pain Assessment  Pain Scale 1: Numeric (0 - 10)  Pain Intensity 1: 0  Pain Onset 1: with turning and moving  Pain Location 1: Hip  Pain Orientation 1: Right  Pain Description 1: Aching  Pain Intervention(s) 1: Medication (see MAR)    LAB:    Recent Labs      08/15/18   0336  08/14/18   0627   HCT   --   22.9*   HGB  8.0*  7.4*     Lab Results   Component Value Date/Time    Sodium 138 08/13/2018 06:05 AM    Potassium 3.9 08/13/2018 06:05 AM    Chloride 105 08/13/2018 06:05 AM    CO2 27 08/13/2018 06:05 AM    Glucose 95 08/13/2018 06:05 AM    BUN 14 08/13/2018 06:05 AM    Creatinine 0.68 08/13/2018 06:05 AM    Calcium 8.2 (L) 08/13/2018 06:05 AM       Subjective:  Dejuan Cordero is a 80 y.o. female s/p a  Procedure(s):  DISTAL RIGHT FEMUR, OPEN REDUCTION INTERNAL FIXATION   Procedure(s):  DISTAL RIGHT FEMUR, OPEN REDUCTION INTERNAL FIXATION. Tolerating diet. resting in bed. Pain well managed      Objective: General: alert, cooperative, no distress. Neuro/Vascular: CNS Intact. Sensation stable. Brisk cap refill, + pulses UE/LE  Musculoskeletal:  +ROM UE/LE. America's sign negative in bilateral lower extremities. Calves soft, supple, non-tender upon palpation or with passive stretch. Skin: Incision - clean, dry and intact. No significant erythema or swelling.     Dressing: clean, dry, and intact    Y[place]rough - n  Drain - n       PT/OT:   Gait: Assessment:    s/p Procedure(s):  DISTAL RIGHT FEMUR, OPEN REDUCTION INTERNAL FIXATION    Active Problems:    Paroxysmal atrial fibrillation (HCC) (8/28/2017)      Late onset Alzheimer's disease without behavioral disturbance (5/22/2018)      Periprosthetic fracture around internal prosthetic right knee joint (8/11/2018)      Bradycardia (8/14/2018)         Plan:     -  Continue PT/OT - NWB  -  Continue established methods of pain control  -  VTE Prophylaxes - TEDS &/or SCDs with lovenox for total of 21 days   -  Acute blood loss anemia - s/p transfusion, hgb 8.0        Discharge To:  SNF, cleared from Ortho standpoint     Signed By: John Spencer NP    Orthopedic Nurse Practitioner

## 2018-08-17 VITALS
TEMPERATURE: 98.2 F | RESPIRATION RATE: 18 BRPM | DIASTOLIC BLOOD PRESSURE: 59 MMHG | BODY MASS INDEX: 21.92 KG/M2 | WEIGHT: 116 LBS | OXYGEN SATURATION: 99 % | SYSTOLIC BLOOD PRESSURE: 127 MMHG | HEART RATE: 70 BPM

## 2018-08-17 LAB
BASOPHILS # BLD: 0 K/UL (ref 0–0.1)
BASOPHILS NFR BLD: 0 % (ref 0–1)
DIFFERENTIAL METHOD BLD: ABNORMAL
EOSINOPHIL # BLD: 0.5 K/UL (ref 0–0.4)
EOSINOPHIL NFR BLD: 4 % (ref 0–7)
ERYTHROCYTE [DISTWIDTH] IN BLOOD BY AUTOMATED COUNT: 14.4 % (ref 11.5–14.5)
HCT VFR BLD AUTO: 27 % (ref 35–47)
HGB BLD-MCNC: 9.1 G/DL (ref 11.5–16)
IMM GRANULOCYTES # BLD: 0.1 K/UL (ref 0–0.04)
IMM GRANULOCYTES NFR BLD AUTO: 1 % (ref 0–0.5)
LYMPHOCYTES # BLD: 1.9 K/UL (ref 0.8–3.5)
LYMPHOCYTES NFR BLD: 17 % (ref 12–49)
MCH RBC QN AUTO: 32.4 PG (ref 26–34)
MCHC RBC AUTO-ENTMCNC: 33.7 G/DL (ref 30–36.5)
MCV RBC AUTO: 96.1 FL (ref 80–99)
MONOCYTES # BLD: 1.1 K/UL (ref 0–1)
MONOCYTES NFR BLD: 9 % (ref 5–13)
NEUTS SEG # BLD: 7.8 K/UL (ref 1.8–8)
NEUTS SEG NFR BLD: 68 % (ref 32–75)
NRBC # BLD: 0 K/UL (ref 0–0.01)
NRBC BLD-RTO: 0 PER 100 WBC
PLATELET # BLD AUTO: 386 K/UL (ref 150–400)
PMV BLD AUTO: 10.2 FL (ref 8.9–12.9)
RBC # BLD AUTO: 2.81 M/UL (ref 3.8–5.2)
WBC # BLD AUTO: 11.4 K/UL (ref 3.6–11)

## 2018-08-17 PROCEDURE — 85025 COMPLETE CBC W/AUTO DIFF WBC: CPT | Performed by: PHYSICIAN ASSISTANT

## 2018-08-17 PROCEDURE — 74011250637 HC RX REV CODE- 250/637: Performed by: NURSE PRACTITIONER

## 2018-08-17 PROCEDURE — 74011250637 HC RX REV CODE- 250/637: Performed by: INTERNAL MEDICINE

## 2018-08-17 PROCEDURE — 74011000250 HC RX REV CODE- 250: Performed by: ORTHOPAEDIC SURGERY

## 2018-08-17 PROCEDURE — 74011250636 HC RX REV CODE- 250/636: Performed by: ORTHOPAEDIC SURGERY

## 2018-08-17 PROCEDURE — 97530 THERAPEUTIC ACTIVITIES: CPT

## 2018-08-17 PROCEDURE — 74011250637 HC RX REV CODE- 250/637: Performed by: ORTHOPAEDIC SURGERY

## 2018-08-17 PROCEDURE — 36415 COLL VENOUS BLD VENIPUNCTURE: CPT | Performed by: PHYSICIAN ASSISTANT

## 2018-08-17 PROCEDURE — 94760 N-INVAS EAR/PLS OXIMETRY 1: CPT

## 2018-08-17 RX ORDER — ENOXAPARIN SODIUM 100 MG/ML
30 INJECTION SUBCUTANEOUS EVERY 24 HOURS
Qty: 14 SYRINGE | Refills: 0 | Status: ON HOLD | OUTPATIENT
Start: 2018-08-18 | End: 2019-09-23 | Stop reason: CLARIF

## 2018-08-17 RX ORDER — ACETAMINOPHEN 325 MG/1
650 TABLET ORAL
Qty: 40 TAB | Refills: 0 | Status: SHIPPED | OUTPATIENT
Start: 2018-08-17

## 2018-08-17 RX ORDER — DILTIAZEM HYDROCHLORIDE 30 MG/1
30 TABLET, FILM COATED ORAL
Qty: 90 TAB | Refills: 1 | Status: ON HOLD | OUTPATIENT
Start: 2018-08-17 | End: 2019-09-23 | Stop reason: CLARIF

## 2018-08-17 RX ADMIN — DILTIAZEM HYDROCHLORIDE 30 MG: 30 TABLET, FILM COATED ORAL at 12:43

## 2018-08-17 RX ADMIN — Medication 10 ML: at 06:09

## 2018-08-17 RX ADMIN — CALCIUM CARBONATE 500 MG (1,250 MG)-VITAMIN D3 200 UNIT TABLET 1 TABLET: at 09:17

## 2018-08-17 RX ADMIN — Medication 10 ML: at 12:44

## 2018-08-17 RX ADMIN — OXYBUTYNIN CHLORIDE 10 MG: 5 TABLET, EXTENDED RELEASE ORAL at 09:17

## 2018-08-17 RX ADMIN — ACETAMINOPHEN 650 MG: 325 TABLET ORAL at 06:00

## 2018-08-17 RX ADMIN — ACETAMINOPHEN 650 MG: 325 TABLET ORAL at 12:43

## 2018-08-17 RX ADMIN — ENOXAPARIN SODIUM 30 MG: 30 INJECTION, SOLUTION INTRAVENOUS; SUBCUTANEOUS at 09:16

## 2018-08-17 RX ADMIN — SENNOSIDES AND DOCUSATE SODIUM 1 TABLET: 8.6; 5 TABLET ORAL at 09:17

## 2018-08-17 RX ADMIN — CALCIUM CARBONATE 500 MG (1,250 MG)-VITAMIN D3 200 UNIT TABLET 1 TABLET: at 12:43

## 2018-08-17 RX ADMIN — AMIODARONE HYDROCHLORIDE 200 MG: 200 TABLET ORAL at 09:17

## 2018-08-17 RX ADMIN — ACETAMINOPHEN 650 MG: 325 TABLET ORAL at 00:00

## 2018-08-17 RX ADMIN — POLYETHYLENE GLYCOL 3350 17 G: 17 POWDER, FOR SOLUTION ORAL at 09:17

## 2018-08-17 RX ADMIN — DILTIAZEM HYDROCHLORIDE 30 MG: 30 TABLET, FILM COATED ORAL at 09:17

## 2018-08-17 NOTE — PROGRESS NOTES
Nutrition Assessment:    INTERVENTIONS/RECOMMENDATIONS:   Continue current diet and supplements     ASSESSMENT:   Chart reviewed. Pt was working on breakfast during visit. Ensure was unopened but she plans on drinking it after breakfast. Flowsheet documents fair appetite. Diet Order: Regular  % Eaten:  Patient Vitals for the past 72 hrs:   % Diet Eaten   08/16/18 0905 0 %     Pertinent Medications: [x]Reviewed: os-praveen, miralax, senna, pericolace,   Pertinent Labs: [x]Reviewed:   Food Allergies: [x]NKFA  []Other   Last BM:  8/16  Edema:      []RUE   []LUE   [x]RLE 1+  [x]LLE 2+      Pressure Ulcer:      [] Stage I   [] Stage II   [] Stage III   [] Stage IV      Anthropometrics: Height:   Weight: 52.6 kg (116 lb)    IBW (%IBW):   ( ) UBW (%UBW):   (  %)    BMI: Body mass index is 21.92 kg/(m^2). This BMI is indicative of:  []Underweight   [x]Normal   []Overweight   [] Obesity   [] Extreme Obesity (BMI>40)  Last Weight Metrics:  Weight Loss Metrics 8/11/2018 7/28/2018 5/24/2018 5/22/2018 3/9/2018 2/20/2018 12/5/2017   Today's Wt 116 lb 116 lb 13.5 oz 116 lb 13.5 oz 115 lb 3.2 oz 120 lb 120 lb 115 lb   BMI 21.92 kg/m2 22.08 kg/m2 22.08 kg/m2 21.77 kg/m2 22.67 kg/m2 22.67 kg/m2 21.73 kg/m2       Estimated Nutrition Needs (Based on): 1175 Kcals/day (BMR: 900 x 1.3) , 65 g (1.2 g/kg) Protein  Carbohydrate: At Least 130 g/day  Fluids: 1175 mL/day or per primary team    NUTRITION DIAGNOSES:   Problem:  Increased nutrient needs      Etiology: related to fracture healing     Signs/Symptoms: as evidenced by s/p DISTAL RIGHT FEMUR, OPEN REDUCTION INTERNAL FIXATION    Previous Nutrition Dx:  [] Resolved  [] Unresolved           [x] Progressing    NUTRITION INTERVENTIONS:  Meals/Snacks: General/healthful diet   Supplements: Commercial supplement              GOAL:   consume >50% of meals and ONS in 3-5 days    NUTRITION MONITORING AND EVALUATION      Food/Nutrient Intake Outcomes:  Total energy intake  Physical Signs/Symptoms Outcomes: Weight/weight change, Electrolyte and renal profile, GI    Previous Goal Met:   [] Met              [x] Progressing Towards Goal              [] Not Progressing Towards Goal   Previous Recommendations:   [x] Implemented          [] Not Implemented          [] Not Applicable    LEARNING NEEDS (Diet, Food/Nutrient-Drug Interaction):    [x] None Identified   [] Identified and Education Provided/Documented   [] Identified and Pt declined/was not appropriate     Cultural, Jew, OR Ethnic Dietary Needs:    [x] None Identified   [] Identified and Addressed     [x] Interdisciplinary Care Plan Reviewed/Documented    [x] Discharge Planning: General healthy diet with adequate kcal and protein intake to promote fracture healing.  Nutrition supplements as needed   [] Participated in Interdisciplinary Rounds    NUTRITION RISK:    [] High              [x] Moderate           []  Low  []  Minimal/Uncompromised      Ward Ram RDN  Pager [phone]  Weekend Pager [phone]

## 2018-08-17 NOTE — PROGRESS NOTES
Bedside and Verbal shift change report given to Lilia RN (oncoming nurse) by Renetta Garcia RN (offgoing nurse). Report included the following information SBAR, Kardex, Procedure Summary, Intake/Output, MAR, Accordion, Recent Results, Med Rec Status, Cardiac Rhythm MAR and Alarm Parameters .

## 2018-08-17 NOTE — PROGRESS NOTES
Report called to [facility] . Report given to [facility].   Bed assignment is [employer] [address] B

## 2018-08-17 NOTE — PROGRESS NOTES
Problem: Falls - Risk of  Goal: *Absence of Falls  Document Rose Mary Fall Risk and appropriate interventions in the flowsheet. Outcome: Resolved/Met Date Met: 08/17/18  Fall Risk Interventions:  Mobility Interventions: Communicate number of staff needed for ambulation/transfer, Bed/chair exit alarm    Mentation Interventions: Door open when patient unattended, Reorient patient, Toileting rounds    Medication Interventions: Assess postural VS orthostatic hypotension    Elimination Interventions: Call light in reach, Toileting schedule/hourly rounds, Toilet paper/wipes in reach, Patient to call for help with toileting needs    History of Falls Interventions: Bed/chair exit alarm        Problem: Pressure Injury - Risk of  Goal: *Prevention of pressure injury  Document Seng Scale and appropriate interventions in the flowsheet. Outcome: Resolved/Met Date Met: 08/17/18  Pressure Injury Interventions:  Sensory Interventions: Assess changes in LOC, Check visual cues for pain, Float heels, Maintain/enhance activity level, Monitor skin under medical devices, Pad between skin to skin, Suspension boots, Use 30-degree side-lying position    Moisture Interventions: Absorbent underpads, Assess need for specialty bed    Activity Interventions: Pressure redistribution bed/mattress(bed type), PT/OT evaluation    Mobility Interventions: Chair cushion, Float heels, HOB 30 degrees or less, Pressure redistribution bed/mattress (bed type), PT/OT evaluation, Suspension boots, Trapeze to reposition, Turn and reposition approx.  every two hours(pillow and wedges)    Nutrition Interventions: Document food/fluid/supplement intake    Friction and Shear Interventions: Feet elevated on foot rest, Foam dressings/transparent film/skin sealants, HOB 30 degrees or less, Lift sheet, Transferring/repositioning devices

## 2018-08-17 NOTE — PROGRESS NOTES
Pt will discharge to [facility] today. Call report number is [phone] bed assignment will be provided once report is called. CM completed transportation request via [facility] (S) for 1600  and completed documentation for transportation. CM placed documents on pt bedside chart. Care Management Interventions  PCP Verified by CM:  Yes  Mode of Transport at Discharge: BLS ([facility] )  Transition of Care Consult (CM Consult): SNF  Partner SNF: Yes  Discharge Durable Medical Equipment: No  Health Maintenance Reviewed: Yes  Physical Therapy Consult: Yes  Occupational Therapy Consult: Yes  Speech Therapy Consult: No  Current Support Network: [address] [place] [address]  Confirm Follow Up Transport: Other (see comment)  Plan discussed with Pt/Family/Caregiver: Yes  Freedom of Choice Offered: Yes  Discharge Location  Discharge Placement: Skilled nursing facility    BRENDAN Beckman, [address] [place] [address]   [phone]

## 2018-08-17 NOTE — DISCHARGE SUMMARY
Ortho Discharge Summary    Patient ID:  Buena Paget  [id number]  female  80 y.o.  8/19/1929    Admit date: 8/11/2018    Discharge date: 8/17/2018    Admitting Physician: Chriss Ralph MD     Consulting Physician(s): Treatment Team: Attending Provider: Chen Story MD; Utilization Review: Rebeca Plascencia RN; Care Manager: Niki Gardner    Date of Surgery: 8/13/2018     Preoperative Diagnosis:  right femur fracture    Postoperative Diagnosis: right femur fracture    Procedure(s): Procedure(s): DISTAL RIGHT FEMUR, OPEN REDUCTION INTERNAL FIXATION    Surgeon: Surgeon(s) and Role:   * Chen Story MD - Primary                              HPI:  Pt is a 80 y.o. female who has a history of Periprosthetic fracture around internal prosthetic right knee joint with pain and limitations of activities of daily living who presents at this time for operative intervention. PMH:   Past Medical History:   Diagnosis Date    A-fib [facility])     Alzheimer disease     Alzheimer's dementia     Arthritis        Medications upon admission :   Prior to Admission Medications   Prescriptions Last Dose Informant Patient Reported? Taking?   amiodarone (CORDARONE) 200 mg tablet   No Yes   Sig: Take 1 Tab by mouth daily. Indications: VENTRICULAR RATE CONTROL IN ATRIAL FIBRILLATION   amiodarone (PACERONE) 400 mg tablet   No Yes   Sig: Take 1 Tab by mouth two (2) times a day for 3 days. Indications: VENTRICULAR RATE CONTROL IN ATRIAL FIBRILLATION   aspirin 81 mg chewable tablet   No Yes   Sig: Take 1 Tab by mouth daily. Indications: prevention of cerebrovascular accident   cholecalciferol, vitamin D3, (VITAMIN D3) 2,000 unit tab  Other Yes Yes   Sig: Take 2,000 Units by mouth every evening. ferrous sulfate (IRON) 325 mg (65 mg iron) EC tablet  Other Yes Yes   Sig: Take 325 mg by mouth every evening. lutein 6 mg tab  Other Yes Yes   Sig: Take 1 Tab by mouth daily.    metoprolol succinate (TOPROL-XL) 100 mg tablet   No Yes   Sig: Take 1 Tab by mouth daily. oxybutynin (DITROPAN) 5 mg tablet  Other Yes Yes   Sig: Take 5 mg by mouth two (2) times a day. senna (SENOKOT) 8.6 mg tablet  Other Yes Yes   Sig: Take 2 Tabs by mouth every evening. Facility-Administered Medications: None        Allergies:  No Known Allergies     Hospital Course: The patient underwent surgery. Complications:  None; patient tolerated the procedure well. Was taken to the PACU in stable condition and then transferred to the ortho floor. Perioperative Antibiotics:  Ancef     Postoperative Pain Management: APAP, oxy    DVT Prophylaxis: Lovenox    Postoperative transfusions:     1 units banked PRBCs     Post Op complications: none    Hemoglobin at discharge:    Lab Results   Component Value Date/Time    HGB 9.1 (L) 08/17/2018 01:43 PM    INR 1.1 08/11/2018 07:39 PM       Dressing was changed on POD # 1. Incision - clean, dry and intact. No significant erythema or swelling. Neurovascular exam within normal limits. Wound appears to be healing without any evidence of infection. Discharged to: SNF then to long term care    Discharge instructions:  -Anticoagulate with ASA, lovenox  -Resume pre hospital diet              Per cardiology\"  1. Cont baby ASA when OK with surgery. 2. Cont amiodarone 200 mg daily. 3. Will avoid metoprolol which in her will cause sinus bradycardia. Diltiazem preferred since it has AV marco a action but little to no sinus node suppression. 4. Added very low dose diltiazem IR 30 tid to take the edge off breakthrough Afib, HR's in the 100-120 bpm range or so are OK, she'll eventually convert out.   She's back in sinus. As the amiodarone further gets in her system, she'll spend more time in sinus. I'd tolerate transient paroxysms of Afib as she is asymptomatic.   Will sign off, please call with any questions or concerns \"      Current Discharge Medication List      START taking these medications    Details   dilTIAZem (CARDIZEM) 30 mg tablet Take 1 Tab by mouth Before breakfast, lunch, and dinner. Qty: 90 Tab, Refills: 1      enoxaparin (LOVENOX) 30 mg/0.3 mL injection 0.3 mL by SubCUTAneous route every twenty-four (24) hours. Qty: 14 Syringe, Refills: 0      acetaminophen (TYLENOL) 325 mg tablet Take 2 Tabs by mouth every six (6) hours as needed for Pain. Qty: 40 Tab, Refills: 0         CONTINUE these medications which have NOT CHANGED    Details   amiodarone (CORDARONE) 200 mg tablet Take 1 Tab by mouth daily. Indications: VENTRICULAR RATE CONTROL IN ATRIAL FIBRILLATION  Qty: 30 Tab, Refills: 3      aspirin 81 mg chewable tablet Take 1 Tab by mouth daily. Indications: prevention of cerebrovascular accident  Qty: 60 Tab, Refills: 2      ferrous sulfate (IRON) 325 mg (65 mg iron) EC tablet Take 325 mg by mouth every evening. oxybutynin (DITROPAN) 5 mg tablet Take 5 mg by mouth two (2) times a day. lutein 6 mg tab Take 1 Tab by mouth daily. cholecalciferol, vitamin D3, (VITAMIN D3) 2,000 unit tab Take 2,000 Units by mouth every evening. senna (SENOKOT) 8.6 mg tablet Take 2 Tabs by mouth every evening.          STOP taking these medications       metoprolol succinate (TOPROL-XL) 100 mg tablet Comments:   Reason for Stopping:            per medical continuation form  -Discharge activity: activity as tolerated  - NWB on the RLE- wear knee immobilizers, may remove for skin care, bathing and ROM with PT/OT   -Wound Care Keep wound clean and dry  -Follow up in office in 2 weeks      Signed:  Prakash Linn PA-C  8/17/2018  1:49 PM

## 2018-08-17 NOTE — PROGRESS NOTES
Problem: Mobility Impaired (Adult and Pediatric)  Goal: *Acute Goals and Plan of Care (Insert Text)  Physical Therapy Goals  Initiated 8/14/2018  1. Patient will move from supine to sit and sit to supine , scoot up and down and roll side to side in bed with maximal assistance within 7 day(s). 2.  Patient will transfer from bed to chair and chair to bed with maximal assistance using the least restrictive device within 7 day(s). 3.  Patient will perform sit to stand with maximal assistance within 7 day(s). 4.  Patient will ambulate with maximal assistance for 3 feet with the least restrictive device within 7 day(s). physical Therapy TREATMENT  Patient: Pilo Higgins (89 y.o. female)  Date: 8/17/2018  Diagnosis: Periprosthetic fracture around internal prosthetic right knee joint  right femur fracture <principal problem not specified>  Procedure(s) (LRB):  DISTAL RIGHT FEMUR, OPEN REDUCTION INTERNAL FIXATION (Right) 4 Days Post-Op  Precautions: NWB, DNR, Fall  Chart, physical therapy assessment, plan of care and goals were reviewed. ASSESSMENT:  Patient making some progress, at least regards to bed mobility skills. She continues to require max-total when upright, with 100% inability to follow NWB restriction. Patient required re-orientation 8x during session, despite multi-modal facilitation of recall strategies. Majority of session spent assisting on/off BSC with successful BM; patient required total A for bowel hygiene. Continue to recommend SNF. Progression toward goals:  []    Improving appropriately and progressing toward goals  [x]    Improving slowly and progressing toward goals  []    Not making progress toward goals and plan of care will be adjusted     PLAN:  Patient continues to benefit from skilled intervention to address the above impairments. Continue treatment per established plan of care.   Discharge Recommendations:  Justin Avila  Further Equipment Recommendations for Discharge:  defer     SUBJECTIVE:   Patient stated Jaclyn Geiger happened?     OBJECTIVE DATA SUMMARY:   Critical Behavior:  Neurologic State: Alert, Confused  Orientation Level: Disoriented to situation, Disoriented to time, Oriented to person  Cognition: Decreased attention/concentration, Follows commands  Safety/Judgement: Decreased awareness of need for safety  Functional Mobility Training:  Bed Mobility:  Rolling: Minimum assistance  Supine to Sit: Minimum assistance     Scooting: Minimum assistance        Transfers:  Sit to Stand: Maximum assistance  Stand to Sit: Maximum assistance  Stand Pivot Transfers: Total assistance                          Balance:  Sitting: Impaired  Sitting - Static: Good (unsupported)  Sitting - Dynamic: Fair (occasional)  Standing: Impaired  Standing - Static: Poor;Constant support  Standing - Dynamic : Poor  Pain:  Pain Scale 1: Numeric (0 - 10)  Pain Intensity 1: 0              Activity Tolerance: WNL    Please refer to the flowsheet for vital signs taken during this treatment.   After treatment:   [x]    Patient left in no apparent distress sitting up in chair  []    Patient left in no apparent distress in bed  [x]    Call bell left within reach  [x]    Nursing notified  []    Caregiver present  []    Bed alarm activated    COMMUNICATION/COLLABORATION:   The patients plan of care was discussed with: Registered Nurse    Joesphine Lanes, PT, DPT   Board-Certified Geriatric Clinical Specialist   Certified Exercise Expert for Aging Adults      Time Calculation: 20 mins

## 2018-08-17 NOTE — DISCHARGE INSTRUCTIONS
Janie Simmonds  Surgery: Right Femur Fracture Repair  Surgeon:   Ania Corea MD  Surgery Date:  8/13/2018     To relieve pain:   Use ice/gel packs.  Put the ice pack directly over the wound, or anywhere you are hurting or swollen.  To control pain and swelling, keep ice on regularly, especially after physical activity.  The packs should stay cold for 3-4 hours. When it is not cold anymore, rotate with the packs in the freezer.  Elevate your leg. This will also keep swelling down.  Rest for at least 20 minutes between activity or exercises.  To keep track of your pain medications, write down what you take and when you take it.  The last dose of pain medication you got in the hospital was:     Medication    Dose    Date & Time         Choose your medications based on the pain scale below:     To keep your pain under control, take Tylenol every 6 hours for 14 days - even if you feel like you dont need it.  For mild to moderate pain (1-6 on pain scale), take one pain pill every 3-4 hours as needed.  For severe pain (7-10 on pain scale), take two pain pills every 3-4 hours as needed.  To prevent nausea, take your pain medications with food. Pain Scale                 As your pain lessens:     Slowly start taking less pain medication. You may do this by waiting longer between doses or by taking smaller doses.  Stop using the pain medications as soon as you no longer need it, usually in 2-3 weeks.  Lovenox   To prevent blood clots, you will will need to take    Lovenox 30 mg daily for total of 21 days.  It is an injection that you receive in the side of your  stomach.  Your nursing will teach you or a caregiver how to     do this before you go home.               To prevent stomach upset or bleeding:   Take Pepcid 20 mg twice a day, or a similar home medication, while you are taking a blood thinner.  Do not take non-steroidal anti-inflammatory (NSAID) medications (Ibuprofen, Advil, Motrin, Naproxen, etc.)                                     OPSITE (Honeycomb dressing)     Keep your clear, waterproof dressing in place for 5-7 days after your leave the hospital.      If you are still having drainage, you will need to change your dressing in 5-7 days. You will be given one extra dressing to use at home.  If there is no more drainage from the wound, you may leave it open to the air. OPSITE DRESSING INSTRUCTIONS                 DO NOTs:   Do not rub your surgical wound   Do not put lotion or oils on your wound.  Do not take a tub bath or go swimming until your doctor says it is ok.  You may shower with this dressing over your wound.  After showering pat the dressing dry.  If you have staples a home health nurse will remove them in about 10-14 days post op.  To increase and promote healing:     Stop Smoking (or at least cut back on       Smoking).  Eat a well-balanced diet (high in protein       and vitamin C).  If you have a poor appetite, drink Ensure, Glucerna, or CarnationInstant Breakfast for the next 30 days.  If you are diabetic, you should control your blood         sugars to prevent infection and help your wound         to heal.       To prevent constipation, stay active & drink plenty of fluid.  While using pain medications, you should also take stool softeners and laxatives, such as Pericolace and Miralax.  If you are having too many bowel movements, then you may need  to stop taking the laxatives.  You should have a bowel movement 3-4 days after surgery and then at least every other day while on pain medication.  To improve your recovery, you must be WORK with PT/OT     WEIGHT BEARING   None = you may not put any weight or pressure through that leg. Wear the knee brace!  May be removed for bathing, skin care and knee ROM.  THERAPY   If you go to a rehab facility, physical therapy (PT) will need to work with you daily, and sometimes twice a day to teach you how to:     Get in and out of the bed -  caution due to                dementia !!!!!!!!!     Walk (gait training) and climb stairs-        You may also need to have occupational therapy (OT) work with you to help you practice:     Getting on and off the toilet   Self-care (brushing teeth, eating, bathing, etc)     If you go directly home, home health physical therapy will come the day after you leave the hospital.  They will visit about 4 times over the next couple of weeks to teach you how to get out of bed, to safely walk in your home, and to do your exercises.  NO DRIVING until your surgeon tells you it is ok.  You can return to work when cleared by a physician.  Please call your physician immediately if you have:     Constant bleeding from your wound.  Increasing redness or swelling around your wound (Some warmth, bruising and swelling is normal).  Change in wound drainage (increase in amount, color, or bad smell).  Change in mental status (unusual behavior)     Temperature over 101.5 degrees Fahrenheit     Increased pain, swelling, or redness in the calf (back of your lower leg), thigh, ankle or foot.  Emergency: CALL 911 if you have:   Shortness of breath   Chest pain when you cough or taking a deep breath     Please call your surgeons office at [place] [address] for a follow up appointment.  You should call as soon as you get home or the next day after discharge. Ask to make an appointment in 2 weeks.

## 2018-08-17 NOTE — PROGRESS NOTES
Problem: Mobility Impaired (Adult and Pediatric)  Goal: *Acute Goals and Plan of Care (Insert Text)  Physical Therapy Goals  Initiated 8/14/2018  1. Patient will move from supine to sit and sit to supine , scoot up and down and roll side to side in bed with maximal assistance within 7 day(s). 2.  Patient will transfer from bed to chair and chair to bed with maximal assistance using the least restrictive device within 7 day(s). 3.  Patient will perform sit to stand with maximal assistance within 7 day(s). 4.  Patient will ambulate with maximal assistance for 3 feet with the least restrictive device within 7 day(s). physical Therapy TREATMENT  Patient: Janie Simmonds (89 y.o. female)  Date: 8/16/2018 LATE ENTRY  Diagnosis: Periprosthetic fracture around internal prosthetic right knee joint, right femur fracture     Procedure(s) (LRB):  DISTAL RIGHT FEMUR, OPEN REDUCTION INTERNAL FIXATION (Right) 4 Days Post-Op     Precautions: NWB, DNR, Fall  Chart, physical therapy assessment, plan of care and goals were reviewed. ASSESSMENT: pt continues to need a lot of assistance for all mobility, did fair with ther-ex, unable to maintain NWB, vc's for safety. Progression toward goals:  []    Improving appropriately and progressing toward goals  [x]    Improving very slowly and progressing toward goals  []    Not making progress toward goals and plan of care will be adjusted     PLAN:  Patient continues to benefit from skilled intervention to address the above impairments. Continue treatment per established plan of care.   Discharge Recommendations:  Justin Avila  Further Equipment Recommendations for Discharge:  bedside commode and rolling walker     OBJECTIVE DATA SUMMARY:     Critical Behavior:  Neurologic State: Alert, Confused  Orientation Level: Disoriented to situation, Disoriented to time, Oriented to person  Cognition: Decreased attention/concentration, Follows commands  Safety/Judgement: Decreased awareness of need for safety     Functional Mobility Training:  Bed Mobility:  Rolling: Minimum assistance  Supine to Sit: Minimum assistance  Scooting: Minimum assistance    Transfers:  Sit to Stand: Maximum assistance  Stand to Sit: Maximum assistance  Stand Pivot Transfers:  Total assistance       Balance:  Sitting: Impaired  Sitting - Static: Good (unsupported)  Sitting - Dynamic: Fair (occasional)  Standing: Impaired  Standing - Static: Poor;Constant support  Standing - Dynamic : Poor     Ambulation/Gait Training: unable    Therapeutic Exercises:   supine  EXERCISE   Sets   Reps   Active Active Assist   Passive   Comments   Ankle pumps 1 10 [x] [] [] bilat   SLR 1 10 [x] [x] [] \"   Abd & Add 1 10 [x] [x] [] \"   Heel slides 1 10 [x] [] [] LLE   Hip flex 1 10 [x] [] [] \"     Pain:  Pain Scale 1: Numeric (0 - 10)  Pain Intensity 1: 0    Activity Tolerance: poor    After treatment:   [x]    Patient left in no apparent distress sitting up in chair  []    Patient left in no apparent distress in bed  [x]    Call bell left within reach  [x]    Nursing notified  []    Caregiver present  []    Bed alarm activated    COMMUNICATION/COLLABORATION:   The patients plan of care was discussed with: Registered Nurse    Aliya Alba PTA   Time Calculation: 20 mins

## 2018-08-19 NOTE — PROGRESS NOTES
MAT Ramirez notified of patient's d/c to [facility] ctr/ Benny Dial of Care Management

## 2018-08-20 ENCOUNTER — PATIENT OUTREACH (OUTPATIENT)
Dept: INTERNAL MEDICINE CLINIC | Age: 83
End: 2018-08-20

## 2018-08-23 ENCOUNTER — PATIENT OUTREACH (OUTPATIENT)
Dept: CASE MANAGEMENT | Age: 83
End: 2018-08-23

## 2018-08-23 NOTE — PROGRESS NOTES
Community Care Team documentation for patient in [facility]  Initial Follow Up       Patient was admitted to [facility] on 8/11 and discharged 8/17 to [facility] Discharge diagnosis:  right femur fracture    RRAT score: 19    Advance Care Planning:  POA on file. PCP : Sarah Banks MD  Nurse Navigator in PCP office: Victoriano Zimmerman  Note routed to Nurse Navigator team.    SNF Attending: John Curiel MD    Spoke with Nidia Johnson  and team at SNF. Ensured patient arrived to SNF safely with admission packet in order. Discussed need for ortho follow up in 2 weeks. Pt is being skilled by PT/OT. Currently max assist with ADLs and transfers and non weight bearing to RLE. LOS pending weight bearing status and participation. Family requesting LTC placement. Pt is Medicaid pending. UAI received. Community Care Team will follow up weekly with [facility] until discharge. Medications were not reconciled and general patient assessment was not completed during this [facility] outreach.

## 2018-08-30 ENCOUNTER — PATIENT OUTREACH (OUTPATIENT)
Dept: CASE MANAGEMENT | Age: 83
End: 2018-08-30

## 2018-08-30 NOTE — PROGRESS NOTES
Community Care Team Documentation for Patient in [facility] Subsequent Follow up Patient remains at [facility] ([facility]). See previous [facility] Team notes. PCP : Luan Rashid MD 
Nurse Navigator in PCP office: Wanda Fortune Spoke with Nidia Johnson and team at [facility]. PT/OT continue. Currently mod to max assist with ADLs and transfers. Patient remanis NWB to RLE. Ortho follow up scheduled for 9/4. SLP providing therapy for swallow. Family requesting LTC placement at [facility]. Patient has Medicaid pending in [facility]. Niece working on spend down for patient. SNF received UAI. Medications were not reconciled and general patient assessment was not completed during this skilled nursing facility outreach. Sharon Longoria, MSN, RN, ACNS-BC, [facility] Nurse Navigator, [address] [place] [address] [phone]

## 2018-09-06 ENCOUNTER — PATIENT OUTREACH (OUTPATIENT)
Dept: CASE MANAGEMENT | Age: 83
End: 2018-09-06

## 2018-09-06 NOTE — PROGRESS NOTES
Community Care Team Documentation for Patient in [facility] Subsequent Follow up Patient remains at [facility] ([facility]). See previous [facility] Team notes. PCP : Diogenes Castillo MD 
Nurse Navigator in PCP office: Ruiz Mullins, [address] Spoke with SNF team.  PT/OT continue. Patient attended ortho follow up on 9/4 and remains NWB to RLE. Anticipate about 2 more weeks LOS. Patient has Medicaid pending in [facility]. [place] working on spend down for patient  SNF received UAI. Plan for transition to LTC at [facility]. Date TBD. Medications were not reconciled and general patient assessment was not completed during this skilled nursing facility outreach. Jessica Longoria, MSN, RN, ACNS-BC, [facility] Nurse Navigator, [address] [place] [address] [phone]

## 2018-09-13 ENCOUNTER — PATIENT OUTREACH (OUTPATIENT)
Dept: CASE MANAGEMENT | Age: 83
End: 2018-09-13

## 2018-09-14 NOTE — PROGRESS NOTES
Community Care Team Documentation for Patient in [facility] Subsequent Follow up Patient remains at [facility] ([facility]). See previous [facility] Team notes. PCP : Susana Salomon MD 
Nurse Navigator in PCP office: Montgomery Pulling Spoke with SNF team.  PT/OT last treat day 9/19. Plan for transition back to LTC on 9/20 at SNF. Medications were not reconciled and general patient assessment was not completed during this skilled nursing facility outreach. Liz Longoria, MSN, RN, ACNS-BC, [facility] Nurse Navigator, [address] [place] [address] [phone]

## 2018-09-20 ENCOUNTER — PATIENT OUTREACH (OUTPATIENT)
Dept: CASE MANAGEMENT | Age: 83
End: 2018-09-20

## 2018-09-20 NOTE — Clinical Note
allyson in LTC at [facility]. PCp now Yrn Watson MD.  Could you have this changed in [phone] and CC please? ? Thanks!

## 2018-09-20 NOTE — PROGRESS NOTES
Community Care Team Documentation for Patient in [facility] Subsequent Follow up Patient remains at [facility] ([facility]). See previous [facility] Team notes. PCP : Lori Sher MD 
Nurse Navigator in PCP office: Joel Haywood Spoke with SNF team.  Confirmed pt transitioned back to LTC on 9/20 at SNF. Medications were not reconciled and general patient assessment was not completed during this skilled nursing facility outreach. Ghassan Longoria, MSN, RN, ACNS-BC, [facility] Nurse Navigator, [address] [place] [address] [phone]

## 2019-09-20 ENCOUNTER — APPOINTMENT (OUTPATIENT)
Dept: GENERAL RADIOLOGY | Age: 84
DRG: 871 | End: 2019-09-20
Attending: EMERGENCY MEDICINE
Payer: MEDICARE

## 2019-09-20 ENCOUNTER — HOSPITAL ENCOUNTER (INPATIENT)
Age: 84
LOS: 6 days | Discharge: NURSING, REHAB OR LONG-TERM CARE | DRG: 871 | End: 2019-09-26
Attending: EMERGENCY MEDICINE | Admitting: INTERNAL MEDICINE
Payer: MEDICARE

## 2019-09-20 DIAGNOSIS — R62.7 FAILURE TO THRIVE IN ADULT: ICD-10-CM

## 2019-09-20 DIAGNOSIS — E86.0 DEHYDRATION: ICD-10-CM

## 2019-09-20 DIAGNOSIS — E03.9 HYPOTHYROIDISM, UNSPECIFIED TYPE: ICD-10-CM

## 2019-09-20 DIAGNOSIS — I48.92 ATRIAL FLUTTER WITH RAPID VENTRICULAR RESPONSE (HCC): Primary | ICD-10-CM

## 2019-09-20 PROBLEM — I95.9 HYPOTENSION: Status: ACTIVE | Noted: 2019-09-20

## 2019-09-20 PROBLEM — R79.89 ELEVATED TSH: Status: ACTIVE | Noted: 2019-09-20

## 2019-09-20 PROBLEM — I48.91 ATRIAL FIBRILLATION WITH RVR (HCC): Status: ACTIVE | Noted: 2019-09-20

## 2019-09-20 LAB
ALBUMIN SERPL-MCNC: 3.5 G/DL (ref 3.5–5)
ALBUMIN/GLOB SERPL: 1.1 {RATIO} (ref 1.1–2.2)
ALP SERPL-CCNC: 74 U/L (ref 45–117)
ALT SERPL-CCNC: 20 U/L (ref 12–78)
ANION GAP SERPL CALC-SCNC: 9 MMOL/L (ref 5–15)
APPEARANCE UR: ABNORMAL
AST SERPL-CCNC: 17 U/L (ref 15–37)
BACTERIA URNS QL MICRO: ABNORMAL /HPF
BASOPHILS # BLD: 0 K/UL (ref 0–0.1)
BASOPHILS NFR BLD: 0 % (ref 0–1)
BILIRUB SERPL-MCNC: 0.4 MG/DL (ref 0.2–1)
BILIRUB UR QL: NEGATIVE
BUN SERPL-MCNC: 43 MG/DL (ref 6–20)
BUN/CREAT SERPL: 34 (ref 12–20)
CALCIUM SERPL-MCNC: 8.5 MG/DL (ref 8.5–10.1)
CHLORIDE SERPL-SCNC: 107 MMOL/L (ref 97–108)
CK MB CFR SERPL CALC: 11.5 % (ref 0–2.5)
CK MB SERPL-MCNC: 4.5 NG/ML (ref 5–25)
CK SERPL-CCNC: 39 U/L (ref 26–192)
CO2 SERPL-SCNC: 26 MMOL/L (ref 21–32)
COLOR UR: ABNORMAL
CREAT SERPL-MCNC: 1.26 MG/DL (ref 0.55–1.02)
DIFFERENTIAL METHOD BLD: ABNORMAL
EOSINOPHIL # BLD: 0 K/UL (ref 0–0.4)
EOSINOPHIL NFR BLD: 0 % (ref 0–7)
EPITH CASTS URNS QL MICRO: ABNORMAL /LPF
ERYTHROCYTE [DISTWIDTH] IN BLOOD BY AUTOMATED COUNT: 13.8 % (ref 11.5–14.5)
GLOBULIN SER CALC-MCNC: 3.3 G/DL (ref 2–4)
GLUCOSE SERPL-MCNC: 113 MG/DL (ref 65–100)
GLUCOSE UR STRIP.AUTO-MCNC: NEGATIVE MG/DL
HCT VFR BLD AUTO: 45.4 % (ref 35–47)
HGB BLD-MCNC: 15.4 G/DL (ref 11.5–16)
HGB UR QL STRIP: ABNORMAL
HYALINE CASTS URNS QL MICRO: ABNORMAL /LPF (ref 0–5)
IMM GRANULOCYTES # BLD AUTO: 0.2 K/UL (ref 0–0.04)
IMM GRANULOCYTES NFR BLD AUTO: 1 % (ref 0–0.5)
INR PPP: 1 (ref 0.9–1.1)
KETONES UR QL STRIP.AUTO: NEGATIVE MG/DL
LEUKOCYTE ESTERASE UR QL STRIP.AUTO: NEGATIVE
LYMPHOCYTES # BLD: 1.9 K/UL (ref 0.8–3.5)
LYMPHOCYTES NFR BLD: 12 % (ref 12–49)
MAGNESIUM SERPL-MCNC: 2.4 MG/DL (ref 1.6–2.4)
MCH RBC QN AUTO: 32.6 PG (ref 26–34)
MCHC RBC AUTO-ENTMCNC: 33.9 G/DL (ref 30–36.5)
MCV RBC AUTO: 96 FL (ref 80–99)
MONOCYTES # BLD: 2 K/UL (ref 0–1)
MONOCYTES NFR BLD: 13 % (ref 5–13)
NEUTS SEG # BLD: 11.6 K/UL (ref 1.8–8)
NEUTS SEG NFR BLD: 74 % (ref 32–75)
NITRITE UR QL STRIP.AUTO: NEGATIVE
NRBC # BLD: 0 K/UL (ref 0–0.01)
NRBC BLD-RTO: 0 PER 100 WBC
PH UR STRIP: 5.5 [PH] (ref 5–8)
PLATELET # BLD AUTO: 353 K/UL (ref 150–400)
PMV BLD AUTO: 10.2 FL (ref 8.9–12.9)
POTASSIUM SERPL-SCNC: 3 MMOL/L (ref 3.5–5.1)
PROT SERPL-MCNC: 6.8 G/DL (ref 6.4–8.2)
PROT UR STRIP-MCNC: 30 MG/DL
PROTHROMBIN TIME: 10.5 SEC (ref 9–11.1)
RBC # BLD AUTO: 4.73 M/UL (ref 3.8–5.2)
RBC #/AREA URNS HPF: ABNORMAL /HPF (ref 0–5)
RBC MORPH BLD: ABNORMAL
SODIUM SERPL-SCNC: 142 MMOL/L (ref 136–145)
SP GR UR REFRACTOMETRY: 1.01 (ref 1–1.03)
T3FREE SERPL-MCNC: 3.7 PG/ML (ref 2.2–4)
TSH SERPL DL<=0.05 MIU/L-ACNC: 23.7 UIU/ML (ref 0.36–3.74)
UA: UC IF INDICATED,UAUC: ABNORMAL
UROBILINOGEN UR QL STRIP.AUTO: 0.2 EU/DL (ref 0.2–1)
WBC # BLD AUTO: 15.7 K/UL (ref 3.6–11)
WBC URNS QL MICRO: ABNORMAL /HPF (ref 0–4)

## 2019-09-20 PROCEDURE — 36415 COLL VENOUS BLD VENIPUNCTURE: CPT

## 2019-09-20 PROCEDURE — 82550 ASSAY OF CK (CPK): CPT

## 2019-09-20 PROCEDURE — 85025 COMPLETE CBC W/AUTO DIFF WBC: CPT

## 2019-09-20 PROCEDURE — 84145 PROCALCITONIN (PCT): CPT

## 2019-09-20 PROCEDURE — 87040 BLOOD CULTURE FOR BACTERIA: CPT

## 2019-09-20 PROCEDURE — 71045 X-RAY EXAM CHEST 1 VIEW: CPT

## 2019-09-20 PROCEDURE — 84443 ASSAY THYROID STIM HORMONE: CPT

## 2019-09-20 PROCEDURE — 84439 ASSAY OF FREE THYROXINE: CPT

## 2019-09-20 PROCEDURE — 87186 SC STD MICRODIL/AGAR DIL: CPT

## 2019-09-20 PROCEDURE — 65270000029 HC RM PRIVATE

## 2019-09-20 PROCEDURE — 80048 BASIC METABOLIC PNL TOTAL CA: CPT

## 2019-09-20 PROCEDURE — 87086 URINE CULTURE/COLONY COUNT: CPT

## 2019-09-20 PROCEDURE — 93005 ELECTROCARDIOGRAM TRACING: CPT

## 2019-09-20 PROCEDURE — 84484 ASSAY OF TROPONIN QUANT: CPT

## 2019-09-20 PROCEDURE — 83735 ASSAY OF MAGNESIUM: CPT

## 2019-09-20 PROCEDURE — 87077 CULTURE AEROBIC IDENTIFY: CPT

## 2019-09-20 PROCEDURE — 80053 COMPREHEN METABOLIC PANEL: CPT

## 2019-09-20 PROCEDURE — 85610 PROTHROMBIN TIME: CPT

## 2019-09-20 PROCEDURE — 74011000250 HC RX REV CODE- 250: Performed by: EMERGENCY MEDICINE

## 2019-09-20 PROCEDURE — 96374 THER/PROPH/DIAG INJ IV PUSH: CPT

## 2019-09-20 PROCEDURE — 51702 INSERT TEMP BLADDER CATH: CPT

## 2019-09-20 PROCEDURE — 81001 URINALYSIS AUTO W/SCOPE: CPT

## 2019-09-20 PROCEDURE — 74011250636 HC RX REV CODE- 250/636: Performed by: EMERGENCY MEDICINE

## 2019-09-20 PROCEDURE — 83605 ASSAY OF LACTIC ACID: CPT

## 2019-09-20 PROCEDURE — 84481 FREE ASSAY (FT-3): CPT

## 2019-09-20 PROCEDURE — 96361 HYDRATE IV INFUSION ADD-ON: CPT

## 2019-09-20 PROCEDURE — 99285 EMERGENCY DEPT VISIT HI MDM: CPT

## 2019-09-20 RX ORDER — MIRTAZAPINE 45 MG/1
45 TABLET, FILM COATED ORAL
COMMUNITY

## 2019-09-20 RX ORDER — BUSPIRONE HYDROCHLORIDE 5 MG/1
15 TABLET ORAL 3 TIMES DAILY
COMMUNITY

## 2019-09-20 RX ORDER — SODIUM CHLORIDE 0.9 % (FLUSH) 0.9 %
5-40 SYRINGE (ML) INJECTION EVERY 8 HOURS
Status: DISCONTINUED | OUTPATIENT
Start: 2019-09-20 | End: 2019-09-26 | Stop reason: HOSPADM

## 2019-09-20 RX ORDER — MIDODRINE HYDROCHLORIDE 5 MG/1
2.5 TABLET ORAL DAILY
Status: DISCONTINUED | OUTPATIENT
Start: 2019-09-21 | End: 2019-09-26 | Stop reason: HOSPADM

## 2019-09-20 RX ORDER — ARIPIPRAZOLE 5 MG/1
5 TABLET ORAL DAILY
COMMUNITY

## 2019-09-20 RX ORDER — AMOXICILLIN 250 MG
1 CAPSULE ORAL DAILY
Status: ON HOLD | COMMUNITY
End: 2019-09-23 | Stop reason: CLARIF

## 2019-09-20 RX ORDER — SODIUM CHLORIDE 0.9 % (FLUSH) 0.9 %
5-40 SYRINGE (ML) INJECTION AS NEEDED
Status: DISCONTINUED | OUTPATIENT
Start: 2019-09-20 | End: 2019-09-23 | Stop reason: SDUPTHER

## 2019-09-20 RX ORDER — SODIUM CHLORIDE 9 MG/ML
75 INJECTION, SOLUTION INTRAVENOUS CONTINUOUS
Status: DISCONTINUED | OUTPATIENT
Start: 2019-09-20 | End: 2019-09-22

## 2019-09-20 RX ORDER — POTASSIUM CHLORIDE 20 MEQ/1
40 TABLET, EXTENDED RELEASE ORAL ONCE
Status: COMPLETED | OUTPATIENT
Start: 2019-09-20 | End: 2019-09-21

## 2019-09-20 RX ORDER — SODIUM CHLORIDE 9 MG/ML
1000 INJECTION, SOLUTION INTRAVENOUS ONCE
Status: DISPENSED | OUTPATIENT
Start: 2019-09-20 | End: 2019-09-21

## 2019-09-20 RX ORDER — DILTIAZEM HYDROCHLORIDE 5 MG/ML
10 INJECTION INTRAVENOUS
Status: COMPLETED | OUTPATIENT
Start: 2019-09-20 | End: 2019-09-20

## 2019-09-20 RX ORDER — SODIUM CHLORIDE 0.9 % (FLUSH) 0.9 %
5-40 SYRINGE (ML) INJECTION EVERY 8 HOURS
Status: DISCONTINUED | OUTPATIENT
Start: 2019-09-20 | End: 2019-09-23 | Stop reason: SDUPTHER

## 2019-09-20 RX ORDER — AMOXICILLIN 250 MG
1 CAPSULE ORAL DAILY
Status: DISCONTINUED | OUTPATIENT
Start: 2019-09-21 | End: 2019-09-24

## 2019-09-20 RX ORDER — SODIUM CHLORIDE 0.9 % (FLUSH) 0.9 %
5-40 SYRINGE (ML) INJECTION AS NEEDED
Status: DISCONTINUED | OUTPATIENT
Start: 2019-09-20 | End: 2019-09-26 | Stop reason: HOSPADM

## 2019-09-20 RX ORDER — OXYBUTYNIN CHLORIDE 5 MG/1
10 TABLET, EXTENDED RELEASE ORAL DAILY
Status: DISCONTINUED | OUTPATIENT
Start: 2019-09-21 | End: 2019-09-21

## 2019-09-20 RX ORDER — MIDODRINE HYDROCHLORIDE 2.5 MG/1
2.5 TABLET ORAL 2 TIMES DAILY
COMMUNITY

## 2019-09-20 RX ORDER — AZITHROMYCIN 250 MG/1
500 TABLET, FILM COATED ORAL DAILY
Status: DISCONTINUED | OUTPATIENT
Start: 2019-09-20 | End: 2019-09-20

## 2019-09-20 RX ORDER — AZITHROMYCIN 250 MG/1
500 TABLET, FILM COATED ORAL DAILY
Status: DISCONTINUED | OUTPATIENT
Start: 2019-09-21 | End: 2019-09-20 | Stop reason: SDUPTHER

## 2019-09-20 RX ORDER — ACETAMINOPHEN 325 MG/1
650 TABLET ORAL
Status: DISCONTINUED | OUTPATIENT
Start: 2019-09-20 | End: 2019-09-26 | Stop reason: HOSPADM

## 2019-09-20 RX ORDER — ENOXAPARIN SODIUM 100 MG/ML
30 INJECTION SUBCUTANEOUS EVERY 24 HOURS
Status: DISCONTINUED | OUTPATIENT
Start: 2019-09-20 | End: 2019-09-21

## 2019-09-20 RX ORDER — DILTIAZEM HYDROCHLORIDE 30 MG/1
30 TABLET, FILM COATED ORAL
Status: DISCONTINUED | OUTPATIENT
Start: 2019-09-21 | End: 2019-09-26 | Stop reason: HOSPADM

## 2019-09-20 RX ORDER — GUAIFENESIN 100 MG/5ML
81 LIQUID (ML) ORAL DAILY
Status: DISCONTINUED | OUTPATIENT
Start: 2019-09-21 | End: 2019-09-26 | Stop reason: HOSPADM

## 2019-09-20 RX ORDER — DOXYCYCLINE HYCLATE 100 MG
100 TABLET ORAL EVERY 12 HOURS
Status: DISCONTINUED | OUTPATIENT
Start: 2019-09-20 | End: 2019-09-21

## 2019-09-20 RX ADMIN — SODIUM CHLORIDE 1000 ML: 900 INJECTION, SOLUTION INTRAVENOUS at 19:22

## 2019-09-20 RX ADMIN — DILTIAZEM HYDROCHLORIDE 10 MG: 5 INJECTION INTRAVENOUS at 18:49

## 2019-09-20 NOTE — ED TRIAGE NOTES
Assumed care form EMS. Per EMS they were called because pt was unresponsive for about 3-5 min. Per EMS by the time EMS arrived she was A&O to her normal baseline. On arrival pt SBP was in the 60s for 2 readings. Pt received 300ml NS bolus from EMS and her BP on arrival is now 97/72. Pt on monitor x3. Pt family at bedside and they report she has severe dementia and pt is at baseline.

## 2019-09-21 ENCOUNTER — APPOINTMENT (OUTPATIENT)
Dept: ULTRASOUND IMAGING | Age: 84
DRG: 871 | End: 2019-09-21
Attending: INTERNAL MEDICINE
Payer: MEDICARE

## 2019-09-21 PROBLEM — E03.9 ACQUIRED HYPOTHYROIDISM: Status: ACTIVE | Noted: 2019-09-21

## 2019-09-21 LAB
ANION GAP SERPL CALC-SCNC: 6 MMOL/L (ref 5–15)
ATRIAL RATE: 250 BPM
ATRIAL RATE: 86 BPM
BASOPHILS # BLD: 0 K/UL (ref 0–0.1)
BASOPHILS NFR BLD: 0 % (ref 0–1)
BUN SERPL-MCNC: 39 MG/DL (ref 6–20)
BUN/CREAT SERPL: 37 (ref 12–20)
CALCIUM SERPL-MCNC: 8.1 MG/DL (ref 8.5–10.1)
CALCULATED P AXIS, ECG09: 64 DEGREES
CALCULATED R AXIS, ECG10: 24 DEGREES
CALCULATED R AXIS, ECG10: 43 DEGREES
CALCULATED T AXIS, ECG11: 67 DEGREES
CALCULATED T AXIS, ECG11: 78 DEGREES
CHLORIDE SERPL-SCNC: 111 MMOL/L (ref 97–108)
CO2 SERPL-SCNC: 28 MMOL/L (ref 21–32)
CREAT SERPL-MCNC: 1.05 MG/DL (ref 0.55–1.02)
DIAGNOSIS, 93000: NORMAL
DIAGNOSIS, 93000: NORMAL
DIFFERENTIAL METHOD BLD: ABNORMAL
EOSINOPHIL # BLD: 0 K/UL (ref 0–0.4)
EOSINOPHIL NFR BLD: 0 % (ref 0–7)
ERYTHROCYTE [DISTWIDTH] IN BLOOD BY AUTOMATED COUNT: 13.7 % (ref 11.5–14.5)
GLUCOSE SERPL-MCNC: 95 MG/DL (ref 65–100)
HCT VFR BLD AUTO: 41.9 % (ref 35–47)
HGB BLD-MCNC: 14.1 G/DL (ref 11.5–16)
IMM GRANULOCYTES # BLD AUTO: 0.1 K/UL (ref 0–0.04)
IMM GRANULOCYTES NFR BLD AUTO: 1 % (ref 0–0.5)
LACTATE SERPL-SCNC: 1.4 MMOL/L (ref 0.4–2)
LACTATE SERPL-SCNC: 2.5 MMOL/L (ref 0.4–2)
LACTATE SERPL-SCNC: 3.3 MMOL/L (ref 0.4–2)
LYMPHOCYTES # BLD: 3.1 K/UL (ref 0.8–3.5)
LYMPHOCYTES NFR BLD: 19 % (ref 12–49)
MCH RBC QN AUTO: 32.5 PG (ref 26–34)
MCHC RBC AUTO-ENTMCNC: 33.7 G/DL (ref 30–36.5)
MCV RBC AUTO: 96.5 FL (ref 80–99)
MONOCYTES # BLD: 1.6 K/UL (ref 0–1)
MONOCYTES NFR BLD: 10 % (ref 5–13)
NEUTS SEG # BLD: 11.4 K/UL (ref 1.8–8)
NEUTS SEG NFR BLD: 70 % (ref 32–75)
NRBC # BLD: 0 K/UL (ref 0–0.01)
NRBC BLD-RTO: 0 PER 100 WBC
P-R INTERVAL, ECG05: 142 MS
PLATELET # BLD AUTO: 313 K/UL (ref 150–400)
PMV BLD AUTO: 10.5 FL (ref 8.9–12.9)
POTASSIUM SERPL-SCNC: 3.6 MMOL/L (ref 3.5–5.1)
PROCALCITONIN SERPL-MCNC: <0.1 NG/ML
Q-T INTERVAL, ECG07: 406 MS
Q-T INTERVAL, ECG07: 420 MS
QRS DURATION, ECG06: 92 MS
QRS DURATION, ECG06: 98 MS
QTC CALCULATION (BEZET), ECG08: 502 MS
QTC CALCULATION (BEZET), ECG08: 571 MS
RBC # BLD AUTO: 4.34 M/UL (ref 3.8–5.2)
SODIUM SERPL-SCNC: 145 MMOL/L (ref 136–145)
T4 FREE SERPL-MCNC: 0.7 NG/DL (ref 0.8–1.5)
TROPONIN I SERPL-MCNC: 2.06 NG/ML
TROPONIN I SERPL-MCNC: 2.89 NG/ML
TROPONIN I SERPL-MCNC: 3.78 NG/ML
VENTRICULAR RATE, ECG03: 119 BPM
VENTRICULAR RATE, ECG03: 86 BPM
WBC # BLD AUTO: 16.2 K/UL (ref 3.6–11)

## 2019-09-21 PROCEDURE — 74011250636 HC RX REV CODE- 250/636: Performed by: HOSPITALIST

## 2019-09-21 PROCEDURE — 51798 US URINE CAPACITY MEASURE: CPT

## 2019-09-21 PROCEDURE — 76770 US EXAM ABDO BACK WALL COMP: CPT

## 2019-09-21 PROCEDURE — 93005 ELECTROCARDIOGRAM TRACING: CPT

## 2019-09-21 PROCEDURE — 74011250637 HC RX REV CODE- 250/637: Performed by: INTERNAL MEDICINE

## 2019-09-21 PROCEDURE — 77030005518 HC CATH URETH FOL 2W BARD -B

## 2019-09-21 PROCEDURE — 65660000001 HC RM ICU INTERMED STEPDOWN

## 2019-09-21 PROCEDURE — 36415 COLL VENOUS BLD VENIPUNCTURE: CPT

## 2019-09-21 PROCEDURE — 74011250637 HC RX REV CODE- 250/637: Performed by: HOSPITALIST

## 2019-09-21 PROCEDURE — 74011250636 HC RX REV CODE- 250/636: Performed by: INTERNAL MEDICINE

## 2019-09-21 PROCEDURE — 74011000258 HC RX REV CODE- 258: Performed by: INTERNAL MEDICINE

## 2019-09-21 PROCEDURE — 83605 ASSAY OF LACTIC ACID: CPT

## 2019-09-21 RX ORDER — LEVOTHYROXINE SODIUM 75 UG/1
75 TABLET ORAL
Status: DISCONTINUED | OUTPATIENT
Start: 2019-09-21 | End: 2019-09-26 | Stop reason: HOSPADM

## 2019-09-21 RX ORDER — HEPARIN SODIUM 5000 [USP'U]/ML
5000 INJECTION, SOLUTION INTRAVENOUS; SUBCUTANEOUS EVERY 12 HOURS
Status: DISCONTINUED | OUTPATIENT
Start: 2019-09-22 | End: 2019-09-26 | Stop reason: HOSPADM

## 2019-09-21 RX ORDER — HYDRALAZINE HYDROCHLORIDE 20 MG/ML
10 INJECTION INTRAMUSCULAR; INTRAVENOUS
Status: DISCONTINUED | OUTPATIENT
Start: 2019-09-21 | End: 2019-09-26 | Stop reason: HOSPADM

## 2019-09-21 RX ORDER — ASPIRIN 325 MG
325 TABLET ORAL ONCE
Status: COMPLETED | OUTPATIENT
Start: 2019-09-21 | End: 2019-09-21

## 2019-09-21 RX ADMIN — Medication 10 ML: at 23:18

## 2019-09-21 RX ADMIN — DILTIAZEM HYDROCHLORIDE 30 MG: 30 TABLET, FILM COATED ORAL at 12:11

## 2019-09-21 RX ADMIN — LEVOTHYROXINE SODIUM 75 MCG: 75 TABLET ORAL at 12:11

## 2019-09-21 RX ADMIN — Medication 10 ML: at 06:47

## 2019-09-21 RX ADMIN — DOXYCYCLINE HYCLATE 100 MG: 100 TABLET, COATED ORAL at 01:51

## 2019-09-21 RX ADMIN — SODIUM CHLORIDE 75 ML/HR: 900 INJECTION, SOLUTION INTRAVENOUS at 02:03

## 2019-09-21 RX ADMIN — ASPIRIN 325 MG: 325 TABLET ORAL at 04:11

## 2019-09-21 RX ADMIN — Medication 10 ML: at 17:23

## 2019-09-21 RX ADMIN — ASPIRIN 81 MG CHEWABLE TABLET 81 MG: 81 TABLET CHEWABLE at 08:46

## 2019-09-21 RX ADMIN — DILTIAZEM HYDROCHLORIDE 30 MG: 30 TABLET, FILM COATED ORAL at 08:44

## 2019-09-21 RX ADMIN — POTASSIUM CHLORIDE 40 MEQ: 20 TABLET, EXTENDED RELEASE ORAL at 01:51

## 2019-09-21 RX ADMIN — ENOXAPARIN SODIUM 30 MG: 30 INJECTION SUBCUTANEOUS at 00:15

## 2019-09-21 RX ADMIN — CEFTRIAXONE 1 G: 1 INJECTION, POWDER, FOR SOLUTION INTRAMUSCULAR; INTRAVENOUS at 00:15

## 2019-09-21 RX ADMIN — HYDRALAZINE HYDROCHLORIDE 10 MG: 20 INJECTION INTRAMUSCULAR; INTRAVENOUS at 06:49

## 2019-09-21 RX ADMIN — DILTIAZEM HYDROCHLORIDE 30 MG: 30 TABLET, FILM COATED ORAL at 17:23

## 2019-09-21 RX ADMIN — OXYBUTYNIN CHLORIDE 10 MG: 5 TABLET, EXTENDED RELEASE ORAL at 08:44

## 2019-09-21 RX ADMIN — DOXYCYCLINE HYCLATE 100 MG: 100 TABLET, COATED ORAL at 08:46

## 2019-09-21 RX ADMIN — SENNOSIDES, DOCUSATE SODIUM 1 TABLET: 50; 8.6 TABLET, FILM COATED ORAL at 08:46

## 2019-09-21 RX ADMIN — MIDODRINE HYDROCHLORIDE 2.5 MG: 5 TABLET ORAL at 08:46

## 2019-09-21 RX ADMIN — CEFTRIAXONE 1 G: 1 INJECTION, POWDER, FOR SOLUTION INTRAMUSCULAR; INTRAVENOUS at 23:17

## 2019-09-21 NOTE — PROGRESS NOTES
**Consult Information**  Member Facility: [facility] Marisol Clemente MRN: [id number]  Consult ID: [id number]  Facility Time Zone: ET  Date and Time of Consult: 09/21/2019 02:34:34 AM  Requesting Clinician: Jefferson Leslie  Patient Name: Janelle Kevin  YOB: 1929  Gender: Female    **Clinical Note**  Clinical Note: 80 yr old with encephalopathy, sepsis, A flutter w/ RVR with elevated troponin. - suspect type 2 MI   - aspirin 325 mg once   - check ECG     - no nitro paste as noted to be hypotensive initially. - Cardiology has already been consulted. **Attestation**  Interaction Mode: Electronic Interaction  Interaction Attestation: Interprofessional internet consultation was delivered through telephonic and/ or electronic communication upon the request of the patients treating physician, while the requesting and the rendering provider were not in the same physical location. Written report was provided to the requesting provider.   Evaluation Duration (mins): 3

## 2019-09-21 NOTE — CONSULTS
Consult    Patient: Philly Molina MRN: [id number]  SSN: [id number]    YOB: 1929  Age: 80 y.o. Sex: female      Subjective:      Philly Molina is a 80 y.o. female who is being seen for hypothyroidism. Pt was admitted last night because she became unresponsive. EMS was called and per the cart review, she returned to baseline after 5 minutes. In the ED she was found to have a TSH of 23.7, she also had elevated troponin level and CXR which was concerning for ASD/PNA. Pt was admitted for evaluation and monitoring. Pt has hx of Afib and has been on Amiodarone. Reviewing the chart I do not see any evidence of prior thyroid dysfunction or any evidence where she has ever been on thyroid medications. It was felt she had an MI and is being treated for PNA as well. This AM she is resting comfortably. She does wake to voice, but is not able to give any history so all the history was obtained from chart review and the RN.     Past Medical History:   Diagnosis Date    A-fib ([facility] [address]     Alzheimer disease     Alzheimer's dementia     Arthritis      Past Surgical History:   Procedure Laterality Date    HX BREAST AUGMENTATION      HX ORTHOPAEDIC      TKR--bilateral       Family History   Problem Relation Age of Onset    Heart Attack Mother     Cancer Sister     Cancer Brother      Social History     Tobacco Use    Smoking status: Never Smoker    Smokeless tobacco: Never Used   Substance Use Topics    Alcohol use: No      Current Facility-Administered Medications   Medication Dose Route Frequency Provider Last Rate Last Dose    influenza vaccine 2019-20 (6 mos+)(PF) (FLUARIX/FLULAVAL/FLUZONE QUAD) injection 0.5 mL  0.5 mL IntraMUSCular PRIOR TO DISCHARGE Marc Austin MD        hydrALAZINE (APRESOLINE) 20 mg/mL injection 10 mg  10 mg IntraVENous Q6H PRN Baldomero Cruz MD   10 mg at 09/21/19 0649    levothyroxine (SYNTHROID) tablet 75 mcg  75 mcg Oral 6am Roderic Pontiff, MD Karie Peabody sodium chloride (NS) flush 5-40 mL  5-40 mL IntraVENous Q8H Andreea Ryan MD        sodium chloride (NS) flush 5-40 mL  5-40 mL IntraVENous PRN Andreea Ryan MD        acetaminophen (TYLENOL) tablet 650 mg  650 mg Oral Q6H PRN Andreea Ryan MD        cefTRIAXone (ROCEPHIN) 1 g in 0.9% sodium chloride (MBP/ADV) 50 mL  1 g IntraVENous Q24H Andreea Ryan  mL/hr at 09/21/19 0015 1 g at 09/21/19 0015    aspirin chewable tablet 81 mg  81 mg Oral DAILY Andreea Ryan MD   81 mg at 09/21/19 0846    senna-docusate (PERICOLACE) 8.6-50 mg per tablet 1 Tab  1 Tab Oral DAILY Andreea Ryan MD   1 Tab at 09/21/19 0846    dilTIAZem (CARDIZEM) IR tablet 30 mg  30 mg Oral TIDAC Andreea Ryan MD   30 mg at 09/21/19 0844    sodium chloride (NS) flush 5-40 mL  5-40 mL IntraVENous Q8H Andreea Ryan MD   10 mL at 09/21/19 0647    sodium chloride (NS) flush 5-40 mL  5-40 mL IntraVENous PRN Andreea Ryan MD        0.9% sodium chloride infusion  75 mL/hr IntraVENous CONTINUOUS Andreea Ryan MD 75 mL/hr at 09/21/19 0844 75 mL/hr at 09/21/19 0844    enoxaparin (LOVENOX) injection 30 mg  30 mg SubCUTAneous Q24H Andreea Ryan MD   30 mg at 09/21/19 0015    midodrine (PROAMITINE) tablet 2.5 mg  2.5 mg Oral DAILY Andreea Ryan MD   2.5 mg at 09/21/19 0846        No Known Allergies    Review of Systems:  Review of systems not obtained due to patient factors. Objective:     Vitals:    09/21/19 0127 09/21/19 0434 09/21/19 0650 09/21/19 0750   BP: 164/80 186/83 (!) 165/94 121/61   Pulse: 88 89  94   Resp: 22 20  22   Temp: 98 °F (36.7 °C) 98.2 °F (36.8 °C)  98.2 °F (36.8 °C)   SpO2: 95% 97%  94%        Physical Exam:  GENERAL: cooperative, sleeping, but rousable  LYMPHATIC: Cervical, supraclavicular, and axillary nodes normal.   THROAT & NECK: No LAD, no thyromegaly  LUNG: CTA,  HEART: regular rate and rhythm,  ABDOMEN: soft, non-tender.  Bowel sounds normal. No masses,  no organomegaly  EXTREMITIES:  extremities normal, atraumatic, no cyanosis 1+ LE edema  SKIN: Normal.  NEUROLOGIC: No tremors, DTR 1+    Component      Latest Ref Rng & Units 9/20/2019 9/20/2019          11:57 PM 11:57 PM   WBC      3.6 - 11.0 K/uL  16.2 (H)   RBC      3.80 - 5.20 M/uL  4.34   HGB      11.5 - 16.0 g/dL  14.1   HCT      35.0 - 47.0 %  41.9   MCV      80.0 - 99.0 FL  96.5   MCH      26.0 - 34.0 PG  32.5   MCHC      30.0 - 36.5 g/dL  33.7   RDW      11.5 - 14.5 %  13.7   PLATELET      450 - 449 K/uL  313   MPV      8.9 - 12.9 FL  10.5   NRBC      0  WBC  0.0   ABSOLUTE NRBC      0.00 - 0.01 K/uL  0.00   NEUTROPHILS      32 - 75 %  70   LYMPHOCYTES      12 - 49 %  19   MONOCYTES      5 - 13 %  10   EOSINOPHILS      0 - 7 %  0   BASOPHILS      0 - 1 %  0   IMMATURE GRANULOCYTES      0.0 - 0.5 %  1 (H)   ABS. NEUTROPHILS      1.8 - 8.0 K/UL  11.4 (H)   ABS. LYMPHOCYTES      0.8 - 3.5 K/UL  3.1   ABS. MONOCYTES      0.0 - 1.0 K/UL  1.6 (H)   ABS. EOSINOPHILS      0.0 - 0.4 K/UL  0.0   ABS. BASOPHILS      0.0 - 0.1 K/UL  0.0   ABS. IMM. GRANS.      0.00 - 0.04 K/UL  0.1 (H)   DF        AUTOMATED   RBC COMMENTS           Sodium      136 - 145 mmol/L 145    Potassium      3.5 - 5.1 mmol/L 3.6    Chloride      97 - 108 mmol/L 111 (H)    CO2      21 - 32 mmol/L 28    Anion gap      5 - 15 mmol/L 6    Glucose      65 - 100 mg/dL 95    BUN      6 - 20 MG/DL 39 (H)    Creatinine      0.55 - 1.02 MG/DL 1.05 (H)    BUN/Creatinine ratio      12 - 20   37 (H)    GFR est AA      >60 ml/min/1.73m2 60 (L)    GFR est non-AA      >60 ml/min/1.73m2 49 (L)    Calcium      8.5 - 10.1 MG/DL 8.1 (L)    Bilirubin, total      0.2 - 1.0 MG/DL     ALT (SGPT)      12 - 78 U/L     AST      15 - 37 U/L     Alk.  phosphatase      45 - 117 U/L     Protein, total      6.4 - 8.2 g/dL     Albumin      3.5 - 5.0 g/dL     Globulin      2.0 - 4.0 g/dL     A-G Ratio      1.1 - 2.2       Color           Appearance      CLEAR       Specific gravity      1.003 - 1.030       pH (UA)      5.0 - 8.0       Protein      NEG mg/dL     Glucose      NEG mg/dL     Ketone      NEG mg/dL     Bilirubin      NEG       Blood      NEG       Urobilinogen      0.2 - 1.0 EU/dL     Nitrites      NEG       Leukocyte Esterase      NEG       WBC      0 - 4 /hpf     RBC      0 - 5 /hpf     Epithelial cells      FEW /lpf     Bacteria      NEG /hpf     UA:UC IF INDICATED      CNI       Hyaline cast      0 - 5 /lpf     Magnesium      1.6 - 2.4 mg/dL     TSH      0.36 - 3.74 uIU/mL       Component      Latest Ref Rng & Units 9/20/2019 9/20/2019          11:16 PM  6:43 PM   WBC      3.6 - 11.0 K/uL     RBC      3.80 - 5.20 M/uL     HGB      11.5 - 16.0 g/dL     HCT      35.0 - 47.0 %     MCV      80.0 - 99.0 FL     MCH      26.0 - 34.0 PG     MCHC      30.0 - 36.5 g/dL     RDW      11.5 - 14.5 %     PLATELET      184 - 045 K/uL     MPV      8.9 - 12.9 FL     NRBC      0  WBC     ABSOLUTE NRBC      0.00 - 0.01 K/uL     NEUTROPHILS      32 - 75 %     LYMPHOCYTES      12 - 49 %     MONOCYTES      5 - 13 %     EOSINOPHILS      0 - 7 %     BASOPHILS      0 - 1 %     IMMATURE GRANULOCYTES      0.0 - 0.5 %     ABS. NEUTROPHILS      1.8 - 8.0 K/UL     ABS. LYMPHOCYTES      0.8 - 3.5 K/UL     ABS. MONOCYTES      0.0 - 1.0 K/UL     ABS. EOSINOPHILS      0.0 - 0.4 K/UL     ABS. BASOPHILS      0.0 - 0.1 K/UL     ABS. IMM.  GRANS.      0.00 - 0.04 K/UL     DF           RBC COMMENTS           Sodium      136 - 145 mmol/L     Potassium      3.5 - 5.1 mmol/L     Chloride      97 - 108 mmol/L     CO2      21 - 32 mmol/L     Anion gap      5 - 15 mmol/L     Glucose      65 - 100 mg/dL     BUN      6 - 20 MG/DL     Creatinine      0.55 - 1.02 MG/DL     BUN/Creatinine ratio      12 - 20       GFR est AA      >60 ml/min/1.73m2     GFR est non-AA      >60 ml/min/1.73m2     Calcium      8.5 - 10.1 MG/DL     Bilirubin, total      0.2 - 1.0 MG/DL     ALT (SGPT)      12 - 78 U/L     AST      15 - 37 U/L     Alk. phosphatase      45 - 117 U/L     Protein, total      6.4 - 8.2 g/dL     Albumin      3.5 - 5.0 g/dL     Globulin      2.0 - 4.0 g/dL     A-G Ratio      1.1 - 2.2       Color       YELLOW/STRAW    Appearance      CLEAR   CLOUDY (A)    Specific gravity      1.003 - 1.030   1.013    pH (UA)      5.0 - 8.0   5.5    Protein      NEG mg/dL 30 (A)    Glucose      NEG mg/dL NEGATIVE    Ketone      NEG mg/dL NEGATIVE    Bilirubin      NEG   NEGATIVE    Blood      NEG   SMALL (A)    Urobilinogen      0.2 - 1.0 EU/dL 0.2    Nitrites      NEG   NEGATIVE    Leukocyte Esterase      NEG   NEGATIVE    WBC      0 - 4 /hpf 0-4    RBC      0 - 5 /hpf 0-5    Epithelial cells      FEW /lpf FEW    Bacteria      NEG /hpf 4+ (A)    UA:UC IF INDICATED      CNI   URINE CULTURE ORDERED (A)    Hyaline cast      0 - 5 /lpf 0-2    Magnesium      1.6 - 2.4 mg/dL     TSH      0.36 - 3.74 uIU/mL  23.70 (H)     Component      Latest Ref Rng & Units 9/20/2019 9/20/2019           6:42 PM  6:42 PM   WBC      3.6 - 11.0 K/uL 15.7 (H)    RBC      3.80 - 5.20 M/uL 4.73    HGB      11.5 - 16.0 g/dL 15.4    HCT      35.0 - 47.0 % 45.4    MCV      80.0 - 99.0 FL 96.0    MCH      26.0 - 34.0 PG 32.6    MCHC      30.0 - 36.5 g/dL 33.9    RDW      11.5 - 14.5 % 13.8    PLATELET      544 - 357 K/uL 353    MPV      8.9 - 12.9 FL 10.2    NRBC      0  WBC 0.0    ABSOLUTE NRBC      0.00 - 0.01 K/uL 0.00    NEUTROPHILS      32 - 75 % 74    LYMPHOCYTES      12 - 49 % 12    MONOCYTES      5 - 13 % 13    EOSINOPHILS      0 - 7 % 0    BASOPHILS      0 - 1 % 0    IMMATURE GRANULOCYTES      0.0 - 0.5 % 1 (H)    ABS. NEUTROPHILS      1.8 - 8.0 K/UL 11.6 (H)    ABS. LYMPHOCYTES      0.8 - 3.5 K/UL 1.9    ABS. MONOCYTES      0.0 - 1.0 K/UL 2.0 (H)    ABS. EOSINOPHILS      0.0 - 0.4 K/UL 0.0    ABS. BASOPHILS      0.0 - 0.1 K/UL 0.0    ABS. IMM.  GRANS.      0.00 - 0.04 K/UL 0.2 (H)    DF       AUTOMATED    RBC COMMENTS       NORMOCYTIC, NORMOCHROMIC Sodium      136 - 145 mmol/L     Potassium      3.5 - 5.1 mmol/L     Chloride      97 - 108 mmol/L     CO2      21 - 32 mmol/L     Anion gap      5 - 15 mmol/L     Glucose      65 - 100 mg/dL     BUN      6 - 20 MG/DL     Creatinine      0.55 - 1.02 MG/DL     BUN/Creatinine ratio      12 - 20       GFR est AA      >60 ml/min/1.73m2     GFR est non-AA      >60 ml/min/1.73m2     Calcium      8.5 - 10.1 MG/DL     Bilirubin, total      0.2 - 1.0 MG/DL     ALT (SGPT)      12 - 78 U/L     AST      15 - 37 U/L     Alk. phosphatase      45 - 117 U/L     Protein, total      6.4 - 8.2 g/dL     Albumin      3.5 - 5.0 g/dL     Globulin      2.0 - 4.0 g/dL     A-G Ratio      1.1 - 2.2       Color           Appearance      CLEAR       Specific gravity      1.003 - 1.030       pH (UA)      5.0 - 8.0       Protein      NEG mg/dL     Glucose      NEG mg/dL     Ketone      NEG mg/dL     Bilirubin      NEG       Blood      NEG       Urobilinogen      0.2 - 1.0 EU/dL     Nitrites      NEG       Leukocyte Esterase      NEG       WBC      0 - 4 /hpf     RBC      0 - 5 /hpf     Epithelial cells      FEW /lpf     Bacteria      NEG /hpf     UA:UC IF INDICATED      CNI       Hyaline cast      0 - 5 /lpf     Magnesium      1.6 - 2.4 mg/dL  2.4   TSH      0.36 - 3.74 uIU/mL       Component      Latest Ref Rng & Units 9/20/2019           6:42 PM   WBC      3.6 - 11.0 K/uL    RBC      3.80 - 5.20 M/uL    HGB      11.5 - 16.0 g/dL    HCT      35.0 - 47.0 %    MCV      80.0 - 99.0 FL    MCH      26.0 - 34.0 PG    MCHC      30.0 - 36.5 g/dL    RDW      11.5 - 14.5 %    PLATELET      376 - 831 K/uL    MPV      8.9 - 12.9 FL    NRBC      0  WBC    ABSOLUTE NRBC      0.00 - 0.01 K/uL    NEUTROPHILS      32 - 75 %    LYMPHOCYTES      12 - 49 %    MONOCYTES      5 - 13 %    EOSINOPHILS      0 - 7 %    BASOPHILS      0 - 1 %    IMMATURE GRANULOCYTES      0.0 - 0.5 %    ABS. NEUTROPHILS      1.8 - 8.0 K/UL    ABS.  LYMPHOCYTES      0.8 - 3.5 K/UL ABS. MONOCYTES      0.0 - 1.0 K/UL    ABS. EOSINOPHILS      0.0 - 0.4 K/UL    ABS. BASOPHILS      0.0 - 0.1 K/UL    ABS. IMM. GRANS.      0.00 - 0.04 K/UL    DF          RBC COMMENTS          Sodium      136 - 145 mmol/L 142   Potassium      3.5 - 5.1 mmol/L 3.0 (L)   Chloride      97 - 108 mmol/L 107   CO2      21 - 32 mmol/L 26   Anion gap      5 - 15 mmol/L 9   Glucose      65 - 100 mg/dL 113 (H)   BUN      6 - 20 MG/DL 43 (H)   Creatinine      0.55 - 1.02 MG/DL 1.26 (H)   BUN/Creatinine ratio      12 - 20   34 (H)   GFR est AA      >60 ml/min/1.73m2 48 (L)   GFR est non-AA      >60 ml/min/1.73m2 40 (L)   Calcium      8.5 - 10.1 MG/DL 8.5   Bilirubin, total      0.2 - 1.0 MG/DL 0.4   ALT (SGPT)      12 - 78 U/L 20   AST      15 - 37 U/L 17   Alk. phosphatase      45 - 117 U/L 74   Protein, total      6.4 - 8.2 g/dL 6.8   Albumin      3.5 - 5.0 g/dL 3.5   Globulin      2.0 - 4.0 g/dL 3.3   A-G Ratio      1.1 - 2.2   1.1   Color          Appearance      CLEAR      Specific gravity      1.003 - 1.030      pH (UA)      5.0 - 8.0      Protein      NEG mg/dL    Glucose      NEG mg/dL    Ketone      NEG mg/dL    Bilirubin      NEG      Blood      NEG      Urobilinogen      0.2 - 1.0 EU/dL    Nitrites      NEG      Leukocyte Esterase      NEG      WBC      0 - 4 /hpf    RBC      0 - 5 /hpf    Epithelial cells      FEW /lpf    Bacteria      NEG /hpf    UA:UC IF INDICATED      CNI      Hyaline cast      0 - 5 /lpf    Magnesium      1.6 - 2.4 mg/dL    TSH      0.36 - 3.74 uIU/mL      EXAM:  XR CHEST PORT     INDICATION:  SOA, AMS     COMPARISON:  2018     FINDINGS: A portable AP radiograph of the chest was obtained at 2047 hours. The  patient is on a cardiac monitor. Lungs are clear except for small focus of left  basilar atelectasis/airspace disease. There is slight interstitial prominence  right lung base. Heart size is within normal limits. .  There is  deformity/chronic degenerative changes of the shoulders. . There is scoliosis  thoracolumbar junction.     IMPRESSION  IMPRESSION: Lungs are clear except for small focus of left basilar  atelectasis/airspace disease. Assessment:     Hospital Problems  Date Reviewed: 8/14/2018          Codes Class Noted POA    Hypotension ICD-10-CM: I95.9  ICD-9-CM: 458.9  9/20/2019 Unknown        Elevated TSH ICD-10-CM: R79.89  ICD-9-CM: 794.5  9/20/2019 Unknown        Atrial fibrillation with RVR (HCC) ICD-10-CM: I48.91  ICD-9-CM: 427.31  9/20/2019 Unknown              Plan:     1) Hypothyroidism > Pt has been on chronic Amiodarone which does contain iodine and can cause thyroid dysfunction. This is most likely the cause of her hypothyroidism. Will start pt on Levothyroxine 75mcg daily, RN instructed to give a now dose then every morning. Since she is DNR and the family's wishes are to give minimal intervention, I don't feel any further work-up is needed at this time.          Signed By: Annelise Gregg MD     September 21, 2019

## 2019-09-21 NOTE — PROGRESS NOTES
Bedside shift change report GIVEN TO Carol Avila RN. Report included the following information SBAR. SIGNIFICANT CHANGES DURING SHIFT:   Pt had elevated troponin of 2.89 during night. On call tele hospitalist aware. EKG done and troponin redraw done. Asa given. Repeat troponin 3.78. MD made aware. No new orders. Lactic acid during night 2.5-MD made aware-no orders received. Elevated BP during night-Hydralazine prn ordered. CONCERNS TO ADDRESS WITH MD:            [facility] NURSING NOTE   Admission Date 9/20/2019   Admission Diagnosis Atrial fibrillation with RVR (HCC) [I48.91]  Hypotension [I95.9]  Elevated TSH [R79.89]   Consults IP CONSULT TO CARDIOLOGY  IP CONSULT TO ENDOCRINOLOGY      Cardiac Monitoring [x] Yes [] No      Purposeful Hourly Rounding [x] Yes    Rose Mary Score Total Score: 4   Rose Mary score 3 or > [x] Bed Alarm [] Avasys [] 1:1 sitter [] Patient refused (Signed refusal form in chart)   Seng Score Seng Score: 12   Seng score 14 or < [x] PMT consult [] Wound Care consult    []  Specialty bed  [] Nutrition consult      Influenza Vaccine Received Flu Vaccine for Current Season (usually Sept-March): No    Patient/Guardian Refused (Notify MD): No      Oxygen needs? [x] Room air Oxygen @  []1L    []2L    []3L   []4L    []5L   []6L via  NC   Chronic home O2 use? [x] Yes [] No  Perform O2 challenge test and document in progress note using smartphrase (.Homeoxygen)      Last bowel movement Last Bowel Movement Date: 09/21/19      Urinary Catheter             LDAs               Peripheral IV 09/20/19 Right; Inner Forearm (Active)   Site Assessment Clean, dry, & intact 9/21/2019  7:27 AM   Phlebitis Assessment 0 9/21/2019  2:00 AM   Infiltration Assessment 0 9/21/2019  2:00 AM   Dressing Status Clean, dry, & intact 9/21/2019  2:00 AM   Dressing Type Tape;Transparent 9/21/2019  2:00 AM   Hub Color/Line Status Pink; Infusing 9/21/2019  2:00 AM   Action Taken Blood drawn 9/20/2019  6:44 PM       Peripheral IV 09/20/19 Left Forearm (Active)   Site Assessment Clean, dry, & intact 9/21/2019  2:00 AM   Phlebitis Assessment 0 9/21/2019  2:00 AM   Infiltration Assessment 0 9/21/2019  2:00 AM   Dressing Status Clean, dry, & intact 9/21/2019  2:00 AM   Dressing Type Tape;Transparent 9/21/2019  2:00 AM   Hub Color/Line Status Pink;Flushed 9/21/2019  2:00 AM                         Readmission Risk Assessment Tool Score Medium Risk            15       Total Score        9 Pt. Coverage (Medicare=5 , Medicaid, or Self-Pay=4)    6 Charlson Comorbidity Score (Age + Comorbid Conditions)        Criteria that do not apply:    Has Seen PCP in Last 6 Months (Yes=3, No=0)    . Living with Significant Other. Assisted Living. LTAC. SNF.  or   Rehab    Patient Length of Stay (>5 days = 3)    IP Visits Last 12 Months (1-3=4, 4=9, >4=11)       Expected Length of Stay - - -   Actual Length of Stay 1

## 2019-09-21 NOTE — PROGRESS NOTES
Received report from Kandace Gross RN. Assumed care of patient. Bedside shift change report GIVEN TO ADY Lua. Report included the following information SBAR. SIGNIFICANT CHANGES DURING SHIFT:  [facility] lab called with positive blood cultures. 1 out of 4 bottles in R AC  Are growing gram postive cocci/clusters. Tele hospitalist made aware. No orders received. CONCERNS TO ADDRESS WITH MD:            [facility] NURSING NOTE   Admission Date 9/20/2019   Admission Diagnosis Atrial fibrillation with RVR (HCC) [I48.91]  Hypotension [I95.9]  Elevated TSH [R79.89]   Consults IP CONSULT TO CARDIOLOGY  IP CONSULT TO ENDOCRINOLOGY      Cardiac Monitoring [x] Yes [] No      Purposeful Hourly Rounding [x] Yes    Rose Mary Score Total Score: 4   Rose Mary score 3 or > [x] Bed Alarm [] Avasys [] 1:1 sitter [] Patient refused (Signed refusal form in chart)   Seng Score Seng Score: 12   Seng score 14 or < [x] PMT consult [] Wound Care consult    []  Specialty bed  [] Nutrition consult      Influenza Vaccine Received Flu Vaccine for Current Season (usually Sept-March): No    Patient/Guardian Refused (Notify MD): No      Oxygen needs? [] Room air Oxygen @  [x]1L    []2L    []3L   []4L    []5L   []6L via  NC   Chronic home O2 use? [x] Yes [] No  Perform O2 challenge test and document in progress note using smartphrase (.Homeoxygen)      Last bowel movement Last Bowel Movement Date: 09/21/19      Urinary Catheter Urinary Catheter 09/21/19 2- way-Indications for Use: Acute urinary retention/bladder outlet obstruction     Urinary Catheter 09/21/19 2- way-Urine Output (mL): 500 ml     LDAs               Peripheral IV 09/20/19 Right; Inner Forearm (Active)   Site Assessment Clean, dry, & intact 9/22/2019  4:00 AM   Phlebitis Assessment 0 9/22/2019  4:00 AM   Infiltration Assessment 0 9/22/2019  4:00 AM   Dressing Status Clean, dry, & intact 9/22/2019  4:00 AM   Dressing Type Tape;Transparent 9/22/2019  4:00 AM   Hub Color/Line Status Pink;Flushed 9/22/2019  4:00 AM   Action Taken Blood drawn 9/20/2019  6:44 PM       Peripheral IV 09/20/19 Left Forearm (Active)   Site Assessment Clean, dry, & intact 9/22/2019  4:00 AM   Phlebitis Assessment 0 9/22/2019  4:00 AM   Infiltration Assessment 0 9/22/2019  4:00 AM   Dressing Status Clean, dry, & intact 9/22/2019  4:00 AM   Dressing Type Tape;Transparent 9/22/2019  4:00 AM   Hub Color/Line Status Pink;Flushed 9/22/2019  4:00 AM                         Readmission Risk Assessment Tool Score Medium Risk            15       Total Score        9 Pt. Coverage (Medicare=5 , Medicaid, or Self-Pay=4)    6 Charlson Comorbidity Score (Age + Comorbid Conditions)        Criteria that do not apply:    Has Seen PCP in Last 6 Months (Yes=3, No=0)    . Living with Significant Other. Assisted Living. LTAC. SNF.  or   Rehab    Patient Length of Stay (>5 days = 3)    IP Visits Last 12 Months (1-3=4, 4=9, >4=11)       Expected Length of Stay - - -   Actual Length of Stay 2

## 2019-09-21 NOTE — PROGRESS NOTES
Hospitalist Progress Note    NAME: Smith Colmenares   :  1929   MRN:  [id number]       Assessment / Plan:  Acute metabolic encephalopathy in setting of hypotension  MARTY  Aflutter with  RVR; resolved  Possible Sepsis most likely secondary to questionable pneumonia: CXR showed: \"IMPRESSION: Lungs are clear except for small focus of left basilar  atelectasis/airspace disease. \"  NSTEMI (POA)  Elevated Lactic Acidosis; resolved  -troponin peaked at 3.7  -await TTE  -continue ASA  -patient on Diltiazem  -continue IVFs  -continue IV Rocephin  -await Cx's  -Leukocytosis persists  -Cardiology consult    Hypokalemia  -repleted    Hypothyroidism:  -case discussed with Dr. Genaro Bradshaw this am, appreciate assistance, start Levothyroxine    Severe dementia  Dysphagia  -continue dysphagia diet , speech evel      Code Status:  DNR   Surrogate Decision Maker: Niece     DVT Prophylaxis: lovenox   GI Prophylaxis: not indicated     Baseline: wheelchair bound      Subjective:     Chief Complaint / Reason for Physician Visit: follow-up sepsis, NSTEMI  Patient lethargic but awakens enough to deny CP  Case discussed with RN    Review of Systems:  Symptom Y/N Comments  Symptom Y/N Comments   Fever/Chills    Chest Pain     Poor Appetite    Edema     Cough    Abdominal Pain     Sputum    Joint Pain     SOB/ADAME    Pruritis/Rash     Nausea/vomit    Tolerating PT/OT     Diarrhea    Tolerating Diet     Constipation    Other       Could NOT obtain due to: dementia     Objective:     VITALS:   Last 24hrs VS reviewed since prior progress note.  Most recent are:  Patient Vitals for the past 24 hrs:   Temp Pulse Resp BP SpO2   19 0750 98.2 °F (36.8 °C) 94 22 121/61 94 %   19 0650    (!) 165/94    19 0434 98.2 °F (36.8 °C) 89 20 186/83 97 %   19 0127 98 °F (36.7 °C) 88 22 164/80 95 %   19 2300  84 27 135/89 96 %   19 2230  85 23 159/88 96 %   19 2200  79 17 145/82 95 %   19 2130  91 24 108/67 95 %   09/20/19 2100  84 23 132/72 95 %   09/20/19 2030  85 25 166/81 95 %   09/20/19 2000  81 19 (!) 143/100 97 %   09/20/19 1930  77 23 138/74 97 %   09/20/19 1900  (!) 135 (!) 32 (!) 69/46 97 %   09/20/19 1831  (!) 121 25 120/83 97 %       Intake/Output Summary (Last 24 hours) at 9/21/2019 0958  Last data filed at 9/21/2019 0923  Gross per 24 hour   Intake 1050 ml   Output    Net 1050 ml        PHYSICAL EXAM:  General: WD, WN. Alert, cooperative, no acute distress    EENT:  EOMI. Anicteric sclerae. MM dry  Resp:  CTA bilaterally on anterior assessment, no wheezing or rales. No accessory muscle use  CV:  Regular  rhythm,  No edema  GI:  Soft, Non distended, Non tender.  +Bowel sounds  Neurologic:  Alerts some, not oriented (oriented x1 at baseline), normal speech,   Psych:   No insight. Not anxious nor agitated  Skin:  No rashes. No jaundice    Reviewed most current lab test results and cultures  YES  Reviewed most current radiology test results   YES  Review and summation of old records today    NO  Reviewed patient's current orders and MAR    YES  PMH/SH reviewed - no change compared to H&P  ________________________________________________________________________  Care Plan discussed with:    Comments   Patient x    Family  x    RN x    Care Manager     Consultant  x Endocrine                     Multidiciplinary team rounds were held today with , nursing, pharmacist and clinical coordinator. Patient's plan of care was discussed; medications were reviewed and discharge planning was addressed.      ________________________________________________________________________  Total NON critical care TIME:  35   Minutes    Total CRITICAL CARE TIME Spent:   Minutes non procedure based      Comments   >50% of visit spent in counseling and coordination of care x    ________________________________________________________________________  Emma Ruiz MD     Procedures: see electronic medical records for all procedures/Xrays and details which were not copied into this note but were reviewed prior to creation of Plan. LABS:  I reviewed today's most current labs and imaging studies.   Pertinent labs include:  Recent Labs     09/20/19 2357 09/20/19 1842   WBC 16.2* 15.7*   HGB 14.1 15.4   HCT 41.9 45.4    353     Recent Labs     09/20/19 2357 09/20/19 1842    142   K 3.6 3.0*   * 107   CO2 28 26   GLU 95 113*   BUN 39* 43*   CREA 1.05* 1.26*   CA 8.1* 8.5   MG  --  2.4   ALB  --  3.5   TBILI  --  0.4   SGOT  --  17   ALT  --  20   INR  --  1.0       Signed: Michelle Carbajal MD

## 2019-09-21 NOTE — H&P
Hospitalist Admission Note    NAME: Shreya Glaser   :  1929   MRN:  [id number]     Date/Time:  2019 10:37 PM    Patient PCP: Gurdeep Cowart MD  ________________________________________________________________________    My assessment of this patient's clinical condition and my plan of care is as follows. Assessment / Plan:    Acute metabolic encephalopathy in setting of hypotension, MARTY  Aflutter  with  RVR resolved with IV fluid  Sepsis most likely secondary to questionable pneumonia   Acute kidney injury most likely prerenal  Admit to telemetry floor, as patient aflutter with  RVR has been resolved with IV fluid and there is no need for any drip . C/w with home dose diltiazem as BP back to wnl . Hypotension also resolved with IV fluid  Patient  was tachycardic, tachypneic ,elevated white blood cell count. His chest x-ray is reading as small focus of left basilar atelectasis/airspace disease  We will start with ceftriaxone and  doxycycline, will check procalcitonin, check blood cultures and lactic acid. Incentive spirometry   Will check renal US , c/w IVF     Hypokalemia  We will replete  Elevated CK-MB  We will check stat troponin, 2D echo and cardiac consult  Elevated TSH   tsh 23 FT3 FT4 pending   Endocrine called by ED physician , no return call yet   If Ft4 Ft3 low , treat with IV levothyroxine  Hold amiodarone     Severe dementia  Dysphagia  C/w dysphagia diet , speech evel       Code Status:  DNR   Surrogate Decision Maker:    DVT Prophylaxis: lovenox   GI Prophylaxis: not indicated    Baseline: wheelchair bound         Subjective:   CHIEF COMPLAINT:     HISTORY OF PRESENT ILLNESS:     Shreya Glaser is a 80 y.o.    female with past medical history of end-stage dementia, A. fib on amiodarone, who was sent to the ED via [address] where she is a long-term resident for unresponsiveness.  Most of the HPI obtained from the ED physician and from the niece who is the power of . Schaefer Courser yesterday,  patient was found to be sleepy and altered, and today she was not eating much and she was found unresponsive. Prior to this event, patient was reportedly doing fine.  In the ED, she was found to be profoundly hypotensive with systolic blood pressure in the 60s, tachycardic with heart rate in the 135 (found to be in A. fib with RVR). [facility] [employer] labs revealed that she has elevated white blood cell count, acute kidney injury and hypokalemia.  Elevated CK-MB.  Further test showed elevated TSH of 23 mg. We were asked to admit for work up and evaluation of the above problems. Past Medical History:   Diagnosis Date    A-fib [facility])     Alzheimer disease     Alzheimer's dementia     Arthritis         Past Surgical History:   Procedure Laterality Date    HX BREAST AUGMENTATION      HX ORTHOPAEDIC      TKR--bilateral        Social History     Tobacco Use    Smoking status: Never Smoker    Smokeless tobacco: Never Used   Substance Use Topics    Alcohol use: No        Family History   Problem Relation Age of Onset    Heart Attack Mother     Cancer Sister     Cancer Brother      No Known Allergies     Prior to Admission medications    Medication Sig Start Date End Date Taking? Authorizing Provider   ARIPiprazole (ABILIFY) 5 mg tablet Take 5 mg by mouth daily. Yes Other, MD Abhay   busPIRone (BUSPAR) 5 mg tablet Take 5 mg by mouth. Yes Other, MD Abhay   vitamin P-D-M-lutein-minerals (OCUVITE) tablet daily. Yes Other, MD Abhay   midodrine (PROAMITINE) 2.5 mg tablet Take 2.5 mg by mouth. Yes Other, MD Abhay   mirtazapine (REMERON) 45 mg tablet Take 45 mg by mouth nightly. Yes Other, MD Abhay   senna-docusate (SENOKOT-S) 8.6-50 mg per tablet Take 1 Tab by mouth daily. Yes Other, MD Abhay   dilTIAZem (CARDIZEM) 30 mg tablet Take 1 Tab by mouth Before breakfast, lunch, and dinner.  8/17/18   Earl Conway PA-C   enoxaparin (LOVENOX) 30 mg/0.3 mL injection 0.3 mL by SubCUTAneous route every twenty-four (24) hours. 8/18/18   Ramirez Jerome PA-C   acetaminophen (TYLENOL) 325 mg tablet Take 2 Tabs by mouth every six (6) hours as needed for Pain. 8/17/18   Ramirez Jerome PA-C   amiodarone (CORDARONE) 200 mg tablet Take 1 Tab by mouth daily. Indications: VENTRICULAR RATE CONTROL IN ATRIAL FIBRILLATION 8/12/18   Kaveh Brown MD   aspirin 81 mg chewable tablet Take 1 Tab by mouth daily. Indications: prevention of cerebrovascular accident 8/9/18   Kaveh Brown MD   ferrous sulfate (IRON) 325 mg (65 mg iron) EC tablet Take 325 mg by mouth every evening. Provider, Historical   oxybutynin (DITROPAN) 5 mg tablet Take 5 mg by mouth two (2) times a day. Provider, Historical   lutein 6 mg tab Take 1 Tab by mouth daily. Provider, Historical   cholecalciferol, vitamin D3, (VITAMIN D3) 2,000 unit tab Take 2,000 Units by mouth every evening. Provider, Historical   senna (SENOKOT) 8.6 mg tablet Take 2 Tabs by mouth every evening. Provider, Historical       REVIEW OF SYSTEMS:     I am not able to complete the review of systems because: The patient is intubated and sedated    The patient has altered mental status due to his acute medical problems    The patient has baseline aphasia from prior stroke(s)   x The patient has baseline dementia and is not reliable historian    The patient is in acute medical distress and unable to provide information             Objective:   VITALS:    Visit Vitals  /72   Pulse 84   Resp 23   SpO2 95%       PHYSICAL EXAM:    General:    Alert, cooperative, no distress, appears stated age. HEENT: Atraumatic, anicteric sclerae, pink conjunctivae     No oral ulcers, mucosadry , throat clear, dentition fair  Neck:  Supple, symmetrical,  thyroid: non tender  Lungs:   Clear to auscultation bilaterally. No Wheezing or Rhonchi. No rales. Chest wall:  No tenderness  No Accessory muscle use.   Heart:   Regular  rhythm,  No murmur   No edema  Abdomen:   Soft, non-tender. Not distended. Bowel sounds normal  Extremities: No cyanosis. No clubbing      Capillary refill normal,  Radial pulse 2+,  DP  Skin:     Not pale. Not Jaundiced  No rashes   Psych:  Unable to assess  Neurologic: EOMs intact. No facial asymmetry. No aphasia or slurred speech. Symmetrical strength, Sensation grossly intact. Alert and oriented X 1 , dementia     _______________________________________________________________________  Care Plan discussed with:    Comments   Patient x    Family  x MARTHA bobo RN x    Care Manager                    Consultant:      _______________________________________________________________________  Expected  Disposition:   Home with Family    HH/PT/OT/RN    SNF/LTC    MARCOS    ________________________________________________________________________  TOTAL TIME: 79 Minutes    Critical Care Provided     Minutes non procedure based      Comments   >50% of visit spent in counseling and coordination of care x Chart review  Discussion with patient and/or family and questions answered     ________________________________________________________________________  Michelle Valenzuela MD    Procedures: see electronic medical records for all procedures/Xrays and details which were not copied into this note but were reviewed prior to creation of Plan. LAB DATA REVIEWED:    Recent Results (from the past 24 hour(s))   EKG, 12 LEAD, INITIAL    Collection Time: 09/20/19  6:23 PM   Result Value Ref Range    Ventricular Rate 119 BPM    Atrial Rate 250 BPM    QRS Duration 98 ms    Q-T Interval 406 ms    QTC Calculation (Bezet) 571 ms    Calculated R Axis 24 degrees    Calculated T Axis 67 degrees    Diagnosis       Atrial flutter with variable AV block  Posterior infarct , possibly acute  ** ** ACUTE MI / STEMI ** **  When compared with ECG of 11-AUG-2018 19:29,  Atrial flutter has replaced Sinus rhythm  Vent.  rate has increased BY  68 BPM  ST now depressed in Anterior leads  Nonspecific T wave abnormality now evident in Inferior leads  T wave inversion now evident in Anterior leads     CBC WITH AUTOMATED DIFF    Collection Time: 09/20/19  6:42 PM   Result Value Ref Range    WBC 15.7 (H) 3.6 - 11.0 K/uL    RBC 4.73 3.80 - 5.20 M/uL    HGB 15.4 11.5 - 16.0 g/dL    HCT 45.4 35.0 - 47.0 %    MCV 96.0 80.0 - 99.0 FL    MCH 32.6 26.0 - 34.0 PG    MCHC 33.9 30.0 - 36.5 g/dL    RDW 13.8 11.5 - 14.5 %    PLATELET 253 354 - 421 K/uL    MPV 10.2 8.9 - 12.9 FL    NRBC 0.0 0  WBC    ABSOLUTE NRBC 0.00 0.00 - 0.01 K/uL    NEUTROPHILS 74 32 - 75 %    LYMPHOCYTES 12 12 - 49 %    MONOCYTES 13 5 - 13 %    EOSINOPHILS 0 0 - 7 %    BASOPHILS 0 0 - 1 %    IMMATURE GRANULOCYTES 1 (H) 0.0 - 0.5 %    ABS. NEUTROPHILS 11.6 (H) 1.8 - 8.0 K/UL    ABS. LYMPHOCYTES 1.9 0.8 - 3.5 K/UL    ABS. MONOCYTES 2.0 (H) 0.0 - 1.0 K/UL    ABS. EOSINOPHILS 0.0 0.0 - 0.4 K/UL    ABS. BASOPHILS 0.0 0.0 - 0.1 K/UL    ABS. IMM. GRANS. 0.2 (H) 0.00 - 0.04 K/UL    DF AUTOMATED      RBC COMMENTS NORMOCYTIC, NORMOCHROMIC     METABOLIC PANEL, COMPREHENSIVE    Collection Time: 09/20/19  6:42 PM   Result Value Ref Range    Sodium 142 136 - 145 mmol/L    Potassium 3.0 (L) 3.5 - 5.1 mmol/L    Chloride 107 97 - 108 mmol/L    CO2 26 21 - 32 mmol/L    Anion gap 9 5 - 15 mmol/L    Glucose 113 (H) 65 - 100 mg/dL    BUN 43 (H) 6 - 20 MG/DL    Creatinine 1.26 (H) 0.55 - 1.02 MG/DL    BUN/Creatinine ratio 34 (H) 12 - 20      GFR est AA 48 (L) >60 ml/min/1.73m2    GFR est non-AA 40 (L) >60 ml/min/1.73m2    Calcium 8.5 8.5 - 10.1 MG/DL    Bilirubin, total 0.4 0.2 - 1.0 MG/DL    ALT (SGPT) 20 12 - 78 U/L    AST (SGOT) 17 15 - 37 U/L    Alk.  phosphatase 74 45 - 117 U/L    Protein, total 6.8 6.4 - 8.2 g/dL    Albumin 3.5 3.5 - 5.0 g/dL    Globulin 3.3 2.0 - 4.0 g/dL    A-G Ratio 1.1 1.1 - 2.2     PROTHROMBIN TIME + INR    Collection Time: 09/20/19  6:42 PM   Result Value Ref Range    INR 1.0 0.9 - 1.1      Prothrombin time 10.5 9.0 - 11.1 sec   CK W/ CKMB & INDEX    Collection Time: 09/20/19  6:42 PM   Result Value Ref Range    CK 39 26 - 192 U/L    CK - MB 4.5 (H) <3.6 NG/ML    CK-MB Index 11.5 (H) 0.0 - 2.5     MAGNESIUM    Collection Time: 09/20/19  6:42 PM   Result Value Ref Range    Magnesium 2.4 1.6 - 2.4 mg/dL   TSH 3RD GENERATION    Collection Time: 09/20/19  6:43 PM   Result Value Ref Range    TSH 23.70 (H) 0.36 - 3.74 uIU/mL

## 2019-09-21 NOTE — PROGRESS NOTES
Bedside and Verbal shift change report GIVEN TO Amy Krishnan RN. Report included the following information SBAR, Kardex, ED Summary, Procedure Summary, Intake/Output, MAR and Recent Results    919: endocrinologist returned call for consult  [phone]: dr Marcin Live notified of recent LA 3.3. Order for repeat LA in 4 hrs and pt to have O2 NC at 1L. Unable to educate pt d/t severe confusion.      Nithya Banks, pt's joselin and WALIA:  H: [phone]  C: [phone]

## 2019-09-21 NOTE — ED PROVIDER NOTES
EMERGENCY DEPARTMENT HISTORY AND PHYSICAL EXAM      Date: 9/20/2019  Patient Name: Donald Cox    History of Presenting Illness     Chief Complaint   Patient presents with    Altered mental status     pt was unresponsive for 3-5 min so EMS was called. Per EMS pt is back at baseline. History Provided By: Patient's Daughter and EMS    HPI: Donald Cox, 80 y.o. female presents to the ED with cc of altered mental status. Pt had an episode prior to 911 call when she became unresponsive for approx 3-5mins. Per EMS pt had been felling well and there has been no recent fever or chills, cough, vomiting or diarrhea. Hx otherwise limited. There are no other complaints, changes, or physical findings at this time. PCP: Becky Stuart MD    No current facility-administered medications on file prior to encounter. Current Outpatient Medications on File Prior to Encounter   Medication Sig Dispense Refill    ARIPiprazole (ABILIFY) 5 mg tablet Take 5 mg by mouth daily.  busPIRone (BUSPAR) 5 mg tablet Take 5 mg by mouth.  vitamin L-F-A-lutein-minerals (OCUVITE) tablet daily.  midodrine (PROAMITINE) 2.5 mg tablet Take 2.5 mg by mouth.  mirtazapine (REMERON) 45 mg tablet Take 45 mg by mouth nightly.  senna-docusate (SENOKOT-S) 8.6-50 mg per tablet Take 1 Tab by mouth daily.  dilTIAZem (CARDIZEM) 30 mg tablet Take 1 Tab by mouth Before breakfast, lunch, and dinner. 90 Tab 1    enoxaparin (LOVENOX) 30 mg/0.3 mL injection 0.3 mL by SubCUTAneous route every twenty-four (24) hours. 14 Syringe 0    acetaminophen (TYLENOL) 325 mg tablet Take 2 Tabs by mouth every six (6) hours as needed for Pain. 40 Tab 0    amiodarone (CORDARONE) 200 mg tablet Take 1 Tab by mouth daily. Indications: VENTRICULAR RATE CONTROL IN ATRIAL FIBRILLATION 30 Tab 3    aspirin 81 mg chewable tablet Take 1 Tab by mouth daily.  Indications: prevention of cerebrovascular accident 61 Tab 2    ferrous sulfate (IRON) 325 mg (65 mg iron) EC tablet Take 325 mg by mouth every evening.  oxybutynin (DITROPAN) 5 mg tablet Take 5 mg by mouth two (2) times a day.  lutein 6 mg tab Take 1 Tab by mouth daily.  cholecalciferol, vitamin D3, (VITAMIN D3) 2,000 unit tab Take 2,000 Units by mouth every evening.  senna (SENOKOT) 8.6 mg tablet Take 2 Tabs by mouth every evening. Past History     Past Medical History:  Past Medical History:   Diagnosis Date    A-fib ([facility] [address]     Alzheimer disease     Alzheimer's dementia     Arthritis        Past Surgical History:  Past Surgical History:   Procedure Laterality Date    HX BREAST AUGMENTATION      HX ORTHOPAEDIC      TKR--bilateral        Family History:  Family History   Problem Relation Age of Onset    Heart Attack Mother     Cancer Sister     Cancer Brother        Social History:  Social History     Tobacco Use    Smoking status: Never Smoker    Smokeless tobacco: Never Used   Substance Use Topics    Alcohol use: No    Drug use: No       Allergies:  No Known Allergies      Review of Systems   Review of Systems   Unable to perform ROS: Patient unresponsive       Physical Exam   Physical Exam   Constitutional: She appears well-developed and well-nourished. Thin elderly female      HENT:   Head: Normocephalic and atraumatic. Mouth/Throat: No oropharyngeal exudate. Dry mucous membranes      Eyes: Pupils are equal, round, and reactive to light. Conjunctivae and EOM are normal.   Neck: Normal range of motion. Neck supple. No JVD present. No tracheal deviation present. Cardiovascular: Normal heart sounds and intact distal pulses. No murmur heard. Irregular, tachycardic   Pulmonary/Chest: Effort normal and breath sounds normal. No stridor. No respiratory distress. She has no wheezes. She has no rales. Abdominal: Soft. She exhibits no distension. There is no tenderness (no grimacing with palpation). Musculoskeletal: Normal range of motion.  She exhibits no edema or tenderness. Neurological:   Not awake or alert, arouses to physical stimuli briefly, does not follow commands, moves all exttremities   Skin: Skin is warm and dry. She is not diaphoretic. Psychiatric:   Unable to assess     Nursing note and vitals reviewed. Diagnostic Study Results     Labs -     Recent Results (from the past 12 hour(s))   EKG, 12 LEAD, INITIAL    Collection Time: 09/20/19  6:23 PM   Result Value Ref Range    Ventricular Rate 119 BPM    Atrial Rate 250 BPM    QRS Duration 98 ms    Q-T Interval 406 ms    QTC Calculation (Bezet) 571 ms    Calculated R Axis 24 degrees    Calculated T Axis 67 degrees    Diagnosis       Atrial flutter with variable AV block  Posterior infarct , possibly acute  ** ** ACUTE MI / STEMI ** **  When compared with ECG of 11-AUG-2018 19:29,  Atrial flutter has replaced Sinus rhythm  Vent. rate has increased BY  68 BPM  ST now depressed in Anterior leads  Nonspecific T wave abnormality now evident in Inferior leads  T wave inversion now evident in Anterior leads     CBC WITH AUTOMATED DIFF    Collection Time: 09/20/19  6:42 PM   Result Value Ref Range    WBC 15.7 (H) 3.6 - 11.0 K/uL    RBC 4.73 3.80 - 5.20 M/uL    HGB 15.4 11.5 - 16.0 g/dL    HCT 45.4 35.0 - 47.0 %    MCV 96.0 80.0 - 99.0 FL    MCH 32.6 26.0 - 34.0 PG    MCHC 33.9 30.0 - 36.5 g/dL    RDW 13.8 11.5 - 14.5 %    PLATELET 032 987 - 679 K/uL    MPV 10.2 8.9 - 12.9 FL    NRBC 0.0 0  WBC    ABSOLUTE NRBC 0.00 0.00 - 0.01 K/uL    NEUTROPHILS 74 32 - 75 %    LYMPHOCYTES 12 12 - 49 %    MONOCYTES 13 5 - 13 %    EOSINOPHILS 0 0 - 7 %    BASOPHILS 0 0 - 1 %    IMMATURE GRANULOCYTES 1 (H) 0.0 - 0.5 %    ABS. NEUTROPHILS 11.6 (H) 1.8 - 8.0 K/UL    ABS. LYMPHOCYTES 1.9 0.8 - 3.5 K/UL    ABS. MONOCYTES 2.0 (H) 0.0 - 1.0 K/UL    ABS. EOSINOPHILS 0.0 0.0 - 0.4 K/UL    ABS. BASOPHILS 0.0 0.0 - 0.1 K/UL    ABS. IMM.  GRANS. 0.2 (H) 0.00 - 0.04 K/UL    DF AUTOMATED      RBC COMMENTS NORMOCYTIC, NORMOCHROMIC METABOLIC PANEL, COMPREHENSIVE    Collection Time: 09/20/19  6:42 PM   Result Value Ref Range    Sodium 142 136 - 145 mmol/L    Potassium 3.0 (L) 3.5 - 5.1 mmol/L    Chloride 107 97 - 108 mmol/L    CO2 26 21 - 32 mmol/L    Anion gap 9 5 - 15 mmol/L    Glucose 113 (H) 65 - 100 mg/dL    BUN 43 (H) 6 - 20 MG/DL    Creatinine 1.26 (H) 0.55 - 1.02 MG/DL    BUN/Creatinine ratio 34 (H) 12 - 20      GFR est AA 48 (L) >60 ml/min/1.73m2    GFR est non-AA 40 (L) >60 ml/min/1.73m2    Calcium 8.5 8.5 - 10.1 MG/DL    Bilirubin, total 0.4 0.2 - 1.0 MG/DL    ALT (SGPT) 20 12 - 78 U/L    AST (SGOT) 17 15 - 37 U/L    Alk.  phosphatase 74 45 - 117 U/L    Protein, total 6.8 6.4 - 8.2 g/dL    Albumin 3.5 3.5 - 5.0 g/dL    Globulin 3.3 2.0 - 4.0 g/dL    A-G Ratio 1.1 1.1 - 2.2     PROTHROMBIN TIME + INR    Collection Time: 09/20/19  6:42 PM   Result Value Ref Range    INR 1.0 0.9 - 1.1      Prothrombin time 10.5 9.0 - 11.1 sec   CK W/ CKMB & INDEX    Collection Time: 09/20/19  6:42 PM   Result Value Ref Range    CK 39 26 - 192 U/L    CK - MB 4.5 (H) <3.6 NG/ML    CK-MB Index 11.5 (H) 0.0 - 2.5     MAGNESIUM    Collection Time: 09/20/19  6:42 PM   Result Value Ref Range    Magnesium 2.4 1.6 - 2.4 mg/dL   T3, FREE    Collection Time: 09/20/19  6:42 PM   Result Value Ref Range    Free Triiodothyronine (T3) 3.7 2.2 - 4.0 pg/mL   TSH 3RD GENERATION    Collection Time: 09/20/19  6:43 PM   Result Value Ref Range    TSH 23.70 (H) 0.36 - 3.74 uIU/mL   URINALYSIS W/ REFLEX CULTURE    Collection Time: 09/20/19 11:16 PM   Result Value Ref Range    Color YELLOW/STRAW      Appearance CLOUDY (A) CLEAR      Specific gravity 1.013 1.003 - 1.030      pH (UA) 5.5 5.0 - 8.0      Protein 30 (A) NEG mg/dL    Glucose NEGATIVE  NEG mg/dL    Ketone NEGATIVE  NEG mg/dL    Bilirubin NEGATIVE  NEG      Blood SMALL (A) NEG      Urobilinogen 0.2 0.2 - 1.0 EU/dL    Nitrites NEGATIVE  NEG      Leukocyte Esterase NEGATIVE  NEG      WBC 0-4 0 - 4 /hpf    RBC 0-5 0 - 5 /hpf    Epithelial cells FEW FEW /lpf    Bacteria 4+ (A) NEG /hpf    UA:UC IF INDICATED URINE CULTURE ORDERED (A) CNI      Hyaline cast 0-2 0 - 5 /lpf   TROPONIN I    Collection Time: 09/20/19 11:57 PM   Result Value Ref Range    Troponin-I, Qt. 2.89 (H) <0.05 ng/mL   LACTIC ACID    Collection Time: 09/20/19 11:57 PM   Result Value Ref Range    Lactic acid 2.5 (HH) 0.4 - 2.0 MMOL/L   PROCALCITONIN    Collection Time: 09/20/19 11:57 PM   Result Value Ref Range    Procalcitonin <8.0 ng/mL   METABOLIC PANEL, BASIC    Collection Time: 09/20/19 11:57 PM   Result Value Ref Range    Sodium 145 136 - 145 mmol/L    Potassium 3.6 3.5 - 5.1 mmol/L    Chloride 111 (H) 97 - 108 mmol/L    CO2 28 21 - 32 mmol/L    Anion gap 6 5 - 15 mmol/L    Glucose 95 65 - 100 mg/dL    BUN 39 (H) 6 - 20 MG/DL    Creatinine 1.05 (H) 0.55 - 1.02 MG/DL    BUN/Creatinine ratio 37 (H) 12 - 20      GFR est AA 60 (L) >60 ml/min/1.73m2    GFR est non-AA 49 (L) >60 ml/min/1.73m2    Calcium 8.1 (L) 8.5 - 10.1 MG/DL   CBC WITH AUTOMATED DIFF    Collection Time: 09/20/19 11:57 PM   Result Value Ref Range    WBC 16.2 (H) 3.6 - 11.0 K/uL    RBC 4.34 3.80 - 5.20 M/uL    HGB 14.1 11.5 - 16.0 g/dL    HCT 41.9 35.0 - 47.0 %    MCV 96.5 80.0 - 99.0 FL    MCH 32.5 26.0 - 34.0 PG    MCHC 33.7 30.0 - 36.5 g/dL    RDW 13.7 11.5 - 14.5 %    PLATELET 775 664 - 189 K/uL    MPV 10.5 8.9 - 12.9 FL    NRBC 0.0 0  WBC    ABSOLUTE NRBC 0.00 0.00 - 0.01 K/uL    NEUTROPHILS 70 32 - 75 %    LYMPHOCYTES 19 12 - 49 %    MONOCYTES 10 5 - 13 %    EOSINOPHILS 0 0 - 7 %    BASOPHILS 0 0 - 1 %    IMMATURE GRANULOCYTES 1 (H) 0.0 - 0.5 %    ABS. NEUTROPHILS 11.4 (H) 1.8 - 8.0 K/UL    ABS. LYMPHOCYTES 3.1 0.8 - 3.5 K/UL    ABS. MONOCYTES 1.6 (H) 0.0 - 1.0 K/UL    ABS. EOSINOPHILS 0.0 0.0 - 0.4 K/UL    ABS. BASOPHILS 0.0 0.0 - 0.1 K/UL    ABS. IMM.  GRANS. 0.1 (H) 0.00 - 0.04 K/UL    DF AUTOMATED         Radiologic Studies -   XR CHEST PORT   Final Result   IMPRESSION: Lungs are clear except for small focus of left basilar   atelectasis/airspace disease. [address] [place] [address]    (Results Pending)     CT Results  (Last 48 hours)    None        CXR Results  (Last 48 hours)               09/20/19 2107  XR CHEST PORT Final result    Impression:  IMPRESSION: Lungs are clear except for small focus of left basilar   atelectasis/airspace disease. Narrative:  EXAM:  XR CHEST PORT       INDICATION:  SOA, AMS       COMPARISON:  2018       FINDINGS: A portable AP radiograph of the chest was obtained at 2047 hours. The   patient is on a cardiac monitor. Lungs are clear except for small focus of left   basilar atelectasis/airspace disease. There is slight interstitial prominence   right lung base. Heart size is within normal limits. .  There is   deformity/chronic degenerative changes of the shoulders. . There is scoliosis   thoracolumbar junction. Medical Decision Making   I am the first provider for this patient. I reviewed the vital signs, available nursing notes, past medical history, past surgical history, family history and social history. Vital Signs-Reviewed the patient's vital signs.   Patient Vitals for the past 12 hrs:   Temp Pulse Resp BP SpO2   09/21/19 0127 98 °F (36.7 °C) 88 22 164/80 95 %   09/20/19 2300  84 27 135/89 96 %   09/20/19 2230  85 23 159/88 96 %   09/20/19 2200  79 17 145/82 95 %   09/20/19 2130  91 24 108/67 95 %   09/20/19 2100  84 23 132/72 95 %   09/20/19 2030  85 25 166/81 95 %   09/20/19 2000  81 19 (!) 143/100 97 %   09/20/19 1930  77 23 138/74 97 %   09/20/19 1900  (!) 135 (!) 32 (!) 69/46 97 %   09/20/19 1831  (!) 121 25 120/83 97 %       EKG interpretation: (Preliminary)  Aflutter, variable conduction, rate 140's, normal axis, normal qrs, diffuse ST changes likely due to demand ischemia    Records Reviewed: Nursing Notes, Old Medical Records, Previous electrocardiograms, Ambulance Run Sheet, Previous Radiology Studies and Previous Laboratory Studies    Provider Notes (Medical Decision Making):   DDx- Arrhythmia, electrolyte abnormality, dehydration, thyroid dysf(n), pneumonia, UTI    ED Course:   Initial assessment performed. The patients presenting problems have been discussed, and they are in agreement with the care plan formulated and outlined with them. I have encouraged them to ask questions as they arise throughout their visit. Upon arrival Pt in A flutter w/ RVR, arouses to physical stimuli, hemodynamically stable, pt given Cardizem 10mg bolus which improved heart rate for short interim, then BP dropped into the 60's. Family at the bedside. Discussed will cotinue IV fluids to assess if this improves BP and heart rate, as pt has not been eating or drinking for the last several days. Family would like to not pursue aggressive treatment such as Central line or pressors, and would discuss palliative treatment if we do not see improvement. Cardiology paged. Consult Note:   Discussed with Dr. Anthony Roldan, at the time he called with fluid resuscitation pt had converted to sinus rhythm and BP had improved. Can see pt in consult as needed. PT TSH elevated, Free T3 and T4 have been sent. With IV fluids pt is more alert,confused at baseline. Will discuss with hospitalist, admission for AMS, dehydration, failure to thrive, and hypothyroid.       Critical Care Time:   CRITICAL CARE NOTE :    IMPENDING DETERIORATION -Cardiovascular and CNS  ASSOCIATED RISK FACTORS - Hypotension, Dysrhythmia, Dehydration and CNS Decompensation  MANAGEMENT- Bedside Assessment and Supervision of Care  INTERPRETATION -  Xrays, ECG, Blood Pressure, Cardiac Output Measures  and Labs  INTERVENTIONS - hemodynamic mngmt and IV fluids  CASE REVIEW - Hospitalist, Medical Sub-Specialist, Nursing and Family  TREATMENT RESPONSE -Improved  PERFORMED BY - Self    NOTES   :  I have spent 40 minutes of critical care time involved in lab review, consultations with specialist, family decision- making, bedside attention and documentation. During this entire length of time I was immediately available to the patient . Madison Thomas DO      Disposition:  Admit    PLAN:  1. Admit    Diagnosis     Clinical Impression:   1. Atrial flutter with rapid ventricular response (HCC)    2. Dehydration    3. Failure to thrive in adult    4. Hypothyroidism, unspecified type        Attestations:    Madison Thomas DO    Please note that this dictation was completed with [employer], the computer voice recognition software. Quite often unanticipated grammatical, syntax, homophones, and other interpretive errors are inadvertently transcribed by the computer software. Please disregard these errors. Please excuse any errors that have escaped final proofreading. Thank you.

## 2019-09-21 NOTE — ROUTINE PROCESS
TRANSFER - OUT REPORT: 
 
Verbal report given to Juliocesar Novak RN (name) on Rodney John  being transferred to [address] [place] [address] (unit) for routine progression of care Report consisted of patients Situation, Background, Assessment and  
Recommendations(SBAR). Information from the following report(s) SBAR, Kardex, ED Summary, Procedure Summary, Intake/Output, MAR, Accordion and Recent Results was reviewed with the receiving nurse. Lines:  
Peripheral IV 09/20/19 Right; Inner Forearm (Active) Site Assessment Clean, dry, & intact 9/20/2019  6:44 PM  
Phlebitis Assessment 0 9/20/2019  6:44 PM  
Infiltration Assessment 0 9/20/2019  6:44 PM  
Dressing Status Clean, dry, & intact 9/20/2019  6:44 PM  
Dressing Type Tape;Transparent 9/20/2019  6:44 PM  
Hub Color/Line Status Pink;Flushed;Patent 9/20/2019  6:44 PM  
Action Taken Blood drawn 9/20/2019  6:44 PM  
   
Peripheral IV 09/20/19 Left Forearm (Active) Site Assessment Clean, dry, & intact 9/20/2019  6:45 PM  
Phlebitis Assessment 0 9/20/2019  6:45 PM  
Infiltration Assessment 0 9/20/2019  6:45 PM  
Dressing Status Clean, dry, & intact 9/20/2019  6:45 PM  
Dressing Type Tape;Transparent 9/20/2019  6:45 PM  
Hub Color/Line Status Pink;Flushed;Patent 9/20/2019  6:45 PM  
  
 
Opportunity for questions and clarification was provided. Patient transported with: 
 Registered Nurse

## 2019-09-21 NOTE — PROGRESS NOTES
TRANSFER - IN REPORT:    Verbal report received from Homer, RN on [facility] Asc[facility]  being received from ER for routine progression of care      Report consisted of patients Situation, Background, Assessment and   Recommendations(SBAR). Information from the following report(s) SBAR was reviewed with the receiving nurse. Opportunity for questions and clarification was provided. Assessment completed upon patients arrival to unit and care assumed. Primary Nurse Semaj Lutz RN and Augustin Santoyo RN performed a dual skin assessment on this patient. Pt has redness on left outer great toe; scab on R shin; redness on L groin area; old healing wound on sacrum; redness on sacrum.

## 2019-09-22 ENCOUNTER — APPOINTMENT (OUTPATIENT)
Dept: NON INVASIVE DIAGNOSTICS | Age: 84
DRG: 871 | End: 2019-09-22
Attending: INTERNAL MEDICINE
Payer: MEDICARE

## 2019-09-22 LAB
ALBUMIN SERPL-MCNC: 3.1 G/DL (ref 3.5–5)
ALBUMIN/GLOB SERPL: 1 {RATIO} (ref 1.1–2.2)
ALP SERPL-CCNC: 65 U/L (ref 45–117)
ALT SERPL-CCNC: 34 U/L (ref 12–78)
ANION GAP SERPL CALC-SCNC: 6 MMOL/L (ref 5–15)
AST SERPL-CCNC: 35 U/L (ref 15–37)
BASOPHILS # BLD: 0 K/UL (ref 0–0.1)
BASOPHILS NFR BLD: 0 % (ref 0–1)
BILIRUB SERPL-MCNC: 1 MG/DL (ref 0.2–1)
BUN SERPL-MCNC: 20 MG/DL (ref 6–20)
BUN/CREAT SERPL: 30 (ref 12–20)
CALCIUM SERPL-MCNC: 8.1 MG/DL (ref 8.5–10.1)
CHLORIDE SERPL-SCNC: 117 MMOL/L (ref 97–108)
CO2 SERPL-SCNC: 25 MMOL/L (ref 21–32)
CREAT SERPL-MCNC: 0.66 MG/DL (ref 0.55–1.02)
DIFFERENTIAL METHOD BLD: ABNORMAL
ECHO AV PEAK GRADIENT: 13.5 MMHG
ECHO AV PEAK VELOCITY: 184.04 CM/S
ECHO LA MAJOR AXIS: 3.59 CM
ECHO LV INTERNAL DIMENSION DIASTOLIC: 2.45 CM (ref 3.9–5.3)
ECHO LV INTERNAL DIMENSION SYSTOLIC: 1.21 CM
ECHO LV IVSD: 1.25 CM (ref 0.6–0.9)
ECHO LV MASS 2D: 101 G (ref 67–162)
ECHO LV MASS INDEX 2D: 67.4 G/M2 (ref 43–95)
ECHO LV POSTERIOR WALL DIASTOLIC: 1.3 CM (ref 0.6–0.9)
ECHO LVOT PEAK GRADIENT: 3.2 MMHG
ECHO LVOT PEAK VELOCITY: 89.55 CM/S
EOSINOPHIL # BLD: 0.1 K/UL (ref 0–0.4)
EOSINOPHIL NFR BLD: 1 % (ref 0–7)
ERYTHROCYTE [DISTWIDTH] IN BLOOD BY AUTOMATED COUNT: 14.2 % (ref 11.5–14.5)
GLOBULIN SER CALC-MCNC: 3 G/DL (ref 2–4)
GLUCOSE SERPL-MCNC: 87 MG/DL (ref 65–100)
HCT VFR BLD AUTO: 39.7 % (ref 35–47)
HGB BLD-MCNC: 13 G/DL (ref 11.5–16)
IMM GRANULOCYTES # BLD AUTO: 0.1 K/UL (ref 0–0.04)
IMM GRANULOCYTES NFR BLD AUTO: 1 % (ref 0–0.5)
LYMPHOCYTES # BLD: 3.5 K/UL (ref 0.8–3.5)
LYMPHOCYTES NFR BLD: 33 % (ref 12–49)
MAGNESIUM SERPL-MCNC: 1.8 MG/DL (ref 1.6–2.4)
MCH RBC QN AUTO: 32 PG (ref 26–34)
MCHC RBC AUTO-ENTMCNC: 32.7 G/DL (ref 30–36.5)
MCV RBC AUTO: 97.8 FL (ref 80–99)
MONOCYTES # BLD: 1.2 K/UL (ref 0–1)
MONOCYTES NFR BLD: 11 % (ref 5–13)
NEUTS SEG # BLD: 5.9 K/UL (ref 1.8–8)
NEUTS SEG NFR BLD: 54 % (ref 32–75)
NRBC # BLD: 0 K/UL (ref 0–0.01)
NRBC BLD-RTO: 0 PER 100 WBC
PHOSPHATE SERPL-MCNC: 2 MG/DL (ref 2.6–4.7)
PLATELET # BLD AUTO: 276 K/UL (ref 150–400)
PMV BLD AUTO: 10 FL (ref 8.9–12.9)
POTASSIUM SERPL-SCNC: 3.4 MMOL/L (ref 3.5–5.1)
PROT SERPL-MCNC: 6.1 G/DL (ref 6.4–8.2)
RBC # BLD AUTO: 4.06 M/UL (ref 3.8–5.2)
SODIUM SERPL-SCNC: 148 MMOL/L (ref 136–145)
WBC # BLD AUTO: 10.8 K/UL (ref 3.6–11)

## 2019-09-22 PROCEDURE — 74011250636 HC RX REV CODE- 250/636: Performed by: INTERNAL MEDICINE

## 2019-09-22 PROCEDURE — 77010033678 HC OXYGEN DAILY

## 2019-09-22 PROCEDURE — 77030027138 HC INCENT SPIROMETER -A

## 2019-09-22 PROCEDURE — 74011000250 HC RX REV CODE- 250: Performed by: INTERNAL MEDICINE

## 2019-09-22 PROCEDURE — 94760 N-INVAS EAR/PLS OXIMETRY 1: CPT

## 2019-09-22 PROCEDURE — 65660000001 HC RM ICU INTERMED STEPDOWN

## 2019-09-22 PROCEDURE — 85025 COMPLETE CBC W/AUTO DIFF WBC: CPT

## 2019-09-22 PROCEDURE — 80053 COMPREHEN METABOLIC PANEL: CPT

## 2019-09-22 PROCEDURE — 36415 COLL VENOUS BLD VENIPUNCTURE: CPT

## 2019-09-22 PROCEDURE — 74011250637 HC RX REV CODE- 250/637: Performed by: INTERNAL MEDICINE

## 2019-09-22 PROCEDURE — 74011000258 HC RX REV CODE- 258: Performed by: INTERNAL MEDICINE

## 2019-09-22 PROCEDURE — 93308 TTE F-UP OR LMTD: CPT

## 2019-09-22 PROCEDURE — 84100 ASSAY OF PHOSPHORUS: CPT

## 2019-09-22 PROCEDURE — 83735 ASSAY OF MAGNESIUM: CPT

## 2019-09-22 RX ORDER — BALSAM PERU/CASTOR OIL
OINTMENT (GRAM) TOPICAL 2 TIMES DAILY
Status: DISCONTINUED | OUTPATIENT
Start: 2019-09-22 | End: 2019-09-24 | Stop reason: ALTCHOICE

## 2019-09-22 RX ORDER — SODIUM CHLORIDE 450 MG/100ML
75 INJECTION, SOLUTION INTRAVENOUS CONTINUOUS
Status: DISCONTINUED | OUTPATIENT
Start: 2019-09-22 | End: 2019-09-23

## 2019-09-22 RX ADMIN — HEPARIN SODIUM 5000 UNITS: 5000 INJECTION INTRAVENOUS; SUBCUTANEOUS at 07:03

## 2019-09-22 RX ADMIN — CASTOR OIL AND BALSAM, PERU: 788; 87 OINTMENT TOPICAL at 17:08

## 2019-09-22 RX ADMIN — LEVOTHYROXINE SODIUM 75 MCG: 75 TABLET ORAL at 07:02

## 2019-09-22 RX ADMIN — ASPIRIN 81 MG CHEWABLE TABLET 81 MG: 81 TABLET CHEWABLE at 10:02

## 2019-09-22 RX ADMIN — HEPARIN SODIUM 5000 UNITS: 5000 INJECTION INTRAVENOUS; SUBCUTANEOUS at 18:42

## 2019-09-22 RX ADMIN — Medication 10 ML: at 23:22

## 2019-09-22 RX ADMIN — DILTIAZEM HYDROCHLORIDE 30 MG: 30 TABLET, FILM COATED ORAL at 15:27

## 2019-09-22 RX ADMIN — SODIUM CHLORIDE 75 ML/HR: 450 INJECTION, SOLUTION INTRAVENOUS at 17:07

## 2019-09-22 RX ADMIN — SENNOSIDES, DOCUSATE SODIUM 1 TABLET: 50; 8.6 TABLET, FILM COATED ORAL at 10:09

## 2019-09-22 RX ADMIN — Medication 10 ML: at 15:27

## 2019-09-22 RX ADMIN — SODIUM CHLORIDE: 900 INJECTION, SOLUTION INTRAVENOUS at 17:31

## 2019-09-22 RX ADMIN — DILTIAZEM HYDROCHLORIDE 30 MG: 30 TABLET, FILM COATED ORAL at 18:42

## 2019-09-22 RX ADMIN — CEFTRIAXONE 1 G: 1 INJECTION, POWDER, FOR SOLUTION INTRAMUSCULAR; INTRAVENOUS at 23:22

## 2019-09-22 RX ADMIN — DILTIAZEM HYDROCHLORIDE 30 MG: 30 TABLET, FILM COATED ORAL at 10:02

## 2019-09-22 RX ADMIN — SODIUM CHLORIDE 75 ML/HR: 900 INJECTION, SOLUTION INTRAVENOUS at 04:03

## 2019-09-22 RX ADMIN — MIDODRINE HYDROCHLORIDE 2.5 MG: 5 TABLET ORAL at 10:03

## 2019-09-22 NOTE — PROGRESS NOTES
Reason for Admission:   Atrail Fibrillation with RVR                  RRAT Score:  15 Moderate risk                Do you (patient/family) have any concerns for transition/discharge? None                Plan for utilizing home health:   Pt has had home health in the past. Pt will not be utilizing home health at d/c. Current Advanced Directive/Advance Care Plan:  Pt has DNR. Pt has an ACP on file. Transition of Care Plan:       [facility]  2nd IM Letter  Medical Transport    CM met with pt at [facility] to discuss d/c plan. Pt was confused. CM called pt's niece to complete initial assessment. CM verified pt's demographics, insurance and PCP. Pt is a 81 y/o  female admitted to [facility] on 9/20/19 for Atrial Fibrillation with RVR. Pt's PCP is Dr. Rachel Ray. Pt is LTC at [facility]. Pt needs assistance with ADL's and IADL's. Pt is wheelchair bound. Pt has had home health in the past. No acute inpatient rehab. Pt has DNR code. Pt has an ACP on file. Pt will need ambulance transport at d/.     Pt's niece provided verbal consent for [address] document. [address] document placed in pt's bedside chart. Referral sent to [facility] via [facility]. Care Management Interventions  PCP Verified by CM: Yes(Pt's PCP is Dr. Rachel Ray.)  Mode of Transport at Discharge: BLS(Pt will need ambulance transport )  Transition of Care Consult (CM Consult): Discharge Planning  Discharge Durable Medical Equipment: No(No DME's)  Physical Therapy Consult: No  Occupational Therapy Consult: No  Speech Therapy Consult: Yes  Current Support Network: Nursing Facility(Pt is in LTC at [facility]. )  Confirm Follow Up Transport: Other (see comment)(Pt is LTC at [facility] )  Discharge Location  Discharge Placement: ([address] [place] [address] and Rehab)    CM will continue to follow patient for discharge planning needs and arrange for services as deemed necessary.     [facility] Raghav Borges [address] [place] [address]  [phone]

## 2019-09-22 NOTE — PROGRESS NOTES
venelex applied to inner buttocks and patient repositioned. Patient not drinking much and it takes some effort to give meds even when crushed. Blood pressure was elevated at end of shift. She may need prn for this but also received the midodrine earlier.

## 2019-09-22 NOTE — PROGRESS NOTES
TELE-HOSPITALIST CROSS COVER NOTE:    Visit Vitals  /74 (BP 1 Location: Right arm, BP Patient Position: Supine; At rest)   Pulse 86   Temp 97.6 °F (36.4 °C)   Resp 18   SpO2 98%   Breastfeeding? No         D/W RN; medical records reviewed; notified of positive BCx; 1/4 with [facility]; pending finalization; continue IV Abx, Rocephin; tailor based on finalized Cx.       Doylene Shoulders

## 2019-09-22 NOTE — PROGRESS NOTES
Problem: Falls - Risk of  Goal: *Absence of Falls  Description  Document Margi Flores Fall Risk and appropriate interventions in the flowsheet.   Outcome: Progressing Towards Goal  Note:   Fall Risk Interventions:  Mobility Interventions: Bed/chair exit alarm, OT consult for ADLs, Patient to call before getting OOB, PT Consult for mobility concerns    Mentation Interventions: Bed/chair exit alarm, Adequate sleep, hydration, pain control    Medication Interventions: Bed/chair exit alarm, Patient to call before getting OOB    Elimination Interventions: Bed/chair exit alarm, Call light in reach, Patient to call for help with toileting needs    History of Falls Interventions: Bed/chair exit alarm, Door open when patient unattended

## 2019-09-22 NOTE — PROGRESS NOTES
Hospitalist Progress Note    NAME: Stoney Arellano   :  1929   MRN:  [id number]       Assessment / Plan:  Acute metabolic encephalopathy in setting of hypotension; encephalopathy improved  MARTY: resolved  Aflutter with  RVR; resolved  Possible Sepsis most likely secondary to questionable pneumonia: CXR showed: \"IMPRESSION: Lungs are clear except for small focus of left basilar  atelectasis/airspace disease. \"  UTI (POA): UCx with GNR's  Possible Bacteremia (POA): BCx's with GPC's  NSTEMI (POA)  Elevated Lactic Acidosis  -troponin peaked at 3.7  -await TTE, pending  -continue ASA  -patient on Diltiazem  -continue IVFs, change to hypotonic IVF's with Hypernatremia  -continue IV Rocephin since Leukocytosis has normalized  -await Cx finalization  -Cardiology consult pending, I though Dr. Damián Vasquez would just see later last night, RN spoke with him this am and he is aware of consult    Hypokalemia  Hypophosphatemia  -replete with IV potassium phosphate    Hypothyroidism:  -case discussed with Dr. Haley Hendrix this am, appreciate assistance, start Levothyroxine    Severe dementia  Dysphagia  -continue dysphagia diet , speech eval; will thicken liquids for now     Code Status:  DNR   Surrogate Decision Maker: Niece     DVT Prophylaxis: lovenox   GI Prophylaxis: not indicated     Baseline: wheelchair bound      Subjective:     Chief Complaint / Reason for Physician Visit: follow-up sepsis, NSTEMI  Patient more alert  Denies complaints    Review of Systems:  Symptom Y/N Comments  Symptom Y/N Comments   Fever/Chills    Chest Pain n    Poor Appetite    Edema     Cough    Abdominal Pain n    Sputum    Joint Pain     SOB/ADAME n   Pruritis/Rash     Nausea/vomit    Tolerating PT/OT     Diarrhea    Tolerating Diet y    Constipation    Other       Could NOT obtain due to:      Objective:     VITALS:   Last 24hrs VS reviewed since prior progress note.  Most recent are:  Patient Vitals for the past 24 hrs:   Temp Pulse Resp BP SpO2 09/22/19 0814    160/75    09/22/19 0737 97.6 °F (36.4 °C) 85 18 160/75 97 %   09/22/19 0306 97.6 °F (36.4 °C) 86 18 173/74 98 %   09/21/19 2348 98.1 °F (36.7 °C) 86 18 167/77 99 %   09/21/19 1947 97.6 °F (36.4 °C) 84 18 144/70 97 %   09/21/19 1521 97.6 °F (36.4 °C) 87 19 148/65 98 %   09/21/19 1122     97 %   09/21/19 1120     96 %   09/21/19 1041 98.5 °F (36.9 °C) 88 20 158/77 97 %       Intake/Output Summary (Last 24 hours) at 9/22/2019 0844  Last data filed at 9/22/2019 0759  Gross per 24 hour   Intake 935 ml   Output 1450 ml   Net -515 ml        PHYSICAL EXAM:  General: WD, WN. Alert, cooperative, no acute distress    EENT:  EOMI. Anicteric sclerae. MM dry  Resp:  CTA bilaterally on anterior assessment, no wheezing or rales. No accessory muscle use  CV:  Regular  rhythm,  No edema  GI:  Soft, Non distended, Non tender.  +Bowel sounds  Neurologic:  Alerts some, not oriented (oriented x1 at baseline), normal speech,   Psych:   No insight. Not anxious nor agitated  Skin:  No rashes. No jaundice    Reviewed most current lab test results and cultures  YES  Reviewed most current radiology test results   YES  Review and summation of old records today    NO  Reviewed patient's current orders and MAR    YES  PMH/SH reviewed - no change compared to H&P  ________________________________________________________________________  Care Plan discussed with:    Comments   Patient x    Family  x Ms Robe Rebolledo RN x    Care Manager     Consultant                        Multidiciplinary team rounds were held today with , nursing, pharmacist and clinical coordinator. Patient's plan of care was discussed; medications were reviewed and discharge planning was addressed.      ________________________________________________________________________  Total NON critical care TIME:  30   Minutes    Total CRITICAL CARE TIME Spent:   Minutes non procedure based      Comments   >50% of visit spent in counseling and coordination of care     ________________________________________________________________________  Clay Garcia MD     Procedures: see electronic medical records for all procedures/Xrays and details which were not copied into this note but were reviewed prior to creation of Plan. LABS:  I reviewed today's most current labs and imaging studies.   Pertinent labs include:  Recent Labs     09/22/19 0407 09/20/19 2357 09/20/19 1842   WBC 10.8 16.2* 15.7*   HGB 13.0 14.1 15.4   HCT 39.7 41.9 45.4    313 353     Recent Labs     09/22/19 0407 09/20/19 2357 09/20/19  1842   * 145 142   K 3.4* 3.6 3.0*   * 111* 107   CO2 25 28 26   GLU 87 95 113*   BUN 20 39* 43*   CREA 0.66 1.05* 1.26*   CA 8.1* 8.1* 8.5   MG 1.8  --  2.4   PHOS 2.0*  --   --    ALB 3.1*  --  3.5   TBILI 1.0  --  0.4   SGOT 35  --  17   ALT 34  --  20   INR  --   --  1.0       Signed: Clay Garcia MD

## 2019-09-22 NOTE — CONSULTS
Consult/Admission    NAME: Philly Molina   :  1929   MRN:  [id number]     Date/Time:  2019 7:21 PM    Patient PCP: Brionna Mejia MD  ________________________________________________________________________     Assessment:     Possible NSTEMI   Severe dementia  Sepsis resolved  Atrial flutter on admission, converting back to NSR>   Age related physical debility. lactic acidosis. Echo shows LVH with normal LV systolic function. Plan:     Conservative cardiac management. Comfort care is recommended. [x]           High complexity decision making was performed        Subjective:   CHIEF COMPLAINT: pt has dementia and is unable to provide any hx . Unable to answer any questions    HISTORY OF PRESENT ILLNESS:     Nallely Earl is a 80 y.o.  female who has above diagnoses. A Flutter converted to NSR     Elevated Troponin , c/w NSTEMI. With her advnaced age, physical debility and frailty , severe dementia , no further cardiac studies or intervention planned . Would not be a candidate. Comfort care.      Past Medical History:   Diagnosis Date    A-fib ([address]     Alzheimer disease     Alzheimer's dementia     Arthritis       Past Surgical History:   Procedure Laterality Date    HX BREAST AUGMENTATION      HX ORTHOPAEDIC      TKR--bilateral      No Known Allergies   Meds:  See below  Social History     Tobacco Use    Smoking status: Never Smoker    Smokeless tobacco: Never Used   Substance Use Topics    Alcohol use: No      Family History   Problem Relation Age of Onset    Heart Attack Mother     Cancer Sister     Cancer Brother        REVIEW OF SYSTEMS:     []            Unable to obtain  ROS due to ---   [x]            Total of 12 systems reviewed as follows:    Constitutional: negative fever, negative chills, negative weight loss  Eyes:   negative visual changes  ENT:   negative sore throat, tongue or lip swelling  Respiratory:  negative cough, negative dyspnea  Cards:  negative for chest pain, palpitations, lower extremity edema  GI:   negative for nausea, vomiting, diarrhea, and abdominal pain  Genitourinary: negative for frequency, dysuria  Integument:  negative for rash   Hematologic:  negative for easy bruising and gum/nose bleeding  Musculoskel: negative for myalgias,  back pain  Neurological:  negative for headaches, dizziness, vertigo, weakness  Behavl/Psych: negative for feelings of anxiety, depression     Pertinent Positives include :    Objective:      Physical Exam:    Last 24hrs VS reviewed since prior progress note. Most recent are:    Visit Vitals  /81   Pulse 81   Temp 98.3 °F (36.8 °C)   Resp 28   Ht 5' 1\" (1.549 m)   Wt 52.6 kg (116 lb)   SpO2 96%   Breastfeeding? No   BMI 21.92 kg/m²       Intake/Output Summary (Last 24 hours) at 9/22/2019 1921  Last data filed at 9/22/2019 1205  Gross per 24 hour   Intake 1790 ml   Output 800 ml   Net 990 ml        General Appearance: Well developed, well nourished, alert & oriented x 3,    no acute distress. Ears/Nose/Mouth/Throat: Pupils equal and round, Hearing grossly normal.  Neck: Supple. JVP within normal limits. Carotids good upstrokes, with no bruit. Chest: Lungs clear to auscultation bilaterally. Cardiovascular: Regular rate and rhythm, S1S2 normal, no murmur, rubs, gallops. Abdomen: Soft, non-tender, bowel sounds are active. No organomegaly. Extremities: No edema bilaterally. Femoral pulses +2, Distal Pulses +1. Skin: Warm and dry. Neuro: CN II-XII grossly intact, Strength and sensation grossly intact. Data:      Prior to Admission medications    Medication Sig Start Date End Date Taking? Authorizing Provider   ARIPiprazole (ABILIFY) 5 mg tablet Take 5 mg by mouth daily. Yes Other, MD Abhay   busPIRone (BUSPAR) 5 mg tablet Take 5 mg by mouth. Yes Other, MD Abhay   vitamin X-N-J-lutein-minerals (OCUVITE) tablet daily.    Yes Other, MD Abhay   midodrine (PROAMITINE) 2.5 mg tablet Take 2.5 mg by mouth. Yes Other, MD Abhay   mirtazapine (REMERON) 45 mg tablet Take 45 mg by mouth nightly. Yes Other, MD Abhay   senna-docusate (SENOKOT-S) 8.6-50 mg per tablet Take 1 Tab by mouth daily. Yes Other, MD Abhay   dilTIAZem (CARDIZEM) 30 mg tablet Take 1 Tab by mouth Before breakfast, lunch, and dinner. 8/17/18   Ramirez Jerome PA-C   enoxaparin (LOVENOX) 30 mg/0.3 mL injection 0.3 mL by SubCUTAneous route every twenty-four (24) hours. 8/18/18   Ramirez Jerome PA-C   acetaminophen (TYLENOL) 325 mg tablet Take 2 Tabs by mouth every six (6) hours as needed for Pain. 8/17/18   Ramirez Jerome PA-C   amiodarone (CORDARONE) 200 mg tablet Take 1 Tab by mouth daily. Indications: VENTRICULAR RATE CONTROL IN ATRIAL FIBRILLATION 8/12/18   Kaveh Brown MD   aspirin 81 mg chewable tablet Take 1 Tab by mouth daily. Indications: prevention of cerebrovascular accident 8/9/18   Kaveh Brown MD   ferrous sulfate (IRON) 325 mg (65 mg iron) EC tablet Take 325 mg by mouth every evening. Provider, Historical   oxybutynin (DITROPAN) 5 mg tablet Take 5 mg by mouth two (2) times a day. Provider, Historical   lutein 6 mg tab Take 1 Tab by mouth daily. Provider, Historical   cholecalciferol, vitamin D3, (VITAMIN D3) 2,000 unit tab Take 2,000 Units by mouth every evening. Provider, Historical   senna (SENOKOT) 8.6 mg tablet Take 2 Tabs by mouth every evening.     Provider, Historical       Recent Results (from the past 24 hour(s))   CBC WITH AUTOMATED DIFF    Collection Time: 09/22/19  4:07 AM   Result Value Ref Range    WBC 10.8 3.6 - 11.0 K/uL    RBC 4.06 3.80 - 5.20 M/uL    HGB 13.0 11.5 - 16.0 g/dL    HCT 39.7 35.0 - 47.0 %    MCV 97.8 80.0 - 99.0 FL    MCH 32.0 26.0 - 34.0 PG    MCHC 32.7 30.0 - 36.5 g/dL    RDW 14.2 11.5 - 14.5 %    PLATELET 818 818 - 369 K/uL    MPV 10.0 8.9 - 12.9 FL    NRBC 0.0 0  WBC    ABSOLUTE NRBC 0.00 0.00 - 0.01 K/uL    NEUTROPHILS 54 32 - 75 %    LYMPHOCYTES 33 12 - 49 %    MONOCYTES 11 5 - 13 %    EOSINOPHILS 1 0 - 7 %    BASOPHILS 0 0 - 1 %    IMMATURE GRANULOCYTES 1 (H) 0.0 - 0.5 %    ABS. NEUTROPHILS 5.9 1.8 - 8.0 K/UL    ABS. LYMPHOCYTES 3.5 0.8 - 3.5 K/UL    ABS. MONOCYTES 1.2 (H) 0.0 - 1.0 K/UL    ABS. EOSINOPHILS 0.1 0.0 - 0.4 K/UL    ABS. BASOPHILS 0.0 0.0 - 0.1 K/UL    ABS. IMM. GRANS. 0.1 (H) 0.00 - 0.04 K/UL    DF AUTOMATED     METABOLIC PANEL, COMPREHENSIVE    Collection Time: 09/22/19  4:07 AM   Result Value Ref Range    Sodium 148 (H) 136 - 145 mmol/L    Potassium 3.4 (L) 3.5 - 5.1 mmol/L    Chloride 117 (H) 97 - 108 mmol/L    CO2 25 21 - 32 mmol/L    Anion gap 6 5 - 15 mmol/L    Glucose 87 65 - 100 mg/dL    BUN 20 6 - 20 MG/DL    Creatinine 0.66 0.55 - 1.02 MG/DL    BUN/Creatinine ratio 30 (H) 12 - 20      GFR est AA >60 >60 ml/min/1.73m2    GFR est non-AA >60 >60 ml/min/1.73m2    Calcium 8.1 (L) 8.5 - 10.1 MG/DL    Bilirubin, total 1.0 0.2 - 1.0 MG/DL    ALT (SGPT) 34 12 - 78 U/L    AST (SGOT) 35 15 - 37 U/L    Alk.  phosphatase 65 45 - 117 U/L    Protein, total 6.1 (L) 6.4 - 8.2 g/dL    Albumin 3.1 (L) 3.5 - 5.0 g/dL    Globulin 3.0 2.0 - 4.0 g/dL    A-G Ratio 1.0 (L) 1.1 - 2.2     MAGNESIUM    Collection Time: 09/22/19  4:07 AM   Result Value Ref Range    Magnesium 1.8 1.6 - 2.4 mg/dL   PHOSPHORUS    Collection Time: 09/22/19  4:07 AM   Result Value Ref Range    Phosphorus 2.0 (L) 2.6 - 4.7 MG/DL   ECHO ADULT FOLLOW-UP OR LIMITED    Collection Time: 09/22/19 12:04 PM   Result Value Ref Range    Aortic Valve Systolic Peak Velocity 985.05 cm/s    AoV PG 13.5 mmHg    LVIDd 2.45 (A) 3.9 - 5.3 cm    LVPWd 1.30 (A) 0.6 - 0.9 cm    LVIDs 1.21 cm    IVSd 1.25 (A) 0.6 - 0.9 cm    LVOT Peak Velocity 89.55 cm/s    LVOT Peak Gradient 3.2 mmHg    LV Mass .0 67 - 162 g    LV Mass AL Index 67.4 43 - 95 g/m2    Left Atrium Major Axis 3.59 cm

## 2019-09-22 NOTE — PROGRESS NOTES
Report received from [facility]. SBAR were discussed. Akanksha Camp RN     6606 Dr. Rekha Jason was in to see patient. Patient has left mouth droop and slight left facial droop. She is confused . She does follow commands    1050 patient states she is scared often. Reassured patient that we would watch over her. Stayed with her during ECHO. Gave patient a colorful blanket for comfort.

## 2019-09-22 NOTE — PROGRESS NOTES
Received report from [place] ([facility]), RN. Assumed care of patient.

## 2019-09-23 LAB
ALBUMIN SERPL-MCNC: 2.6 G/DL (ref 3.5–5)
ALBUMIN/GLOB SERPL: 1 {RATIO} (ref 1.1–2.2)
ALP SERPL-CCNC: 55 U/L (ref 45–117)
ALT SERPL-CCNC: 33 U/L (ref 12–78)
ANION GAP SERPL CALC-SCNC: 4 MMOL/L (ref 5–15)
AST SERPL-CCNC: 26 U/L (ref 15–37)
BACTERIA SPEC CULT: ABNORMAL
BASOPHILS # BLD: 0 K/UL (ref 0–0.1)
BASOPHILS NFR BLD: 0 % (ref 0–1)
BILIRUB SERPL-MCNC: 1.4 MG/DL (ref 0.2–1)
BUN SERPL-MCNC: 11 MG/DL (ref 6–20)
BUN/CREAT SERPL: 20 (ref 12–20)
CALCIUM SERPL-MCNC: 7.3 MG/DL (ref 8.5–10.1)
CC UR VC: ABNORMAL
CHLORIDE SERPL-SCNC: 108 MMOL/L (ref 97–108)
CO2 SERPL-SCNC: 27 MMOL/L (ref 21–32)
CREAT SERPL-MCNC: 0.55 MG/DL (ref 0.55–1.02)
DIFFERENTIAL METHOD BLD: ABNORMAL
EOSINOPHIL # BLD: 0.3 K/UL (ref 0–0.4)
EOSINOPHIL NFR BLD: 3 % (ref 0–7)
ERYTHROCYTE [DISTWIDTH] IN BLOOD BY AUTOMATED COUNT: 13.7 % (ref 11.5–14.5)
GLOBULIN SER CALC-MCNC: 2.5 G/DL (ref 2–4)
GLUCOSE SERPL-MCNC: 81 MG/DL (ref 65–100)
HCT VFR BLD AUTO: 32.8 % (ref 35–47)
HGB BLD-MCNC: 10.8 G/DL (ref 11.5–16)
IMM GRANULOCYTES # BLD AUTO: 0.1 K/UL (ref 0–0.04)
IMM GRANULOCYTES NFR BLD AUTO: 1 % (ref 0–0.5)
LYMPHOCYTES # BLD: 3.7 K/UL (ref 0.8–3.5)
LYMPHOCYTES NFR BLD: 40 % (ref 12–49)
MAGNESIUM SERPL-MCNC: 1.7 MG/DL (ref 1.6–2.4)
MCH RBC QN AUTO: 31.7 PG (ref 26–34)
MCHC RBC AUTO-ENTMCNC: 32.9 G/DL (ref 30–36.5)
MCV RBC AUTO: 96.2 FL (ref 80–99)
MONOCYTES # BLD: 0.8 K/UL (ref 0–1)
MONOCYTES NFR BLD: 9 % (ref 5–13)
NEUTS SEG # BLD: 4.4 K/UL (ref 1.8–8)
NEUTS SEG NFR BLD: 47 % (ref 32–75)
NRBC # BLD: 0 K/UL (ref 0–0.01)
NRBC BLD-RTO: 0 PER 100 WBC
PHOSPHATE SERPL-MCNC: 2.7 MG/DL (ref 2.6–4.7)
PLATELET # BLD AUTO: 247 K/UL (ref 150–400)
PMV BLD AUTO: 10 FL (ref 8.9–12.9)
POTASSIUM SERPL-SCNC: 3.1 MMOL/L (ref 3.5–5.1)
PROT SERPL-MCNC: 5.1 G/DL (ref 6.4–8.2)
RBC # BLD AUTO: 3.41 M/UL (ref 3.8–5.2)
SERVICE CMNT-IMP: ABNORMAL
SODIUM SERPL-SCNC: 139 MMOL/L (ref 136–145)
WBC # BLD AUTO: 9.3 K/UL (ref 3.6–11)

## 2019-09-23 PROCEDURE — 65660000001 HC RM ICU INTERMED STEPDOWN

## 2019-09-23 PROCEDURE — 74011250636 HC RX REV CODE- 250/636: Performed by: INTERNAL MEDICINE

## 2019-09-23 PROCEDURE — 36415 COLL VENOUS BLD VENIPUNCTURE: CPT

## 2019-09-23 PROCEDURE — 74011000258 HC RX REV CODE- 258: Performed by: INTERNAL MEDICINE

## 2019-09-23 PROCEDURE — 74011250636 HC RX REV CODE- 250/636: Performed by: HOSPITALIST

## 2019-09-23 PROCEDURE — 85025 COMPLETE CBC W/AUTO DIFF WBC: CPT

## 2019-09-23 PROCEDURE — 74011250637 HC RX REV CODE- 250/637: Performed by: INTERNAL MEDICINE

## 2019-09-23 PROCEDURE — 83735 ASSAY OF MAGNESIUM: CPT

## 2019-09-23 PROCEDURE — 84100 ASSAY OF PHOSPHORUS: CPT

## 2019-09-23 PROCEDURE — 80053 COMPREHEN METABOLIC PANEL: CPT

## 2019-09-23 PROCEDURE — 92610 EVALUATE SWALLOWING FUNCTION: CPT | Performed by: SPEECH-LANGUAGE PATHOLOGIST

## 2019-09-23 RX ORDER — MAGNESIUM SULFATE 1 G/100ML
1 INJECTION INTRAVENOUS ONCE
Status: COMPLETED | OUTPATIENT
Start: 2019-09-23 | End: 2019-09-23

## 2019-09-23 RX ORDER — POTASSIUM CHLORIDE 750 MG/1
40 TABLET, FILM COATED, EXTENDED RELEASE ORAL
Status: COMPLETED | OUTPATIENT
Start: 2019-09-23 | End: 2019-09-23

## 2019-09-23 RX ADMIN — POTASSIUM CHLORIDE 40 MEQ: 750 TABLET, FILM COATED, EXTENDED RELEASE ORAL at 06:58

## 2019-09-23 RX ADMIN — LEVOTHYROXINE SODIUM 75 MCG: 75 TABLET ORAL at 06:27

## 2019-09-23 RX ADMIN — SODIUM CHLORIDE 250 ML: 900 INJECTION, SOLUTION INTRAVENOUS at 04:36

## 2019-09-23 RX ADMIN — Medication 10 ML: at 17:55

## 2019-09-23 RX ADMIN — DILTIAZEM HYDROCHLORIDE 30 MG: 30 TABLET, FILM COATED ORAL at 09:57

## 2019-09-23 RX ADMIN — HEPARIN SODIUM 5000 UNITS: 5000 INJECTION INTRAVENOUS; SUBCUTANEOUS at 17:44

## 2019-09-23 RX ADMIN — DILTIAZEM HYDROCHLORIDE 30 MG: 30 TABLET, FILM COATED ORAL at 12:25

## 2019-09-23 RX ADMIN — CEFTRIAXONE 1 G: 1 INJECTION, POWDER, FOR SOLUTION INTRAMUSCULAR; INTRAVENOUS at 22:35

## 2019-09-23 RX ADMIN — MAGNESIUM SULFATE HEPTAHYDRATE 1 G: 1 INJECTION, SOLUTION INTRAVENOUS at 12:26

## 2019-09-23 RX ADMIN — ASPIRIN 81 MG CHEWABLE TABLET 81 MG: 81 TABLET CHEWABLE at 09:57

## 2019-09-23 RX ADMIN — HEPARIN SODIUM 5000 UNITS: 5000 INJECTION INTRAVENOUS; SUBCUTANEOUS at 06:27

## 2019-09-23 RX ADMIN — CASTOR OIL AND BALSAM, PERU: 788; 87 OINTMENT TOPICAL at 10:00

## 2019-09-23 RX ADMIN — DILTIAZEM HYDROCHLORIDE 30 MG: 30 TABLET, FILM COATED ORAL at 17:44

## 2019-09-23 RX ADMIN — SODIUM CHLORIDE 75 ML/HR: 450 INJECTION, SOLUTION INTRAVENOUS at 06:46

## 2019-09-23 RX ADMIN — SENNOSIDES, DOCUSATE SODIUM 1 TABLET: 50; 8.6 TABLET, FILM COATED ORAL at 10:00

## 2019-09-23 RX ADMIN — CASTOR OIL AND BALSAM, PERU: 788; 87 OINTMENT TOPICAL at 17:54

## 2019-09-23 RX ADMIN — Medication 10 ML: at 22:35

## 2019-09-23 RX ADMIN — Medication 10 ML: at 06:28

## 2019-09-23 RX ADMIN — MIDODRINE HYDROCHLORIDE 2.5 MG: 5 TABLET ORAL at 09:57

## 2019-09-23 NOTE — PROGRESS NOTES
Hospitalist Progress Note    NAME: Horacio Klein   :  1929   MRN:  [id number]       Assessment / Plan:  Acute metabolic encephalopathy in setting of hypotension; encephalopathy improved  MARTY: resolved  Aflutter with RVR; resolved  Possible Sepsis most likely secondary to questionable pneumonia: CXR showed: \"IMPRESSION: Lungs are clear except for small focus of left basilar  atelectasis/airspace disease. \"  E coli UTI (POA):   Possible Bacteremia (POA): BCx's with GPC's  NSTEMI (POA)  Elevated Lactic Acidosis  -troponin peaked at 3.7  -await TTE read as \"Left Ventricle: Normal cavity size and systolic function (ejection fraction normal). Mild concentric hypertrophy. Estimated left ventricular ejection fraction is 51 - 55%. \"  -continue ASA and Diltiazem  -discontinue IVFs  -continue IV Rocephin since Leukocytosis has normalized  -await BCx finalization, Surveillance BCx's to send with am labs  -Cardiology recommendations noted    Hypokalemia  Hypophosphatemia  -replete potassium today, IV magnesium also to be given for Magnesium of 1.7    Hypernatremia: resovled    Hypothyroidism:  -case discussed with Dr. Elke Mahmood this am, appreciate assistance, start Levothyroxine    Severe dementia  Dysphagia  -continue dysphagia diet , speech eval appreciated Dysphagia 2 diet with thin liquids     Code Status:  DNR   Surrogate Decision Maker: Niece     DVT Prophylaxis: lovenox   GI Prophylaxis: not indicated     Baseline: wheelchair bound      Subjective:     Chief Complaint / Reason for Physician Visit: follow-up sepsis, NSTEMI  Patient more alert  Denies complaints    Review of Systems:  Symptom Y/N Comments  Symptom Y/N Comments   Fever/Chills    Chest Pain n    Poor Appetite    Edema     Cough    Abdominal Pain n    Sputum    Joint Pain     SOB/ADAME n   Pruritis/Rash     Nausea/vomit    Tolerating PT/OT     Diarrhea    Tolerating Diet     Constipation    Other       Could NOT obtain due to:      Objective:     VITALS: Last 24hrs VS reviewed since prior progress note. Most recent are:  Patient Vitals for the past 24 hrs:   Temp Pulse Resp BP SpO2   09/23/19 1224  64  108/68 96 %   09/23/19 1120 98.2 °F (36.8 °C) 60 16 116/60 96 %   09/23/19 0951    113/63    09/23/19 0730 98.5 °F (36.9 °C) 65 18 103/61 94 %   09/23/19 0619    100/63    09/23/19 0351    (!) 82/48 96 %   09/23/19 0347 98.9 °F (37.2 °C) 63 20 (!) 86/47 95 %   09/22/19 2322 98.4 °F (36.9 °C) 78 18 143/84 92 %   09/22/19 2009 98.3 °F (36.8 °C) 76 20 152/73 96 %   09/22/19 1841  81  177/81    09/22/19 1528 98.3 °F (36.8 °C) 82 28 (!) 153/101 96 %       Intake/Output Summary (Last 24 hours) at 9/23/2019 1324  Last data filed at 9/23/2019 1015  Gross per 24 hour   Intake 120 ml   Output 1700 ml   Net -1580 ml        PHYSICAL EXAM:  General: WD, WN. Alert, cooperative, no acute distress    EENT:  EOMI. Anicteric sclerae. MM dry  Resp:  CTA bilaterally on anterior assessment, no wheezing or rales. No accessory muscle use  CV:  Regular  rhythm,  No edema  GI:  Soft, Non distended, Non tender.  +Bowel sounds  Neurologic:  Alerts some, not oriented (oriented x1 at baseline), normal speech,   Psych:   No insight. Not anxious nor agitated  Skin:  No rashes. No jaundice    Reviewed most current lab test results and cultures  YES  Reviewed most current radiology test results   YES  Review and summation of old records today    NO  Reviewed patient's current orders and MAR    YES  PMH/SH reviewed - no change compared to H&P  ________________________________________________________________________  Care Plan discussed with:    Comments   Patient x    Family  x Ms Mcnulty Shows   RN x    Care Manager x    Consultant                        Multidiciplinary team rounds were held today with , nursing, pharmacist and clinical coordinator. Patient's plan of care was discussed; medications were reviewed and discharge planning was addressed. ________________________________________________________________________  Total NON critical care TIME:  30   Minutes    Total CRITICAL CARE TIME Spent:   Minutes non procedure based      Comments   >50% of visit spent in counseling and coordination of care     ________________________________________________________________________  Susan Novak MD     Procedures: see electronic medical records for all procedures/Xrays and details which were not copied into this note but were reviewed prior to creation of Plan. LABS:  I reviewed today's most current labs and imaging studies.   Pertinent labs include:  Recent Labs     09/23/19 0342 09/22/19 0407 09/20/19 2357   WBC 9.3 10.8 16.2*   HGB 10.8* 13.0 14.1   HCT 32.8* 39.7 41.9    276 313     Recent Labs     09/23/19 0342 09/22/19 0407 09/20/19 2357 09/20/19  1842    148* 145 142   K 3.1* 3.4* 3.6 3.0*    117* 111* 107   CO2 27 25 28 26   GLU 81 87 95 113*   BUN 11 20 39* 43*   CREA 0.55 0.66 1.05* 1.26*   CA 7.3* 8.1* 8.1* 8.5   MG 1.7 1.8  --  2.4   PHOS 2.7 2.0*  --   --    ALB 2.6* 3.1*  --  3.5   TBILI 1.4* 1.0  --  0.4   SGOT 26 35  --  17   ALT 33 34  --  20   INR  --   --   --  1.0       Signed: Susan Novak MD

## 2019-09-23 NOTE — PROGRESS NOTES
Pharmacy Medication Reconciliation     PTA medications reviewed against facility MAR. Allergy Update: Patient has no known allergies. Recommendations/Findings: The following amendments were made to the patient's active medication list on file at [facility]:     1) Additions: None    2) Deletions: Diltiazem, enoxaparin    3) Changes: Midodrine 2.5 mg BID                        Buspirone 15 mg TID       -Verified PTA med list with Facility MAR. PTA medication list was corrected to the following:     Prior to Admission Medications   Prescriptions Last Dose Informant Patient Reported? Taking? ARIPiprazole (ABILIFY) 5 mg tablet   Yes Yes   Sig: Take 5 mg by mouth daily. acetaminophen (TYLENOL) 325 mg tablet   No No   Sig: Take 2 Tabs by mouth every six (6) hours as needed for Pain. amiodarone (CORDARONE) 200 mg tablet   No No   Sig: Take 1 Tab by mouth daily. Indications: VENTRICULAR RATE CONTROL IN ATRIAL FIBRILLATION   aspirin 81 mg chewable tablet   No No   Sig: Take 1 Tab by mouth daily. Indications: prevention of cerebrovascular accident   busPIRone (BUSPAR) 5 mg tablet   Yes Yes   Sig: Take 15 mg by mouth three (3) times daily. cholecalciferol, vitamin D3, (VITAMIN D3) 2,000 unit tab  Other Yes No   Sig: Take 2,000 Units by mouth every evening. ferrous sulfate (IRON) 325 mg (65 mg iron) EC tablet  Other Yes No   Sig: Take 325 mg by mouth every evening. lutein 6 mg tab  Other Yes No   Sig: Take 1 Tab by mouth daily. midodrine (PROAMITINE) 2.5 mg tablet   Yes Yes   Sig: Take 2.5 mg by mouth two (2) times a day. mirtazapine (REMERON) 45 mg tablet   Yes Yes   Sig: Take 45 mg by mouth nightly. oxybutynin (DITROPAN) 5 mg tablet  Other Yes No   Sig: Take 5 mg by mouth two (2) times a day. senna (SENOKOT) 8.6 mg tablet  Other Yes No   Sig: Take 2 Tabs by mouth every evening.       Facility-Administered Medications: None        Thank you,  Stacy Honeycutt PharmD

## 2019-09-23 NOTE — PROGRESS NOTES
Initial Nutrition Assessment:    INTERVENTIONS/RECOMMENDATIONS:   · Meals/Snacks: General/healthful diet: continue diet order   · Supplements: Commercial supplement: recommend Ensure Enlive TID to provide extra calories/protein    ASSESSMENT:   9/23: chart reviewed. Medically noted for: hypotension, a-fib, hypothyroidism, dementia and increased TSH. Dietetic intern visited pt d/t poor PO intake. Nurses were attending to pt and answered questions d/t pt being unable to participate in conversation. Per nurses report, pt consumes significantly less than 25% of meals. Recommend Ensure Enlive TID to provide extra protein/kcals. Diet Order: Other (comment)(Dysphagia )  % Eaten:    Patient Vitals for the past 72 hrs:   % Diet Eaten   09/23/19 1015 2 %   09/22/19 1205 20 %   09/22/19 1011 25 %   09/21/19 1727 0 %   09/21/19 1315 0 %   09/21/19 0923 5 %     Pertinent Medications: [x]Reviewed []Other: cardizem, porcine, proamite, pericolace  Pertinent Labs: [x]Reviewed []Other: hgb (10.8), hct (32.8), Ca (7.3), Alb (2.6)  Food Allergies: [x]None []Other   Last BM: 9/21   [x]Active     []Hyperactive  []Hypoactive       [] Absent BS  Skin:    [x] Intact   [] Incision  [] Breakdown  [] Other:    Anthropometrics:   Height: 5' 1\" (154.9 cm) Weight: 52.6 kg (115 lb 15.4 oz)   IBW (%IBW):   ( ) UBW (%UBW):   (  %)   Last Weight Metrics:  Weight Loss Metrics 9/23/2019 8/11/2018 7/28/2018 5/24/2018 5/22/2018 3/9/2018 2/20/2018   Today's Wt 115 lb 15.4 oz 116 lb 116 lb 13.5 oz 116 lb 13.5 oz 115 lb 3.2 oz 120 lb 120 lb   BMI 21.91 kg/m2 21.92 kg/m2 22.08 kg/m2 22.08 kg/m2 21.77 kg/m2 22.67 kg/m2 22.67 kg/m2       BMI: Body mass index is 21.91 kg/m². This BMI is indicative of:   []Underweight    [x]Normal    []Overweight    [] Obesity   [] Extreme Obesity (BMI>40)     Estimated Nutrition Needs (Based on):   1150 Kcals/day( X 1.3 ) , 68 g(52.6 kg X 1.3 ) Protein  Carbohydrate:  At Least 130 g/day  Fluids: 1150 mL/day (1ml/kcal)    NUTRITION DIAGNOSES:   Problem:  Inadequate protein-energy intake      Etiology: related to dementia      Signs/Symptoms: as evidenced by PO intake <25% of meals       NUTRITION INTERVENTIONS:  Meals/Snacks: General/healthful diet   Supplements: Commercial supplement              GOAL:   Maintain PO intake >50% of meals in 3-5 days      LEARNING NEEDS (Diet, Food/Nutrient-Drug Interaction):    [x] None Identified   [] Identified and Education Provided/Documented   [] Identified and Pt declined/was not appropriate     Cultureal, Scientologist, OR Ethnic Dietary Needs:    [x] None Identified   [] Identified and Addressed     [x] Interdisciplinary Care Plan Reviewed/Documented    [x] Discharge Planning: general healthy diet with calorie dense foods. Nutrition supplements as needed to meet caloric requirements. MONITORING /EVALUATION:      Food/Nutrient Intake Outcomes:  Total energy intake  Physical Signs/Symptoms Outcomes: Weight/weight change, Electrolyte and renal profile, Glucose profile, GI    NUTRITION RISK:    [x] Patient At Nutritional Risk    [] Patient Not At Nutritional Risk    PT SEEN FOR:    [x]  MD Consult: []Calorie Count      []Diabetic Diet Education        []Diet Education     []Electrolyte Management     [x]General Nutrition Management and Supplements     []Management of Tube Feeding     []TPN Recommendations    []  RN Referral:  []MST score >=2     []Enteral/Parenteral Nutrition PTA     []Pregnant: Gestational DM or Multigestation     []Pressure Ulcer/Wound Care needs        []  Low BMI  []  MEKHI Carranza   Pager [phone]  Weekend Pager [phone]

## 2019-09-23 NOTE — PROGRESS NOTES
Bedside shift change report GIVEN TO Greg Mcdaniel RN. Report included the following information SBAR. SIGNIFICANT CHANGES DURING SHIFT:  Pt's BP at 0300 was low 50'E systolic. Pt sleeping, arousable to voice. MD paged. Orders received for 250 cc bolus. /63 after bolus. CONCERNS TO ADDRESS WITH MD:            [facility] NURSING NOTE   Admission Date 9/20/2019   Admission Diagnosis Atrial fibrillation with RVR (HCC) [I48.91]  Hypotension [I95.9]  Elevated TSH [R79.89]   Consults IP CONSULT TO CARDIOLOGY  IP CONSULT TO ENDOCRINOLOGY      Cardiac Monitoring [x] Yes [] No      Purposeful Hourly Rounding [x] Yes    Rose Mary Score Total Score: 4   Rose Mary score 3 or > [x] Bed Alarm [] Avasys [] 1:1 sitter [] Patient refused (Signed refusal form in chart)   Seng Score Seng Score: 13   Seng score 14 or < [x] PMT consult [] Wound Care consult    []  Specialty bed  [] Nutrition consult      Influenza Vaccine Received Flu Vaccine for Current Season (usually Sept-March): No    Patient/Guardian Refused (Notify MD): No      Oxygen needs? [x] Room air Oxygen @  []1L    []2L    []3L   []4L    []5L   []6L via  NC   Chronic home O2 use? [] Yes [x] No  Perform O2 challenge test and document in progress note using smartphrase (.Homeoxygen)      Last bowel movement Last Bowel Movement Date: 09/21/19      Urinary Catheter Urinary Catheter 09/21/19 2- way-Indications for Use: Acute urinary retention/bladder outlet obstruction     Urinary Catheter 09/21/19 2- way-Urine Output (mL): 700 ml     LDAs               Peripheral IV 09/20/19 Right; Inner Forearm (Active)   Site Assessment Clean, dry, & intact 9/23/2019  4:00 AM   Phlebitis Assessment 0 9/23/2019  4:00 AM   Infiltration Assessment 0 9/23/2019  4:00 AM   Dressing Status Clean, dry, & intact 9/23/2019  4:00 AM   Dressing Type Tape;Transparent 9/23/2019  4:00 AM   Hub Color/Line Status Pink; Infusing 9/23/2019  4:00 AM   Action Taken Blood drawn 9/20/2019  6:44 PM Peripheral IV 09/20/19 Left Forearm (Active)   Site Assessment Clean, dry, & intact;Painful 9/23/2019  4:00 AM   Phlebitis Assessment 0 9/23/2019  4:00 AM   Infiltration Assessment 0 9/23/2019  4:00 AM   Dressing Status Clean, dry, & intact 9/23/2019  4:00 AM   Dressing Type Tape;Transparent 9/23/2019  4:00 AM   Hub Color/Line Status Pink;Flushed 9/23/2019  4:00 AM                         Readmission Risk Assessment Tool Score Medium Risk            15       Total Score        9 Pt. Coverage (Medicare=5 , Medicaid, or Self-Pay=4)    6 Charlson Comorbidity Score (Age + Comorbid Conditions)        Criteria that do not apply:    Has Seen PCP in Last 6 Months (Yes=3, No=0)    . Living with Significant Other. Assisted Living. LTAC. SNF.  or   Rehab    Patient Length of Stay (>5 days = 3)    IP Visits Last 12 Months (1-3=4, 4=9, >4=11)       Expected Length of Stay - - -   Actual Length of Stay 3

## 2019-09-23 NOTE — PROGRESS NOTES
Problem: Dysphagia (Adult)  Goal: *Acute Goals and Plan of Care (Insert Text)  Description  Speech Therapy Goals  Initiated 9/23/2019  1. Patient will tolerate dysphagia 2 diet with thin liquids without signs/symptoms of aspiration within 7 day(s). Outcome: Progressing Towards Goal     SPEECH LANGUAGE PATHOLOGY BEDSIDE SWALLOW EVALUATION  Patient: Rodney John (69 y.o. female)  Date: 9/23/2019  Primary Diagnosis: Atrial fibrillation with RVR (HCC) [I48.91]  Hypotension [I95.9]  Elevated TSH [R79.89]        Precautions: Swallow       ASSESSMENT :  Based on the objective data described below, the patient presents with mild-moderate oropharyngeal dysphagia characterized by inconsistent acceptance of PO, delayed oral manipulation and transit, and pharyngeal swallow delay. Laryngeal elevation palpable. Patient with delayed dry cough x 1 however suspect unrelated to PO. Patient eventually stating \"No,no, no\" and spitting food out. Patient at increased risk for aspiration given dysphagia and impaired mentation. Suspect swallow function would improve with improved mentation. Given bedside presentation feel patient is safe for dysphagia 2 diet, thin liquids. Patient with very limited intake and therefore at risk nutritionally as well. Patient will benefit from skilled intervention to address the above impairments. Patients rehabilitation potential is considered to be Fair  Factors which may influence rehabilitation potential include:   ? None noted  ? Mental ability/status  ? Medical condition  ? Home/family situation and support systems  ? Safety awareness  ? Pain tolerance/management  ?             Other:      PLAN :  Recommendations and Planned Interventions:  Dysphagia 2 diet  Thin liquids  Sit up for all meals  Small bites  Single sips  Medication as tolerated  Frequency/Duration: Patient will be followed by speech-language pathology 2 times a week to address goals. Discharge Recommendations: To Be Determined     SUBJECTIVE:   Patient stated That's terrible. Per RN patient with oral holding at times. Liquids thickened to nectar over weekend. Chest x ray 9/20/19: \"Lungs are clear except for small focus of left basilar atelectasis/airspace disease. \"  Per chart  Review baseline diet is minced and moist with thin liquids. Niece reports to RN patient with limited intake at baseline. OBJECTIVE:     Past Medical History:   Diagnosis Date    A-fib ([facility] [address]     Alzheimer disease     Alzheimer's dementia     Arthritis      Past Surgical History:   Procedure Laterality Date    HX BREAST AUGMENTATION      HX ORTHOPAEDIC      TKR--bilateral      Prior Level of Function/Home Situation:   Home Situation  Home Environment: Long term care  One/Two Story Residence: One story  Support Systems: Family member(s)  Diet prior to admission: Moist and minced  Current Diet:  Dysphagia 2/nectar   Cognitive and Communication Status:  Neurologic State: Alert  Orientation Level: Oriented to person, Disoriented to place, Disoriented to situation, Disoriented to time  Cognition: Decreased attention/concentration, Decreased command following           Oral Assessment:  Oral Assessment  Labial: Left droop  Dentition: Natural  Oral Hygiene: Moist and clean  Lingual: (Unable to assess due to limited command following)  Velum: Unable to visualize  Mandible: No impairment  P.O. Trials:  Patient Position: Upright in bed  Vocal quality prior to P.O.: Strain  Consistency Presented: Thin liquid;Puree; Solid  How Presented: SLP-fed/presented;Spoon;Straw     Bolus Acceptance: Impaired  Bolus Formation/Control: Impaired  Type of Impairment: Delayed  Propulsion: Delayed (# of seconds)  Oral Residue: None  Initiation of Swallow: Delayed (# of seconds)  Laryngeal Elevation: Functional  Aspiration Signs/Symptoms: Delayed cough/throat clear     Effective Modifications:  Alternate liquids/solids          Oral Phase Severity: Mild-moderate  Pharyngeal Phase Severity : Mild-moderate    NOMS:   The NOMS functional outcome measure was used to quantify this patient's level of swallowing impairment. Based on the NOMS, the patient was determined to be at level 5 for swallow function       NOMS Swallowing Levels:  Level 1 (CN): NPO  Level 2 (CM): NPO but takes consistency in therapy  Level 3 (CL): Takes less than 50% of nutrition p.o. and continues with nonoral feedings; and/or safe with mod cues; and/or max diet restriction  Level 4 (CK): Safe swallow but needs mod cues; and/or mod diet restriction; and/or still requires some nonoral feeding/supplements  Level 5 (CJ): Safe swallow with min diet restriction; and/or needs min cues  Level 6 (CI): Independent with p.o.; rare cues; usually self cues; may need to avoid some foods or needs extra time  Level 7 ([address] [place] [address]): Independent for all p.o.  ADRIAN. (2003). National Outcomes Measurement System (NOMS): Adult Speech-Language Pathology User's Guide. After treatment:   ?            Patient left in no apparent distress sitting up in chair  ? Patient left in no apparent distress in bed  ? Call bell left within reach  ? Nursing notified  ? Caregiver present  ? Bed alarm activated    COMMUNICATION/EDUCATION:   The patients plan of care including recommendations, planned interventions, and recommended diet changes were discussed with: Registered Nurse. Patient was educated regarding role of SLP, diet and POC. Patient did not respond. Discussed with RN. ? Posted safety precautions in patient's room. ? Patient/family have participated as able in goal setting and plan of care. ?            Patient/family agree to work toward stated goals and plan of care. ?            Patient understands intent and goals of therapy, but is neutral about his/her participation. ? Patient is unable to participate in goal setting and plan of care.     Thank you for this referral.  JULIA Mccracken  Time Calculation: 14 mins

## 2019-09-24 LAB
ALBUMIN SERPL-MCNC: 2.8 G/DL (ref 3.5–5)
ALBUMIN/GLOB SERPL: 1.2 {RATIO} (ref 1.1–2.2)
ALP SERPL-CCNC: 55 U/L (ref 45–117)
ALT SERPL-CCNC: 33 U/L (ref 12–78)
ANION GAP SERPL CALC-SCNC: 7 MMOL/L (ref 5–15)
AST SERPL-CCNC: 22 U/L (ref 15–37)
BASOPHILS # BLD: 0.1 K/UL (ref 0–0.1)
BASOPHILS NFR BLD: 1 % (ref 0–1)
BILIRUB SERPL-MCNC: 0.7 MG/DL (ref 0.2–1)
BUN SERPL-MCNC: 12 MG/DL (ref 6–20)
BUN/CREAT SERPL: 21 (ref 12–20)
CALCIUM SERPL-MCNC: 7.9 MG/DL (ref 8.5–10.1)
CHLORIDE SERPL-SCNC: 106 MMOL/L (ref 97–108)
CO2 SERPL-SCNC: 23 MMOL/L (ref 21–32)
CREAT SERPL-MCNC: 0.56 MG/DL (ref 0.55–1.02)
DIFFERENTIAL METHOD BLD: ABNORMAL
EOSINOPHIL # BLD: 0.6 K/UL (ref 0–0.4)
EOSINOPHIL NFR BLD: 6 % (ref 0–7)
ERYTHROCYTE [DISTWIDTH] IN BLOOD BY AUTOMATED COUNT: 13.6 % (ref 11.5–14.5)
GLOBULIN SER CALC-MCNC: 2.3 G/DL (ref 2–4)
GLUCOSE SERPL-MCNC: 69 MG/DL (ref 65–100)
HCT VFR BLD AUTO: 32.7 % (ref 35–47)
HGB BLD-MCNC: 11.1 G/DL (ref 11.5–16)
IMM GRANULOCYTES # BLD AUTO: 0.1 K/UL (ref 0–0.04)
IMM GRANULOCYTES NFR BLD AUTO: 1 % (ref 0–0.5)
LYMPHOCYTES # BLD: 4.1 K/UL (ref 0.8–3.5)
LYMPHOCYTES NFR BLD: 42 % (ref 12–49)
MCH RBC QN AUTO: 32.7 PG (ref 26–34)
MCHC RBC AUTO-ENTMCNC: 33.9 G/DL (ref 30–36.5)
MCV RBC AUTO: 96.5 FL (ref 80–99)
MONOCYTES # BLD: 1 K/UL (ref 0–1)
MONOCYTES NFR BLD: 10 % (ref 5–13)
NEUTS SEG # BLD: 3.9 K/UL (ref 1.8–8)
NEUTS SEG NFR BLD: 40 % (ref 32–75)
NRBC # BLD: 0 K/UL (ref 0–0.01)
NRBC BLD-RTO: 0 PER 100 WBC
PLATELET # BLD AUTO: 250 K/UL (ref 150–400)
PMV BLD AUTO: 10.3 FL (ref 8.9–12.9)
POTASSIUM SERPL-SCNC: 3.5 MMOL/L (ref 3.5–5.1)
PROT SERPL-MCNC: 5.1 G/DL (ref 6.4–8.2)
RBC # BLD AUTO: 3.39 M/UL (ref 3.8–5.2)
SODIUM SERPL-SCNC: 136 MMOL/L (ref 136–145)
WBC # BLD AUTO: 9.6 K/UL (ref 3.6–11)

## 2019-09-24 PROCEDURE — 74011000258 HC RX REV CODE- 258: Performed by: INTERNAL MEDICINE

## 2019-09-24 PROCEDURE — 87040 BLOOD CULTURE FOR BACTERIA: CPT

## 2019-09-24 PROCEDURE — 65660000001 HC RM ICU INTERMED STEPDOWN

## 2019-09-24 PROCEDURE — 74011250637 HC RX REV CODE- 250/637: Performed by: INTERNAL MEDICINE

## 2019-09-24 PROCEDURE — 80053 COMPREHEN METABOLIC PANEL: CPT

## 2019-09-24 PROCEDURE — 74011250636 HC RX REV CODE- 250/636: Performed by: INTERNAL MEDICINE

## 2019-09-24 PROCEDURE — 77030037878 HC DRSG MEPILEX >48IN BORD MOLN -B

## 2019-09-24 PROCEDURE — 36415 COLL VENOUS BLD VENIPUNCTURE: CPT

## 2019-09-24 PROCEDURE — 85025 COMPLETE CBC W/AUTO DIFF WBC: CPT

## 2019-09-24 PROCEDURE — 94760 N-INVAS EAR/PLS OXIMETRY 1: CPT

## 2019-09-24 PROCEDURE — 92526 ORAL FUNCTION THERAPY: CPT

## 2019-09-24 RX ADMIN — DILTIAZEM HYDROCHLORIDE 30 MG: 30 TABLET, FILM COATED ORAL at 17:22

## 2019-09-24 RX ADMIN — SENNOSIDES, DOCUSATE SODIUM 1 TABLET: 50; 8.6 TABLET, FILM COATED ORAL at 09:46

## 2019-09-24 RX ADMIN — DILTIAZEM HYDROCHLORIDE 30 MG: 30 TABLET, FILM COATED ORAL at 13:09

## 2019-09-24 RX ADMIN — ASPIRIN 81 MG CHEWABLE TABLET 81 MG: 81 TABLET CHEWABLE at 09:45

## 2019-09-24 RX ADMIN — MIDODRINE HYDROCHLORIDE 2.5 MG: 5 TABLET ORAL at 09:46

## 2019-09-24 RX ADMIN — HEPARIN SODIUM 5000 UNITS: 5000 INJECTION INTRAVENOUS; SUBCUTANEOUS at 07:01

## 2019-09-24 RX ADMIN — DILTIAZEM HYDROCHLORIDE 30 MG: 30 TABLET, FILM COATED ORAL at 07:01

## 2019-09-24 RX ADMIN — CEFTRIAXONE 1 G: 1 INJECTION, POWDER, FOR SOLUTION INTRAMUSCULAR; INTRAVENOUS at 23:15

## 2019-09-24 RX ADMIN — Medication: at 17:23

## 2019-09-24 RX ADMIN — Medication 1 CAPSULE: at 13:09

## 2019-09-24 RX ADMIN — LEVOTHYROXINE SODIUM 75 MCG: 75 TABLET ORAL at 07:01

## 2019-09-24 RX ADMIN — Medication 10 ML: at 07:01

## 2019-09-24 RX ADMIN — CASTOR OIL AND BALSAM, PERU: 788; 87 OINTMENT TOPICAL at 09:46

## 2019-09-24 RX ADMIN — Medication 10 ML: at 23:16

## 2019-09-24 RX ADMIN — HEPARIN SODIUM 5000 UNITS: 5000 INJECTION INTRAVENOUS; SUBCUTANEOUS at 17:22

## 2019-09-24 RX ADMIN — Medication 10 ML: at 17:23

## 2019-09-24 NOTE — PROGRESS NOTES
Patient receiving stool softener. Last noted stool on 21st. She then had large loose stool, incontinent. Patient very excoriated inner groin. Power care done as patient had stool all over power and urethrae. .patient also has a rash on back. Zinc applied to site. It looks like she has scratched area. venelex still being applied to sacrum. Area is hyperpigmentation  and there is an abraision. We can also apply a foam. Specialty mattress to be ordered. Wound care consult has already been placed.

## 2019-09-24 NOTE — PROGRESS NOTES
[address] INTERDISCIPLINARY ROUNDS    Cardiopulmonary Care Interdisciplinary Rounds were held today to discuss patient's plan of care and outcomes. The following members were present: NP/Physician, Pharmacy, Nursing and Case Management.   Expected Length of Stay:  4d 19h    PLAN OF CARE:   Continue current treatment plan

## 2019-09-24 NOTE — PROGRESS NOTES
Report received from [facility]. SBAR were discussed.     Jessica Cordero RN

## 2019-09-24 NOTE — PROGRESS NOTES
Received report from [place] ([facility]), RN. Assumed care of patient.

## 2019-09-24 NOTE — PROGRESS NOTES
Hospitalist Progress Note    NAME: Sudeep Iqbal   :  1929   MRN:  [id number]       Assessment / Plan:  Acute metabolic encephalopathy in setting of hypotension; encephalopathy improved  MARTY: resolved  Aflutter with RVR; resolved  Possible Sepsis most likely secondary to questionable pneumonia: CXR showed: \"IMPRESSION: Lungs are clear except for small focus of left basilar  atelectasis/airspace disease. \"  E coli UTI (POA):   Possible Bacteremia (POA): BCx's with coagulase negative Staphylococcus  NSTEMI (POA)  Elevated Lactic Acidosis  -troponin peaked at 3.7  -TTE read as \"Left Ventricle: Normal cavity size and systolic function (ejection fraction normal). Mild concentric hypertrophy. Estimated left ventricular ejection fraction is 51 - 55%. \"  -Continue ASA and Diltiazem  -Continue IV Rocephin for UTI  -Clinically, strongly suspect that coagulase negative staphylococcus is a contaminant. Patient showing improvement, afebrile. Therefore will not order vancomycin at this time, follow repeat blood cultures (drawn this morning) for at least 24 hours.   -Cardiology recommendations noted    Hypokalemia  Hypophosphatemia  -s/p repletion with resolution    Hypernatremia: resovled    Hypothyroidism:  -Case discussed with Dr. De Leon Host by previous hospitalist; started Levothyroxine    Severe dementia  Dysphagia  -continue dysphagia diet , speech eval appreciated Dysphagia 2 diet with thin liquids     Code Status:  DNR   Surrogate Decision Maker: Niece     DVT Prophylaxis: lovenox   GI Prophylaxis: not indicated     Baseline: wheelchair bound      Subjective:     Chief Complaint / Reason for Physician Visit: follow-up sepsis, NSTEMI  Anxious, doesn't realize that she is in the hospital or why she is here. Denies pain. Denies nausea, vomiting, diarrhea, fevers, chills.     Review of Systems:  Symptom Y/N Comments  Symptom Y/N Comments   Fever/Chills n   Chest Pain n    Poor Appetite n   Edema     Cough n   Abdominal Pain n    Sputum    Joint Pain     SOB/ADAME n   Pruritis/Rash     Nausea/vomit n   Tolerating PT/OT     Diarrhea    Tolerating Diet     Constipation    Other       Could NOT obtain due to:      Objective:     VITALS:   Last 24hrs VS reviewed since prior progress note. Most recent are:  Patient Vitals for the past 24 hrs:   Temp Pulse Resp BP SpO2   09/24/19 1049 97.6 °F (36.4 °C) 76 20 120/58 96 %   09/24/19 0717 97.8 °F (36.6 °C) 78 18 130/67 94 %   09/24/19 0400 97.8 °F (36.6 °C) 75 18 131/68 96 %   09/23/19 2300 97.9 °F (36.6 °C) 68 18 113/71 92 %   09/23/19 1945 97.6 °F (36.4 °C) 69 18 104/68 96 %   09/23/19 1745  69  119/66    09/23/19 1436 98.1 °F (36.7 °C) 60 18 97/50 95 %   09/23/19 1224  64  108/68 96 %   09/23/19 1120 98.2 °F (36.8 °C) 60 16 116/60 96 %       Intake/Output Summary (Last 24 hours) at 9/24/2019 1056  Last data filed at 9/24/2019 0640  Gross per 24 hour   Intake 330 ml   Output 600 ml   Net -270 ml        PHYSICAL EXAM:  General: WD, WN. Thin, alert, cooperative, no acute distress    EENT:  EOMI. Anicteric sclerae. MMM. Resp:  CTA bilaterally, no w/r/r. No accessory muscle use  CV:  Regular  rhythm,  No edema  GI:  Soft, Non distended, Non tender.  +Bowel sounds  Neurologic:  Alert, oriented to self only, normal speech,   Psych:   No insight. Appears anxious and admits to the same. Skin:  No rashes. No jaundice    Reviewed most current lab test results and cultures  YES  Reviewed most current radiology test results   YES  Review and summation of old records today    NO  Reviewed patient's current orders and MAR    YES  PMH/SH reviewed - no change compared to H&P  ________________________________________________________________________  Care Plan discussed with:    Comments   Patient x    Family      RN     Care Manager     Consultant                        Multidiciplinary team rounds were held today with , nursing, pharmacist and clinical coordinator.   Patient's plan of care was discussed; medications were reviewed and discharge planning was addressed. ________________________________________________________________________  Total NON critical care TIME:  20   Minutes    Total CRITICAL CARE TIME Spent:   Minutes non procedure based      Comments   >50% of visit spent in counseling and coordination of care     ________________________________________________________________________  Tamar Veronica MD     Procedures: see electronic medical records for all procedures/Xrays and details which were not copied into this note but were reviewed prior to creation of Plan. LABS:  I reviewed today's most current labs and imaging studies.   Pertinent labs include:  Recent Labs     09/24/19  0510 09/23/19  0342 09/22/19  0407   WBC 9.6 9.3 10.8   HGB 11.1* 10.8* 13.0   HCT 32.7* 32.8* 39.7    247 276     Recent Labs     09/24/19  0510 09/23/19  0342 09/22/19  0407    139 148*   K 3.5 3.1* 3.4*    108 117*   CO2 23 27 25   GLU 69 81 87   BUN 12 11 20   CREA 0.56 0.55 0.66   CA 7.9* 7.3* 8.1*   MG  --  1.7 1.8   PHOS  --  2.7 2.0*   ALB 2.8* 2.6* 3.1*   TBILI 0.7 1.4* 1.0   SGOT 22 26 35   ALT 33 33 34       Signed: Tamar Veronica MD

## 2019-09-24 NOTE — PROGRESS NOTES
JOSELITO: [facility]    CM met with pt at bedside. No new needs at this time. CM will continue to follow pt for d/c planning needs.      Remington Lemus [address]  [phone]

## 2019-09-24 NOTE — PROGRESS NOTES
Bedside shift change report GIVEN TO Sera Oconnor RN. Report included the following information SBAR. SIGNIFICANT CHANGES DURING SHIFT:  Patient dissatisfied with being in hospital and wants to go home. She yelled out during the afternoon calling for help to go home. Poor appetite, drinks well. Power intact. Had loose stools in the am but this resolved this afternoon. Power care done. How much longer will she need the power. Wound care came to see patient . [address] NURSING NOTE   Admission Date 9/20/2019   Admission Diagnosis Atrial fibrillation with RVR (HCC) [I48.91]  Hypotension [I95.9]  Elevated TSH [R79.89]   Consults IP CONSULT TO CARDIOLOGY  IP CONSULT TO ENDOCRINOLOGY      Cardiac Monitoring [] Yes [] No      Purposeful Hourly Rounding [] Yes    Rose Mary Score Total Score: 4   Rose Mary score 3 or > [] Bed Alarm [] Avasys [] 1:1 sitter [] Patient refused (Signed refusal form in chart)   Seng Score Seng Score: 12   Seng score 14 or < [] PMT consult [] Wound Care consult    []  Specialty bed  [] Nutrition consult      Influenza Vaccine Received Flu Vaccine for Current Season (usually Sept-March): No    Patient/Guardian Refused (Notify MD): No      Oxygen needs? [] Room air Oxygen @  []1L    []2L    []3L   []4L    []5L   []6L via  NC   Chronic home O2 use? [] Yes [] No  Perform O2 challenge test and document in progress note using smartphrase (.Homeoxygen)      Last bowel movement Last Bowel Movement Date: 09/24/19      Urinary Catheter Urinary Catheter 09/21/19 2- way-Indications for Use: Acute urinary retention/bladder outlet obstruction     Urinary Catheter 09/21/19 2- way-Urine Output (mL): 350 ml     LDAs               Peripheral IV 09/20/19 Right; Inner Forearm (Active)   Site Assessment Clean, dry, & intact 9/24/2019  8:26 AM   Phlebitis Assessment 0 9/24/2019  8:26 AM   Infiltration Assessment 0 9/24/2019  8:26 AM   Dressing Status Clean, dry, & intact 9/23/2019  8:16 PM   Dressing Type Tape;Transparent 9/23/2019  8:16 PM   Hub Color/Line Status Pink; Infusing 9/23/2019  8:16 PM   Action Taken Blood drawn 9/20/2019  6:44 PM                         Readmission Risk Assessment Tool Score Medium Risk            15       Total Score        9 Pt. Coverage (Medicare=5 , Medicaid, or Self-Pay=4)    6 Charlson Comorbidity Score (Age + Comorbid Conditions)        Criteria that do not apply:    Has Seen PCP in Last 6 Months (Yes=3, No=0)    . Living with Significant Other. Assisted Living. LTAC. SNF.  or   Rehab    Patient Length of Stay (>5 days = 3)    IP Visits Last 12 Months (1-3=4, 4=9, >4=11)       Expected Length of Stay 4d 19h   Actual Length of Stay 4

## 2019-09-24 NOTE — PROGRESS NOTES
Bedside and Verbal shift change report given to ADY Lua (oncoming nurse) by Violeta Kramer RN (offgoing nurse). Report included the following information SBAR, Kardex, Intake/Output, MAR and Cardiac Rhythm NSR.

## 2019-09-24 NOTE — WOUND CARE
Wound Care Consult: chart reviewed and patient assessed for her sacrum and her groin skin. Nursing identified lesions upon admission to this hospital.  
Assessment and Treatment:  The sacrum has a fissure in the skin from moisture associated skin damage. The groin skin is mildly red and weepy at times from incontinence and moisture on the skin. Patient currently has a catheter. Staff has been using the Z-guard paste with good results so far. Will continue with increased % of zinc oxide from the pharmacy. Plan / Recommendation: Discontinue the Venelex for now. Do not use the Venelex and Zinc cream at the same time. Maximum strength Desitin cream was ordered for the skin areas indicated. Wound care orders written for this and plan discussed with nursing.   
Charlene Rosen RN, BSN, [employer]

## 2019-09-24 NOTE — PROGRESS NOTES
Problem: Dysphagia (Adult)  Goal: *Acute Goals and Plan of Care (Insert Text)  Description  Speech Therapy Goals  Initiated 9/23/2019  1. Patient will tolerate dysphagia 2 diet with thin liquids without signs/symptoms of aspiration within 7 day(s). Outcome: Progressing Towards Goal     SPEECH LANGUAGE PATHOLOGY DYSPHAGIA TREATMENT  Patient: Kimberley Lima (74 y.o. female)  Date: 9/24/2019  Diagnosis: Atrial fibrillation with RVR (HCC) [I48.91]  Hypotension [I95.9]  Elevated TSH [R79.89] <principal problem not specified>       Precautions:       ASSESSMENT:  Patient only agreeable to minimal PO trials this morning. Patient with continued mild-moderate oral and mild pharyngeal dysphagia characterized by prolonged mastication, delayed posterior propulsion, and delayed swallow initiation. No overt s/s aspiration observed during PO trials, however patient did have delayed cough ~5 minutes after PO completed when SLP had already left the room, and question if related to esophageal dysphagia. Question if patient would participate in imaging given poor PO intake and poor participation in dysphagia treatment session. PLAN:  Recommendations and Planned Interventions:  --Continue current diet  --SLP to follow for diet tolerance   Patient continues to benefit from skilled intervention to address the above impairments. Continue treatment per established plan of care. Discharge Recommendations:  None     SUBJECTIVE:   Patient stated That doesn't taste good!  Patient alert, confused, oriented to person only. OBJECTIVE:   Cognitive and Communication Status:  Neurologic State: Alert, Confused  Orientation Level: Oriented to person, Disoriented to time, Disoriented to situation, Disoriented to place  Cognition: Decreased command following, Decreased attention/concentration           Dysphagia Treatment:     P.O.  Trials:  Patient Position: upright in bed  Vocal quality prior to P.O.: Hoarse;Strain;Breathy  Consistency Presented: Mechanical soft; Thin liquid  How Presented: SLP-fed/presented;Spoon;Straw;Successive swallows     Bolus Acceptance: No impairment  Bolus Formation/Control: Impaired  Type of Impairment: Delayed  Propulsion: Delayed (# of seconds)  Oral Residue: None  Initiation of Swallow: Delayed (# of seconds)  Laryngeal Elevation: Functional  Aspiration Signs/Symptoms: Delayed cough/throat clear(after PO completed)  Pharyngeal Phase Characteristics: No impairment, issues, or problems   Effective Modifications: Alternate liquids/solids;Straw  Cues for Modifications: None      Pain:  Pain Scale 1: Numeric (0 - 10)  Pain Intensity 1: 0     After treatment:   ?              Patient left in no apparent distress sitting up in chair  ? Patient left in no apparent distress in bed  ? Call bell left within reach  ? Nursing notified  ? Caregiver present  ? Bed alarm activated    COMMUNICATION/EDUCATION:   The patients plan of care including recommendations, planned interventions, and recommended diet changes were discussed with: Registered Nurse. ? Posted safety precautions in patient's room.     JULIA Michelle  Time Calculation: 10 mins

## 2019-09-25 PROCEDURE — 97161 PT EVAL LOW COMPLEX 20 MIN: CPT | Performed by: PHYSICAL THERAPIST

## 2019-09-25 PROCEDURE — 94760 N-INVAS EAR/PLS OXIMETRY 1: CPT

## 2019-09-25 PROCEDURE — 65660000001 HC RM ICU INTERMED STEPDOWN

## 2019-09-25 PROCEDURE — 74011250636 HC RX REV CODE- 250/636: Performed by: INTERNAL MEDICINE

## 2019-09-25 PROCEDURE — 74011250637 HC RX REV CODE- 250/637: Performed by: INTERNAL MEDICINE

## 2019-09-25 PROCEDURE — 97165 OT EVAL LOW COMPLEX 30 MIN: CPT | Performed by: OCCUPATIONAL THERAPIST

## 2019-09-25 PROCEDURE — 97530 THERAPEUTIC ACTIVITIES: CPT | Performed by: PHYSICAL THERAPIST

## 2019-09-25 PROCEDURE — 74011000258 HC RX REV CODE- 258: Performed by: INTERNAL MEDICINE

## 2019-09-25 RX ADMIN — Medication: at 08:30

## 2019-09-25 RX ADMIN — DILTIAZEM HYDROCHLORIDE 30 MG: 30 TABLET, FILM COATED ORAL at 17:07

## 2019-09-25 RX ADMIN — Medication 1 CAPSULE: at 08:30

## 2019-09-25 RX ADMIN — HEPARIN SODIUM 5000 UNITS: 5000 INJECTION INTRAVENOUS; SUBCUTANEOUS at 17:08

## 2019-09-25 RX ADMIN — ASPIRIN 81 MG CHEWABLE TABLET 81 MG: 81 TABLET CHEWABLE at 08:30

## 2019-09-25 RX ADMIN — Medication: at 17:08

## 2019-09-25 RX ADMIN — Medication 10 ML: at 13:03

## 2019-09-25 RX ADMIN — LEVOTHYROXINE SODIUM 75 MCG: 75 TABLET ORAL at 05:29

## 2019-09-25 RX ADMIN — MIDODRINE HYDROCHLORIDE 2.5 MG: 5 TABLET ORAL at 08:30

## 2019-09-25 RX ADMIN — DILTIAZEM HYDROCHLORIDE 30 MG: 30 TABLET, FILM COATED ORAL at 11:18

## 2019-09-25 RX ADMIN — CEFTRIAXONE 1 G: 1 INJECTION, POWDER, FOR SOLUTION INTRAMUSCULAR; INTRAVENOUS at 23:06

## 2019-09-25 RX ADMIN — DILTIAZEM HYDROCHLORIDE 30 MG: 30 TABLET, FILM COATED ORAL at 08:30

## 2019-09-25 RX ADMIN — Medication 10 ML: at 05:29

## 2019-09-25 RX ADMIN — HEPARIN SODIUM 5000 UNITS: 5000 INJECTION INTRAVENOUS; SUBCUTANEOUS at 05:29

## 2019-09-25 RX ADMIN — Medication 10 ML: at 23:12

## 2019-09-25 NOTE — PROGRESS NOTES
Speech path  Two attempts to evaluate her were thwarted by inability to raise the [facility] for po trials. A new bed is being ordered.    Flavio Bernardo, SLP

## 2019-09-25 NOTE — PROGRESS NOTES
Problem: Mobility Impaired (Adult and Pediatric)  Goal: *Acute Goals and Plan of Care (Insert Text)  Note:   PHYSICAL THERAPY EVALUATION/DISCHARGE  Patient: Clinton Diez [de-identified]80 y.o. female)  Date: 9/25/2019  Primary Diagnosis: Atrial fibrillation with RVR (HCC) [I48.91]  Hypotension [I95.9]  Elevated TSH [R79.89]        Precautions: bedrest except for PT        ASSESSMENT  Based on the objective data described below, the patient presents with limited ROM, grossly decreased strength, impaired sitting balance, and limited functional mobility skills. Patient is able to follow commands with cues. Patient requires max assist for all mobility. Patient able to sit EOB for 10 minutes with tendency to lean backwards and required mod assist for balance. Vital signs stable throughout. This is likely patient's baseline and patient likely unable to stand/transfer due to restrictions of ROM feet/ankles. Patient would benefit from OOB with gui by mobility team.  Further PT unlikely to improve patient's mobility. Functional Outcome Measure: The patient scored 0 on the 100 outcome measure which is indicative of dependece in mobility and ADL's. Other factors to consider for discharge: requires 24/7 care     Further skilled acute physical therapy is not indicated at this time. PLAN :  Recommendation for discharge: (in order for the patient to meet his/her long term goals)  No skilled physical therapy/ follow up rehabilitation needs identified at this time. Long term care    This discharge recommendation:  Has not yet been discussed the attending provider and/or case management    Equipment recommendations for successful discharge: none       SUBJECTIVE:   Patient stated I don't know.     OBJECTIVE DATA SUMMARY:   HISTORY:    Past Medical History:   Diagnosis Date    A-fib ([facility] [address]     Alzheimer disease     Alzheimer's dementia     Arthritis      Past Surgical History:   Procedure Laterality Date    HX BREAST AUGMENTATION      HX ORTHOPAEDIC      TKR--bilateral        Prior level of function: per chart patient assisted into wheelchair at [address] factors and/or comorbidities impacting plan of care: Dementia; safety    Home Situation  Home Environment: Long term care  # Steps to Enter: 0  One/Two Story Residence: One story  Living Alone: No  Support Systems: Long term acute care, Family member(s)  Patient Expects to be Discharged to[de-identified] Skilled nursing facility  Current DME Used/Available at Home: None    EXAMINATION/PRESENTATION/DECISION MAKING:   Critical Behavior:  Neurologic State: Alert  Orientation Level: Disoriented to time, Disoriented to situation, Disoriented to place, Oriented to person  Cognition: Follows commands  Safety/Judgement: Lack of insight into deficits  Hearing: Auditory  Auditory Impairment: Hard of hearing, bilateral  Skin:  extremities intact  Edema: none noted  Range Of Motion:  AROM: Grossly decreased, non-functional           PROM: Grossly decreased, non-functional           Strength:    Strength: Grossly decreased, non-functional                    Tone & Sensation:   Tone: Normal              Sensation: Intact(per patient)               Coordination:  Coordination: Generally decreased, functional  Vision:      Functional Mobility:  Bed Mobility:  Rolling: Maximum assistance  Supine to Sit: Maximum assistance  Sit to Supine: Maximum assistance  Scooting:  Total assistance;Assist x2  Transfers:  Sit to Stand: (unable to attempt)                          Balance:   Sitting: Impaired  Sitting - Static: Poor (constant support)  Sitting - Dynamic: Poor (constant support)  Standing: (not tested)  Ambulation/Gait Training:                                                                       Functional Measure:  Barthel Index:    Bathin  Bladder: 0  Bowels: 0  Groomin  Dressin  Feedin  Mobility: 0  Stairs: 0  Toilet Use: 0  Transfer (Bed to Chair and Back): 0  Total: 0/100       The Barthel ADL Index: Guidelines  1. The index should be used as a record of what a patient does, not as a record of what a patient could do. 2. The main aim is to establish degree of independence from any help, physical or verbal, however minor and for whatever reason. 3. The need for supervision renders the patient not independent. 4. A patient's performance should be established using the best available evidence. Asking the patient, friends/relatives and nurses are the usual sources, but direct observation and common sense are also important. However direct testing is not needed. 5. Usually the patient's performance over the preceding 24-48 hours is important, but occasionally longer periods will be relevant. 6. Middle categories imply that the patient supplies over 50 per cent of the effort. 7. Use of aids to be independent is allowed. Lucy James., Barthel, DJoshuaW. (2821). Functional evaluation: the Barthel Index. [facility] (14)2. ADALID Vides, Dorie Mancini., Manas Rowley, [place], [address] [place] [address] (1999). Measuring the change indisability after inpatient rehabilitation; comparison of the responsiveness of the Barthel Index and Functional [place] Measure. Journal of Neurology, Neurosurgery, and Psychiatry, 66(4), 828-029. Ahmet Navarrete, N.J.A, JOSÉ ANTONIO Farrell, & Josh Clayton, M.A. (2004.) Assessment of post-stroke quality of life in cost-effectiveness studies: The usefulness of the Barthel Index and the EuroQoL-5D.  Quality of Life Research, 15, 142-17            Physical Therapy Evaluation Charge Determination   History Examination Presentation Decision-Making   MEDIUM  Complexity : 1-2 comorbidities / personal factors will impact the outcome/ POC  LOW Complexity : 1-2 Standardized tests and measures addressing body structure, function, activity limitation and / or participation in recreation  LOW Complexity : Stable, uncomplicated  LOW Complexity : FOTO score of  Based on the above components, the patient evaluation is determined to be of the following complexity level: LOW     Pain Rating:  None reported    Activity Tolerance:   Poor  Please refer to the flowsheet for vital signs taken during this treatment. After treatment patient left in no apparent distress:   Supine in bed, alarm set, heels floated, RN notified    COMMUNICATION/EDUCATION:   The patients plan of care was discussed with: Registered Nurse. Fall prevention education was provided and the patient/caregiver indicated understanding. and Patient is unable to participate in goal setting and plan of care.     Thank you for this referral.  Amanda Steele, PT   Time Calculation: 20 mins

## 2019-09-25 NOTE — PROGRESS NOTES
Bedside shift change report GIVEN TO Parris Dewitt RN. Report included the following information SBAR. SIGNIFICANT CHANGES DURING SHIFT:        CONCERNS TO ADDRESS WITH MD:          [facility] NURSING NOTE   Admission Date 9/20/2019   Admission Diagnosis Atrial fibrillation with RVR (HCC) [I48.91]  Hypotension [I95.9]  Elevated TSH [R79.89]   Consults IP CONSULT TO CARDIOLOGY  IP CONSULT TO ENDOCRINOLOGY      Cardiac Monitoring [x] Yes [] No      Purposeful Hourly Rounding [x] Yes    Rose Mary Score Total Score: 4   Rose Mary score 3 or > [x] Bed Alarm [] Avasys [] 1:1 sitter [] Patient refused (Signed refusal form in chart)   Seng Score Seng Score: 12   Seng score 14 or < [x] PMT consult [] Wound Care consult    []  Specialty bed  [] Nutrition consult      Influenza Vaccine Received Flu Vaccine for Current Season (usually Sept-March): No    Patient/Guardian Refused (Notify MD): No      Oxygen needs? [x] Room air Oxygen @  []1L    []2L    []3L   []4L    []5L   []6L via  NC   Chronic home O2 use? [] Yes [x] No  Perform O2 challenge test and document in progress note using smartphrase (.Homeoxygen)      Last bowel movement Last Bowel Movement Date: 09/24/19      Urinary Catheter Urinary Catheter 09/21/19 2- way-Indications for Use: Acute urinary retention/bladder outlet obstruction     Urinary Catheter 09/21/19 2- way-Urine Output (mL): 200 ml     LDAs               Peripheral IV 09/20/19 Right; Inner Forearm (Active)   Site Assessment Clean, dry, & intact 9/25/2019  4:00 AM   Phlebitis Assessment 0 9/25/2019  4:00 AM   Infiltration Assessment 0 9/25/2019  4:00 AM   Dressing Status Clean, dry, & intact 9/25/2019  4:00 AM   Dressing Type Tape;Transparent 9/25/2019  4:00 AM   Hub Color/Line Status Pink; Infusing 9/25/2019  4:00 AM   Action Taken Blood drawn 9/20/2019  6:44 PM                         Readmission Risk Assessment Tool Score Medium Risk            18       Total Score        3 Has Seen PCP in Last 6 Months (Yes=3, No=0)    9 Pt. Coverage (Medicare=5 , Medicaid, or Self-Pay=4)    6 Charlson Comorbidity Score (Age + Comorbid Conditions)        Criteria that do not apply:    . Living with Significant Other. Assisted Living. LTAC. SNF.  or   Rehab    Patient Length of Stay (>5 days = 3)    IP Visits Last 12 Months (1-3=4, 4=9, >4=11)       Expected Length of Stay 4d 19h   Actual Length of Stay 5

## 2019-09-25 NOTE — PROGRESS NOTES
Problem: Falls - Risk of  Goal: *Absence of Falls  Description  Document [facility] Fall Risk and appropriate interventions in the flowsheet. Outcome: Progressing Towards Goal  Note:   Fall Risk Interventions:  Mobility Interventions: Bed/chair exit alarm    Mentation Interventions: Bed/chair exit alarm, Adequate sleep, hydration, pain control    Medication Interventions: Bed/chair exit alarm, Patient to call before getting OOB    Elimination Interventions: Bed/chair exit alarm, Call light in reach    History of Falls Interventions: Bed/chair exit alarm         Problem: Patient Education: Go to Patient Education Activity  Goal: Patient/Family Education  Outcome: Progressing Towards Goal     Problem: Patient Education: Go to Patient Education Activity  Goal: Patient/Family Education  Outcome: Progressing Towards Goal     Problem: Pressure Injury - Risk of  Goal: *Prevention of pressure injury  Description  Document Seng Scale and appropriate interventions in the flowsheet.   Outcome: Progressing Towards Goal  Note:   Pressure Injury Interventions:  Sensory Interventions: Assess changes in LOC    Moisture Interventions: Absorbent underpads    Activity Interventions: Increase time out of bed    Mobility Interventions: Assess need for specialty bed, Float heels, PT/OT evaluation    Nutrition Interventions: Document food/fluid/supplement intake    Friction and Shear Interventions: Feet elevated on foot rest, Lift sheet, Minimize layers                Problem: Patient Education: Go to Patient Education Activity  Goal: Patient/Family Education  Outcome: Progressing Towards Goal     Problem: Infection - Risk of, Urinary Catheter-Associated Urinary Tract Infection  Goal: *Absence of infection signs and symptoms  Outcome: Progressing Towards Goal     Problem: Patient Education: Go to Patient Education Activity  Goal: Patient/Family Education  Outcome: Progressing Towards Goal     Problem: Patient Education: Go to Patient Education Activity  Goal: Patient/Family Education  Outcome: Progressing Towards Goal

## 2019-09-25 NOTE — PROGRESS NOTES
Hospitalist Progress Note    NAME: Virgie Haddad   :  1929   MRN:  [id number]       Assessment / Plan:  Acute metabolic encephalopathy in setting of hypotension; encephalopathy improved  MARTY: resolved  Aflutter with RVR; resolved  Sepsis due to UTI (no clinical evidence of pneumonia)  E coli UTI (POA):   Contaminated blood culture, bacteremia ruled out  NSTEMI (POA)  Elevated Lactic Acidosis  -troponin peaked at 3.7  -TTE read as \"Left Ventricle: Normal cavity size and systolic function (ejection fraction normal). Mild concentric hypertrophy. Estimated left ventricular ejection fraction is 51 - 55%. \"  -Continue ASA and Diltiazem  -Continue IV Rocephin for UTI, transition to Keflex on discharge  -Clinically, blood cultures with coagulase negative staphylococcus is a contaminant. Patient showing improvement, afebrile. Repeat blood cultures showing no growth despite to treatment directed against CoNS. -Cardiology recommendations noted    Hypokalemia  Hypophosphatemia  -s/p repletion with resolution    Hypernatremia: resovled    Hypothyroidism:  -Case discussed with Dr. Juvencio Glass by previous hospitalist; started Levothyroxine    Severe dementia  Dysphagia  -continue dysphagia diet , speech eval appreciated Dysphagia 2 diet with thin liquids     Code Status:  DNR   Surrogate Decision Maker: Niece     DVT Prophylaxis: lovenox   GI Prophylaxis: not indicated     Baseline: wheelchair bound      Subjective:     Chief Complaint / Reason for Physician Visit: follow-up sepsis, NSTEMI  No new complaints. Wants to go home. No insight into why she is in the hospital or that she is here. Some loose stools noted.     Review of Systems:  Symptom Y/N Comments  Symptom Y/N Comments   Fever/Chills n   Chest Pain n    Poor Appetite n   Edema     Cough n   Abdominal Pain n    Sputum    Joint Pain     SOB/ADAME n   Pruritis/Rash     Nausea/vomit n   Tolerating PT/OT     Diarrhea    Tolerating Diet     Constipation    Other Could NOT obtain due to:      Objective:     VITALS:   Last 24hrs VS reviewed since prior progress note. Most recent are:  Patient Vitals for the past 24 hrs:   Temp Pulse Resp BP SpO2   09/25/19 1514 97.8 °F (36.6 °C) 74 17 119/66 96 %   09/25/19 1045 98.1 °F (36.7 °C) 63 18 114/61 96 %   09/25/19 0754 98.3 °F (36.8 °C) 67 17 126/66 98 %   09/25/19 0405 97.6 °F (36.4 °C) 71 20 124/70 97 %   09/24/19 2313 98.2 °F (36.8 °C) 75 20 98/77 95 %   09/24/19 2002 98.2 °F (36.8 °C) 82 22 107/55 96 %       Intake/Output Summary (Last 24 hours) at 9/25/2019 1844  Last data filed at 9/25/2019 1259  Gross per 24 hour   Intake 360 ml   Output 600 ml   Net -240 ml        PHYSICAL EXAM:  General: WD, WN. Thin, alert, cooperative, no acute distress, nontoxic  EENT:  EOMI. Anicteric sclerae. MMM. Resp:  CTA bilaterally, no w/r/r. No accessory muscle use  CV:  Regular  rhythm,  No edema  GI:  Soft, Non distended, Non tender.  +Bowel sounds  Neurologic:  Alert, oriented to self only, normal speech  Psych:   No insight. Appears anxious. Skin:  No rashes. No jaundice    Reviewed most current lab test results and cultures  YES  Reviewed most current radiology test results   YES  Review and summation of old records today    NO  Reviewed patient's current orders and MAR    YES  PMH/SH reviewed - no change compared to H&P  ________________________________________________________________________  Care Plan discussed with:    Comments   Patient x    Family  x Niece   RN x    Care Manager     Consultant                       x Multidiciplinary team rounds were held today with , nursing, pharmacist and clinical coordinator. Patient's plan of care was discussed; medications were reviewed and discharge planning was addressed.      ________________________________________________________________________  Total NON critical care TIME:  20   Minutes    Total CRITICAL CARE TIME Spent:   Minutes non procedure based      Comments >50% of visit spent in counseling and coordination of care     ________________________________________________________________________  Chris Donohue MD     Procedures: see electronic medical records for all procedures/Xrays and details which were not copied into this note but were reviewed prior to creation of Plan. LABS:  I reviewed today's most current labs and imaging studies.   Pertinent labs include:  Recent Labs     09/24/19  0510 09/23/19  0342   WBC 9.6 9.3   HGB 11.1* 10.8*   HCT 32.7* 32.8*    247     Recent Labs     09/24/19  0510 09/23/19 0342    139   K 3.5 3.1*    108   CO2 23 27   GLU 69 81   BUN 12 11   CREA 0.56 0.55   CA 7.9* 7.3*   MG  --  1.7   PHOS  --  2.7   ALB 2.8* 2.6*   TBILI 0.7 1.4*   SGOT 22 26   ALT 33 33       Signed: Chris Donohue MD

## 2019-09-25 NOTE — PROGRESS NOTES
0700: Bedside shift change report given to GERMANIA Avilez RN (oncoming nurse) by Pauly Mead RN (offgoing nurse). Report included the following information SBAR and Kardex. 0900: Verbal orders received from Dr. Fercho Suggs for PT/OT. 1900:   Bedside shift change report GIVEN TO Brigida Alejo RN. Report included the following information SBAR and Kardex. SIGNIFICANT CHANGES DURING SHIFT:  Pt worked with PT today, pt turned q2 hours. CONCERNS TO ADDRESS WITH MD:  D/C planning? [facility] NURSING NOTE   Admission Date 9/20/2019   Admission Diagnosis Atrial fibrillation with RVR (HCC) [I48.91]  Hypotension [I95.9]  Elevated TSH [R79.89]   Consults IP CONSULT TO CARDIOLOGY  IP CONSULT TO ENDOCRINOLOGY      Cardiac Monitoring [x] Yes [] No      Purposeful Hourly Rounding [x] Yes    Rose Mary Score Total Score: 4   Rose Mary score 3 or > [x] Bed Alarm [] Avasys [] 1:1 sitter [] Patient refused (Signed refusal form in chart)   Seng Score Seng Score: 12   Seng score 14 or < [] PMT consult [] Wound Care consult    []  Specialty bed  [] Nutrition consult      Influenza Vaccine Received Flu Vaccine for Current Season (usually Sept-March): No    Patient/Guardian Refused (Notify MD): No      Oxygen needs? [x] Room air Oxygen @  []1L    []2L    []3L   []4L    []5L   []6L via  NC   Chronic home O2 use? [] Yes [] No  Perform O2 challenge test and document in progress note using smartphrase (.Homeoxygen)      Last bowel movement Last Bowel Movement Date: 09/24/19      Urinary Catheter Urinary Catheter 09/21/19 2- way-Indications for Use: Acute urinary retention/bladder outlet obstruction     Urinary Catheter 09/21/19 2- way-Urine Output (mL): 525 ml     LDAs               Peripheral IV 09/20/19 Right; Inner Forearm (Active)   Site Assessment Clean, dry, & intact 9/25/2019  2:50 PM   Phlebitis Assessment 0 9/25/2019  2:50 PM   Infiltration Assessment 0 9/25/2019  2:50 PM   Dressing Status Clean, dry, & intact 9/25/2019  2:50 PM Dressing Type Transparent;Tape 9/25/2019  2:50 PM   Hub Color/Line Status Pink 9/25/2019  2:50 PM   Action Taken Blood drawn 9/20/2019  6:44 PM                         Readmission Risk Assessment Tool Score High Risk            23       Total Score        3 Has Seen PCP in Last 6 Months (Yes=3, No=0)    2 . Living with Significant Other. Assisted Living. LTAC. SNF. or   Rehab    3 Patient Length of Stay (>5 days = 3)    9 Pt.  Coverage (Medicare=5 , Medicaid, or Self-Pay=4)    6 Charlson Comorbidity Score (Age + Comorbid Conditions)        Criteria that do not apply:    IP Visits Last 12 Months (1-3=4, 4=9, >4=11)       Expected Length of Stay 4d 19h   Actual Length of Stay 5

## 2019-09-25 NOTE — PROGRESS NOTES
JOSELITO: [address]    2:30pm-CM set up medical transport with [facility] via [employer].  time 10:00am. [facility] paperwork placed in pt's chart. 3:30pm- CM called Archie Lipscomb, Admission Director for [facility] and [address] [place] [address]. CM informed Rupa Landin of pt's discharge and if pt is able to return back to the facility tomorrow. Rupa Landin stated that she would call CM back or have Lazaro Filler to call CM    4:00pm-CM called pt's niece via phone. CM informed pt's niece of discharge tomorrow and transportation time. CM discuss with her about pt's Medicare rights and their right to appeal the discharge. Pt's niece understood pt's Medicare rights. Pt unable to sign. Pt's niece provided verbal understanding and agreement. Copy of 2nd IM letter was placed in pt bedside chart. 4:05pm- Pt has an UAI that was completed 8/3/18. Transition of Care Plan to SNF/Rehab    SNF/Rehab Transition:  Patient has been accepted to [address] and meets criteria for admission. Patient will transported by ambulance and expected to leave at 10:00am.    Communication to Patient/Family:  Met with patient and Brook De La O (identified care giver) and they are agreeable to the transition plan. Communication to SNF/Rehab:  Bedside RN Joy, has been notified to update the transition plan to the facility and call report (phone number [phone]). Discharge information has been updated on the AVS.     Discharge instructions to be fax'd to facility at [facility] # [phone]).      SNF/Rehab Transition:  Patient to follow-up with Home Health: N/A  PCP/Specialist: Dr. Haley Ricci Management: N/A    Reviewed and confirmed with facility, [facility] an they can manage the patient care needs for the following:     Maddie Both with (X) only those applicable:    Medication:  []  Medications will be available at the facility  []  IV Antibiotics   []  Controlled Substance - hard copy to be sent with patient   []  Weekly Labs   Documents:  [] Hard RX  [x] MAR  [] Kardex  [x] AVS  []Transfer Summary  [x]Discharge   Equipment:  []  CPAP/BiPAP  []  Wound Vacuum  [x]  [place] or Urinary Device  []  PICC/Central Line  []  Nebulizer  []  Ventilator   Treatment:  []Isolation (for MRSA, VRE, etc.)  []Surgical Drain Management  []Tracheostomy Care  []Dressing Changes  []Dialysis with transportation and chair time   []PEG Care  []Oxygen  []Daily Weights for Heart Failure   Dietary:  [x]Any diet limitations  []Tube Feedings   []Total Parenteral Management (TPN)   Eligible for Medicaid Long Term Services and Supports  Yes:  [x] Eligible for medical assistance or will become eligible within 180 days and UAI completed. [] Provider/Patient and/or support system has requested screening. [] UAI copy provided to patient or responsible party,.  [] UAI unavailable at discharge will send once processed to SNF provider. [] UAI unavailable at discharged mailed to patient  No:   [] Private pay and is not financially eligible for Medicaid within the next 180 days. [] Reside out-of-state. [] A residents of a state owned/operated facility that is licensed  by [address] and [employer] or [facility]  [] Enrollment in [facility] hospice services  [] [address]  [] Patient /Family declines to have screening completed or provide financial information for screening     Financial Resources:  Medicaid    [] Initiated and application pending   [x] Full coverage     Advanced Care Plan:  [x]Surrogate Decision Maker of Care  []POA  [x]Communicated Code Status DNR   Other     CM will continue to follow patient for discharge planning needs and arrange for services as deemed necessary.     Remington Dunbar [address] [place] [address]  [phone]

## 2019-09-25 NOTE — PROGRESS NOTES
OCCUPATIONAL THERAPY EVALUATION/DISCHARGE  Patient: Philly Molina (89 y.o. female)  Date: 9/25/2019  Primary Diagnosis: Atrial fibrillation with RVR (HCC) [I48.91]  Hypotension [I95.9]  Elevated TSH [R79.89]       Precautions: standard, fall       ASSESSMENT  Based on the objective data described below, the patient presents with baseline end stage dementia, confusion, significantly impaired BUE ROM and strength impairing functional tasks . Pt was admitted from [place] [address] and is expected to return there at discharge. Pt is oriented to her name, partially to her birthdate (not year) and reports that she is unsure of her age. Pt was able to follow most simple one step commands during OT session, however, she is limited in following functional task commands due to her impaired BUE abilities. Pt is dependent for adls/IADLs at baseline per record review. She is an unreliable  re: PLOF due to her dementia. See PT note for mobility evaluation. .  Pt reported that she does not propel her w/c. A chart review was performed and pt has been in LTC for approx one year. She had a history of falls prior to 2018. In 2018 she was admitted to [facility] post fall and required R ORIF and was non weight bearing; at that time she had non functional BUE shoulder ROM and was confused, disoriented, and unaware of her injury. At this time, pt is likely at her functional baseline and OT is not recommended. Post OT Evaluation,, spoke with  who reported that family is not interested in pt having therapy services. .    Current Level of Function (ADLs/self-care): dependent in adls and IADLs; cared for in LTC setting    Functional Outcome Measure: The patient scored 0/100 on the BArthel Index outcome measure which is indicative of dependency in adls and IADls.       Other factors to consider for discharge: end stage dementia     PLAN :  Recommend with staff:     Recommendation for discharge: (in order for the patient to meet his/her long term goals)  No skilled occupational therapy/ follow up rehabilitation needs identified at this time. Return to LTC facility  This discharge recommendation:  Has been made in collaboration with the attending provider and/or case management    Equipment recommendations for successful discharge: none       SUBJECTIVE:   Patient stated her name, but did not state her age    OBJECTIVE DATA SUMMARY:   HISTORY:   Past Medical History:   Diagnosis Date    A-fib ([address]     Alzheimer disease     Alzheimer's dementia     Arthritis      Past Surgical History:   Procedure Laterality Date    HX BREAST AUGMENTATION      HX ORTHOPAEDIC      TKR--bilateral        Prior Level of Function/Environment/Context: w/c bound and dependent in adls  Expanded or extensive additional review of patient history:   Home Situation  Home Environment: Long term care  # Steps to Enter: 0  One/Two Story Residence: One story  Living Alone: No  Support Systems: Long term acute care, Family member(s)  Patient Expects to be Discharged to[de-identified] Skilled nursing facility  Current DME Used/Available at Home: None    Hand dominance: Right--per pt report    EXAMINATION OF PERFORMANCE DEFICITS:  Cognitive/Behavioral Status:  Neurologic State: Alert;Confused  Orientation Level: Oriented to person(states her name and BD mo/day/not year/not age)  Cognition: Follows commands(most commands-dx end stage dementia) pt is very limited in B shoulder ROM-dementia and decreased functional abilities = dependence  Perception: Appears intact  Perseveration: No perseveration noted  Safety/Judgement: Lack of insight into deficits    Skin: generally intact on BUEs    Edema: none observed    Hearing:   Auditory  Auditory Impairment: (reports no difficulty hearing)    Vision/Perceptual:                           Acuity: (pt acuity not formally tested, does not fully \"see\" TV)    Corrective Lenses: (glasses present, pt does not wish to don)    Range of Motion:  BUEs:    AROM: Grossly decreased, non-functional(BUEs--poor/very limited B shoulder ROM)  PROM: Grossly decreased, non-functional(unable to range shoulders passively nor active assisted)                      Strength:  BUEs:  Demonstrates limted hand grasp--arthritic joint changes  Strength: Grossly decreased, non-functional(impaired hand strength and arthritic joint changes)                Coordination:  Coordination: Grossly decreased, non-functional(limited functional abilities BUEs)  Fine Motor Skills-Upper: Left Impaired;Right Impaired(pt states that she feeds herself at baseline)    Gross Motor Skills-Upper: Left Impaired;Right Impaired(minimal B shoulder ROM--passive and active)    Tone & Sensation:    Tone: Normal  Sensation: Intact(reports no numbness or tingling)                      Balance:  Sitting: (see PT Evaluation for mobiilty)  Sitting - Static: Poor (constant support)  Sitting - Dynamic: Poor (constant support)  Standing: (not tested)    Functional Mobility and Transfers for ADLs:  Not assessed - see PT Evaluation  ADL Assessment:  Feeding: Minimum assistance; Moderate assistance(needs encouragement per nsg, but pt reports likes to eat)    Oral Facial Hygiene/Grooming: Maximum assistance    Bathing: Maximum assistance; Total assistance    Upper Body Dressing: Total assistance    Lower Body Dressing: Total assistance    Toileting: Total assistance                ADL Intervention and task modifications:   very limited ROM for functional tasks  declined to don her glasses  Able to touch her nose  Reports no trouble self feeding.   Declined to eat/ spoon feed applesauce snack (nursing cleared for applesauce)                                  Cognitive Retraining  Safety/Judgement: Lack of insight into deficits  End stage dementia    Functional Measure:  Per chart review:  7/30 /2018: Barthel INdex:  25/100.                                  8/14/2018:  Barthel Index: 15/100    Barthel Index:    Bathin  Bladder: 0  Bowels: 0  Groomin  Dressin  Feedin  Mobility: 0  Stairs: 0  Toilet Use: 0  Transfer (Bed to Chair and Back): 0  Total: 0/100        The Barthel ADL Index: Guidelines  1. The index should be used as a record of what a patient does, not as a record of what a patient could do. 2. The main aim is to establish degree of independence from any help, physical or verbal, however minor and for whatever reason. 3. The need for supervision renders the patient not independent. 4. A patient's performance should be established using the best available evidence. Asking the patient, friends/relatives and nurses are the usual sources, but direct observation and common sense are also important. However direct testing is not needed. 5. Usually the patient's performance over the preceding 24-48 hours is important, but occasionally longer periods will be relevant. 6. Middle categories imply that the patient supplies over 50 per cent of the effort. 7. Use of aids to be independent is allowed. Anaid Salomon., Barthel, D.W. (5989). Functional evaluation: the Barthel Index. [facility] (14)2. Lolis Cabrera evens ADALID Diehl, Elige Heimlich., Neetu Henderson., [place], [address] [place] [address] (). Measuring the change indisability after inpatient rehabilitation; comparison of the responsiveness of the Barthel Index and Functional [place] Measure. Journal of Neurology, Neurosurgery, and Psychiatry, 66(4), 005-151. Alyssa Beltran, N.J.A, JOSÉ ANTONIO Farrell, & Amy Fletcher MJoshuaA. (2004.) Assessment of post-stroke quality of life in cost-effectiveness studies: The usefulness of the Barthel Index and the EuroQoL-5D.  Quality of Life Research, 15, 904-73       Occupational Therapy Evaluation Charge Determination   History Examination Decision-Making   MEDIUM Complexity : Expanded review of history including physical, cognitive and psychosocial  history  LOW Complexity : 1-3 performance deficits relating to physical, cognitive , or psychosocial skils that result in activity limitations and / or participation restrictions  LOW Complexity : No comorbidities that affect functional and no verbal or physical assistance needed to complete eval tasks       Based on the above components, the patient evaluation is determined to be of the following complexity level: MEDIUM  Pain Rating:  No pain reported    After treatment patient left in no apparent distress:    Supine in bed and Call bell within reach-- pt unable to use call bell to call nurse--she can locate a red button, but unable to push it  Nursing informed  COMMUNICATION/EDUCATION:   The patients plan of care was discussed with: Registered Nurse and .     Thank you for this referral.  Parth Baca OTR/L  Time Calculation: 12 mins

## 2019-09-26 VITALS
BODY MASS INDEX: 21.89 KG/M2 | HEART RATE: 74 BPM | TEMPERATURE: 97.9 F | RESPIRATION RATE: 18 BRPM | WEIGHT: 115.96 LBS | OXYGEN SATURATION: 97 % | SYSTOLIC BLOOD PRESSURE: 121 MMHG | HEIGHT: 61 IN | DIASTOLIC BLOOD PRESSURE: 59 MMHG

## 2019-09-26 LAB
BACTERIA SPEC CULT: ABNORMAL
BASOPHILS # BLD: 0 K/UL (ref 0–0.1)
BASOPHILS NFR BLD: 0 % (ref 0–1)
DIFFERENTIAL METHOD BLD: ABNORMAL
EOSINOPHIL # BLD: 0.3 K/UL (ref 0–0.4)
EOSINOPHIL NFR BLD: 3 % (ref 0–7)
ERYTHROCYTE [DISTWIDTH] IN BLOOD BY AUTOMATED COUNT: 13.6 % (ref 11.5–14.5)
HCT VFR BLD AUTO: 31.3 % (ref 35–47)
HGB BLD-MCNC: 10.5 G/DL (ref 11.5–16)
IMM GRANULOCYTES # BLD AUTO: 0 K/UL (ref 0–0.04)
IMM GRANULOCYTES NFR BLD AUTO: 0 % (ref 0–0.5)
LYMPHOCYTES # BLD: 3 K/UL (ref 0.8–3.5)
LYMPHOCYTES NFR BLD: 29 % (ref 12–49)
MCH RBC QN AUTO: 32.3 PG (ref 26–34)
MCHC RBC AUTO-ENTMCNC: 33.5 G/DL (ref 30–36.5)
MCV RBC AUTO: 96.3 FL (ref 80–99)
MONOCYTES # BLD: 0.4 K/UL (ref 0–1)
MONOCYTES NFR BLD: 4 % (ref 5–13)
NEUTS SEG # BLD: 6.7 K/UL (ref 1.8–8)
NEUTS SEG NFR BLD: 64 % (ref 32–75)
NRBC # BLD: 0 K/UL (ref 0–0.01)
NRBC BLD-RTO: 0 PER 100 WBC
PLATELET # BLD AUTO: 242 K/UL (ref 150–400)
PMV BLD AUTO: 10 FL (ref 8.9–12.9)
RBC # BLD AUTO: 3.25 M/UL (ref 3.8–5.2)
RBC MORPH BLD: ABNORMAL
SERVICE CMNT-IMP: ABNORMAL
WBC # BLD AUTO: 10.4 K/UL (ref 3.6–11)

## 2019-09-26 PROCEDURE — 74011250637 HC RX REV CODE- 250/637: Performed by: INTERNAL MEDICINE

## 2019-09-26 PROCEDURE — 74011250636 HC RX REV CODE- 250/636: Performed by: INTERNAL MEDICINE

## 2019-09-26 PROCEDURE — 85025 COMPLETE CBC W/AUTO DIFF WBC: CPT

## 2019-09-26 PROCEDURE — 90471 IMMUNIZATION ADMIN: CPT

## 2019-09-26 PROCEDURE — 90686 IIV4 VACC NO PRSV 0.5 ML IM: CPT | Performed by: INTERNAL MEDICINE

## 2019-09-26 PROCEDURE — 94760 N-INVAS EAR/PLS OXIMETRY 1: CPT

## 2019-09-26 PROCEDURE — 36415 COLL VENOUS BLD VENIPUNCTURE: CPT

## 2019-09-26 RX ORDER — LEVOTHYROXINE SODIUM 75 UG/1
75 TABLET ORAL
Qty: 30 TAB | Refills: 0 | Status: SHIPPED
Start: 2019-09-27

## 2019-09-26 RX ORDER — CEPHALEXIN 500 MG/1
500 CAPSULE ORAL 2 TIMES DAILY
Qty: 4 CAP | Refills: 0 | Status: SHIPPED
Start: 2019-09-26

## 2019-09-26 RX ADMIN — Medication 10 ML: at 05:34

## 2019-09-26 RX ADMIN — DILTIAZEM HYDROCHLORIDE 30 MG: 30 TABLET, FILM COATED ORAL at 08:51

## 2019-09-26 RX ADMIN — Medication 1 CAPSULE: at 08:51

## 2019-09-26 RX ADMIN — INFLUENZA VIRUS VACCINE 0.5 ML: 15; 15; 15; 15 SUSPENSION INTRAMUSCULAR at 09:51

## 2019-09-26 RX ADMIN — ASPIRIN 81 MG CHEWABLE TABLET 81 MG: 81 TABLET CHEWABLE at 08:51

## 2019-09-26 RX ADMIN — Medication: at 08:52

## 2019-09-26 RX ADMIN — HEPARIN SODIUM 5000 UNITS: 5000 INJECTION INTRAVENOUS; SUBCUTANEOUS at 05:33

## 2019-09-26 RX ADMIN — MIDODRINE HYDROCHLORIDE 2.5 MG: 5 TABLET ORAL at 08:51

## 2019-09-26 RX ADMIN — LEVOTHYROXINE SODIUM 75 MCG: 75 TABLET ORAL at 05:33

## 2019-09-26 NOTE — PROGRESS NOTES
[facility]: [facility]    8:45am- CM spoke to Quang at [address] [place] [address] in admissions via phone. CM informed Quang that pt will be d/c and transported at 10:00am to the facility. Care Management Interventions  PCP Verified by CM: Yes  Mode of Transport at Discharge: BLS(Pt will be transported by ambulance)  Transition of Care Consult (CM Consult): Long Term Care([facility])  Discharge Durable Medical Equipment: No(No DME's)  Physical Therapy Consult: Yes  Occupational Therapy Consult: Yes  Speech Therapy Consult: Yes  Current Support Network: Nursing Facility([facility])  Confirm Follow Up Transport: Other (see comment)(LTC- [place] [address] and Rehab)  Plan discussed with Pt/Family/Caregiver: Yes  Freedom of Choice Offered: Yes  Discharge Location  Discharge Placement: Long Term Care(LTC-[facility])    No further CM needs identified. CM notified pt's nurse of d/c.     Remington King [address]  [phone]

## 2019-09-26 NOTE — PROGRESS NOTES
Hospital to [place] [address] [facility]                                                                        80 y.o.   female    [address] [facility]   Room: 2286/[facility] 2 CARDIOPULMONARY CARE  Unit Phone# :        [address]  [facility] [address] [place] [address]  Dept: [phone]  Loc: [phone]                    SITUATION     Admitted:  9/20/2019         Attending Provider:  Emy Whalen MD       Consultations:  IP CONSULT TO CARDIOLOGY  IP CONSULT TO ENDOCRINOLOGY    PCP:  Khadar Clement MD   [phone]    Treatment Team: Attending Provider: Emy Whalen MD; Consulting Provider: Dinh Sosa MD; Consulting Provider: Magi Guzman MD; Consulting Provider: Neema Reza MD; Utilization Review: Erin Burroughs RN; Care Manager: Paula Thompson    Admitting Dx:  Atrial fibrillation with RVR ([facility] [I48.91]  Hypotension [I95.9]  Elevated TSH [R79.89]       Principal Problem: <principal problem not specified>    * No surgery found * of      BY: * Surgery not found *             ON: * No surgery found *                  Code Status: DNR                Advance Directives:   Advance Care Planning 9/21/2019   Patient's Healthcare Decision Maker is: -   Primary Decision Maker Name -   Primary Decision Maker Phone Number -   Primary Decision Maker Relationship to Patient -   Confirm Advance Directive Yes, on file   Patient Would Like to Complete Advance Directive -   Does the patient have other document types -    (Send w/patient)   Yes Not W Pt       Isolation:  There are currently no Active Isolations       MDRO: No current active infections    Pain Medications given:  none          (Not currently on dialysis)  (Not currently on dialysis)  (Not currently on dialysis)     BACKGROUND     Allergies:  No Known Allergies    Past Medical History:   Diagnosis Date    [facility]fib ([facility]     Alzheimer disease     Alzheimer's dementia     Arthritis        Past Surgical History:   Procedure Laterality Date    HX BREAST AUGMENTATION      HX ORTHOPAEDIC      TKR--bilateral        Medications Prior to Admission   Medication Sig    ARIPiprazole (ABILIFY) 5 mg tablet Take 5 mg by mouth daily.  busPIRone (BUSPAR) 5 mg tablet Take 15 mg by mouth three (3) times daily.  midodrine (PROAMITINE) 2.5 mg tablet Take 2.5 mg by mouth two (2) times a day.  mirtazapine (REMERON) 45 mg tablet Take 45 mg by mouth nightly.  acetaminophen (TYLENOL) 325 mg tablet Take 2 Tabs by mouth every six (6) hours as needed for Pain.  amiodarone (CORDARONE) 200 mg tablet Take 1 Tab by mouth daily. Indications: VENTRICULAR RATE CONTROL IN ATRIAL FIBRILLATION    aspirin 81 mg chewable tablet Take 1 Tab by mouth daily. Indications: prevention of cerebrovascular accident    ferrous sulfate (IRON) 325 mg (65 mg iron) EC tablet Take 325 mg by mouth every evening.  oxybutynin (DITROPAN) 5 mg tablet Take 5 mg by mouth two (2) times a day.  lutein 6 mg tab Take 1 Tab by mouth daily.  cholecalciferol, vitamin D3, (VITAMIN D3) 2,000 unit tab Take 2,000 Units by mouth every evening.  senna (SENOKOT) 8.6 mg tablet Take 2 Tabs by mouth every evening. Hard scripts included in transfer packet no    Vaccinations:    Immunization History   Administered Date(s) Administered    Influenza Vaccine (Quad) PF 09/26/2019    Pneumococcal Conjugate (PCV-13) 12/05/2017       Readmission Risks:    Known Risks: The Charlson CoMorbitiy Index tool is an evidenced based tool that has more automatic generated information. The tool looks at many different items such as the age of the patient, how many times they were admitted in the last calendar year, current length of stay in the hospital and their diagnosis.  All of these items are pulled automatically from information documented in the chart from various places and will generate a score that predicts whether a patient is at low (less than 13), medium (13-20) or high (21 or greater) risk of being readmitted. ASSESSMENT                Temp: 97.9 °F (36.6 °C) (09/26/19 1001) Pulse (Heart Rate): 74 (09/26/19 1001)     Resp Rate: 18 (09/26/19 1001)           BP: 121/59 (09/26/19 1001)     O2 Sat (%): 97 % (09/26/19 1001)     Weight: 52.6 kg (115 lb 15.4 oz)    Height: 5' 1\" (154.9 cm) (09/23/19 1101)       If above not within 1 hour of discharge:    BP:_____  P:____  R:____ T:_____ O2 Sat: ___%  O2: ______    Active Orders   Diet    DIET DYSPHAGIA MECH ALTERED (NDD2)         Orientation: only aware of  person     Active Behaviors: None                                   Active Lines/Drains:  (Peg Tube / Yarbrough / CL or S/L?): yes    Urinary Status: Yarbrough     Last BM: Last Bowel Movement Date: 09/25/19     Skin Integrity: Excoriation             Mobility: Very limited   Weight Bearing Status: NWB (Non Weight Bearing)                Lab Results   Component Value Date/Time    Glucose 69 09/24/2019 05:10 AM    INR 1.0 09/20/2019 06:42 PM    INR 1.1 08/11/2018 07:39 PM    HGB 10.5 (L) 09/26/2019 04:40 AM    HGB 11.1 (L) 09/24/2019 05:10 AM        RECOMMENDATION     See After Visit Summary (AVS) for:  · Discharge instructions  · After [address]   · Special equipment needed (entered pre-discharge by Care Management)  · Medication Reconciliation    · Follow up Appointment(s)         Report given/sent by:  Shola Freedman                    Verbal report given to: Kaye  FAXED to:           Estimated discharge time:  9/26/2019 at 1000          Pt d/c to snf via [facility]. Removed all IV/tele. Pt left with all personal belongings and Rxs. Lesa Kat

## 2019-09-26 NOTE — PROGRESS NOTES
Bedside shift change report GIVEN TO Alex Moran RN. Report included the following information SBAR. SIGNIFICANT CHANGES DURING SHIFT:          CONCERNS TO ADDRESS WITH MD:              [facility] NURSING NOTE   Admission Date 9/20/2019   Admission Diagnosis Atrial fibrillation with RVR (HCC) [I48.91]  Hypotension [I95.9]  Elevated TSH [R79.89]   Consults IP CONSULT TO CARDIOLOGY  IP CONSULT TO ENDOCRINOLOGY      Cardiac Monitoring [x] Yes [] No      Purposeful Hourly Rounding [x] Yes    Rose Mary Score Total Score: 4   Rose Mary score 3 or > [x] Bed Alarm [] Avasys [] 1:1 sitter [] Patient refused (Signed refusal form in chart)   Seng Score Seng Score: 12   Seng score 14 or < [x] PMT consult [x] Wound Care consult    []  Specialty bed  [] Nutrition consult      Influenza Vaccine Received Flu Vaccine for Current Season (usually Sept-March): No    Patient/Guardian Refused (Notify MD): No      Oxygen needs? [x] Room air Oxygen @  []1L    []2L    []3L   []4L    []5L   []6L via  NC   Chronic home O2 use? [] Yes [] No  Perform O2 challenge test and document in progress note using smartphrase (.Homeoxygen)      Last bowel movement Last Bowel Movement Date: 09/25/19      Urinary Catheter Urinary Catheter 09/21/19 2- way-Indications for Use: Accurate measurement of urinary output     Urinary Catheter 09/21/19 2- way-Urine Output (mL): 60 ml     LDAs               Peripheral IV 09/20/19 Right; Inner Forearm (Active)   Site Assessment Clean, dry, & intact 9/26/2019  3:51 AM   Phlebitis Assessment 0 9/26/2019  3:51 AM   Infiltration Assessment 0 9/26/2019  3:51 AM   Dressing Status Clean, dry, & intact 9/26/2019  3:51 AM   Dressing Type Transparent 9/26/2019  3:51 AM   Hub Color/Line Status Pink 9/26/2019  3:51 AM   Action Taken Blood drawn 9/20/2019  6:44 PM                         Readmission Risk Assessment Tool Score High Risk            23       Total Score        3 Has Seen PCP in Last 6 Months (Yes=3, No=0)    2 . Living with Significant Other. Assisted Living. LTAC. SNF. or   Rehab    3 Patient Length of Stay (>5 days = 3)    9 Pt.  Coverage (Medicare=5 , Medicaid, or Self-Pay=4)    6 Charlson Comorbidity Score (Age + Comorbid Conditions)        Criteria that do not apply:    IP Visits Last 12 Months (1-3=4, 4=9, >4=11)       Expected Length of Stay 4d 19h   Actual Length of Stay 6

## 2019-09-26 NOTE — DISCHARGE SUMMARY
Hospitalist Discharge Summary     Patient ID:  Oni Palm  [id number]  27 y.o.  8/19/1929 9/20/2019    PCP on record: Shirlee Olszewski, MD    Admit date: 9/20/2019  Discharge date and time: 9/26/2019    DISCHARGE DIAGNOSIS:  Severe sepsis due to UTI  Acute metabolic encephalopathy in setting of hypotension  MARTY  Atrial flutter with rapid ventricular response  Elevated troponin  Lactic acidosis  Hypokalemia  Hypophosphatemia  Hypernatremia  Hypothyroidism  Severe demenetia  Dysphagia    CONSULTATIONS:  IP CONSULT TO CARDIOLOGY  IP CONSULT TO ENDOCRINOLOGY    Excerpted HPI from H&P of Alisson Mendez MD:  Oni Palm is a 80 y.o.  female with past medical history of end-stage dementia, A. fib on amiodarone, who was sent to the ED via [address] where she is a long-term resident for unresponsiveness.  Most of the HPI obtained from the ED physician and from the niece who is the power of . Davon Lazo yesterday,  patient was found to be sleepy and altered, and today she was not eating much and she was found unresponsive. Prior to this event, patient was reportedly doing fine.  In the ED, she was found to be profoundly hypotensive with systolic blood pressure in the 60s, tachycardic with heart rate in the 135 (found to be in A. fib with RVR). [place] labs revealed that she has elevated white blood cell count, acute kidney injury and hypokalemia.  Elevated CK-MB.  Further test showed elevated TSH of 23 mg.     We were asked to admit for work up and evaluation of the above problems. ______________________________________________________________________  DISCHARGE SUMMARY/HOSPITAL COURSE:  for full details see H&P, daily progress notes, labs, consult notes. Severe sepsis due to UTI: treated empirically with ceftriaxone. Urine ultimately grew E. coli, pansensitive. Patient responded very well to treatment and was transitioned to Keflex on discharge.  Of note, there was concern for bacteremia, with cultures ultimately growing coagulase negative Staphylococcus. Blood cultures were repeated and negative, confirming that the previous cultures reflected contamination, rather than infection. Acute metabolic encephalopathy in setting of hypotension; resolved with treatment of UTI. MARTY: resolved with IVF and treatment of sepsis. Aflutter with RVR; home medications resumed. Troponin elevation  Troponin peaked at 3.7  TTE read as \"Left Ventricle: Normal cavity size and systolic function (ejection fraction normal). Mild concentric hypertrophy. Estimated left ventricular ejection fraction is 51 - 55%. \"  Continue ASA and Diltiazem  Cardiology recommendations noted -- felt that troponin elevation was a response to septic state rather than true NSTEMI. No intervention felt to be indicated. Hypokalemia  Hypophosphatemia  s/p repletion with resolution     Hypernatremia: resolved    Hypothyroidism: levothyroxine started.     Severe dementia  Dysphagia  _______________________________________________________________________  Patient seen and examined by me on discharge day. Pertinent Findings:  Gen:    Not in distress, thin, nontoxic  Chest: Clear lungs  CVS:   Regular rhythm. No edema  Abd:  Soft, not distended, not tender  Neuro:  Alert, oriented to self only  _______________________________________________________________________  DISCHARGE MEDICATIONS:   Current Discharge Medication List      START taking these medications    Details   L. acidoph & paracasei- S therm- Bifido (ELBA-Q/RISAQUAD) 8 billion cell cap cap Take 1 Cap by mouth daily. Qty: 30 Cap, Refills: 0      levothyroxine (SYNTHROID) 75 mcg tablet Take 1 Tab by mouth every morning. Qty: 30 Tab, Refills: 0      zinc oxide-cod liver oil (DESITIN) 40 % paste Apply  to affected area two (2) times a day. Apply to sacrum and groin.   Qty: 28 g, Refills: 0      cephALEXin (KEFLEX) 500 mg capsule Take 1 Cap by mouth two (2) times a day.  Qty: 4 Cap, Refills: 0         CONTINUE these medications which have NOT CHANGED    Details   ARIPiprazole (ABILIFY) 5 mg tablet Take 5 mg by mouth daily. busPIRone (BUSPAR) 5 mg tablet Take 15 mg by mouth three (3) times daily. midodrine (PROAMITINE) 2.5 mg tablet Take 2.5 mg by mouth two (2) times a day. mirtazapine (REMERON) 45 mg tablet Take 45 mg by mouth nightly. acetaminophen (TYLENOL) 325 mg tablet Take 2 Tabs by mouth every six (6) hours as needed for Pain. Qty: 40 Tab, Refills: 0      amiodarone (CORDARONE) 200 mg tablet Take 1 Tab by mouth daily. Indications: VENTRICULAR RATE CONTROL IN ATRIAL FIBRILLATION  Qty: 30 Tab, Refills: 3      aspirin 81 mg chewable tablet Take 1 Tab by mouth daily. Indications: prevention of cerebrovascular accident  Qty: 60 Tab, Refills: 2      ferrous sulfate (IRON) 325 mg (65 mg iron) EC tablet Take 325 mg by mouth every evening. oxybutynin (DITROPAN) 5 mg tablet Take 5 mg by mouth two (2) times a day. lutein 6 mg tab Take 1 Tab by mouth daily. cholecalciferol, vitamin D3, (VITAMIN D3) 2,000 unit tab Take 2,000 Units by mouth every evening. senna (SENOKOT) 8.6 mg tablet Take 2 Tabs by mouth every evening. STOP taking these medications       dilTIAZem (CARDIZEM) 30 mg tablet Comments:   Reason for Stopping:                 Patient Follow Up Instructions: Activity: Activity as tolerated  Diet: Dysphagia diet  Wound Care: None needed    Follow-up with PCP at rehab in 1 week.   Follow-up tests/labs as needed  Follow-up Information     Follow up With Specialties Details Why Contact Info    [address] [place] [address]  [phone]      Natividad Gaitan MD Internal Medicine   [address] [place] [address]  [phone]          ________________________________________________________________    Risk of deterioration: Moderate    Condition at Discharge:  Stable  __________________________________________________________________    Disposition  SNF/LTC    ____________________________________________________________________    Code Status: DNR/DNI  ___________________________________________________________________      Total time in minutes spent coordinating this discharge (includes going over instructions, follow-up, prescriptions, and preparing report for sign off to her PCP) :  45 minutes    Signed:  Annabelle Souza MD

## 2019-09-26 NOTE — DISCHARGE INSTRUCTIONS
HOSPITALIST DISCHARGE INSTRUCTIONS    NAME: Shanice Mathew   :  1929   MRN:  [id number]     Date/Time:  2019 8:06 AM    ADMIT DATE: 2019   DISCHARGE DATE: 2019     Attending Physician: Prema Grimes MD    DISCHARGE DIAGNOSIS:  Severe sepsis due to urinary tract infection  Acute metabolic encephalopathy in setting of hypotension  Acute kidney injury  Atrial flutter with RVR  NSTEMI  Lactic acidosis  Hypokalemia  Hypophosphatemia  Hypernatremia  Hypothyroidism  Severe dementia  Dysphagia    Medications: Per above medication reconciliation. Pain Management: per above medications     Recommended diet: Dysphagia diet-minced    Recommended activity: PT/OT Eval and Treat    Wound care: Apply Desitin to sacrum and groin BID    Indwelling devices:  None    Supplemental Oxygen: None    Required Lab work: per facility routine    Glucose management:  None    Code status: DNR        Outside physician follow up: Follow-up Information     Follow up With Specialties Details Why Contact [address]  [phone]      Roque Marx MD Internal Medicine   [address] [place] [address]  [phone]                 Skilled nursing facility/ SNF MD responsible for above on discharge. Information obtained by :  I understand that if any problems occur once I am at home I am to contact my physician. I understand and acknowledge receipt of the instructions indicated above.                                                                                                                                            Physician's or R.N.'s Signature                                                                  Date/Time                                                                                                                                              Patient or Repres

## 2019-09-26 NOTE — PROGRESS NOTES
0700: Bedside shift change report given to GERMANIA Avilez RN (oncoming nurse) by Heidi Humphrey RN (offgoing nurse). Report included the following information SBAR and Kardex.

## 2019-09-26 NOTE — PROGRESS NOTES
Problem: Falls - Risk of  Goal: *Absence of Falls  Description  Document Bruna Yeboah Fall Risk and appropriate interventions in the flowsheet. Outcome: Progressing Towards Goal  Note:   Fall Risk Interventions:  Mobility Interventions: Bed/chair exit alarm    Mentation Interventions: Bed/chair exit alarm    Medication Interventions: Bed/chair exit alarm    Elimination Interventions: Bed/chair exit alarm, Call light in reach    History of Falls Interventions: Bed/chair exit alarm         Problem: Pressure Injury - Risk of  Goal: *Prevention of pressure injury  Description  Document Seng Scale and appropriate interventions in the flowsheet.   Outcome: Progressing Towards Goal  Note:   Pressure Injury Interventions:  Sensory Interventions: Assess changes in LOC    Moisture Interventions: Absorbent underpads    Activity Interventions: Pressure redistribution bed/mattress(bed type)    Mobility Interventions: Pressure redistribution bed/mattress (bed type)    Nutrition Interventions: Document food/fluid/supplement intake    Friction and Shear Interventions: Apply protective barrier, creams and emollients                Problem: Infection - Risk of, Urinary Catheter-Associated Urinary Tract Infection  Goal: *Absence of infection signs and symptoms  Outcome: Progressing Towards Goal

## 2019-09-30 LAB
BACTERIA SPEC CULT: NORMAL
SERVICE CMNT-IMP: NORMAL

## 2019-10-01 ENCOUNTER — PATIENT OUTREACH (OUTPATIENT)
Dept: CASE MANAGEMENT | Age: 84
End: 2019-10-01

## 2019-10-01 NOTE — PROGRESS NOTES
Community Care Team documentation for patient in [facility]  Initial Follow Up       Patient was discharged to [facility], [facility]. Information included in this progress note has been provided to SNF. Hospital Admission and Diagnosis: [facility] 9/20-9/26 Severe sepsis due to UTI     PCP : Mateo Hodgkin, MD  Ambulatory  or Care Transition Nurse: None  Note routed to [facility]. SNF Attending:  Melony Hui MD    Spoke with SNF team.   PT/OT update: Currently patient is working on self feeding. OT working with patient 5 times a week. Transfers with W/C. LOS/ Disposition: Plan for 2 weeks. Patient is a LTC resident at [address] [place] [address],[address] Team will follow up weekly with [facility] until discharge. Medications were not reconciled and general patient assessment was not completed during this [facility] outreach. Tina Durham, BSN, [address] [facility] Team  [phone]

## 2019-10-08 ENCOUNTER — PATIENT OUTREACH (OUTPATIENT)
Dept: CASE MANAGEMENT | Age: 84
End: 2019-10-08

## 2019-10-08 NOTE — PROGRESS NOTES
Community Care Team Documentation for Patient in [facility]  Subsequent Follow up     Patient remains at [address] ([facility]). See previous [facility] Team notes. Spoke with SNF team.  PT/OT update: Currently working on wheel chair positioning. LOS/ Disposition: Plan for dishcarge in 1 week pending DME orders. Medications were not reconciled and general patient assessment was not completed during this skilled nursing facility outreach. RENÉ Stauffer, [address] Team  [phone]

## 2019-10-15 ENCOUNTER — PATIENT OUTREACH (OUTPATIENT)
Dept: CASE MANAGEMENT | Age: 84
End: 2019-10-15

## 2019-10-15 NOTE — PROGRESS NOTES
Community Care Team Documentation for Patient in [facility]  Subsequent Follow up     Patient remains at [address] ([facility]). See previous [facility] Team notes. Spoke with SNF team.  OT update: Currently receiving OT only. Working on basic daily skills, self feeding, strengthening and conditioning. LOS/ Disposition: Plan for transition to LTC at [facility] 10/21. Medications were not reconciled and general patient assessment was not completed during this skilled nursing facility outreach.

## 2019-10-22 ENCOUNTER — PATIENT OUTREACH (OUTPATIENT)
Dept: CASE MANAGEMENT | Age: 84
End: 2019-10-22

## 2019-10-22 NOTE — PROGRESS NOTES
Community Care Team Documentation for Patient in [facility]  Discharge Note    Patient has been discharged from [address] [facility] ([facility]). See previous [facility] Team notes. PCP : Natividad Gaitan MD    Spoke with SNF team.  Confirmed the patient transitioned to LTC at [facility] on 10/21. Patient has high back wheelchair. Community Care Team will sign off at this time. Medications were not reconciled and general patient assessment was not completed during this skilled nursing facility outreach. Tina Jones, KENDRAN, [address] [facility] Team  [phone]

## (undated) DEVICE — DRAPE,REIN 53X77,STERILE: Brand: MEDLINE

## (undated) DEVICE — BIT DRL L145MM DIA3.2MM QUIK CPL W/O STP REUSE

## (undated) DEVICE — SUTURE VCRL SZ 1 L36IN ABSRB VLT L36MM CT-1 1/2 CIR J347H

## (undated) DEVICE — INTENDED FOR TISSUE SEPARATION, AND OTHER PROCEDURES THAT REQUIRE A SHARP SURGICAL BLADE TO PUNCTURE OR CUT.: Brand: BARD-PARKER ® CARBON RIB-BACK BLADES

## (undated) DEVICE — GUIDEWIRE ORTH DIA2.5MM LOK SMOOTH DRL TIP FLX THRD FOR

## (undated) DEVICE — SOLUTION IRRIG 1000ML H2O STRL BLT

## (undated) DEVICE — SUTURE VCRL SZ 2-0 L36IN ABSRB VLT L36MM CT-1 1/2 CIR J345H

## (undated) DEVICE — ROCKER SWITCH PENCIL BLADE ELECTRODE, HOLSTER: Brand: EDGE

## (undated) DEVICE — DRAPE XR C ARM 41X74IN LF --

## (undated) DEVICE — SCREW BNE L32MM DIA4.5MM PROX CORT TIB S STL ST LOK FULL
Type: IMPLANTABLE DEVICE | Site: LEG | Status: NON-FUNCTIONAL
Removed: 2018-08-13

## (undated) DEVICE — REM POLYHESIVE ADULT PATIENT RETURN ELECTRODE: Brand: VALLEYLAB

## (undated) DEVICE — 6619 2 PTNT ISO SYS INCISE AREA&LT;(&GT;&&LT;)&GT;P: Brand: STERI-DRAPE™ IOBAN™ 2

## (undated) DEVICE — DEVON™ KNEE AND BODY STRAP 60" X 3" (1.5 M X 7.6 CM): Brand: DEVON

## (undated) DEVICE — WATERPROOF, BACTERIA PROOF DRESSING WITH ABSORBENT SEE THROUGH PAD: Brand: OPSITE POST-OP VISIBLE 30X10CM CTN 20

## (undated) DEVICE — Device

## (undated) DEVICE — (D)PREP SKN CHLRAPRP APPL 26ML -- CONVERT TO ITEM 371833

## (undated) DEVICE — INFECTION CONTROL KIT SYS

## (undated) DEVICE — BASIC PACK: Brand: CONVERTORS

## (undated) DEVICE — ZIMMER® STERILE DISPOSABLE TOURNIQUET CUFF WITH PROTECTIVE SLEEVE AND PLC, DUAL PORT, SINGLE BLADDER, 34 IN. (86 CM)

## (undated) DEVICE — 3000CC GUARDIAN II: Brand: GUARDIAN

## (undated) DEVICE — HANDLE LT SNAP ON ULT DURABLE LENS FOR TRUMPF ALC DISPOSABLE

## (undated) DEVICE — STERILE POLYISOPRENE POWDER-FREE SURGICAL GLOVES: Brand: PROTEXIS

## (undated) DEVICE — SLIM BODY SKIN STAPLER: Brand: APPOSE ULC

## (undated) DEVICE — SURGICAL PROCEDURE PACK BASIN MAJ SET CUST NO CAUT

## (undated) DEVICE — SOLUTION IV 1000ML 0.9% SOD CHL

## (undated) DEVICE — ASTOUND STANDARD SURGICAL GOWN, XL: Brand: CONVERTORS

## (undated) DEVICE — SUTURE ABSORBABLE BRAIDED 2-0 CT-1 27 IN UD VICRYL J259H